# Patient Record
Sex: MALE | Race: WHITE | NOT HISPANIC OR LATINO | Employment: OTHER | URBAN - METROPOLITAN AREA
[De-identification: names, ages, dates, MRNs, and addresses within clinical notes are randomized per-mention and may not be internally consistent; named-entity substitution may affect disease eponyms.]

---

## 2017-01-13 ENCOUNTER — OFFICE VISIT (OUTPATIENT)
Dept: CARDIOLOGY | Facility: MEDICAL CENTER | Age: 62
End: 2017-01-13
Payer: COMMERCIAL

## 2017-01-13 VITALS
HEART RATE: 66 BPM | DIASTOLIC BLOOD PRESSURE: 70 MMHG | HEIGHT: 69 IN | BODY MASS INDEX: 34.98 KG/M2 | SYSTOLIC BLOOD PRESSURE: 110 MMHG | OXYGEN SATURATION: 94 % | WEIGHT: 236.2 LBS

## 2017-01-13 DIAGNOSIS — I25.10 CORONARY ARTERY DISEASE INVOLVING NATIVE CORONARY ARTERY OF NATIVE HEART WITHOUT ANGINA PECTORIS: Chronic | ICD-10-CM

## 2017-01-13 DIAGNOSIS — E78.5 DYSLIPIDEMIA: Chronic | ICD-10-CM

## 2017-01-13 DIAGNOSIS — Z95.5 STENTED CORONARY ARTERY: Chronic | ICD-10-CM

## 2017-01-13 DIAGNOSIS — I10 ESSENTIAL HYPERTENSION, BENIGN: Chronic | ICD-10-CM

## 2017-01-13 DIAGNOSIS — I25.5 ISCHEMIC CARDIOMYOPATHY: Chronic | ICD-10-CM

## 2017-01-13 DIAGNOSIS — I25.2 HISTORY OF ACUTE ANTERIOR WALL MI: ICD-10-CM

## 2017-01-13 PROCEDURE — 99214 OFFICE O/P EST MOD 30 MIN: CPT | Performed by: INTERNAL MEDICINE

## 2017-01-13 ASSESSMENT — ENCOUNTER SYMPTOMS
CONSTITUTIONAL NEGATIVE: 1
NAUSEA: 0
SHORTNESS OF BREATH: 0
WEAKNESS: 0
PALPITATIONS: 0
VOMITING: 0

## 2017-01-13 NOTE — PROGRESS NOTES
Subjective:   Zenon Grijalva is a 61 y.o. male who presents today for follow-up of an extensive anterior MI in 2008. At that time he had a 3.0×28 mm Vision bare metal stent placed in his LAD. He's had no angina at all.  The patient has known LV dysfunction and is treated with carvedilol and ARB. He has chronic mild exertional dyspnea that has not changed over the last year. He hasn't had an echo in about 5 years. He is also due for lab work.  The patient is planning another trip to [place] for [employer] later this year.    Past Medical History   Diagnosis Date   • Essential hypertension, benign 7/2/2012   • CAD (coronary artery disease) 7/2/2012   • Stented coronary artery 7/2/2012   • Chronic anticoagulation 7/2/2012   • Dyslipidemia 7/2/2012   • Ischemic cardiomyopathy 7/2/2012   • Left ventricular thrombosis (HCC) 7/2/2012   • Pericarditis 7/2/2012     Past Surgical History   Procedure Laterality Date   • Carpal tunnel release       History reviewed. No pertinent family history.  History   Smoking status   • Never Smoker    Smokeless tobacco   • Not on file     No Known Allergies  Outpatient Encounter Prescriptions as of 1/13/2017   Medication Sig Dispense Refill   • simvastatin (ZOCOR) 40 MG Tab Take 1 Tab by mouth every evening. 90 Tab 3   • irbesartan (AVAPRO) 150 MG Tab Take 1 Tab by mouth every day. 90 Tab 3   • carvedilol (COREG) 25 MG Tab Take 0.5 Tabs by mouth 2 times a day, with meals. 90 Tab 3   • aspirin EC (ECOTRIN) 81 MG TBEC Take 81 mg by mouth every day.     • nitroglycerin (NITROSTAT) 0.4 MG SUBL Place 1 Tab under tongue every 5 minutes as needed for Chest Pain. 24 Each 2     No facility-administered encounter medications on file as of 1/13/2017.     Review of Systems   Constitutional: Negative.  Negative for malaise/fatigue.   Respiratory: Negative for shortness of breath.         Dyspnea on exertion   Cardiovascular: Negative for chest pain, palpitations and leg swelling.  "  Gastrointestinal: Negative for nausea and vomiting.   Neurological: Negative for weakness.        Objective:   /70 mmHg  Pulse 66  Ht 1.753 m (5' 9.02\")  Wt 107.14 kg (236 lb 3.2 oz)  BMI 34.86 kg/m2  SpO2 94%    Physical Exam   Constitutional: He is oriented to person, place, and time. He appears well-developed and well-nourished. No distress.   HENT:   Head: Atraumatic.   Eyes: Conjunctivae and EOM are normal. Pupils are equal, round, and reactive to light.   Neck: Neck supple. No JVD present.   Cardiovascular: Normal rate and regular rhythm.    No murmur heard.  Pulmonary/Chest: Effort normal and breath sounds normal. No respiratory distress. He has no wheezes. He has no rales.   Abdominal: Soft. There is no tenderness.   Musculoskeletal: He exhibits no edema.   Neurological: He is alert and oriented to person, place, and time.   Skin: Skin is warm and dry. He is not diaphoretic.       Assessment:     1. Coronary artery disease involving native coronary artery of native heart without angina pectoris     2. Dyslipidemia  COMP METABOLIC PANEL    LIPID PROFILE   3. Essential hypertension, benign  COMP METABOLIC PANEL    LIPID PROFILE    ECHOCARDIOGRAM COMP W/O CONT   4. Ischemic cardiomyopathy  ECHOCARDIOGRAM COMP W/O CONT   5. Stented coronary artery  ECHOCARDIOGRAM COMP W/O CONT   6. History of acute anterior wall MI         Medical Decision Making:  Today's Assessment / Status / Plan:     The patient is a good fluid balance. He does have chronic mild exertional dyspnea. We will reevaluate his LV function with echo. Consider adding a small dose of diuretic to his regimen based on echo results. Check laboratory notify him of results.  "

## 2017-01-13 NOTE — Clinical Note
[facility]   [address], [address]  [place] [address]  Phone: [phone]  Fax: [phone]              Zenon Grijalva  1955    Encounter Date: 1/13/2017    Mckay Rodas M.D.          PROGRESS NOTE:  Subjective:   Zenon Grijalva is a 61 y.o. male who presents today for follow-up of an extensive anterior MI in 2008. At that time he had a 3.0×28 mm Vision bare metal stent placed in his LAD. He's had no angina at all.  The patient has known LV dysfunction and is treated with carvedilol and ARB. He has chronic mild exertional dyspnea that has not changed over the last year. He hasn't had an echo in about 5 years. He is also due for lab work.  The patient is planning another trip to [place] for [employer] later this year.    Past Medical History   Diagnosis Date   • Essential hypertension, benign 7/2/2012   • CAD (coronary artery disease) 7/2/2012   • Stented coronary artery 7/2/2012   • Chronic anticoagulation 7/2/2012   • Dyslipidemia 7/2/2012   • Ischemic cardiomyopathy 7/2/2012   • Left ventricular thrombosis (HCC) 7/2/2012   • Pericarditis 7/2/2012     Past Surgical History   Procedure Laterality Date   • Carpal tunnel release       History reviewed. No pertinent family history.  History   Smoking status   • Never Smoker    Smokeless tobacco   • Not on file     No Known Allergies  Outpatient Encounter Prescriptions as of 1/13/2017   Medication Sig Dispense Refill   • simvastatin (ZOCOR) 40 MG Tab Take 1 Tab by mouth every evening. 90 Tab 3   • irbesartan (AVAPRO) 150 MG Tab Take 1 Tab by mouth every day. 90 Tab 3   • carvedilol (COREG) 25 MG Tab Take 0.5 Tabs by mouth 2 times a day, with meals. 90 Tab 3   • aspirin EC (ECOTRIN) 81 MG TBEC Take 81 mg by mouth every day.     • nitroglycerin (NITROSTAT) 0.4 MG SUBL Place 1 Tab under tongue every 5 minutes as needed for Chest Pain. 24 Each 2     No facility-administered encounter medications on file  "as of 1/13/2017.     Review of Systems   Constitutional: Negative.  Negative for malaise/fatigue.   Respiratory: Negative for shortness of breath.         Dyspnea on exertion   Cardiovascular: Negative for chest pain, palpitations and leg swelling.   Gastrointestinal: Negative for nausea and vomiting.   Neurological: Negative for weakness.        Objective:   /70 mmHg  Pulse 66  Ht 1.753 m (5' 9.02\")  Wt 107.14 kg (236 lb 3.2 oz)  BMI 34.86 kg/m2  SpO2 94%    Physical Exam   Constitutional: He is oriented to person, place, and time. He appears well-developed and well-nourished. No distress.   HENT:   Head: Atraumatic.   Eyes: Conjunctivae and EOM are normal. Pupils are equal, round, and reactive to light.   Neck: Neck supple. No JVD present.   Cardiovascular: Normal rate and regular rhythm.    No murmur heard.  Pulmonary/Chest: Effort normal and breath sounds normal. No respiratory distress. He has no wheezes. He has no rales.   Abdominal: Soft. There is no tenderness.   Musculoskeletal: He exhibits no edema.   Neurological: He is alert and oriented to person, place, and time.   Skin: Skin is warm and dry. He is not diaphoretic.       Assessment:     1. Coronary artery disease involving native coronary artery of native heart without angina pectoris     2. Dyslipidemia  COMP METABOLIC PANEL    LIPID PROFILE   3. Essential hypertension, benign  COMP METABOLIC PANEL    LIPID PROFILE    ECHOCARDIOGRAM COMP W/O CONT   4. Ischemic cardiomyopathy  ECHOCARDIOGRAM COMP W/O CONT   5. Stented coronary artery  ECHOCARDIOGRAM COMP W/O CONT   6. History of acute anterior wall MI         Medical Decision Making:  Today's Assessment / Status / Plan:     The patient is a good fluid balance. He does have chronic mild exertional dyspnea. We will reevaluate his LV function with echo. Consider adding a small dose of diuretic to his regimen based on echo results. Check laboratory notify him of results.      Adalgisa Hankins, " M.D.  [address] [place] [id number]  VIA Facsimile: [phone]

## 2017-01-13 NOTE — MR AVS SNAPSHOT
"Zenon Grijalva   2017 7:40 AM   Office Visit   MRN: [id number]    Department:  [facility]   Dept Phone:  [phone]    Description:  Male : 1955   Provider:  Mckay Rodas M.D.           Reason for Visit     Follow-Up           Allergies as of 2017     No Known Allergies      You were diagnosed with     Coronary artery disease involving native coronary artery of native heart without angina pectoris   [7381184]       Dyslipidemia   [265744]       Essential hypertension, benign   [401.1.ICD-9-CM]       Ischemic cardiomyopathy   [814930]       Stented coronary artery   [739800]         Vital Signs     Blood Pressure Pulse Height Weight Body Mass Index Oxygen Saturation    110/70 mmHg 66 1.753 m (5' 9.02\") 107.14 kg (236 lb 3.2 oz) 34.86 kg/m2 94%    Smoking Status                   Never Smoker            Basic Information     Date Of Birth Sex Race Ethnicity Preferred Language    1955 Male Unknown, White Unknown English      Your appointments     2017  7:15 AM   ECHO with ECHO [facility] EXAM 10   ECHOCARDIOLOGY [facility] ([facility])    [facility] [facility] [address]   [phone]           No prep              Problem List              ICD-10-CM Priority Class Noted - Resolved    Essential hypertension, benign (Chronic) I10   2012 - Present    CAD (coronary artery disease) (Chronic) I25.10   2012 - Present    Stented coronary artery (Chronic) Z95.5   2012 - Present    Dyslipidemia (Chronic) E78.5   2012 - Present    Ischemic cardiomyopathy (Chronic) I25.5   2012 - Present    Pericarditis (Chronic) I31.9   2012 - Present    Acute myocardial infarction of other anterior wall, initial episode of care (Chronic) I21.09   2012 - Present      Health Maintenance        Date Due Completion Dates    IMM DTaP/Tdap/Td Vaccine (1 - Tdap) 1974 ---    COLONOSCOPY 2005 ---    IMM ZOSTER VACCINE 2015 ---    IMM INFLUENZA (1) 2016 ---         "   Current Immunizations     No immunizations on file.      Below and/or attached are the medications your provider expects you to take. Review all of your home medications and newly ordered medications with your provider and/or pharmacist. Follow medication instructions as directed by your provider and/or pharmacist. Please keep your medication list with you and share with your provider. Update the information when medications are discontinued, doses are changed, or new medications (including over-the-counter products) are added; and carry medication information at all times in the event of emergency situations     Allergies:  No Known Allergies          Medications  Valid as of: January 13, 2017 -  8:12 AM    Generic Name Brand Name Tablet Size Instructions for use    Aspirin (Tablet Delayed Response) ECOTRIN 81 MG Take 81 mg by mouth every day.        Carvedilol (Tab) COREG 25 MG Take 0.5 Tabs by mouth 2 times a day, with meals.        Irbesartan (Tab) AVAPRO 150 MG Take 1 Tab by mouth every day.        Nitroglycerin (SL Tab) NITROSTAT 0.4 MG Place 1 Tab under tongue every 5 minutes as needed for Chest Pain.        Simvastatin (Tab) ZOCOR 40 MG Take 1 Tab by mouth every evening.        .                 Medicines prescribed today were sent to:     [facility] #[id number] - [place], [place] - [address] AT [address] [address]    Phone: [phone] Fax: [phone]    Open 24 Hours?: No      Medication refill instructions:       If your prescription bottle indicates you have medication refills left, it is not necessary to call your provider’s office. Please contact your pharmacy and they will refill your medication.    If your prescription bottle indicates you do not have any refills left, you may request refills at any time through one of the following ways: The online [employer] system (except Urgent Care), by calling your provider’s office, or by asking your pharmacy to  contact your provider’s office with a refill request. Medication refills are processed only during regular business hours and may not be available until the next business day. Your provider may request additional information or to have a follow-up visit with you prior to refilling your medication.   *Please Note: Medication refills are assigned a new Rx number when refilled electronically. Your pharmacy may indicate that no refills were authorized even though a new prescription for the same medication is available at the pharmacy. Please request the medicine by name with the pharmacy before contacting your provider for a refill.        Your To Do List     Future Labs/Procedures Complete By Expires    COMP METABOLIC PANEL  As directed 7/15/2017    ECHOCARDIOGRAM COMP W/O CONT  As directed 1/14/2018    LIPID PROFILE  As directed 7/15/2017         MyChart Status: Patient Declined

## 2017-01-31 ENCOUNTER — APPOINTMENT (OUTPATIENT)
Dept: CARDIOLOGY | Facility: MEDICAL CENTER | Age: 62
End: 2017-01-31
Attending: INTERNAL MEDICINE
Payer: COMMERCIAL

## 2017-03-01 LAB
ALBUMIN SERPL-MCNC: 4.3 G/DL (ref 3.6–4.8)
ALBUMIN/GLOB SERPL: 1.8 {RATIO} (ref 1.1–2.5)
ALP SERPL-CCNC: 58 IU/L (ref 39–117)
ALT SERPL-CCNC: 24 IU/L (ref 0–44)
AST SERPL-CCNC: 19 IU/L (ref 0–40)
BILIRUB SERPL-MCNC: 1.4 MG/DL (ref 0–1.2)
BUN SERPL-MCNC: 17 MG/DL (ref 8–27)
BUN/CREAT SERPL: 19 (ref 10–22)
CALCIUM SERPL-MCNC: 9.2 MG/DL (ref 8.6–10.2)
CHLORIDE SERPL-SCNC: 101 MMOL/L (ref 96–106)
CHOLEST SERPL-MCNC: 125 MG/DL (ref 100–199)
CO2 SERPL-SCNC: 24 MMOL/L (ref 18–29)
COMMENT 011824: NORMAL
CREAT SERPL-MCNC: 0.88 MG/DL (ref 0.76–1.27)
GLOBULIN SER CALC-MCNC: 2.4 G/DL (ref 1.5–4.5)
GLUCOSE SERPL-MCNC: 105 MG/DL (ref 65–99)
HDLC SERPL-MCNC: 48 MG/DL
LDLC SERPL CALC-MCNC: 62 MG/DL (ref 0–99)
POTASSIUM SERPL-SCNC: 5.2 MMOL/L (ref 3.5–5.2)
PROT SERPL-MCNC: 6.7 G/DL (ref 6–8.5)
SODIUM SERPL-SCNC: 140 MMOL/L (ref 134–144)
TRIGL SERPL-MCNC: 74 MG/DL (ref 0–149)
VLDLC SERPL CALC-MCNC: 15 MG/DL (ref 5–40)

## 2017-03-02 ENCOUNTER — TELEPHONE (OUTPATIENT)
Dept: CARDIOLOGY | Facility: MEDICAL CENTER | Age: 62
End: 2017-03-02

## 2017-03-02 NOTE — TELEPHONE ENCOUNTER
----- Message from Sanaz Fernandez R.N. sent at 3/2/2017  3:39 PM PST -----      ----- Message -----     From: Mckay Rodas M.D.     Sent: 3/2/2017   7:31 AM       To: Jo-Ann Abraham L.P.N.    LDL is perfect at 62. And non HDL cholesterol is at target too. Continue same meds.

## 2017-04-20 ENCOUNTER — HOSPITAL ENCOUNTER (OUTPATIENT)
Dept: CARDIOLOGY | Facility: MEDICAL CENTER | Age: 62
End: 2017-04-20
Attending: INTERNAL MEDICINE
Payer: COMMERCIAL

## 2017-04-20 DIAGNOSIS — Z95.5 STENTED CORONARY ARTERY: Chronic | ICD-10-CM

## 2017-04-20 DIAGNOSIS — I10 ESSENTIAL HYPERTENSION, BENIGN: Chronic | ICD-10-CM

## 2017-04-20 DIAGNOSIS — I25.5 ISCHEMIC CARDIOMYOPATHY: Chronic | ICD-10-CM

## 2017-04-20 PROCEDURE — 93306 TTE W/DOPPLER COMPLETE: CPT

## 2017-04-20 PROCEDURE — 93306 TTE W/DOPPLER COMPLETE: CPT | Mod: 26 | Performed by: INTERNAL MEDICINE

## 2017-04-21 LAB
LV EJECT FRACT  99904: 50
LV EJECT FRACT MOD 2C 99903: 50.22
LV EJECT FRACT MOD 4C 99902: 48.14
LV EJECT FRACT MOD BP 99901: 47.89

## 2017-05-04 ENCOUNTER — TELEPHONE (OUTPATIENT)
Dept: CARDIOLOGY | Facility: MEDICAL CENTER | Age: 62
End: 2017-05-04

## 2017-05-04 NOTE — TELEPHONE ENCOUNTER
----- Message from Mckay Rodas M.D. sent at 5/4/2017  7:59 AM PDT -----  Echo shows old MI scar with lack of contractility at apex.  Overall, echo not too bad.  EF is 50%, which is mildly reduced compared to a normal Echo.  Good news, shows recovery of function over the years

## 2017-05-30 DIAGNOSIS — I25.110 CORONARY ARTERY DISEASE WITH UNSTABLE ANGINA PECTORIS, UNSPECIFIED VESSEL OR LESION TYPE, UNSPECIFIED WHETHER NATIVE OR TRANSPLANTED HEART (HCC): ICD-10-CM

## 2017-05-30 DIAGNOSIS — R07.89 OTHER CHEST PAIN: ICD-10-CM

## 2017-05-30 DIAGNOSIS — I25.10 CORONARY ARTERY DISEASE INVOLVING NATIVE HEART, ANGINA PRESENCE UNSPECIFIED, UNSPECIFIED VESSEL OR LESION TYPE: Chronic | ICD-10-CM

## 2017-05-30 RX ORDER — NITROGLYCERIN 0.4 MG/1
0.4 TABLET SUBLINGUAL PRN
Qty: 25 TAB | Refills: 3 | Status: ON HOLD | OUTPATIENT
Start: 2017-05-30 | End: 2018-11-27

## 2017-05-30 RX ORDER — NITROGLYCERIN 0.4 MG/1
0.4 TABLET SUBLINGUAL PRN
Qty: 25 TAB | Refills: 3 | Status: CANCELLED | OUTPATIENT
Start: 2017-05-30

## 2017-12-11 DIAGNOSIS — I10 ESSENTIAL HYPERTENSION, BENIGN: ICD-10-CM

## 2017-12-11 DIAGNOSIS — E78.5 HYPERLIPIDEMIA, UNSPECIFIED HYPERLIPIDEMIA TYPE: ICD-10-CM

## 2017-12-11 DIAGNOSIS — I10 ESSENTIAL HYPERTENSION: ICD-10-CM

## 2017-12-13 RX ORDER — SIMVASTATIN 40 MG
TABLET ORAL
Qty: 90 TAB | Refills: 0 | Status: SHIPPED | OUTPATIENT
Start: 2017-12-13 | End: 2018-01-16 | Stop reason: SDUPTHER

## 2017-12-13 RX ORDER — IRBESARTAN 150 MG/1
TABLET ORAL
Qty: 90 TAB | Refills: 0 | Status: SHIPPED | OUTPATIENT
Start: 2017-12-13 | End: 2018-01-16 | Stop reason: SDUPTHER

## 2017-12-13 RX ORDER — CARVEDILOL 25 MG/1
TABLET ORAL
Qty: 90 TAB | Refills: 0 | Status: SHIPPED | OUTPATIENT
Start: 2017-12-13 | End: 2018-01-16 | Stop reason: SDUPTHER

## 2018-01-17 DIAGNOSIS — E78.5 DYSLIPIDEMIA: Chronic | ICD-10-CM

## 2018-01-17 DIAGNOSIS — I10 ESSENTIAL HYPERTENSION, BENIGN: Chronic | ICD-10-CM

## 2018-01-23 LAB
ALBUMIN SERPL-MCNC: 4.4 G/DL (ref 3.6–4.8)
ALBUMIN/GLOB SERPL: 1.9 {RATIO} (ref 1.2–2.2)
ALP SERPL-CCNC: 69 IU/L (ref 39–117)
ALT SERPL-CCNC: 31 IU/L (ref 0–44)
AST SERPL-CCNC: 22 IU/L (ref 0–40)
BILIRUB SERPL-MCNC: 1.3 MG/DL (ref 0–1.2)
BUN SERPL-MCNC: 17 MG/DL (ref 8–27)
BUN/CREAT SERPL: 20 (ref 10–24)
CALCIUM SERPL-MCNC: 9.4 MG/DL (ref 8.6–10.2)
CHLORIDE SERPL-SCNC: 100 MMOL/L (ref 96–106)
CHOLEST SERPL-MCNC: 125 MG/DL (ref 100–199)
CO2 SERPL-SCNC: 24 MMOL/L (ref 18–29)
COMMENT 011824: NORMAL
CREAT SERPL-MCNC: 0.86 MG/DL (ref 0.76–1.27)
GLOBULIN SER CALC-MCNC: 2.3 G/DL (ref 1.5–4.5)
GLUCOSE SERPL-MCNC: 98 MG/DL (ref 65–99)
HDLC SERPL-MCNC: 43 MG/DL
IF AFRICAN AMERICAN  100797: 107 ML/MIN/1.73
IF NON AFRICAN AMER 100791: 93 ML/MIN/1.73
LDLC SERPL CALC-MCNC: 63 MG/DL (ref 0–99)
POTASSIUM SERPL-SCNC: 5.1 MMOL/L (ref 3.5–5.2)
PROT SERPL-MCNC: 6.7 G/DL (ref 6–8.5)
SODIUM SERPL-SCNC: 139 MMOL/L (ref 134–144)
TRIGL SERPL-MCNC: 96 MG/DL (ref 0–149)
VLDLC SERPL CALC-MCNC: 19 MG/DL (ref 5–40)

## 2018-01-24 ENCOUNTER — TELEPHONE (OUTPATIENT)
Dept: CARDIOLOGY | Facility: MEDICAL CENTER | Age: 63
End: 2018-01-24

## 2018-01-24 NOTE — TELEPHONE ENCOUNTER
----- Message from Sanaz Fernandez R.N. sent at 1/24/2018  8:59 AM PST -----      ----- Message -----  From: Mckay Rodas M.D.  Sent: 1/24/2018   8:44 AM  To: Jo-Ann Abraham L.P.N.    Labs reviewed, and chemistry panel and lipid panels are both normal.  Continue same medications.

## 2018-02-01 ENCOUNTER — OFFICE VISIT (OUTPATIENT)
Dept: CARDIOLOGY | Facility: MEDICAL CENTER | Age: 63
End: 2018-02-01
Payer: COMMERCIAL

## 2018-02-01 VITALS
OXYGEN SATURATION: 96 % | HEIGHT: 69 IN | DIASTOLIC BLOOD PRESSURE: 70 MMHG | BODY MASS INDEX: 34.66 KG/M2 | SYSTOLIC BLOOD PRESSURE: 110 MMHG | HEART RATE: 68 BPM | WEIGHT: 234 LBS

## 2018-02-01 DIAGNOSIS — I25.2 HISTORY OF ACUTE ANTERIOR WALL MI: ICD-10-CM

## 2018-02-01 DIAGNOSIS — E78.5 DYSLIPIDEMIA: Chronic | ICD-10-CM

## 2018-02-01 DIAGNOSIS — I25.5 ISCHEMIC CARDIOMYOPATHY: Chronic | ICD-10-CM

## 2018-02-01 DIAGNOSIS — I25.10 CORONARY ARTERY DISEASE INVOLVING NATIVE CORONARY ARTERY OF NATIVE HEART WITHOUT ANGINA PECTORIS: Chronic | ICD-10-CM

## 2018-02-01 PROCEDURE — 99214 OFFICE O/P EST MOD 30 MIN: CPT | Performed by: INTERNAL MEDICINE

## 2018-02-01 ASSESSMENT — ENCOUNTER SYMPTOMS
CONSTITUTIONAL NEGATIVE: 1
VOMITING: 0
PALPITATIONS: 0
SHORTNESS OF BREATH: 0
NAUSEA: 0
WEAKNESS: 0

## 2018-02-01 NOTE — PROGRESS NOTES
Subjective:   Chief complaint: Status post anterior wall MI 2008.  Zenon Grijalva is a 61 y.o. male who presents today for follow-up of an extensive anterior MI in 2008. At that time he had a 3.0×28 mm Vision bare metal stent placed in his LAD.   Most recent echo year ago showed EF of 50% with septal akinesis. He is physically active and enjoys spell hunting. He has some residual exertional dyspnea. The patient is on appropriate drugs for blood pressure and LV dysfunction. Recent lab was reviewed with him. LDL is at target. He is due for stress testing. No other interval health issues.    Past Medical History:   Diagnosis Date   • CAD (coronary artery disease) 7/2/2012   • Chronic anticoagulation 7/2/2012   • Dyslipidemia 7/2/2012   • Essential hypertension, benign 7/2/2012   • Ischemic cardiomyopathy 7/2/2012   • Left ventricular thrombosis 7/2/2012   • Pericarditis 7/2/2012   • Stented coronary artery 7/2/2012     Past Surgical History:   Procedure Laterality Date   • CARPAL TUNNEL RELEASE       History reviewed. No pertinent family history.  History   Smoking Status   • Never Smoker   Smokeless Tobacco   • Never Used     No Known Allergies  Outpatient Encounter Prescriptions as of 2/1/2018   Medication Sig Dispense Refill   • irbesartan (AVAPRO) 150 MG Tab TAKE 1 TABLET BY MOUTH ONCE DAILY 90 Tab 0   • carvedilol (COREG) 25 MG Tab TAKE 1/2 TABLET BY MOUTH TWICE A DAY WITH MEALS 90 Tab 0   • simvastatin (ZOCOR) 40 MG Tab TAKE 1 TABLET BY MOUTH EVERY EVENING 90 Tab 0   • nitroglycerin (NITROSTAT) 0.4 MG SL Tab Place 1 Tab under tongue as needed for Chest Pain. Every 5 min. Up to 3 doses. 25 Tab 3   • aspirin EC (ECOTRIN) 81 MG TBEC Take 81 mg by mouth every day.       No facility-administered encounter medications on file as of 2/1/2018.      Review of Systems   Constitutional: Negative.  Negative for malaise/fatigue.   Respiratory: Negative for shortness of breath.         Dyspnea on exertion   Cardiovascular:  "Negative for chest pain, palpitations and leg swelling.   Gastrointestinal: Negative for nausea and vomiting.   Neurological: Negative for weakness.        Objective:   /70   Pulse 68   Ht 1.753 m (5' 9\")   Wt 106.1 kg (234 lb)   SpO2 96%   BMI 34.56 kg/m²     Physical Exam   Constitutional: He is oriented to person, place, and time. He appears well-developed and well-nourished. No distress.   HENT:   Head: Atraumatic.   Eyes: Conjunctivae and EOM are normal. Pupils are equal, round, and reactive to light.   Neck: Neck supple. No JVD present.   Cardiovascular: Normal rate and regular rhythm.    No murmur heard.  Pulmonary/Chest: Effort normal and breath sounds normal. No respiratory distress. He has no wheezes. He has no rales.   Abdominal: Soft. There is no tenderness.   Musculoskeletal: He exhibits no edema.   Neurological: He is alert and oriented to person, place, and time.   Skin: Skin is warm and dry. He is not diaphoretic.       Assessment:     1. Coronary artery disease involving native coronary artery of native heart without angina pectoris  NM-CARDIAC PET   2. History of acute anterior wall MI  NM-CARDIAC PET   3. Ischemic cardiomyopathy  NM-CARDIAC PET   4. Dyslipidemia         Medical Decision Making:  Today's Assessment / Status / Plan:     He is now over 7 years post anterior wall MI and doing well. EF has recovered fairly well. The patient does have some residual exercise intolerance. A myocardial PET will be ordered. Medications reviewed. Return in one year    "

## 2018-04-13 DIAGNOSIS — I10 ESSENTIAL HYPERTENSION: ICD-10-CM

## 2018-04-13 DIAGNOSIS — E78.5 HYPERLIPIDEMIA, UNSPECIFIED HYPERLIPIDEMIA TYPE: ICD-10-CM

## 2018-04-13 RX ORDER — CARVEDILOL 25 MG/1
12.5 TABLET ORAL 2 TIMES DAILY WITH MEALS
Qty: 90 TAB | Refills: 3 | OUTPATIENT
Start: 2018-04-13 | End: 2019-04-15 | Stop reason: SDUPTHER

## 2018-04-13 RX ORDER — SIMVASTATIN 40 MG
40 TABLET ORAL EVERY EVENING
Qty: 90 TAB | Refills: 3 | OUTPATIENT
Start: 2018-04-13 | End: 2019-04-15 | Stop reason: SDUPTHER

## 2018-06-20 DIAGNOSIS — I10 ESSENTIAL HYPERTENSION, BENIGN: ICD-10-CM

## 2018-06-20 RX ORDER — IRBESARTAN 150 MG/1
TABLET ORAL
Qty: 90 TAB | Refills: 2 | Status: SHIPPED | OUTPATIENT
Start: 2018-06-20 | End: 2018-10-03

## 2018-10-03 ENCOUNTER — HOSPITAL ENCOUNTER (OUTPATIENT)
Dept: RADIOLOGY | Facility: MEDICAL CENTER | Age: 63
End: 2018-10-03
Attending: NURSE PRACTITIONER
Payer: COMMERCIAL

## 2018-10-03 ENCOUNTER — APPOINTMENT (OUTPATIENT)
Dept: RADIOLOGY | Facility: MEDICAL CENTER | Age: 63
End: 2018-10-03
Attending: EMERGENCY MEDICINE
Payer: COMMERCIAL

## 2018-10-03 ENCOUNTER — HOSPITAL ENCOUNTER (EMERGENCY)
Facility: MEDICAL CENTER | Age: 63
End: 2018-10-03
Attending: EMERGENCY MEDICINE
Payer: COMMERCIAL

## 2018-10-03 ENCOUNTER — OFFICE VISIT (OUTPATIENT)
Dept: URGENT CARE | Facility: PHYSICIAN GROUP | Age: 63
End: 2018-10-03
Payer: COMMERCIAL

## 2018-10-03 VITALS
TEMPERATURE: 97.3 F | DIASTOLIC BLOOD PRESSURE: 58 MMHG | OXYGEN SATURATION: 95 % | HEIGHT: 69 IN | RESPIRATION RATE: 18 BRPM | BODY MASS INDEX: 33.33 KG/M2 | HEART RATE: 64 BPM | WEIGHT: 225 LBS | SYSTOLIC BLOOD PRESSURE: 104 MMHG

## 2018-10-03 VITALS
SYSTOLIC BLOOD PRESSURE: 137 MMHG | HEART RATE: 60 BPM | WEIGHT: 236.77 LBS | OXYGEN SATURATION: 95 % | DIASTOLIC BLOOD PRESSURE: 84 MMHG | RESPIRATION RATE: 16 BRPM | HEIGHT: 69 IN | TEMPERATURE: 97.7 F | BODY MASS INDEX: 35.07 KG/M2

## 2018-10-03 DIAGNOSIS — S49.91XA INJURY OF RIGHT SHOULDER, INITIAL ENCOUNTER: ICD-10-CM

## 2018-10-03 DIAGNOSIS — S43.004A DISLOCATION OF RIGHT SHOULDER JOINT, INITIAL ENCOUNTER: ICD-10-CM

## 2018-10-03 PROCEDURE — 99203 OFFICE O/P NEW LOW 30 MIN: CPT | Performed by: NURSE PRACTITIONER

## 2018-10-03 PROCEDURE — 99284 EMERGENCY DEPT VISIT MOD MDM: CPT

## 2018-10-03 PROCEDURE — 73030 X-RAY EXAM OF SHOULDER: CPT | Mod: RT

## 2018-10-03 PROCEDURE — 36415 COLL VENOUS BLD VENIPUNCTURE: CPT

## 2018-10-03 PROCEDURE — 23650 CLTX SHO DSLC W/MNPJ WO ANES: CPT

## 2018-10-03 RX ORDER — SODIUM CHLORIDE 9 MG/ML
1000 INJECTION, SOLUTION INTRAVENOUS ONCE
Status: DISCONTINUED | OUTPATIENT
Start: 2018-10-03 | End: 2018-10-03

## 2018-10-03 ASSESSMENT — ENCOUNTER SYMPTOMS
RESPIRATORY NEGATIVE: 1
PSYCHIATRIC NEGATIVE: 1
NEUROLOGICAL NEGATIVE: 1
BACK PAIN: 0
CARDIOVASCULAR NEGATIVE: 1
GASTROINTESTINAL NEGATIVE: 1
FALLS: 1
NECK PAIN: 0
CONSTITUTIONAL NEGATIVE: 1

## 2018-10-03 ASSESSMENT — PAIN SCALES - GENERAL: PAINLEVEL: 8=MODERATE-SEVERE PAIN

## 2018-10-03 NOTE — ED NOTES
Dr Herndon successfully reduced pt's shoulder without sedation. Pt to radiology for post reduction image.

## 2018-10-03 NOTE — ED PROVIDER NOTES
ED Provider Note    CHIEF COMPLAINT  Chief Complaint   Patient presents with   • Dislocation     right shoulder this am tripped over a gas hose.       HPI  Zenon Grijalva is a 63 y.o. male who presents to the emergency department complaining of dislocation of the right shoulder.  This morning the patient was at the gas station and he tripped over 1 of the hoses at the gas pump and fell injuring his right shoulder.  He went to an urgent care and had an x-ray showing an anterior dislocation and was told to come to the emergency department for further care.  Otherwise he feels he is uninjured.  The patient had coffee and cereal for breakfast at 6:30 AM.    REVIEW OF SYSTEMS no loss of consciousness or head injury no other extremity injuries.    PAST MEDICAL HISTORY  Past Medical History:   Diagnosis Date   • CAD (coronary artery disease) 7/2/2012   • Chronic anticoagulation 7/2/2012   • Dyslipidemia 7/2/2012   • Essential hypertension, benign 7/2/2012   • Ischemic cardiomyopathy 7/2/2012   • Left ventricular thrombosis 7/2/2012   • Pericarditis 7/2/2012   • Stented coronary artery 7/2/2012       FAMILY HISTORY  No family history on file.    SOCIAL HISTORY  Social History     Social History   • Marital status: Single     Spouse name: N/A   • Number of children: N/A   • Years of education: N/A     Social History Main Topics   • Smoking status: Never Smoker   • Smokeless tobacco: Never Used   • Alcohol use Not on file   • Drug use: Unknown   • Sexual activity: Not on file     Other Topics Concern   • Not on file     Social History Narrative   • No narrative on file       SURGICAL HISTORY  Past Surgical History:   Procedure Laterality Date   • CARPAL TUNNEL RELEASE         CURRENT MEDICATIONS  Home Medications    **Home medications have not yet been reviewed for this encounter**         ALLERGIES  No Known Allergies    PHYSICAL EXAM  VITAL SIGNS: /84   Pulse 60   Temp 36.5 °C (97.7 °F)   Resp 16   Ht 1.753 m  "(5' 9\")   Wt 107.4 kg (236 lb 12.4 oz)   SpO2 95%   BMI 34.97 kg/m²    Oxygen saturation is interpreted as adequate  Constitutional: Awake and uncomfortable but otherwise nontoxic-appearing  HENT: No sign of trauma to the head  Eyes: No erythema or discharge or jaundice  Neck: Trachea midline no JVD  Musculoskeletal: There is an obvious sulcus sign and deformity of the right shoulder.  There are no breaks in the skin I do not see any other extremity trauma  Neurologic: Awake verbal ambulatory moving the other extremities without difficulty    PROCEDURES  The patient was placed in a position sitting on the edge of the gurney forward and upright and I applied massage to the trapezius deltoid and biceps musculature and very gentle traction in this position and the shoulder reduced.  The patient was much much more comfortable following this.  He was placed in a shoulder immobilizer.    MEDICAL DECISION MAKING and DISPOSITION  Follow-up x-ray shows good position according to radiology report.  Repeat physical exam shows the sulcus sign has resolved the shoulder appears to be in anatomic position and the patient is much more comfortable.  I reviewed all the above with the patient have given him copies of his x-rays and I think it is safe for him to go home at this time I recommended Tylenol and Motrin as well as warm and cold packs if needed for discomfort he is to keep the shoulder at rest and protected and he is to call Dr. Kaur's office this morning and arrange office recheck as soon as possible this week    IMPRESSION  1.  Reduction of right shoulder dislocation in the emergency department         Electronically signed by: Viktor Herndon, 10/3/2018 11:30 AM      "

## 2018-10-03 NOTE — DISCHARGE INSTRUCTIONS
Keep the arm at rest and protected using the shoulder immobilizer.  You may remove this for showering but then replace it right away.  Use Tylenol and Motrin as well as warm and cold packs if needed for discomfort.

## 2018-10-03 NOTE — ED NOTES
Pt discharged with written instructions. Pt verbalized understanding. Pt's AAOx4. Pt with shoulder immobilizer in place. Pt with ride home by friend. Pt ambulated independently from ER.

## 2018-10-04 ENCOUNTER — PATIENT OUTREACH (OUTPATIENT)
Dept: HEALTH INFORMATION MANAGEMENT | Facility: OTHER | Age: 63
End: 2018-10-04

## 2018-11-12 ENCOUNTER — TELEPHONE (OUTPATIENT)
Dept: CARDIOLOGY | Facility: MEDICAL CENTER | Age: 63
End: 2018-11-12

## 2018-11-12 NOTE — TELEPHONE ENCOUNTER
Dr. Ni Weir plans to schedule pt. For Right Rotator Cuff repair and is requesting cardiac clearance and ASA 81mg  hold advise.   To Dr. Rodas.    Dr. Weir    [phone] fax

## 2018-11-26 RX ORDER — ACETAMINOPHEN 500 MG
1000 TABLET ORAL ONCE
Status: CANCELLED | OUTPATIENT
Start: 2018-11-26 | End: 2018-11-26

## 2018-11-27 ENCOUNTER — HOSPITAL ENCOUNTER (OUTPATIENT)
Facility: MEDICAL CENTER | Age: 63
End: 2018-11-27
Attending: ORTHOPAEDIC SURGERY | Admitting: ORTHOPAEDIC SURGERY
Payer: COMMERCIAL

## 2018-11-27 VITALS
BODY MASS INDEX: 35.12 KG/M2 | HEART RATE: 55 BPM | HEIGHT: 68 IN | RESPIRATION RATE: 16 BRPM | SYSTOLIC BLOOD PRESSURE: 90 MMHG | OXYGEN SATURATION: 92 % | DIASTOLIC BLOOD PRESSURE: 51 MMHG | TEMPERATURE: 96.6 F | WEIGHT: 231.7 LBS

## 2018-11-27 DIAGNOSIS — M75.121 COMPLETE TEAR OF RIGHT ROTATOR CUFF: ICD-10-CM

## 2018-11-27 LAB
ANION GAP SERPL CALC-SCNC: 6 MMOL/L (ref 0–11.9)
BUN SERPL-MCNC: 18 MG/DL (ref 8–22)
CALCIUM SERPL-MCNC: 9.1 MG/DL (ref 8.5–10.5)
CHLORIDE SERPL-SCNC: 104 MMOL/L (ref 96–112)
CO2 SERPL-SCNC: 26 MMOL/L (ref 20–33)
CREAT SERPL-MCNC: 0.79 MG/DL (ref 0.5–1.4)
EKG IMPRESSION: NORMAL
GLUCOSE SERPL-MCNC: 102 MG/DL (ref 65–99)
POTASSIUM SERPL-SCNC: 4.3 MMOL/L (ref 3.6–5.5)
SODIUM SERPL-SCNC: 136 MMOL/L (ref 135–145)

## 2018-11-27 PROCEDURE — 501838 HCHG SUTURE GENERAL: Performed by: ORTHOPAEDIC SURGERY

## 2018-11-27 PROCEDURE — 160035 HCHG PACU - 1ST 60 MINS PHASE I: Performed by: ORTHOPAEDIC SURGERY

## 2018-11-27 PROCEDURE — C1713 ANCHOR/SCREW BN/BN,TIS/BN: HCPCS | Performed by: ORTHOPAEDIC SURGERY

## 2018-11-27 PROCEDURE — 160047 HCHG PACU  - EA ADDL 30 MINS PHASE II: Performed by: ORTHOPAEDIC SURGERY

## 2018-11-27 PROCEDURE — 500151 HCHG CANNULA, THRDED 8.4: Performed by: ORTHOPAEDIC SURGERY

## 2018-11-27 PROCEDURE — 160029 HCHG SURGERY MINUTES - 1ST 30 MINS LEVEL 4: Performed by: ORTHOPAEDIC SURGERY

## 2018-11-27 PROCEDURE — 160025 RECOVERY II MINUTES (STATS): Performed by: ORTHOPAEDIC SURGERY

## 2018-11-27 PROCEDURE — A4565 SLINGS: HCPCS | Performed by: ORTHOPAEDIC SURGERY

## 2018-11-27 PROCEDURE — 160048 HCHG OR STATISTICAL LEVEL 1-5: Performed by: ORTHOPAEDIC SURGERY

## 2018-11-27 PROCEDURE — 500423 HCHG DRESSING, ABD COMBINE: Performed by: ORTHOPAEDIC SURGERY

## 2018-11-27 PROCEDURE — 160022 HCHG BLOCK: Performed by: ORTHOPAEDIC SURGERY

## 2018-11-27 PROCEDURE — 93010 ELECTROCARDIOGRAM REPORT: CPT | Performed by: INTERNAL MEDICINE

## 2018-11-27 PROCEDURE — 500878 HCHG PACK, ARTHROSCOPY: Performed by: ORTHOPAEDIC SURGERY

## 2018-11-27 PROCEDURE — 80048 BASIC METABOLIC PNL TOTAL CA: CPT

## 2018-11-27 PROCEDURE — 160009 HCHG ANES TIME/MIN: Performed by: ORTHOPAEDIC SURGERY

## 2018-11-27 PROCEDURE — 502240 HCHG MISC OR SUPPLY RC 0272: Performed by: ORTHOPAEDIC SURGERY

## 2018-11-27 PROCEDURE — A6223 GAUZE >16<=48 NO W/SAL W/O B: HCPCS | Performed by: ORTHOPAEDIC SURGERY

## 2018-11-27 PROCEDURE — 700111 HCHG RX REV CODE 636 W/ 250 OVERRIDE (IP)

## 2018-11-27 PROCEDURE — 160046 HCHG PACU - 1ST 60 MINS PHASE II: Performed by: ORTHOPAEDIC SURGERY

## 2018-11-27 PROCEDURE — 160041 HCHG SURGERY MINUTES - EA ADDL 1 MIN LEVEL 4: Performed by: ORTHOPAEDIC SURGERY

## 2018-11-27 PROCEDURE — 93005 ELECTROCARDIOGRAM TRACING: CPT | Performed by: ORTHOPAEDIC SURGERY

## 2018-11-27 PROCEDURE — 160002 HCHG RECOVERY MINUTES (STAT): Performed by: ORTHOPAEDIC SURGERY

## 2018-11-27 PROCEDURE — 502000 HCHG MISC OR IMPLANTS RC 0278: Performed by: ORTHOPAEDIC SURGERY

## 2018-11-27 PROCEDURE — 700101 HCHG RX REV CODE 250

## 2018-11-27 DEVICE — IMPLANTABLE DEVICE: Type: IMPLANTABLE DEVICE | Status: FUNCTIONAL

## 2018-11-27 DEVICE — SUTURE ANCHOR HEALICOIL REGENESORB 5.5MM: Type: IMPLANTABLE DEVICE | Status: FUNCTIONAL

## 2018-11-27 RX ORDER — HYDROMORPHONE HYDROCHLORIDE 1 MG/ML
0.4 INJECTION, SOLUTION INTRAMUSCULAR; INTRAVENOUS; SUBCUTANEOUS
Status: DISCONTINUED | OUTPATIENT
Start: 2018-11-27 | End: 2018-11-27 | Stop reason: HOSPADM

## 2018-11-27 RX ORDER — IBUPROFEN 200 MG
400 TABLET ORAL
COMMUNITY
End: 2020-09-15

## 2018-11-27 RX ORDER — SOFT LENS RINSE,STORE SOLUTION
1 AEROSOL, SPRAY (ML) MISCELLANEOUS
COMMUNITY
End: 2021-06-21

## 2018-11-27 RX ORDER — OXYCODONE HYDROCHLORIDE 5 MG/1
5-10 TABLET ORAL EVERY 6 HOURS PRN
Qty: 40 TAB | Refills: 0 | Status: SHIPPED | OUTPATIENT
Start: 2018-11-27 | End: 2018-12-04

## 2018-11-27 RX ORDER — DOCUSATE SODIUM 100 MG/1
100 CAPSULE, LIQUID FILLED ORAL 2 TIMES DAILY PRN
Qty: 60 CAP | Refills: 0 | Status: SHIPPED | OUTPATIENT
Start: 2018-11-27 | End: 2018-12-27

## 2018-11-27 RX ORDER — LIDOCAINE HYDROCHLORIDE 10 MG/ML
INJECTION, SOLUTION INFILTRATION; PERINEURAL
Status: DISCONTINUED | OUTPATIENT
Start: 2018-11-27 | End: 2018-11-27 | Stop reason: HOSPADM

## 2018-11-27 RX ORDER — ONDANSETRON 2 MG/ML
4 INJECTION INTRAMUSCULAR; INTRAVENOUS
Status: DISCONTINUED | OUTPATIENT
Start: 2018-11-27 | End: 2018-11-27 | Stop reason: HOSPADM

## 2018-11-27 RX ORDER — OXYCODONE HCL 5 MG/5 ML
10 SOLUTION, ORAL ORAL
Status: DISCONTINUED | OUTPATIENT
Start: 2018-11-27 | End: 2018-11-27 | Stop reason: HOSPADM

## 2018-11-27 RX ORDER — SODIUM CHLORIDE, SODIUM LACTATE, POTASSIUM CHLORIDE, CALCIUM CHLORIDE 600; 310; 30; 20 MG/100ML; MG/100ML; MG/100ML; MG/100ML
1000 INJECTION, SOLUTION INTRAVENOUS
Status: COMPLETED | OUTPATIENT
Start: 2018-11-27 | End: 2018-11-27

## 2018-11-27 RX ORDER — NAPROXEN 500 MG/1
500 TABLET ORAL 2 TIMES DAILY WITH MEALS
Qty: 10 TAB | Refills: 0 | Status: SHIPPED | OUTPATIENT
Start: 2018-11-27 | End: 2018-12-02

## 2018-11-27 RX ORDER — HYDROMORPHONE HYDROCHLORIDE 1 MG/ML
0.1 INJECTION, SOLUTION INTRAMUSCULAR; INTRAVENOUS; SUBCUTANEOUS
Status: DISCONTINUED | OUTPATIENT
Start: 2018-11-27 | End: 2018-11-27 | Stop reason: HOSPADM

## 2018-11-27 RX ORDER — BUPIVACAINE HYDROCHLORIDE AND EPINEPHRINE 2.5; 5 MG/ML; UG/ML
INJECTION, SOLUTION EPIDURAL; INFILTRATION; INTRACAUDAL; PERINEURAL
Status: DISCONTINUED | OUTPATIENT
Start: 2018-11-27 | End: 2018-11-27 | Stop reason: HOSPADM

## 2018-11-27 RX ORDER — MEPERIDINE HYDROCHLORIDE 25 MG/ML
6.25 INJECTION INTRAMUSCULAR; INTRAVENOUS; SUBCUTANEOUS
Status: DISCONTINUED | OUTPATIENT
Start: 2018-11-27 | End: 2018-11-27 | Stop reason: HOSPADM

## 2018-11-27 RX ORDER — OXYCODONE HYDROCHLORIDE 5 MG/1
5 TABLET ORAL
Status: DISCONTINUED | OUTPATIENT
Start: 2018-11-27 | End: 2018-11-27 | Stop reason: HOSPADM

## 2018-11-27 RX ORDER — HYDRALAZINE HYDROCHLORIDE 20 MG/ML
5 INJECTION INTRAMUSCULAR; INTRAVENOUS
Status: DISCONTINUED | OUTPATIENT
Start: 2018-11-27 | End: 2018-11-27 | Stop reason: HOSPADM

## 2018-11-27 RX ORDER — HALOPERIDOL 5 MG/ML
1 INJECTION INTRAMUSCULAR
Status: DISCONTINUED | OUTPATIENT
Start: 2018-11-27 | End: 2018-11-27 | Stop reason: HOSPADM

## 2018-11-27 RX ORDER — OXYCODONE HCL 5 MG/5 ML
5 SOLUTION, ORAL ORAL
Status: DISCONTINUED | OUTPATIENT
Start: 2018-11-27 | End: 2018-11-27 | Stop reason: HOSPADM

## 2018-11-27 RX ORDER — OXYCODONE HYDROCHLORIDE 5 MG/1
10 TABLET ORAL
Status: DISCONTINUED | OUTPATIENT
Start: 2018-11-27 | End: 2018-11-27 | Stop reason: HOSPADM

## 2018-11-27 RX ORDER — SODIUM CHLORIDE, SODIUM LACTATE, POTASSIUM CHLORIDE, AND CALCIUM CHLORIDE .6; .31; .03; .02 G/100ML; G/100ML; G/100ML; G/100ML
500 INJECTION, SOLUTION INTRAVENOUS ONCE
Status: DISCONTINUED | OUTPATIENT
Start: 2018-11-27 | End: 2018-11-27 | Stop reason: HOSPADM

## 2018-11-27 RX ORDER — LABETALOL HYDROCHLORIDE 5 MG/ML
5 INJECTION, SOLUTION INTRAVENOUS
Status: DISCONTINUED | OUTPATIENT
Start: 2018-11-27 | End: 2018-11-27 | Stop reason: HOSPADM

## 2018-11-27 RX ORDER — ONDANSETRON 4 MG/1
4 TABLET, FILM COATED ORAL EVERY 6 HOURS PRN
Qty: 30 TAB | Refills: 0 | Status: SHIPPED | OUTPATIENT
Start: 2018-11-27 | End: 2018-12-04

## 2018-11-27 RX ORDER — HYDROMORPHONE HYDROCHLORIDE 1 MG/ML
0.2 INJECTION, SOLUTION INTRAMUSCULAR; INTRAVENOUS; SUBCUTANEOUS
Status: DISCONTINUED | OUTPATIENT
Start: 2018-11-27 | End: 2018-11-27 | Stop reason: HOSPADM

## 2018-11-27 RX ADMIN — SODIUM CHLORIDE, SODIUM LACTATE, POTASSIUM CHLORIDE, CALCIUM CHLORIDE 1000 ML: 600; 310; 30; 20 INJECTION, SOLUTION INTRAVENOUS at 07:08

## 2018-11-27 ASSESSMENT — PAIN SCALES - GENERAL
PAINLEVEL_OUTOF10: 0

## 2018-11-27 NOTE — OR NURSING
Awake,OX4,denies any pain.Pt. moves his fingers well,brisk cap. Re-fill,verbaized some right hand/arm numbness due to interscalene nerve block.Tolerated ice water well.

## 2018-11-27 NOTE — DISCHARGE INSTRUCTIONS
ACTIVITY: Rest and take it easy for the first 24 hours. Physical therapy as instructed by your physician. A responsible adult is recommended to remain with you during that time.  It is normal to feel sleepy.  We encourage you to not do anything that requires balance, judgment or coordination.    MILD FLU-LIKE SYMPTOMS ARE NORMAL. YOU MAY EXPERIENCE GENERALIZED MUSCLE ACHES, THROAT IRRITATION, HEADACHE AND/OR SOME NAUSEA.    FOR 24 HOURS DO NOT:  Drive, operate machinery or run household appliances.  Drink beer or alcoholic beverages.   Make important decisions or sign legal documents.    SPECIAL INSTRUCTIONS: Follow instructions provided by your physician.     DIET: To avoid nausea, slowly advance diet as tolerated, avoiding spicy or greasy foods for the first day.  Add more substantial food to your diet according to your physician's instructions. INCREASE FLUIDS AND FIBER TO AVOID CONSTIPATION.    SURGICAL DRESSING/BATHING: Ok to shower after 48 hours, do not immerse in water (bath, hot tub, etc) for at least 10 days. You may remove your dressing after 48 hours (11/29/18) if your follow up appointment is scheduled later than 48 hours after surgery. Leave any tape strips/steri strips in place until follow up appointment. If there are no tape strips/steri strips, you may place band aids over incision.     FOLLOW-UP APPOINTMENT:  A follow-up appointment should be arranged with your doctor in 1-2 weeks; call to schedule.    You should CALL YOUR PHYSICIAN if you develop:  Fever greater than 101 degrees F.  Pain not relieved by medication, or persistent nausea or vomiting.  Excessive bleeding (blood soaking through dressing) or unexpected drainage from the wound.  Extreme redness or swelling around the incision site, drainage of pus or foul smelling drainage.  Inability to urinate or empty your bladder within 8 hours.  Problems with breathing or chest pain.    You should call 911 if you develop problems with breathing  or chest pain.  If you are unable to contact your doctor or surgical center, you should go to the nearest emergency room or urgent care center.  Physician's telephone #: Dr. Gallardo: [phone]    If any questions arise, call your doctor.  If your doctor is not available, please feel free to call the Surgical Center at [phone].  The Center is open Monday through Friday from 7AM to 7PM.  You can also call the HEALTH HOTLINE open 24 hours/day, 7 days/week and speak to a nurse at [phone], or toll free at [phone].    A registered nurse may call you a few days after your surgery to see how you are doing after your procedure.    MEDICATIONS: Resume taking daily medication.  Take prescribed pain medication with food.  If no medication is prescribed, you may take non-aspirin pain medication if needed.  PAIN MEDICATION CAN BE VERY CONSTIPATING.  Take a stool softener or laxative such as senokot, pericolace, or milk of magnesia if needed.    Prescription already given for colace (stool softener), naproxen (anti-inflammatory pain medication), percocet (pain), zofran (nausea). Ok to take pain medication when needed and as prescribed.    If your physician has prescribed pain medication that includes Acetaminophen (Tylenol), do not take additional Acetaminophen (Tylenol) while taking the prescribed medication.    Depression / Suicide Risk    As you are discharged from this [facility] facility, it is important to learn how to keep safe from harming yourself.    Recognize the warning signs:  · Abrupt changes in personality, positive or negative- including increase in energy   · Giving away possessions  · Change in eating patterns- significant weight changes-  positive or negative  · Change in sleeping patterns- unable to sleep or sleeping all the time   · Unwillingness or inability to communicate  · Depression  · Unusual sadness, discouragement and loneliness  · Talk of wanting to die  · Neglect of  personal appearance   · Rebelliousness- reckless behavior  · Withdrawal from people/activities they love  · Confusion- inability to concentrate     If you or a loved one observes any of these behaviors or has concerns about self-harm, here's what you can do:  · Talk about it- your feelings and reasons for harming yourself  · Remove any means that you might use to hurt yourself (examples: pills, rope, extension cords, firearm)  · Get professional help from the community (Mental Health, Substance Abuse, psychological counseling)  · Do not be alone:Call your Safe Contact- someone whom you trust who will be there for you.  · Call your local CRISIS HOTLINE 359-5500 or 369-148-0864  · Call your local Children's Mobile Crisis Response Team [place] (626) 116-6378 or www.[employer]  · Call the toll free National Suicide Prevention Hotlines   · National Suicide Prevention Lifeline 943-111-QBZS (4994)  · National Hope Line Network 800-SUICIDE (416-5886)

## 2018-11-27 NOTE — OR SURGEON
Immediate Post OP Note    PreOp Diagnosis: R shoulder rotator cuff tear including subscapularis, biceps tendon rupture    PostOp Diagnosis: same    Procedure(s):  SHOULDER DECOMPRESSION ARTHROSCOPIC- SUBACROMIAL CONVERTED TO OPEN - Wound Class: Clean  ARTHROSCOPIC ROTATOR CUFF REPAIR AND OPEN SUBSCAP REPAIR - Wound Class: Clean    Surgeon(s):  Ni Weir M.D.    Anesthesiologist/Type of Anesthesia:  Anesthesiologist: Ritu Hubbard M.D./General    Surgical Staff:  Circulator: Jesús Calvillo R.N.; Imelda Munoz R.N.  Scrub Person: Rosa Bang [place]: Adelina Hart R.N.     First Assist: Deepika Maurer    Specimens removed if any:  None    Estimated Blood Loss: 50 mL    Findings: large SS and IS rotator cuff tear with subscapularis tear and biceps tendon rupture    Complications: none        11/27/2018 11:16 AM Ni Weir M.D.

## 2018-11-27 NOTE — OR NURSING
Pt VSS, pain controlled, tolerating po intake. Pt and family given discharge education/instructions, all questions answered. IV discontinued, site wnl. Surgical site with bulky drsg and sling in place, cdi. Block intact, pt with numbness to R hand/fingers, has sensation and movement, pulse wnl. Sling on, ice pack to incision site. Pt up to BR with SBA, tolerated well, voided without difficulty. Pt discharged home in stable condition with all belongings.

## 2018-11-27 NOTE — DISCHARGE INSTR - OTHER INFO
Discharge instructions:    General Instructions      • Elevate the operative extremity when you are resting to help minimize the swelling.    • Use ice to help control the swelling and pain.  DO NOT USE HEAT - this will increase the swelling.    • Call the office if you develop fevers (over 100.5) or chills.    • You should have an office appointment about 7-10 following your discharge from the hospital.  If you do not please call [phone].      Wound Care    • You may shower 2 days after surgery if the wound is dry. Keep water exposure to the incision site brief and blot it dry when you get out.  Do not bathe or swim or Jacuzzi (ie. Do not submerge the incision) for approximately 3 to 4 weeks.    • Do not use ointments or creams on the incision.      • Keep the incision clean and dry.  Any drainage from the incision should be reported to the doctor immediately.      • You may notice some bruising around the incision and into the operative extremity.  This is not uncommon and should begin to go away within the first 2 weeks after surgery.    • At the time of surgery tape-like strips (Steri-strips) may be placed on your incision to protect it.  These will eventually come off on their own in one to two weeks, or you may remove them yourself after two weeks.    Activity    • Unless otherwise instructed by your doctor you can put all of your weight on your operated leg.      • Estimated return to work varies depending on the demands of your job.  Some ambitious patients return to desk jobs / administrative type work as early as 1 week after surgery (but usually more like 1 month).  For active labor or heavy labor, it may take 3 to 6 months to return to work.     • You should do the exercises given to you at discharge until you return for your post-op visit. At that time, you may be given a new set of exercises. You should continue to exercise until your muscles are pain-free and you can walk without a limp. It is a  good idea to continue your exercises as a lifetime commitment to keep your muscles strong.    • The question of when to drive is impossible to generalize to everyone because it is largely dependent on the individual.  Importantly, doctors do not have a license with the DMV to “clear you” or “release you” to return to driving.  There are 3 primary criteria that must be met.  You need to be off of narcotic pain medicines (otherwise you are driving under the influence).  You need to be able to get in and out of the ’s seat comfortably.  And you must have regained your normal reflexes / strength.  We recommend ‘testing’ yourself with another licensed  in an empty parking lot or quiet street first in order to check your reflexes moving your foot from pedal to pedal.      Medications    • You were prescribed a short acting, narcotic pain medication.  It is recommended that you begin to wean off of this medication in about 3 days after surgery.  To help wean off of the pain medications or to supplement your pain control you can use Tylenol to help with pain.    • You were prescribed an anti-inflammatory medication which you will take for 5 days after surgery.  Take with food if GI discomfort occurs.      • You were prescribed an anti-nausea medication that you may take as needed.    • You will not be discharged from the hospital with any antibiotics unless there are specific concerns regarding infection.

## 2018-11-28 NOTE — OP REPORT
DATE OF SERVICE:  11/27/2018    PREOPERATIVE DIAGNOSIS:  Right shoulder rotator cuff tear including the   subscapularis as well as biceps tendon rupture with subacromial impingement.    POSTOPERATIVE DIAGNOSIS:  Right shoulder large rotator cuff tear including the   subscapularis as well as biceps tendon rupture with subacromial impingement.    PROCEDURES:  1.  Right shoulder diagnostic arthroscopy.  2.  Right shoulder subacromial arthroscopic decompression.  3.  Arthroscopic repair of the supraspinatus and infraspinatus tendons and   open repair of the subscapularis tendon tear.  4.  Glenohumeral joint debridement including the biceps tendon stump from   previous rupture.    ASSISTANT:  first Alexander assist.    ANESTHESIA:  General with an interscalene block.    IMPLANTS:  Two Holloway and Nephew HEALICOIL suture anchors, three Smith and   Nephew Q-Fix suture anchors.    ESTIMATED BLOOD LOSS:  50 mL.    SPECIMENS:  None.    FINDINGS:  There was a large supraspinatus and infraspinatus rotator cuff tear   with about 1 cm of retraction as well as a subscapularis upper third tendon   tear as well as a traumatic biceps tendon rupture with about 1 cm tendon stump   within the glenohumeral joint.  There were grade I chondral changes of the   humeral head and the glenoid as well as some degenerative labral tearing.    COMPLICATIONS:  None.    POSTOPERATIVE PLAN:  The patient will be nonweightbearing on his right upper   extremity in the pillow abduction sling at all times except for when he is   doing his pendulum exercises or he is working with physical therapy.  He will   return to clinic in 7-10 days for a suture removal and wound check.  He may   remove his dressings and shower in 2 days.    INDICATIONS FOR THE PROCEDURE:  The patient is a very nice 63-year-old   right-hand dominant male who fell and dislocated his right shoulder on   10/03/2018.  This did require sedation and reduction in the emergency room and    then he underwent an MRI as he was unable to lift up his arm after the fall   and this revealed a very large rotator cuff tear including a subscapularis   tear and biceps tendon rupture.  I had a long discussion with the patient   regarding the risks and benefits of surgery including, but not limited to   bleeding, infection, risk to surrounding structures such as blood vessels or   nerves, pain, scar, reoperation, hardware failure and risks of the anesthesia   such as stroke, heart attack, deep venous thrombosis, pulmonary embolism and   death.  Patient understood the risks and benefits and elected to proceed with   surgery.    PROCEDURE IN DETAIL:  The patient was met in the preoperative holding area   where the correct right shoulder was marked with my initials.  He was then   transported to the operating room where he underwent an interscalene nerve   block by the anesthesia team.  He was then placed on the operating room table   with all bony prominences well padded.  He was then intubated by the   anesthesia team without complications.  The patient received 2 g of   preoperative Ancef.  All his bony prominences were well padded and he was then   placed in the beachchair position.  Preoperative exam under anesthesia   revealed full passive motion of the right arm with 180 degrees of forward   flexion, 180 degrees of abduction and 45 degrees of external rotation.  The   patient's right upper extremity was then prepped and draped in the usual   sterile fashion.  A preoperative timeout was held where all those in the room   agreed upon the correct patient, the correct site of surgery and the correct   surgery to be performed.    I began the procedure by marking out my portal sites as well as injecting the   portal sites with local.  I also injected 30 mL of arthroscopic fluid with   epinephrine mixed with local with epinephrine into the glenohumeral joint via   the posterior portal.  I then used a #11 blade to  make my posterior portal.    Upon entering the glenohumeral joint, there was a very large biceps tendon   stump from his previous traumatic biceps tendon rupture.  There was also   evidence of tearing of the upper third of the subscapularis tendon with comma   tissue.  He also had a large full-thickness rotator cuff tear of the   supraspinatus and infraspinatus and I was able to see into the subacromial   space via the glenohumeral joint secondary to his large tear.  He had   degenerative labral tearing of the anterior and inferior labrum, but no loose   bodies in the axillary recess.  I used a 4.5 mm sucker shaver and an   Arthrocare wand to debride the biceps tendon stump as well as to debride the   frayed labrum and torn rotator cuff.  He had grade I chondral changes of the humeral head and the   glenoid.  After I previously established my anterior portal under direct   visualization, I also established an anterior superolateral portal to better   evaluate my subscapularis tear.  I placed 2 Q-Fix suture anchors within the   lesser tuberosity percutaneously and then attempted to repair his   subscapularis tendon arthroscopically visualizing the lesser tuberosity and   the tendon with the 70-degree scope.  Due to the significant amount of tearing   and retraction of the tendon, I then elected to proceed later with an open   subscapularis repair in order to better repair his tendon.  After I had   adequately debrided out the glenohumeral joint and the undersurface of the   supraspinatus and infraspinatus tear, I then moved my scope into the   subacromial space.  I again encountered a significant amount of inflammation   and bursitis as well as some undersurface bone spurs of the acromion.  I used   a combination of the Arthrocare wand and the 4.5 mm sucker shaver to debride   the bursa in order to better visualize the rotator cuff tear.  I also used an   arthroscopic bur to perform my subacromial decompression of  about 5 mm of bone   of the undersurface of the acromion.  The supraspinatus and infraspinatus were both torn in a U shaped tear with about 1cm of retraction.   I then placed 2 HEALICOIL Holloway and   Nephew anchors percutaneously anteriorly and posteriorly within the tear at   the chondral margin.  These anchors were placed at a 45-degree deadman's angle   and were solidly placed within the bone.  I also used the microscopic awl to   create healing holes in order to better aid the healing of the tendon.  Next,   I used the FirstPass Smith and Nephew suture passer to pass simple sutures from my   HEALICOIL anchors within the tendon.  I then arthroscopically tied knots to   reduce the tendon back to the footprint, which reduced nicely and I had good   spread throughout the tendon back down to bone.  I then copiously irrigated   out the subacromial space with the arthroscopic fluid and placed an 18-gauge   spinal needle for my future pain injection.  Next, the arthroscope was removed   from the shoulder and I turned my attention towards the open subscapularis   repair portion of the case.      I used the superior part of a deltopectoral incision to perform my subscapularis tendon repair.  I used a 15 blade   followed by Bovie electrocautery to dissect down through skin and subcutaneous   tissues, being careful to achieve hemostasis.  I then found my deltopectoral   interval and retracted the cephalic vein medially.  A Kolbel retractor was   then placed underneath the conjoint tendon medially and underneath the deltoid   laterally.  I was able to visualize the previously ruptured biceps tendon   within the bicipital groove as well as identify the torn upper third portion   of the subscapularis tendon.  I again placed a 2.8 mm Q-Fix Smith and Nephew   suture anchor just medial to the bicipital groove and passed my sutures in a   horizontal mattress fashion to secure the remaining portion of the   subscapularis tendon back  down to the lesser tuberosity.  I achieved a good   repair with the suture anchor as well as the previously placed anchors, and   again, the wound was copiously irrigated with normal saline.  I then began my   layered closure using 2-0 Vicryl for deep subcutaneous tissues followed by 3-0   Monocryl for subcutaneous tissues followed by 3-0 Prolene for the skin.    Steri-Strips were then placed on the wound followed by Adaptic, 4x4s, ABDs and   paper tape.  The patient's right upper extremity was then placed in the   abduction sling where he will stay for the next 6 weeks.  All counts were   correct at the end of the case and please note that Deepika Maurer was necessary   for all portions of this case.  The patient was then extubated by the   anesthesia team without complications and transferred to the postop anesthesia   care unit.       ____________________________________     MD MALIK Munguia / ROSINA    DD:  11/27/2018 21:27:06  DT:  11/27/2018 23:50:10    D#:  [id number]  Job#:  [id number]

## 2019-02-19 ENCOUNTER — OFFICE VISIT (OUTPATIENT)
Dept: CARDIOLOGY | Facility: MEDICAL CENTER | Age: 64
End: 2019-02-19
Payer: COMMERCIAL

## 2019-02-19 VITALS
BODY MASS INDEX: 34.8 KG/M2 | DIASTOLIC BLOOD PRESSURE: 72 MMHG | HEART RATE: 66 BPM | WEIGHT: 235 LBS | SYSTOLIC BLOOD PRESSURE: 126 MMHG | HEIGHT: 69 IN

## 2019-02-19 DIAGNOSIS — I25.10 CORONARY ARTERY DISEASE INVOLVING NATIVE CORONARY ARTERY OF NATIVE HEART WITHOUT ANGINA PECTORIS: Chronic | ICD-10-CM

## 2019-02-19 DIAGNOSIS — I25.5 ISCHEMIC CARDIOMYOPATHY: Chronic | ICD-10-CM

## 2019-02-19 DIAGNOSIS — I10 ESSENTIAL HYPERTENSION, BENIGN: Chronic | ICD-10-CM

## 2019-02-19 DIAGNOSIS — Z95.5 STENTED CORONARY ARTERY: Chronic | ICD-10-CM

## 2019-02-19 DIAGNOSIS — E78.5 DYSLIPIDEMIA: Chronic | ICD-10-CM

## 2019-02-19 PROCEDURE — 99214 OFFICE O/P EST MOD 30 MIN: CPT | Performed by: INTERNAL MEDICINE

## 2019-02-19 ASSESSMENT — ENCOUNTER SYMPTOMS
VOMITING: 0
PALPITATIONS: 0
WEAKNESS: 0
SHORTNESS OF BREATH: 0
NAUSEA: 0
CONSTITUTIONAL NEGATIVE: 1

## 2019-02-19 NOTE — PROGRESS NOTES
Chief Complaint   Patient presents with   • Coronary Artery Disease       Subjective:   Zenon Grijalva is a 63 y.o. male who presents today for follow-up of remote extensive anterior wall MI in 2008.  He had a 3.0 x 28 mm Vision stent placed in his LAD at that time.  He has had no angina.  The patient is due for stress testing.  Most recent echo approximately 2 years ago revealed an EF of 50%.  He has some mild exertional dyspnea.  No angina.  No other interval medical problems.  He was able to go artery hunting this year without any difficulty.    Past Medical History:   Diagnosis Date   • CAD (coronary artery disease) 7/2/2012   • Chronic anticoagulation 7/2/2012   • Dyslipidemia 7/2/2012   • Essential hypertension, benign 7/2/2012   • Ischemic cardiomyopathy 7/2/2012   • Left ventricular thrombosis 7/2/2012   • Pericarditis 7/2/2012   • SOB (shortness of breath)     with excertion   • Stented coronary artery 7/2/2012     Past Surgical History:   Procedure Laterality Date   • SHOULDER DECOMPRESSION ARTHROSCOPIC Right 11/27/2018    Procedure: SHOULDER DECOMPRESSION ARTHROSCOPIC- SUBACROMIAL CONVERTED TO OPEN;  Surgeon: Ni Weir M.D.;  Location: SURGERY [facility];  Service: Orthopedics   • SHOULDER ARTHROSCOPY W/ ROTATOR CUFF REPAIR Right 11/27/2018    Procedure: OPEN ROTATOR CUFF REPAIR;  Surgeon: Ni Weir M.D.;  Location: SURGERY [facility];  Service: Orthopedics   • CARDIAC CATH, LEFT HEART     • CARPAL TUNNEL RELEASE Bilateral    • STENT PLACEMENT      cardiac stent   • VASECTOMY       History reviewed. No pertinent family history.  Social History     Social History   • Marital status: Single     Spouse name: N/A   • Number of children: N/A   • Years of education: N/A     Occupational History   • Not on file.     Social History Main Topics   • Smoking status: Never Smoker   • Smokeless tobacco: Former User     Types: Chew   • Alcohol use No   • Drug use: Unknown   • Sexual  "activity: Not on file     Other Topics Concern   • Not on file     Social History Narrative   • No narrative on file     No Known Allergies  Outpatient Encounter Prescriptions as of 2/19/2019   Medication Sig Dispense Refill   • ibuprofen (MOTRIN) 200 MG Tab Take 400 mg by mouth at bedtime as needed.     • Soft Lens Products (B & L SENSITIVE EYES SALINE) 0.4 % Solution Place 1 Drop in right eye 1 time daily as needed.     • carvedilol (COREG) 25 MG Tab Take 0.5 Tabs by mouth 2 times a day, with meals. 90 Tab 3   • simvastatin (ZOCOR) 40 MG Tab Take 1 Tab by mouth every evening. 90 Tab 3   • irbesartan (AVAPRO) 150 MG Tab TAKE 1 TABLET BY MOUTH ONCE DAILY 90 Tab 0   • aspirin EC (ECOTRIN) 81 MG TBEC Take 81 mg by mouth every evening.       No facility-administered encounter medications on file as of 2/19/2019.      Review of Systems   Constitutional: Negative.  Negative for malaise/fatigue.   Respiratory: Negative for shortness of breath.          mild dyspnea on exertion   Cardiovascular: Negative for chest pain, palpitations and leg swelling.   Gastrointestinal: Negative for nausea and vomiting.   Neurological: Negative for weakness.        Objective:   /72 (BP Location: Left arm, Patient Position: Sitting, BP Cuff Size: Adult)   Pulse 66   Ht 1.753 m (5' 9\")   Wt 106.6 kg (235 lb)   BMI 34.70 kg/m²     Physical Exam   Constitutional: He is oriented to person, place, and time. He appears well-nourished. No distress.   HENT:   Head: Normocephalic and atraumatic.   Eyes: Pupils are equal, round, and reactive to light. No scleral icterus.   Neck: No JVD present. No tracheal deviation present.   Cardiovascular: Normal rate and regular rhythm.    No murmur heard.  Pulmonary/Chest: Breath sounds normal. No respiratory distress. He has no wheezes.   Abdominal: Soft. Bowel sounds are normal. He exhibits no distension. There is no tenderness.   Musculoskeletal: He exhibits no edema.   Neurological: He is alert and " oriented to person, place, and time.   Skin: Skin is warm and dry.   Psychiatric: He has a normal mood and affect.       Assessment:     1. Coronary artery disease involving native coronary artery of native heart without angina pectoris     2. Dyslipidemia     3. Essential hypertension, benign     4. Stented coronary artery  Lipid Profile    Comp Metabolic Panel    CBC WITH DIFFERENTIAL    NM-CARDIAC STRESS TEST   5. Ischemic cardiomyopathy  Lipid Profile    Comp Metabolic Panel    CBC WITH DIFFERENTIAL    NM-CARDIAC STRESS TEST       Medical Decision Making:  Today's Assessment / Status / Plan:   Coronary disease: He is 11 years post stenting of his LAD.  He is due for stress testing.  Insurance would not pay for myocardial PET last year which is unfortunate.  A normal myocardial perfusion scan will be ordered.    Hyperlipidemia: He is due for lab work.    Ischemic heart myopathy: EF is mildly Doose.  He does have exertional dyspnea however he does lead a more physically active lifestyle than average.  Medications reviewed.  He will be notified of the testing results.  Return one year.

## 2019-02-23 LAB
ALBUMIN SERPL-MCNC: 4.3 G/DL (ref 3.6–4.8)
ALBUMIN/GLOB SERPL: 1.8 {RATIO} (ref 1.2–2.2)
ALP SERPL-CCNC: 68 IU/L (ref 39–117)
ALT SERPL-CCNC: 27 IU/L (ref 0–44)
AST SERPL-CCNC: 24 IU/L (ref 0–40)
BASOPHILS # BLD AUTO: 0 X10E3/UL (ref 0–0.2)
BASOPHILS NFR BLD AUTO: 0 %
BILIRUB SERPL-MCNC: 1.5 MG/DL (ref 0–1.2)
BUN SERPL-MCNC: 16 MG/DL (ref 8–27)
BUN/CREAT SERPL: 19 (ref 10–24)
CALCIUM SERPL-MCNC: 9.2 MG/DL (ref 8.6–10.2)
CHLORIDE SERPL-SCNC: 104 MMOL/L (ref 96–106)
CHOLEST SERPL-MCNC: 115 MG/DL (ref 100–199)
CO2 SERPL-SCNC: 24 MMOL/L (ref 20–29)
CREAT SERPL-MCNC: 0.85 MG/DL (ref 0.76–1.27)
EOSINOPHIL # BLD AUTO: 0.2 X10E3/UL (ref 0–0.4)
EOSINOPHIL NFR BLD AUTO: 2 %
ERYTHROCYTE [DISTWIDTH] IN BLOOD BY AUTOMATED COUNT: 12.7 % (ref 12.3–15.4)
GLOBULIN SER CALC-MCNC: 2.4 G/DL (ref 1.5–4.5)
GLUCOSE SERPL-MCNC: 103 MG/DL (ref 65–99)
HCT VFR BLD AUTO: 46.3 % (ref 37.5–51)
HDLC SERPL-MCNC: 40 MG/DL
HGB BLD-MCNC: 15.5 G/DL (ref 13–17.7)
IMM GRANULOCYTES # BLD AUTO: 0 X10E3/UL (ref 0–0.1)
IMM GRANULOCYTES NFR BLD AUTO: 0 %
IMMATURE CELLS  115398: ABNORMAL
LABORATORY COMMENT REPORT: NORMAL
LDLC SERPL CALC-MCNC: 60 MG/DL (ref 0–99)
LYMPHOCYTES # BLD AUTO: 2.1 X10E3/UL (ref 0.7–3.1)
LYMPHOCYTES NFR BLD AUTO: 19 %
MCH RBC QN AUTO: 31.5 PG (ref 26.6–33)
MCHC RBC AUTO-ENTMCNC: 33.5 G/DL (ref 31.5–35.7)
MCV RBC AUTO: 94 FL (ref 79–97)
MONOCYTES # BLD AUTO: 0.8 X10E3/UL (ref 0.1–0.9)
MONOCYTES NFR BLD AUTO: 7 %
MORPHOLOGY BLD-IMP: ABNORMAL
NEUTROPHILS # BLD AUTO: 7.7 X10E3/UL (ref 1.4–7)
NEUTROPHILS NFR BLD AUTO: 72 %
NRBC BLD AUTO-RTO: ABNORMAL %
PLATELET # BLD AUTO: 228 X10E3/UL (ref 150–379)
POTASSIUM SERPL-SCNC: 4.6 MMOL/L (ref 3.5–5.2)
PROT SERPL-MCNC: 6.7 G/DL (ref 6–8.5)
RBC # BLD AUTO: 4.92 X10E6/UL (ref 4.14–5.8)
SODIUM SERPL-SCNC: 141 MMOL/L (ref 134–144)
TRIGL SERPL-MCNC: 76 MG/DL (ref 0–149)
VLDLC SERPL CALC-MCNC: 15 MG/DL (ref 5–40)
WBC # BLD AUTO: 10.8 X10E3/UL (ref 3.4–10.8)

## 2019-02-25 ENCOUNTER — TELEPHONE (OUTPATIENT)
Dept: CARDIOLOGY | Facility: MEDICAL CENTER | Age: 64
End: 2019-02-25

## 2019-02-25 ENCOUNTER — HOSPITAL ENCOUNTER (OUTPATIENT)
Dept: RADIOLOGY | Facility: MEDICAL CENTER | Age: 64
End: 2019-02-25
Attending: FAMILY MEDICINE
Payer: COMMERCIAL

## 2019-02-25 ENCOUNTER — OFFICE VISIT (OUTPATIENT)
Dept: URGENT CARE | Facility: PHYSICIAN GROUP | Age: 64
End: 2019-02-25
Payer: COMMERCIAL

## 2019-02-25 VITALS
WEIGHT: 235 LBS | HEIGHT: 69 IN | SYSTOLIC BLOOD PRESSURE: 132 MMHG | HEART RATE: 64 BPM | OXYGEN SATURATION: 94 % | TEMPERATURE: 97.9 F | BODY MASS INDEX: 34.8 KG/M2 | DIASTOLIC BLOOD PRESSURE: 66 MMHG | RESPIRATION RATE: 16 BRPM

## 2019-02-25 DIAGNOSIS — R10.9 ABDOMINAL PAIN, LEFT LATERAL: ICD-10-CM

## 2019-02-25 DIAGNOSIS — K59.00 CONSTIPATION, UNSPECIFIED CONSTIPATION TYPE: ICD-10-CM

## 2019-02-25 PROCEDURE — 99214 OFFICE O/P EST MOD 30 MIN: CPT | Performed by: FAMILY MEDICINE

## 2019-02-25 PROCEDURE — 74019 RADEX ABDOMEN 2 VIEWS: CPT

## 2019-02-25 ASSESSMENT — ENCOUNTER SYMPTOMS
CARDIOVASCULAR NEGATIVE: 1
NEUROLOGICAL NEGATIVE: 1
RESPIRATORY NEGATIVE: 1
CONSTITUTIONAL NEGATIVE: 1
MUSCULOSKELETAL NEGATIVE: 1

## 2019-02-25 NOTE — TELEPHONE ENCOUNTER
----- Message from Mckay Rodas M.D. sent at 2/25/2019  3:35 PM PST -----  Labs reviewed, and chemistry panel and lipid panels are both normal.  Continue same medications.

## 2019-02-25 NOTE — PROGRESS NOTES
"Subjective:      Zenon Grijalva is a 63 y.o. male who presents with Abdominal Pain (onset 2 days// LLQ// slight consitpation )            This is a new problem.  63-year-old presenting with 2-day history of waxing and waning left-sided abdominal pain.  Denies any nausea, vomiting, fever or chills.  Denies any urinary symptoms including frequency, urgency or dysuria or blood in the urine.  Appetite is otherwise good he has been eating.  He usually has a bowel movement daily.  Over the course of the past 3 days is been little on the harder side but he is able to pass a small amount of stool.  No melena or hematochezia reported.  Denies any kidney stones.  He denies any radiation of pain.  Last colonoscopy was 6 years ago and they remove the polyp which was reportedly benign.  He was supposed to have another colonoscopy 5 years after that which was last year.  He denies any history of diverticulitis or diverticulosis        Review of Systems   Constitutional: Negative.    HENT: Negative.    Respiratory: Negative.    Cardiovascular: Negative.    Genitourinary: Negative.    Musculoskeletal: Negative.    Skin: Negative.    Neurological: Negative.           Objective:     /66   Pulse 64   Temp 36.6 °C (97.9 °F) (Temporal)   Resp 16   Ht 1.753 m (5' 9\")   Wt 106.6 kg (235 lb)   SpO2 94%   BMI 34.70 kg/m²      Physical Exam   Constitutional: He is oriented to person, place, and time. He appears well-developed and well-nourished.  Non-toxic appearance. No distress.   HENT:   Head: Normocephalic and atraumatic.   Eyes: Conjunctivae are normal. No scleral icterus.   Cardiovascular: Normal rate and regular rhythm.  Exam reveals no gallop and no friction rub.    No murmur heard.  Pulmonary/Chest: Effort normal. No respiratory distress. He has no wheezes. He has no rales.   Abdominal: Soft. He exhibits no distension and no mass. There is no hepatosplenomegaly. There is tenderness (Mild on the left side.). There is " no rebound and no guarding. No hernia.   Neurological: He is alert and oriented to person, place, and time.   Skin: Skin is warm. No rash noted. He is not diaphoretic. No erythema. No pallor.   Psychiatric: He has a normal mood and affect.         X-ray of the abdomen showed no signs of obstruction, moderate stool noted.       Assessment/Plan:     ASSESSMENT:PLAN:  1. Constipation, unspecified constipation type  - [id number] VIEWS; Future    Exam is otherwise benign and he has moderate stool burden.  We discussed MiraLAX use.  Plan per orders and instructions  Warning signs reviewed

## 2019-02-25 NOTE — PATIENT INSTRUCTIONS
recommend Miralax one scoop in large glass of water twie daily for 3 days then once daily after that for a total of a week  Follow up if not significantly improved as expected, sooner if any worsening or new symptoms    Constipation, Adult  Constipation is when a person:  · Poops (has a bowel movement) fewer times in a week than normal.  · Has a hard time pooping.  · Has poop that is dry, hard, or bigger than normal.  Follow these instructions at home:  Eating and drinking  · Eat foods that have a lot of fiber, such as:  ¨ Fresh fruits and vegetables.  ¨ Whole grains.  ¨ Beans.  · Eat less of foods that are high in fat, low in fiber, or overly processed, such as:  ¨ French fries.  ¨ Hamburgers.  ¨ Cookies.  ¨ Candy.  ¨ Soda.  · Drink enough fluid to keep your pee (urine) clear or pale yellow.  General instructions  · Exercise regularly or as told by your doctor.  · Go to the restroom when you feel like you need to poop. Do not hold it in.  · Take over-the-counter and prescription medicines only as told by your doctor. These include any fiber supplements.  · Do pelvic floor retraining exercises, such as:  ¨ Doing deep breathing while relaxing your lower belly (abdomen).  ¨ Relaxing your pelvic floor while pooping.  · Watch your condition for any changes.  · Keep all follow-up visits as told by your doctor. This is important.  Contact a doctor if:  · You have pain that gets worse.  · You have a fever.  · You have not pooped for 4 days.  · You throw up (vomit).  · You are not hungry.  · You lose weight.  · You are bleeding from the anus.  · You have thin, pencil-like poop (stool).  Get help right away if:  · You have a fever, and your symptoms suddenly get worse.  · You leak poop or have blood in your poop.  · Your belly feels hard or bigger than normal (is bloated).  · You have very bad belly pain.  · You feel dizzy or you faint.  This information is not intended to replace advice given to you by your health care  provider. Make sure you discuss any questions you have with your health care provider.  Document Released: 06/05/2009 Document Revised: 07/07/2017 Document Reviewed: 06/07/2017  Elsevier Interactive Patient Education © 2017 [employer] Inc[employer]

## 2019-03-17 DIAGNOSIS — I10 ESSENTIAL HYPERTENSION, BENIGN: ICD-10-CM

## 2019-03-18 RX ORDER — IRBESARTAN 150 MG/1
TABLET ORAL
Qty: 90 TAB | Refills: 3 | Status: SHIPPED | OUTPATIENT
Start: 2019-03-18 | End: 2019-09-25 | Stop reason: SDUPTHER

## 2019-04-15 DIAGNOSIS — I10 ESSENTIAL HYPERTENSION: ICD-10-CM

## 2019-04-15 DIAGNOSIS — E78.5 HYPERLIPIDEMIA, UNSPECIFIED HYPERLIPIDEMIA TYPE: ICD-10-CM

## 2019-04-16 RX ORDER — SIMVASTATIN 40 MG
TABLET ORAL
Qty: 90 TAB | Refills: 2 | Status: SHIPPED | OUTPATIENT
Start: 2019-04-16 | End: 2019-09-25 | Stop reason: SDUPTHER

## 2019-04-16 RX ORDER — CARVEDILOL 25 MG/1
TABLET ORAL
Qty: 90 TAB | Refills: 2 | Status: SHIPPED | OUTPATIENT
Start: 2019-04-16 | End: 2019-09-25 | Stop reason: SDUPTHER

## 2019-05-17 ENCOUNTER — TELEPHONE (OUTPATIENT)
Dept: CARDIOLOGY | Facility: MEDICAL CENTER | Age: 64
End: 2019-05-17

## 2019-05-17 RX ORDER — NITROGLYCERIN 0.4 MG/1
0.4 TABLET SUBLINGUAL PRN
Qty: 25 TAB | Refills: 1 | Status: SHIPPED | OUTPATIENT
Start: 2019-05-17 | End: 2019-07-07 | Stop reason: SDUPTHER

## 2019-05-17 NOTE — TELEPHONE ENCOUNTER
----- Message from Danielle Gaxiola, Med Ass't sent at 5/17/2019  1:26 PM PDT -----  Regarding: new RX  Patient is requesting a new RX for NitroStat  Pharmacy: [facility]Y #[id number] - [place], [place] - [address]    He lost the bottle that he carries around  Thank you

## 2019-06-07 ENCOUNTER — HOSPITAL ENCOUNTER (OUTPATIENT)
Dept: RADIOLOGY | Facility: MEDICAL CENTER | Age: 64
End: 2019-06-07
Attending: INTERNAL MEDICINE
Payer: COMMERCIAL

## 2019-06-07 DIAGNOSIS — Z95.5 STENTED CORONARY ARTERY: Chronic | ICD-10-CM

## 2019-06-07 DIAGNOSIS — I25.5 ISCHEMIC CARDIOMYOPATHY: Chronic | ICD-10-CM

## 2019-06-07 PROCEDURE — A9502 TC99M TETROFOSMIN: HCPCS

## 2019-06-07 PROCEDURE — 700111 HCHG RX REV CODE 636 W/ 250 OVERRIDE (IP)

## 2019-06-07 PROCEDURE — 78452 HT MUSCLE IMAGE SPECT MULT: CPT | Mod: 26 | Performed by: INTERNAL MEDICINE

## 2019-06-07 PROCEDURE — 93018 CV STRESS TEST I&R ONLY: CPT | Performed by: INTERNAL MEDICINE

## 2019-06-07 RX ORDER — REGADENOSON 0.08 MG/ML
INJECTION, SOLUTION INTRAVENOUS
Status: COMPLETED
Start: 2019-06-07 | End: 2019-06-07

## 2019-06-07 RX ADMIN — REGADENOSON 0.4 MG: 0.08 INJECTION, SOLUTION INTRAVENOUS at 09:14

## 2019-06-13 ENCOUNTER — TELEPHONE (OUTPATIENT)
Dept: CARDIOLOGY | Facility: MEDICAL CENTER | Age: 64
End: 2019-06-13

## 2019-06-13 NOTE — TELEPHONE ENCOUNTER
Absolutely  His is considerd low risk for this low risk outpt elective procedure  No addl preprocedure testing warranted  Ok to hold asa

## 2019-06-13 NOTE — TELEPHONE ENCOUNTER
Clearance form faxed back to [facility] with Dr White recommendations, F#[phone]    Attempted to call pt, no answer, left vm to call back

## 2019-06-13 NOTE — TELEPHONE ENCOUNTER
Received clearance request from [facility] for pt's upcoming Colonoscopy scheduled on 6/19/19, please advice on holding ASA. Thank You!    To Dr Michell White-ADD, please advice, RS unavail. Thank You!

## 2019-06-17 ENCOUNTER — TELEPHONE (OUTPATIENT)
Dept: CARDIOLOGY | Facility: MEDICAL CENTER | Age: 64
End: 2019-06-17

## 2019-06-17 NOTE — LETTER
June 17, 2019        Zenon Grijalva  [address] [address]        Dear Zenon:    Dr Mckay Rodas reviewed your recent Stress test and he said:    Stress test shows his old MI scar. Everything else looks fine.   Continue same meds.       If you have any questions or concerns, please don't hesitate to call our office, [phone].         Sincerely,    Ariana RODRIGUEZ/Dr Mckay Rodas

## 2019-06-17 NOTE — TELEPHONE ENCOUNTER
Mckay Rodas M.D.  Ariana Castro R.N.             [facility] shows his old MI scar. Everything else looks fine.   Continue same meds.      Attempted to call pt, no answer, left vm to call back    Letter mailed to pt

## 2019-07-07 DIAGNOSIS — R07.89 OTHER CHEST PAIN: Primary | ICD-10-CM

## 2019-07-08 RX ORDER — NITROGLYCERIN 0.4 MG/1
TABLET SUBLINGUAL
Qty: 25 TAB | Refills: 5 | Status: SHIPPED | OUTPATIENT
Start: 2019-07-08 | End: 2020-04-16

## 2019-08-20 ENCOUNTER — TELEPHONE (OUTPATIENT)
Dept: CARDIOLOGY | Facility: MEDICAL CENTER | Age: 64
End: 2019-08-20

## 2019-08-20 NOTE — TELEPHONE ENCOUNTER
Received notification from [facility] pharmacy that pt's Irbesartan is on backorder. LVM with pt at [phone] requesting that he call his other preferred pharmacies to see if any stock this medication and then let us know which alternate pharmacy he would like a new Rx sent to.

## 2019-09-17 ENCOUNTER — TELEPHONE (OUTPATIENT)
Dept: CARDIOLOGY | Facility: MEDICAL CENTER | Age: 64
End: 2019-09-17

## 2019-09-17 NOTE — TELEPHONE ENCOUNTER
[facility] faxed notice that Irbesratan is on back order.  Left message for pt. To call. Probably Irbesartan is available at other pharmacies. Pt. Can call and check with other pharmacis in his neighborhood for availability.

## 2019-09-20 ENCOUNTER — TELEPHONE (OUTPATIENT)
Dept: CARDIOLOGY | Facility: MEDICAL CENTER | Age: 64
End: 2019-09-20

## 2019-09-20 NOTE — TELEPHONE ENCOUNTER
"SANJAY/valentin    [facility] called and left the following message with our Answering Service at 6:18am today:  \"Patients blood pressure Rx is on back order and pharmacy is requesting a substitute.  Lacey at [facility] is requesting a call back for clarification.\"  [facility] phone # [phone]    "

## 2019-09-23 DIAGNOSIS — I10 ESSENTIAL HYPERTENSION: ICD-10-CM

## 2019-09-23 DIAGNOSIS — E78.5 HYPERLIPIDEMIA, UNSPECIFIED HYPERLIPIDEMIA TYPE: ICD-10-CM

## 2019-09-23 DIAGNOSIS — I10 ESSENTIAL HYPERTENSION, BENIGN: ICD-10-CM

## 2019-09-25 RX ORDER — CARVEDILOL 25 MG/1
12.5 TABLET ORAL 2 TIMES DAILY
Qty: 90 TAB | Refills: 1 | Status: SHIPPED | OUTPATIENT
Start: 2019-09-25 | End: 2020-01-08

## 2019-09-25 RX ORDER — SIMVASTATIN 40 MG
40 TABLET ORAL
Qty: 90 TAB | Refills: 1 | Status: SHIPPED | OUTPATIENT
Start: 2019-09-25 | End: 2020-01-08

## 2019-09-25 RX ORDER — IRBESARTAN 150 MG/1
150 TABLET ORAL
Qty: 90 TAB | Refills: 1 | Status: SHIPPED | OUTPATIENT
Start: 2019-09-25 | End: 2019-09-27 | Stop reason: RX

## 2019-09-27 RX ORDER — VALSARTAN 160 MG/1
160 TABLET ORAL DAILY
Qty: 90 TAB | Refills: 1 | Status: SHIPPED | OUTPATIENT
Start: 2019-09-27 | End: 2020-03-10

## 2020-01-08 DIAGNOSIS — I10 ESSENTIAL HYPERTENSION: ICD-10-CM

## 2020-01-08 DIAGNOSIS — I10 ESSENTIAL HYPERTENSION, BENIGN: ICD-10-CM

## 2020-01-08 DIAGNOSIS — E78.5 HYPERLIPIDEMIA, UNSPECIFIED HYPERLIPIDEMIA TYPE: ICD-10-CM

## 2020-01-08 RX ORDER — CARVEDILOL 25 MG/1
12.5 TABLET ORAL 2 TIMES DAILY WITH MEALS
Qty: 90 TAB | Refills: 0 | Status: SHIPPED | OUTPATIENT
Start: 2020-01-08 | End: 2020-03-26

## 2020-01-08 RX ORDER — SIMVASTATIN 40 MG
TABLET ORAL
Qty: 90 TAB | Refills: 0 | Status: SHIPPED | OUTPATIENT
Start: 2020-01-08 | End: 2020-03-26

## 2020-03-09 DIAGNOSIS — I10 ESSENTIAL HYPERTENSION, BENIGN: ICD-10-CM

## 2020-03-10 RX ORDER — IRBESARTAN 150 MG/1
TABLET ORAL
Qty: 90 TAB | Refills: 0 | Status: SHIPPED | OUTPATIENT
Start: 2020-03-10 | End: 2020-04-30 | Stop reason: SDUPTHER

## 2020-03-25 ENCOUNTER — TELEPHONE (OUTPATIENT)
Dept: CARDIOLOGY | Facility: MEDICAL CENTER | Age: 65
End: 2020-03-25

## 2020-03-25 DIAGNOSIS — I25.5 ISCHEMIC CARDIOMYOPATHY: ICD-10-CM

## 2020-03-25 RX ORDER — TORSEMIDE 10 MG/1
TABLET ORAL
Qty: 30 TAB | Refills: 3 | Status: SHIPPED | OUTPATIENT
Start: 2020-03-25 | End: 2020-11-04 | Stop reason: SDUPTHER

## 2020-03-25 NOTE — TELEPHONE ENCOUNTER
RS    Patient is experiencing some swelling in feet and ankles, he would like a call back at [phone]

## 2020-03-25 NOTE — TELEPHONE ENCOUNTER
Patient reports that his ankles and feet are swollen.  Not pitting (asked patient to press on his foot to see if indentation was left).  Patient reports worsening shortness of breath.    Advised patient that information would be routed to Dr. Rodas for recommendations.  Patient was agreeable to plan and verbalized appreciation for the attention given to this matter.

## 2020-03-25 NOTE — TELEPHONE ENCOUNTER
Patient Call     Mckay Rodas M.D.  You 5 hours ago (9:36 AM)     He has known ischemic cardiomyopathy.  He should start torsemide 10 mg a day for 5 days and then 1 daily as needed for edema or shortness of breath.  We should get him an appointment in the next month or 2.    Routing comment      -------------------------------------------------------------------------------------------------------------------------------    Spoke with patient and discussed recommendations.  Patient verbalized understanding and requested to have medication sent to [facility].    Encounter routed to Scheduling to set an appointment in the next month or two.

## 2020-03-26 DIAGNOSIS — E78.5 HYPERLIPIDEMIA, UNSPECIFIED HYPERLIPIDEMIA TYPE: ICD-10-CM

## 2020-03-26 DIAGNOSIS — I10 ESSENTIAL HYPERTENSION, BENIGN: ICD-10-CM

## 2020-03-26 DIAGNOSIS — I10 ESSENTIAL HYPERTENSION: ICD-10-CM

## 2020-03-27 RX ORDER — SIMVASTATIN 40 MG
TABLET ORAL
Qty: 90 TAB | Refills: 0 | Status: SHIPPED | OUTPATIENT
Start: 2020-03-27 | End: 2020-04-30 | Stop reason: SDUPTHER

## 2020-03-27 RX ORDER — CARVEDILOL 25 MG/1
12.5 TABLET ORAL 2 TIMES DAILY WITH MEALS
Qty: 90 TAB | Refills: 0 | Status: SHIPPED | OUTPATIENT
Start: 2020-03-27 | End: 2020-04-30 | Stop reason: SDUPTHER

## 2020-04-16 DIAGNOSIS — R07.89 OTHER CHEST PAIN: ICD-10-CM

## 2020-04-16 RX ORDER — NITROGLYCERIN 0.4 MG/1
TABLET SUBLINGUAL
Qty: 25 TAB | Refills: 3 | Status: SHIPPED | OUTPATIENT
Start: 2020-04-16 | End: 2020-04-30 | Stop reason: SDUPTHER

## 2020-04-30 DIAGNOSIS — R07.89 OTHER CHEST PAIN: ICD-10-CM

## 2020-04-30 DIAGNOSIS — I10 ESSENTIAL HYPERTENSION, BENIGN: ICD-10-CM

## 2020-04-30 DIAGNOSIS — I25.5 ISCHEMIC CARDIOMYOPATHY: ICD-10-CM

## 2020-04-30 DIAGNOSIS — I10 ESSENTIAL HYPERTENSION: ICD-10-CM

## 2020-04-30 DIAGNOSIS — E78.5 HYPERLIPIDEMIA, UNSPECIFIED HYPERLIPIDEMIA TYPE: ICD-10-CM

## 2020-04-30 RX ORDER — CARVEDILOL 25 MG/1
12.5 TABLET ORAL 2 TIMES DAILY WITH MEALS
Qty: 90 TAB | Refills: 0 | Status: SHIPPED | OUTPATIENT
Start: 2020-04-30 | End: 2020-09-18 | Stop reason: SDUPTHER

## 2020-04-30 RX ORDER — SIMVASTATIN 40 MG
40 TABLET ORAL
Qty: 90 TAB | Refills: 0 | Status: SHIPPED | OUTPATIENT
Start: 2020-04-30 | End: 2020-09-18 | Stop reason: SDUPTHER

## 2020-04-30 RX ORDER — IRBESARTAN 150 MG/1
150 TABLET ORAL
Qty: 90 TAB | Refills: 0 | Status: SHIPPED | OUTPATIENT
Start: 2020-04-30 | End: 2020-09-15 | Stop reason: SDUPTHER

## 2020-04-30 RX ORDER — NITROGLYCERIN 0.4 MG/1
0.4 TABLET SUBLINGUAL PRN
Qty: 25 TAB | Refills: 3 | Status: SHIPPED | OUTPATIENT
Start: 2020-04-30 | End: 2022-05-19

## 2020-07-29 ENCOUNTER — TELEPHONE (OUTPATIENT)
Dept: CARDIOLOGY | Facility: MEDICAL CENTER | Age: 65
End: 2020-07-29

## 2020-07-29 ENCOUNTER — OFFICE VISIT (OUTPATIENT)
Dept: CARDIOLOGY | Facility: MEDICAL CENTER | Age: 65
End: 2020-07-29
Payer: MEDICARE

## 2020-07-29 VITALS
HEART RATE: 94 BPM | BODY MASS INDEX: 34.21 KG/M2 | DIASTOLIC BLOOD PRESSURE: 64 MMHG | OXYGEN SATURATION: 95 % | WEIGHT: 231 LBS | SYSTOLIC BLOOD PRESSURE: 102 MMHG | HEIGHT: 69 IN

## 2020-07-29 DIAGNOSIS — I25.10 CORONARY ARTERY DISEASE INVOLVING NATIVE CORONARY ARTERY OF NATIVE HEART WITHOUT ANGINA PECTORIS: Chronic | ICD-10-CM

## 2020-07-29 DIAGNOSIS — I10 ESSENTIAL HYPERTENSION, BENIGN: Chronic | ICD-10-CM

## 2020-07-29 DIAGNOSIS — Z95.5 STENTED CORONARY ARTERY: Chronic | ICD-10-CM

## 2020-07-29 DIAGNOSIS — I48.19 OTHER PERSISTENT ATRIAL FIBRILLATION (HCC): ICD-10-CM

## 2020-07-29 DIAGNOSIS — E78.5 DYSLIPIDEMIA: Chronic | ICD-10-CM

## 2020-07-29 DIAGNOSIS — I25.5 ISCHEMIC CARDIOMYOPATHY: Chronic | ICD-10-CM

## 2020-07-29 DIAGNOSIS — R60.0 LOCALIZED EDEMA: ICD-10-CM

## 2020-07-29 LAB — EKG IMPRESSION: NORMAL

## 2020-07-29 PROCEDURE — 93000 ELECTROCARDIOGRAM COMPLETE: CPT | Performed by: INTERNAL MEDICINE

## 2020-07-29 PROCEDURE — 99214 OFFICE O/P EST MOD 30 MIN: CPT | Performed by: INTERNAL MEDICINE

## 2020-07-29 ASSESSMENT — FIBROSIS 4 INDEX: FIB4 SCORE: 1.32

## 2020-07-29 ASSESSMENT — ENCOUNTER SYMPTOMS
GASTROINTESTINAL NEGATIVE: 1
PSYCHIATRIC NEGATIVE: 1
CONSTITUTIONAL NEGATIVE: 1
SHORTNESS OF BREATH: 1
MUSCULOSKELETAL NEGATIVE: 1
NEUROLOGICAL NEGATIVE: 1

## 2020-07-29 NOTE — TELEPHONE ENCOUNTER
OPO order per RS.     Faxed to [facility] w/ facesheet & insurance cards.  F: [phone]  T: [phone]    INS: /[employer]    Confirmation received.

## 2020-07-29 NOTE — TELEPHONE ENCOUNTER
RS    Pt called his pharmacy for the cost of Xarelto, it is $485 with insurance & was advised to call us to get a coupon. [phone]

## 2020-07-29 NOTE — PROGRESS NOTES
Chief Complaint   Patient presents with   • Coronary Artery Disease       Subjective:   Zenon Grijalva is a 65 y.o. male who presents today for follow-up of remote anterior wall MI, mild LV dysfunction, essential hypertension and hyperlipidemia.  He suffered an extensive anterior wall MI in November 2008 while duck hunting and had a 3.5 x 20 mm vision bare-metal stent placed with jailing of the second diagonal artery.  Initial echo revealed severe hypokinesis but more recent echoes revealed improvement of LV systolic function with most recent EF of 50% in 2017.  He noticed it last October he got short of breath while mo[place] hunting at [place].  In March, his wife noticed swollen ankles and he was started on low-dose bumetanide with resolution.  He had no sense of palpitations chest pain or exertional dyspnea.  EKG today demonstrates atrial fibrillation with controlled ventricular response rate.    He also has a history of sleep apnea that has not been treated.  Wife notes he snores quite a bit.  He is never had this formally evaluated.    Past Medical History:   Diagnosis Date   • CAD (coronary artery disease) 7/2/2012   • Chronic anticoagulation 7/2/2012   • Dyslipidemia 7/2/2012   • Essential hypertension, benign 7/2/2012   • Ischemic cardiomyopathy 7/2/2012   • Left ventricular thrombosis 7/2/2012   • Pericarditis 7/2/2012   • SOB (shortness of breath)     with excertion   • Stented coronary artery 7/2/2012     Past Surgical History:   Procedure Laterality Date   • SHOULDER DECOMPRESSION ARTHROSCOPIC Right 11/27/2018    Procedure: SHOULDER DECOMPRESSION ARTHROSCOPIC- SUBACROMIAL CONVERTED TO OPEN;  Surgeon: Ni Weir M.D.;  Location: SURGERY [facility];  Service: Orthopedics   • SHOULDER ARTHROSCOPY W/ ROTATOR CUFF REPAIR Right 11/27/2018    Procedure: OPEN ROTATOR CUFF REPAIR;  Surgeon: Ni Weir M.D.;  Location: SURGERY [facility];  Service: Orthopedics   • CARDIAC CATH, LEFT  HEART     • CARPAL TUNNEL RELEASE Bilateral    • STENT PLACEMENT      cardiac stent   • VASECTOMY       No family history on file.  Social History     Socioeconomic History   • Marital status: Single     Spouse name: Not on file   • Number of children: Not on file   • Years of education: Not on file   • Highest education level: Not on file   Occupational History   • Not on file   Social Needs   • Financial resource strain: Not on file   • Food insecurity     Worry: Not on file     Inability: Not on file   • Transportation needs     Medical: Not on file     Non-medical: Not on file   Tobacco Use   • Smoking status: Never Smoker   • Smokeless tobacco: Former User     Types: Chew   Substance and Sexual Activity   • Alcohol use: No   • Drug use: Not on file   • Sexual activity: Not on file   Lifestyle   • Physical activity     Days per week: Not on file     Minutes per session: Not on file   • Stress: Not on file   Relationships   • Social connections     Talks on phone: Not on file     Gets together: Not on file     Attends Mu-ism service: Not on file     Active member of club or organization: Not on file     Attends meetings of clubs or organizations: Not on file     Relationship status: Not on file   • Intimate partner violence     Fear of current or ex partner: Not on file     Emotionally abused: Not on file     Physically abused: Not on file     Forced sexual activity: Not on file   Other Topics Concern   • Not on file   Social History Narrative   • Not on file     No Known Allergies  Outpatient Encounter Medications as of 7/29/2020   Medication Sig Dispense Refill   • rivaroxaban (XARELTO) 20 MG Tab tablet Take 1 Tab by mouth with dinner. 30 Tab 6   • carvedilol (COREG) 25 MG Tab Take 0.5 Tabs by mouth 2 times a day, with meals. 90 Tab 0   • irbesartan (AVAPRO) 150 MG Tab Take 1 Tab by mouth every day. 90 Tab 0   • nitroglycerin (NITROSTAT) 0.4 MG SL Tab Place 1 Tab under tongue as needed for Chest Pain. 25 Tab  "3   • simvastatin (ZOCOR) 40 MG Tab Take 1 Tab by mouth every day. 90 Tab 0   • torsemide (DEMADEX) 10 MG tablet Take 1 tablet each day for the first five days.  Then take 1 tablet a day AS NEEDED for swelling/edema. 30 Tab 3   • ibuprofen (MOTRIN) 200 MG Tab Take 400 mg by mouth at bedtime as needed.     • Soft Lens Products (B & L SENSITIVE EYES SALINE) 0.4 % Solution Place 1 Drop in right eye 1 time daily as needed.     • [DISCONTINUED] aspirin EC (ECOTRIN) 81 MG TBEC Take 81 mg by mouth every evening.       No facility-administered encounter medications on file as of 7/29/2020.      Review of Systems   Constitutional: Negative.    HENT: Negative.    Respiratory: Positive for shortness of breath.    Cardiovascular: Positive for leg swelling. Negative for chest pain.   Gastrointestinal: Negative.    Musculoskeletal: Negative.    Skin: Negative.    Neurological: Negative.    Endo/Heme/Allergies: Negative.    Psychiatric/Behavioral: Negative.    All other systems reviewed and are negative.       Objective:   /64 (BP Location: Left arm, Patient Position: Sitting, BP Cuff Size: Adult)   Pulse 94   Ht 1.753 m (5' 9\")   Wt 104.8 kg (231 lb)   SpO2 95%   BMI 34.11 kg/m²     Physical Exam   Constitutional: He is oriented to person, place, and time. He appears well-nourished. No distress.   HENT:   Head: Normocephalic and atraumatic.   Eyes: Pupils are equal, round, and reactive to light. No scleral icterus.   Neck: No JVD present. No tracheal deviation present.   Cardiovascular: Normal rate. An irregularly irregular rhythm present.   No murmur heard.  Pulmonary/Chest: Breath sounds normal. No respiratory distress. He has no wheezes.   Abdominal: Soft. Bowel sounds are normal. He exhibits no distension. There is no abdominal tenderness.   Obese   Musculoskeletal:         General: No edema.   Neurological: He is alert and oriented to person, place, and time.   Skin: Skin is warm and dry.   Psychiatric: He has a " normal mood and affect.       Assessment:     1. Coronary artery disease involving native coronary artery of native heart without angina pectoris  EKG    OVERNIGHT PULSE OXIMETRY    EC-ECHOCARDIOGRAM COMPLETE W/O CONT   2. Stented coronary artery  EKG   3. Ischemic cardiomyopathy  EKG   4. Essential hypertension, benign     5. Other persistent atrial fibrillation (HCC)  OVERNIGHT PULSE OXIMETRY    EC-ECHOCARDIOGRAM COMPLETE W/O CONT   6. Dyslipidemia     7. Localized edema         Medical Decision Making:  Today's Assessment / Status / Plan:   EKG: My interpretation is this demonstrates atrial fibrillation, with low voltage, poor R wave progression, and no ST-T changes.    Atrial fibrillation: Duration is unknown.  Response rate is controlled as he is on carvedilol.  He is had exertional dyspnea since October and edema in March.  An echo be obtained to reevaluate his LV function.  To be started on rivaroxaban 20 mg a day and aspirin were discontinued.    Status post anterior wall MI: LAD stent in 2017.  Echo be obtained to reevaluate his LV function.  In the interim he was noted be hypotensive today with blood pressure 192 systolic.  His irbesartan will be reduced to 75 mg a day pending echo results.    Hyperlipidemia: Treated with statin most recent LDL 60    Plan:  Discontinue aspirin  Rivaroxaban 20 mg a day  Irbesartan to 75 mg a day because of hypotension  Echocardiogram  Return 1 month and schedule cardioversion at that time.  Would preload him with amiodarone for the cardioversion.  Nocturnal oximetry is a patient has history of untreated sleep apnea which may be the  of his atrial fib.

## 2020-07-30 NOTE — TELEPHONE ENCOUNTER
I tried calling but phone does not accept messages.  I mailed him the coupon for 30 day supply of Xarelto and the maintenance Xarelto card.

## 2020-08-04 ENCOUNTER — TELEPHONE (OUTPATIENT)
Dept: CARDIOLOGY | Facility: MEDICAL CENTER | Age: 65
End: 2020-08-04

## 2020-08-04 DIAGNOSIS — R09.89 ABNORMAL PULMONARY FINDING: ICD-10-CM

## 2020-08-04 NOTE — TELEPHONE ENCOUNTER
----- Message from Mckay Rodas M.D. sent at 8/4/2020  8:32 AM PDT -----  The patient had some hypoxia noted early on during a sleep study.  Looks like equipment fallen off a couple times during the night.  He did however have some definite hypoxia.  Must refer him to the sleep center.

## 2020-08-04 NOTE — TELEPHONE ENCOUNTER
Pt says he has not received anything & says he's on his way here to pick them up, not sure which location

## 2020-08-10 ENCOUNTER — HOSPITAL ENCOUNTER (OUTPATIENT)
Dept: CARDIOLOGY | Facility: MEDICAL CENTER | Age: 65
End: 2020-08-10
Attending: INTERNAL MEDICINE
Payer: MEDICARE

## 2020-08-10 DIAGNOSIS — I48.19 OTHER PERSISTENT ATRIAL FIBRILLATION (HCC): ICD-10-CM

## 2020-08-10 DIAGNOSIS — I25.10 CORONARY ARTERY DISEASE INVOLVING NATIVE CORONARY ARTERY OF NATIVE HEART WITHOUT ANGINA PECTORIS: Chronic | ICD-10-CM

## 2020-08-10 PROCEDURE — 700117 HCHG RX CONTRAST REV CODE 255: Performed by: INTERNAL MEDICINE

## 2020-08-10 PROCEDURE — 93306 TTE W/DOPPLER COMPLETE: CPT | Mod: 26 | Performed by: INTERNAL MEDICINE

## 2020-08-10 PROCEDURE — 93306 TTE W/DOPPLER COMPLETE: CPT

## 2020-08-10 RX ADMIN — HUMAN ALBUMIN MICROSPHERES AND PERFLUTREN 3 ML: 10; .22 INJECTION, SOLUTION INTRAVENOUS at 09:37

## 2020-08-11 LAB
LV EJECT FRACT  99904: 40
LV EJECT FRACT MOD 2C 99903: 24.46
LV EJECT FRACT MOD 4C 99902: 39.64
LV EJECT FRACT MOD BP 99901: 32.14

## 2020-08-14 ENCOUNTER — TELEPHONE (OUTPATIENT)
Dept: CARDIOLOGY | Facility: MEDICAL CENTER | Age: 65
End: 2020-08-14

## 2020-08-14 NOTE — TELEPHONE ENCOUNTER
----- Message from Mckay Rodas M.D. sent at 8/14/2020 10:57 AM PDT -----  Echo shows that the heart muscle may not cannot contract quite as well as previously.  This may be due to his atrial fibrillation.  No dramatic change from prior echo

## 2020-08-19 NOTE — TELEPHONE ENCOUNTER
Mckay Rodas M.D.  Jo-Ann Abraham L.P.N. 2 hours ago (8:04 AM)     As I carefully discussed with him in the office.  He needs 1 month of anticoagulation prior to consideration of cardioversion.  As I recall he schedules return to see an APN a month after his last visit and cardioversion to be scheduled at that time.  Tell him he cannot hurry mother nature

## 2020-08-28 NOTE — TELEPHONE ENCOUNTER
Pt calling again, saying that he is having a hard time filling his irbesartan. Noted that several messages have been left with him requesting that he call his other preferred pharmacies to see if it is in stock elsewhere, but pt states he has not received these messages. Explained to him that it would be best to see if med can be filled at alternate pharmacy. He will find out which other pharmacies his insurance will accept and call us back. He needs this filled before the end of the week because he's leaving for a hunting trip on Sunday.  
Pt calling back with update. States [facility] on [address] has medication. He also is requesting refill's for carvedilol and simvastatin be sent to above pharmacy as well. If question's can be reached at  [phone].    
RS    Good afternoon,    This patient is having a hard time filling their RX of irbesartan (AVAPRO) 150 MG Tab. They are wanting to know if there is another RX that they can have filled. The pharmacy was giving him a 300 MG and he was just cutting them in half but now they are on back order and he needs his RX. [facility] [address]. If you have any questions please call [phone], if they do not answer please leave a message.       Thank you,  Vandana JOHNSTON   
Sent Rx for irbesartan, carvedilol, and simvastatin to [facility] [address].  
Additional Progress Note...

## 2020-09-10 ENCOUNTER — TELEPHONE (OUTPATIENT)
Dept: CARDIOLOGY | Facility: MEDICAL CENTER | Age: 65
End: 2020-09-10

## 2020-09-10 NOTE — TELEPHONE ENCOUNTER
RS    Patient wife, Yvonne, called to advise Pt is back in Afib, feeling weak and not able to walk around the park. Please call Yvonne back at [phone].    Thank you,  Vandana JOHNSTON

## 2020-09-10 NOTE — TELEPHONE ENCOUNTER
Spoke to Yvonne and discussed symptoms. Yvonne reports patient developed cough and asked if we could assess cough. Advised that his primary or urgent care is the best option for cough assessment.    Yvonne asked if the patient's cardioversion could be expedited. Advised that patient needs to be on OAC for one month minimum to decrease the risk of clots during procedure and encouraged to be at upcoming appointment as mentioned by Dr. Rodas:    ]    Yvonne verbalized understanding and was appreciative for the return phone call.

## 2020-09-14 NOTE — PROGRESS NOTES
Chief Complaint   Patient presents with   • Coronary Artery Disease     Previous patient        Subjective:   Zenon Grijalva is a 65 y.o. male who presents today for follow up CAD and atrial fibrillation.    He is followed by Dr. Rodas in our clinic, history of CAD with anterior MI in nov 2008 and had bare metal stent LAD with jailing second diagonal artery, then LAD stent 2017, atrial fibrillation, dyslipidemia, and hypertension.    Last OV was 7/2020 with Dr. Rodas, he was having dyspnea on exertion since October and edema in March. EKG showed afib. He was placed on xaretlo and asa d/c. If not reverted back to NSR, plan for DCCV with amiodarone loading. OPO showed low oxygen saturation 75%.     Interim events:  Patient overall has noticed his stamina has decreased. He denies any chest pain, sob or dizziness. He likes to go hunting and has noticed the last couple months he had to cutting his trip earlier due to fatigue and exhaustion.    Past Medical History:   Diagnosis Date   • CAD (coronary artery disease) 7/2/2012   • Chronic anticoagulation 7/2/2012   • Dyslipidemia 7/2/2012   • Essential hypertension, benign 7/2/2012   • Ischemic cardiomyopathy 7/2/2012   • Left ventricular thrombosis 7/2/2012   • Pericarditis 7/2/2012   • SOB (shortness of breath)     with excertion   • Stented coronary artery 7/2/2012     Past Surgical History:   Procedure Laterality Date   • SHOULDER DECOMPRESSION ARTHROSCOPIC Right 11/27/2018    Procedure: SHOULDER DECOMPRESSION ARTHROSCOPIC- SUBACROMIAL CONVERTED TO OPEN;  Surgeon: Ni Weir M.D.;  Location: SURGERY [facility];  Service: Orthopedics   • SHOULDER ARTHROSCOPY W/ ROTATOR CUFF REPAIR Right 11/27/2018    Procedure: OPEN ROTATOR CUFF REPAIR;  Surgeon: Ni Weir M.D.;  Location: SURGERY [facility];  Service: Orthopedics   • CARDIAC CATH, LEFT HEART     • CARPAL TUNNEL RELEASE Bilateral    • STENT PLACEMENT      cardiac stent   • VASECTOMY        History reviewed. No pertinent family history.  Social History     Socioeconomic History   • Marital status: Single     Spouse name: Not on file   • Number of children: Not on file   • Years of education: Not on file   • Highest education level: Not on file   Occupational History   • Not on file   Social Needs   • Financial resource strain: Not on file   • Food insecurity     Worry: Not on file     Inability: Not on file   • Transportation needs     Medical: Not on file     Non-medical: Not on file   Tobacco Use   • Smoking status: Never Smoker   • Smokeless tobacco: Former User     Types: Chew   Substance and Sexual Activity   • Alcohol use: No   • Drug use: Not on file   • Sexual activity: Not on file   Lifestyle   • Physical activity     Days per week: Not on file     Minutes per session: Not on file   • Stress: Not on file   Relationships   • Social connections     Talks on phone: Not on file     Gets together: Not on file     Attends Evangelical service: Not on file     Active member of club or organization: Not on file     Attends meetings of clubs or organizations: Not on file     Relationship status: Not on file   • Intimate partner violence     Fear of current or ex partner: Not on file     Emotionally abused: Not on file     Physically abused: Not on file     Forced sexual activity: Not on file   Other Topics Concern   • Not on file   Social History Narrative   • Not on file     No Known Allergies  Outpatient Encounter Medications as of 9/15/2020   Medication Sig Dispense Refill   • rivaroxaban (XARELTO) 20 MG Tab tablet Take 1 Tab by mouth with dinner. 15 Tab 0   • irbesartan (AVAPRO) 150 MG Tab Take 0.5 Tabs by mouth every day. 90 Tab 0   • [START ON 9/16/2020] amiodarone (PACERONE) 400 MG tablet Take 1 Tab by mouth 2 Times a Day for 14 days. 28 Tab 0   • [START ON 10/1/2020] amiodarone (PACERONE) 400 MG tablet Take 1 Tab by mouth every day for 14 days. 14 Tab 0   • [START ON 10/16/2020] amiodarone  "(PACERONE) 400 MG tablet Take 0.5 Tabs by mouth every day for 30 days. 30 Tab 4   • carvedilol (COREG) 25 MG Tab Take 0.5 Tabs by mouth 2 times a day, with meals. 90 Tab 0   • nitroglycerin (NITROSTAT) 0.4 MG SL Tab Place 1 Tab under tongue as needed for Chest Pain. 25 Tab 3   • simvastatin (ZOCOR) 40 MG Tab Take 1 Tab by mouth every day. 90 Tab 0   • torsemide (DEMADEX) 10 MG tablet Take 1 tablet each day for the first five days.  Then take 1 tablet a day AS NEEDED for swelling/edema. 30 Tab 3   • Soft Lens Products (B & L SENSITIVE EYES SALINE) 0.4 % Solution Place 1 Drop in right eye 1 time daily as needed.     • [DISCONTINUED] rivaroxaban (XARELTO) 20 MG Tab tablet Take 1 Tab by mouth with dinner. 30 Tab 6   • [DISCONTINUED] irbesartan (AVAPRO) 150 MG Tab Take 1 Tab by mouth every day. (Patient taking differently: Take 150 mg by mouth every day. Indications: taking a half tablet) 90 Tab 0   • [DISCONTINUED] ibuprofen (MOTRIN) 200 MG Tab Take 400 mg by mouth at bedtime as needed.       No facility-administered encounter medications on file as of 9/15/2020.      Review of Systems   Constitutional: Positive for malaise/fatigue.   Respiratory: Negative for cough, hemoptysis, sputum production, shortness of breath and wheezing.    Cardiovascular: Negative for chest pain, palpitations, orthopnea, claudication, leg swelling and PND.   Gastrointestinal: Negative for nausea and vomiting.   Neurological: Negative for dizziness and weakness.   Psychiatric/Behavioral: The patient is not nervous/anxious.         Objective:   BP (!) 96/70 (BP Location: Left arm, Patient Position: Sitting, BP Cuff Size: Adult)   Pulse 94   Ht 1.753 m (5' 9\")   Wt 106.6 kg (235 lb)   SpO2 97%   BMI 34.70 kg/m²     Physical Exam   Constitutional: He is oriented to person, place, and time. He appears well-developed and well-nourished. No distress.   HENT:   Head: Normocephalic and atraumatic.   Eyes: Pupils are equal, round, and reactive to " light.   Neck: No JVD present.   Cardiovascular: Normal rate and normal heart sounds. An irregularly irregular rhythm present.   Pulmonary/Chest: Effort normal and breath sounds normal.   Abdominal: Soft. Bowel sounds are normal. He exhibits no distension.   Musculoskeletal:         General: No edema.   Neurological: He is alert and oriented to person, place, and time.   Skin: Skin is warm and dry. He is not diaphoretic.   Psychiatric: He has a normal mood and affect. His behavior is normal. Judgment and thought content normal.         Echocardiography Laboratory  8/10/2020  Compared to the images of the study done 04- there has been a reduciton isn septal contractility. The patient is also now in atrial fib.   Moderately reduced left ventricular systolic function.  Left ventricular ejection fraction is visually estimated to be 40%.No   pericardial effusion seen.  Estimated right ventricular systolic pressure is  22 mmHg.   Thrombus is not observed in the left ventricular apex.  Diastolic function is difficult to assess with atrial fibrillation.  Akinesis of apex, septum and anterior wall.  Mildly dilated right ventricle.  Enlarged right atrium.Mildly dilated left atrium.  Structurally normal mitral valve without significant stenosis or   Regurgitation. Structurally normal aortic valve without significant stenosis or   Regurgitation.      Myocardial Perfusion   6/7/2019   Abnormal Lexiscan myocardial perfusion study.   Large distal anterior and apical infarct.   No significant ischemia.   Mildly reduced LVSF with EF of 47%. Mid to distal anterior wall hypokinesis.   No ischemic changes with Regadenoson.   No chest pain.   ECG INTERPRETATION   Negative stress ECG for ischemia.    Assessment:     1. Essential hypertension, benign  irbesartan (AVAPRO) 150 MG Tab    CL-CARDIOVERSION    EC-JANE W/O CONT   2. Other persistent atrial fibrillation (HCC)  EKG - Clinic Performed    CL-CARDIOVERSION    EC-JANE W/O CONT    3. Coronary artery disease involving native coronary artery of native heart without angina pectoris  CL-CARDIOVERSION    EC-KEN W/O CONT   4. Ischemic cardiomyopathy  CL-CARDIOVERSION    EC-KEN W/O CONT   5. Paroxysmal atrial fibrillation (HCC)  CL-CARDIOVERSION    EC-KEN W/O CONT       Medical Decision Making:  Today's Assessment / Status / Plan:     1. persistent atrial fibrillation:  - EKG today showed atrial fibrillation 90  - start amiodarone 400mg BID for 2 weeks, 400mg qd for 2 weeks till 200mg qd   - plan for ken guided CV     2. Ischemic cardiomyopathy:   - echo showed reduced ef 40%  - euvolemic upon examination   - continue coreg 25mg BID and irebesartan 75mg qd   - continue torsemide 10mg qd as needed     3. CAD s/p PCI to LAD:  - denies any angina   - continue statin therapy   - continue coreg 25mg BID     Follow up after KEN guided cardioversion. Start amiodarone loading with repeat BMP.

## 2020-09-15 ENCOUNTER — OFFICE VISIT (OUTPATIENT)
Dept: CARDIOLOGY | Facility: MEDICAL CENTER | Age: 65
End: 2020-09-15
Payer: MEDICARE

## 2020-09-15 ENCOUNTER — TELEPHONE (OUTPATIENT)
Dept: CARDIOLOGY | Facility: MEDICAL CENTER | Age: 65
End: 2020-09-15

## 2020-09-15 VITALS
BODY MASS INDEX: 34.8 KG/M2 | OXYGEN SATURATION: 97 % | HEIGHT: 69 IN | SYSTOLIC BLOOD PRESSURE: 96 MMHG | HEART RATE: 94 BPM | DIASTOLIC BLOOD PRESSURE: 70 MMHG | WEIGHT: 235 LBS

## 2020-09-15 DIAGNOSIS — I25.10 CORONARY ARTERY DISEASE INVOLVING NATIVE CORONARY ARTERY OF NATIVE HEART WITHOUT ANGINA PECTORIS: ICD-10-CM

## 2020-09-15 DIAGNOSIS — I10 ESSENTIAL HYPERTENSION, BENIGN: Primary | ICD-10-CM

## 2020-09-15 DIAGNOSIS — I48.0 PAROXYSMAL ATRIAL FIBRILLATION (HCC): ICD-10-CM

## 2020-09-15 DIAGNOSIS — I25.5 ISCHEMIC CARDIOMYOPATHY: ICD-10-CM

## 2020-09-15 DIAGNOSIS — I48.19 OTHER PERSISTENT ATRIAL FIBRILLATION (HCC): ICD-10-CM

## 2020-09-15 LAB — EKG IMPRESSION: NORMAL

## 2020-09-15 PROCEDURE — 93000 ELECTROCARDIOGRAM COMPLETE: CPT | Performed by: INTERNAL MEDICINE

## 2020-09-15 PROCEDURE — 99214 OFFICE O/P EST MOD 30 MIN: CPT | Performed by: NURSE PRACTITIONER

## 2020-09-15 RX ORDER — IRBESARTAN 150 MG/1
75 TABLET ORAL
Qty: 90 TAB | Refills: 0 | Status: SHIPPED | OUTPATIENT
Start: 2020-09-15 | End: 2020-12-21 | Stop reason: SDUPTHER

## 2020-09-15 RX ORDER — AMIODARONE HYDROCHLORIDE 400 MG/1
400 TABLET ORAL DAILY
Qty: 14 TAB | Refills: 0 | Status: SHIPPED | OUTPATIENT
Start: 2020-10-01 | End: 2020-09-28

## 2020-09-15 RX ORDER — AMIODARONE HYDROCHLORIDE 400 MG/1
200 TABLET ORAL DAILY
Qty: 30 TAB | Refills: 4 | Status: SHIPPED | OUTPATIENT
Start: 2020-10-16 | End: 2020-09-28

## 2020-09-15 RX ORDER — AMIODARONE HYDROCHLORIDE 400 MG/1
400 TABLET ORAL 2 TIMES DAILY
Qty: 28 TAB | Refills: 0 | Status: SHIPPED | OUTPATIENT
Start: 2020-09-16 | End: 2020-09-28

## 2020-09-15 ASSESSMENT — ENCOUNTER SYMPTOMS
ORTHOPNEA: 0
SPUTUM PRODUCTION: 0
PND: 0
PALPITATIONS: 0
NERVOUS/ANXIOUS: 0
CLAUDICATION: 0
NAUSEA: 0
COUGH: 0
SHORTNESS OF BREATH: 0
WEAKNESS: 0
VOMITING: 0
HEMOPTYSIS: 0
WHEEZING: 0
DIZZINESS: 0

## 2020-09-15 ASSESSMENT — FIBROSIS 4 INDEX: FIB4 SCORE: 1.32

## 2020-09-15 NOTE — TELEPHONE ENCOUNTER
Patient is scheduled on 9- for a JANE/CV with anesthesia with Dr. Maurer. No meds to stop and patient to check in at 6:00 for an 8:00 procedure. H&P was done on 9- by Michael Lobo admtorsten to call patient.

## 2020-09-15 NOTE — PATIENT INSTRUCTIONS
- amiodarone 400mg twice a day for 2 weeks (9/16) then 400mg once a day for 2 weeks (10/1), till 200mg once a day (October 15)

## 2020-09-15 NOTE — TELEPHONE ENCOUNTER
Per Michael's 9-15-20 OV dictation:    - EKG today showed atrial fibrillation 90  - start amiodarone 400mg BID for 2 weeks, 400mg qd for 2 weeks, then 200mg qd   Called Rosa Isela to clarify. She will dispense 200mg tablets.

## 2020-09-15 NOTE — TELEPHONE ENCOUNTER
RT/valentin Natarajan from [facility] calling for clarification on amiodarone script.      Please call Rosa Isela

## 2020-09-18 DIAGNOSIS — I10 ESSENTIAL HYPERTENSION, BENIGN: ICD-10-CM

## 2020-09-18 DIAGNOSIS — I10 ESSENTIAL HYPERTENSION: ICD-10-CM

## 2020-09-18 DIAGNOSIS — E78.5 HYPERLIPIDEMIA, UNSPECIFIED HYPERLIPIDEMIA TYPE: ICD-10-CM

## 2020-09-18 RX ORDER — CARVEDILOL 25 MG/1
12.5 TABLET ORAL 2 TIMES DAILY WITH MEALS
Qty: 90 TAB | Refills: 3 | Status: SHIPPED | OUTPATIENT
Start: 2020-09-18 | End: 2020-09-28

## 2020-09-18 RX ORDER — SIMVASTATIN 40 MG
40 TABLET ORAL
Qty: 90 TAB | Refills: 3 | Status: SHIPPED | OUTPATIENT
Start: 2020-09-18 | End: 2020-12-21 | Stop reason: SDUPTHER

## 2020-09-21 ENCOUNTER — TELEPHONE (OUTPATIENT)
Dept: CARDIOLOGY | Facility: MEDICAL CENTER | Age: 65
End: 2020-09-21

## 2020-09-21 NOTE — TELEPHONE ENCOUNTER
"Spoke with wife. She said that pt. Has a \"tight cough\" and was concerned of the effect that anesthesia might have. Wife advised that pt. Would not receive general anesthesia, but conscious sedation.  Pt. Has pre-admission testing tomorrow and he can discuss cough with RN then. Wife also advised that pt. Should call PCP.    "

## 2020-09-21 NOTE — TELEPHONE ENCOUNTER
RT      Hello, patients wife calling to see if the  Would like to start patient on medication for his lungs since he does have problems prior to his procedure. She will like a call back at [phone]

## 2020-09-22 ENCOUNTER — PRE-ADMISSION TESTING (OUTPATIENT)
Dept: ADMISSIONS | Facility: MEDICAL CENTER | Age: 65
End: 2020-09-22
Attending: INTERNAL MEDICINE
Payer: MEDICARE

## 2020-09-22 DIAGNOSIS — Z01.812 PRE-OPERATIVE LABORATORY EXAMINATION: ICD-10-CM

## 2020-09-22 LAB
ANION GAP SERPL CALC-SCNC: 9 MMOL/L (ref 7–16)
BUN SERPL-MCNC: 15 MG/DL (ref 8–22)
CALCIUM SERPL-MCNC: 9.2 MG/DL (ref 8.5–10.5)
CHLORIDE SERPL-SCNC: 99 MMOL/L (ref 96–112)
CO2 SERPL-SCNC: 29 MMOL/L (ref 20–33)
COVID ORDER STATUS COVID19: NORMAL
CREAT SERPL-MCNC: 0.92 MG/DL (ref 0.5–1.4)
ERYTHROCYTE [DISTWIDTH] IN BLOOD BY AUTOMATED COUNT: 47.6 FL (ref 35.9–50)
GLUCOSE SERPL-MCNC: 103 MG/DL (ref 65–99)
HCT VFR BLD AUTO: 44.5 % (ref 42–52)
HGB BLD-MCNC: 14.6 G/DL (ref 14–18)
INR PPP: 1.73 (ref 0.87–1.13)
MCH RBC QN AUTO: 33 PG (ref 27–33)
MCHC RBC AUTO-ENTMCNC: 32.8 G/DL (ref 33.7–35.3)
MCV RBC AUTO: 100.7 FL (ref 81.4–97.8)
PLATELET # BLD AUTO: 167 K/UL (ref 164–446)
PMV BLD AUTO: 11.8 FL (ref 9–12.9)
POTASSIUM SERPL-SCNC: 4.5 MMOL/L (ref 3.6–5.5)
PROTHROMBIN TIME: 20.7 SEC (ref 12–14.6)
RBC # BLD AUTO: 4.42 M/UL (ref 4.7–6.1)
SARS-COV-2 RNA RESP QL NAA+PROBE: NOTDETECTED
SODIUM SERPL-SCNC: 137 MMOL/L (ref 135–145)
SPECIMEN SOURCE: NORMAL
WBC # BLD AUTO: 8.8 K/UL (ref 4.8–10.8)

## 2020-09-22 PROCEDURE — 36415 COLL VENOUS BLD VENIPUNCTURE: CPT

## 2020-09-22 PROCEDURE — U0003 INFECTIOUS AGENT DETECTION BY NUCLEIC ACID (DNA OR RNA); SEVERE ACUTE RESPIRATORY SYNDROME CORONAVIRUS 2 (SARS-COV-2) (CORONAVIRUS DISEASE [COVID-19]), AMPLIFIED PROBE TECHNIQUE, MAKING USE OF HIGH THROUGHPUT TECHNOLOGIES AS DESCRIBED BY CMS-2020-01-R: HCPCS

## 2020-09-22 PROCEDURE — 80048 BASIC METABOLIC PNL TOTAL CA: CPT

## 2020-09-22 PROCEDURE — 85027 COMPLETE CBC AUTOMATED: CPT

## 2020-09-22 PROCEDURE — 85610 PROTHROMBIN TIME: CPT

## 2020-09-22 RX ORDER — ASPIRIN 81 MG/1
81 TABLET, CHEWABLE ORAL DAILY
COMMUNITY
End: 2020-09-28

## 2020-09-22 ASSESSMENT — FIBROSIS 4 INDEX: FIB4 SCORE: 1.32

## 2020-09-23 NOTE — OR NURSING
COVID-19 Pre-Surgery Screenin. Do you have an undiagnosed respiratory illness or symptoms such as coughing or sneezing? NO     • Onset of Sx: NO    • Acute vs. chronic respiratory illness: NO     2. Do you have an unexplained fever greater than 100.4 degrees Fahrenheit or 38 degrees Celsius? NO  ?  3. Have you had direct exposure to a patient who tested positive for Covid-19? NO    4. Patient informed of current visitation and mask policies by this RN.

## 2020-09-24 ENCOUNTER — APPOINTMENT (OUTPATIENT)
Dept: CARDIOLOGY | Facility: MEDICAL CENTER | Age: 65
End: 2020-09-24
Attending: NURSE PRACTITIONER
Payer: MEDICARE

## 2020-09-24 ENCOUNTER — ANESTHESIA EVENT (OUTPATIENT)
Dept: CARDIOLOGY | Facility: MEDICAL CENTER | Age: 65
End: 2020-09-24
Payer: MEDICARE

## 2020-09-24 ENCOUNTER — ANESTHESIA (OUTPATIENT)
Dept: CARDIOLOGY | Facility: MEDICAL CENTER | Age: 65
End: 2020-09-24
Payer: MEDICARE

## 2020-09-24 ENCOUNTER — HOSPITAL ENCOUNTER (OUTPATIENT)
Facility: MEDICAL CENTER | Age: 65
End: 2020-09-24
Attending: INTERNAL MEDICINE | Admitting: INTERNAL MEDICINE
Payer: MEDICARE

## 2020-09-24 VITALS
TEMPERATURE: 97.6 F | OXYGEN SATURATION: 97 % | BODY MASS INDEX: 35.43 KG/M2 | SYSTOLIC BLOOD PRESSURE: 110 MMHG | HEIGHT: 69 IN | HEART RATE: 64 BPM | WEIGHT: 239.2 LBS | RESPIRATION RATE: 16 BRPM | DIASTOLIC BLOOD PRESSURE: 75 MMHG

## 2020-09-24 DIAGNOSIS — I48.0 PAROXYSMAL ATRIAL FIBRILLATION (HCC): ICD-10-CM

## 2020-09-24 DIAGNOSIS — I25.10 CORONARY ARTERY DISEASE INVOLVING NATIVE CORONARY ARTERY OF NATIVE HEART WITHOUT ANGINA PECTORIS: ICD-10-CM

## 2020-09-24 DIAGNOSIS — I25.5 ISCHEMIC CARDIOMYOPATHY: ICD-10-CM

## 2020-09-24 DIAGNOSIS — I48.19 OTHER PERSISTENT ATRIAL FIBRILLATION (HCC): ICD-10-CM

## 2020-09-24 DIAGNOSIS — I10 ESSENTIAL HYPERTENSION, BENIGN: ICD-10-CM

## 2020-09-24 LAB
EKG IMPRESSION: NORMAL
EKG IMPRESSION: NORMAL

## 2020-09-24 PROCEDURE — 92960 CARDIOVERSION ELECTRIC EXT: CPT | Performed by: INTERNAL MEDICINE

## 2020-09-24 PROCEDURE — 93010 ELECTROCARDIOGRAM REPORT: CPT | Performed by: INTERNAL MEDICINE

## 2020-09-24 PROCEDURE — 93005 ELECTROCARDIOGRAM TRACING: CPT | Performed by: INTERNAL MEDICINE

## 2020-09-24 PROCEDURE — 160002 HCHG RECOVERY MINUTES (STAT)

## 2020-09-24 PROCEDURE — 93312 ECHO TRANSESOPHAGEAL: CPT

## 2020-09-24 PROCEDURE — A9270 NON-COVERED ITEM OR SERVICE: HCPCS

## 2020-09-24 PROCEDURE — 93312 ECHO TRANSESOPHAGEAL: CPT | Mod: 26 | Performed by: INTERNAL MEDICINE

## 2020-09-24 PROCEDURE — 92960 CARDIOVERSION ELECTRIC EXT: CPT

## 2020-09-24 PROCEDURE — 700102 HCHG RX REV CODE 250 W/ 637 OVERRIDE(OP)

## 2020-09-24 PROCEDURE — 700111 HCHG RX REV CODE 636 W/ 250 OVERRIDE (IP): Performed by: ANESTHESIOLOGY

## 2020-09-24 PROCEDURE — 700105 HCHG RX REV CODE 258: Performed by: INTERNAL MEDICINE

## 2020-09-24 RX ORDER — HALOPERIDOL 5 MG/ML
1 INJECTION INTRAMUSCULAR
Status: DISCONTINUED | OUTPATIENT
Start: 2020-09-24 | End: 2020-09-24 | Stop reason: HOSPADM

## 2020-09-24 RX ORDER — ONDANSETRON 2 MG/ML
4 INJECTION INTRAMUSCULAR; INTRAVENOUS
Status: DISCONTINUED | OUTPATIENT
Start: 2020-09-24 | End: 2020-09-24 | Stop reason: HOSPADM

## 2020-09-24 RX ORDER — DIPHENHYDRAMINE HYDROCHLORIDE 50 MG/ML
12.5 INJECTION INTRAMUSCULAR; INTRAVENOUS
Status: DISCONTINUED | OUTPATIENT
Start: 2020-09-24 | End: 2020-09-24 | Stop reason: HOSPADM

## 2020-09-24 RX ORDER — SODIUM CHLORIDE, SODIUM LACTATE, POTASSIUM CHLORIDE, CALCIUM CHLORIDE 600; 310; 30; 20 MG/100ML; MG/100ML; MG/100ML; MG/100ML
INJECTION, SOLUTION INTRAVENOUS CONTINUOUS
Status: DISCONTINUED | OUTPATIENT
Start: 2020-09-24 | End: 2020-09-24 | Stop reason: HOSPADM

## 2020-09-24 RX ADMIN — FENTANYL CITRATE 100 MCG: 50 INJECTION, SOLUTION INTRAMUSCULAR; INTRAVENOUS at 08:08

## 2020-09-24 RX ADMIN — PROPOFOL 150 MG: 10 INJECTION, EMULSION INTRAVENOUS at 08:09

## 2020-09-24 RX ADMIN — POVIDONE IODINE 15 ML: 100 SOLUTION TOPICAL at 06:53

## 2020-09-24 RX ADMIN — SODIUM CHLORIDE, POTASSIUM CHLORIDE, SODIUM LACTATE AND CALCIUM CHLORIDE: 600; 310; 30; 20 INJECTION, SOLUTION INTRAVENOUS at 06:54

## 2020-09-24 ASSESSMENT — FIBROSIS 4 INDEX: FIB4 SCORE: 1.8

## 2020-09-24 ASSESSMENT — PAIN SCALES - GENERAL: PAIN_LEVEL: 2

## 2020-09-24 NOTE — DISCHARGE INSTRUCTIONS
ACTIVITY: Rest and take it easy for the first 24 hours.  A responsible adult is recommended to remain with you during that time.  It is normal to feel sleepy.  We encourage you to not do anything that requires balance, judgment or coordination.    MILD FLU-LIKE SYMPTOMS ARE NORMAL. YOU MAY EXPERIENCE GENERALIZED MUSCLE ACHES, THROAT IRRITATION, HEADACHE AND/OR SOME NAUSEA.    FOR 24 HOURS DO NOT:  Drive, operate machinery or run household appliances.  Drink beer or alcoholic beverages.   Make important decisions or sign legal documents.    SPECIAL INSTRUCTIONS: follow up with your cardiologist call find out when to follow up.     DIET: To avoid nausea, slowly advance diet as tolerated, avoiding spicy or greasy foods for the first day.  Add more substantial food to your diet according to your physician's instructions.  Babies can be fed formula or breast milk as soon as they are hungry.  INCREASE FLUIDS AND FIBER TO AVOID CONSTIPATION.    FOLLOW-UP APPOINTMENT:  A follow-up appointment should be arranged with your doctor in [phone]; call to schedule.    You should CALL YOUR PHYSICIAN if you develop:  Fever greater than 101 degrees F.  Pain not relieved by medication, or persistent nausea or vomiting.  Excessive bleeding (blood soaking through dressing) or unexpected drainage from the wound.  Extreme redness or swelling around the incision site, drainage of pus or foul smelling drainage.  Inability to urinate or empty your bladder within 8 hours.  Problems with breathing or chest pain.    You should call 911 if you develop problems with breathing or chest pain.  If you are unable to contact your doctor or surgical center, you should go to the nearest emergency room or urgent care center.  Physician's telephone #: [phone]    If any questions arise, call your doctor.  If your doctor is not available, please feel free to call the Surgical Center at [phone]  The Center is open Monday through Friday from 7AM to  7PM.  You can also call the HEALTH HOTLINE open 24 hours/day, 7 days/week and speak to a nurse at [phone], or toll free at [phone].    A registered nurse may call you a few days after your surgery to see how you are doing after your procedure.    MEDICATIONS: Resume taking daily medication.  Take prescribed pain medication with food.  If no medication is prescribed, you may take non-aspirin pain medication if needed.  PAIN MEDICATION CAN BE VERY CONSTIPATING.  Take a stool softener or laxative such as senokot, pericolace, or milk of magnesia if needed.  Electrical Cardioversion, Care After  This sheet gives you information about how to care for yourself after your procedure. Your health care provider may also give you more specific instructions. If you have problems or questions, contact your health care provider.  What can I expect after the procedure?  After the procedure, it is common to have:  · Some redness on the skin where the shocks were given.  Follow these instructions at home:    · Do not drive for 24 hours if you were given a medicine to help you relax (sedative).  · Take over-the-counter and prescription medicines only as told by your health care provider.  · Ask your health care provider how to check your pulse. Check it often.  · Rest for 48 hours after the procedure or as told by your health care provider.  · Avoid or limit your caffeine use as told by your health care provider.  Contact a health care provider if:  · You feel like your heart is beating too quickly or your pulse is not regular.  · You have a serious muscle cramp that does not go away.  Get help right away if:    · You have discomfort in your chest.  · You are dizzy or you feel faint.  · You have trouble breathing or you are short of breath.  · Your speech is slurred.  · You have trouble moving an arm or leg on one side of your body.  · Your fingers or toes turn cold or blue.  This information is not intended to replace  advice given to you by your health care provider. Make sure you discuss any questions you have with your health care provider.  Document Released: 10/08/2014 Document Revised: 11/30/2018 Document Reviewed: 06/23/2017  [employer] Patient Education © 2020 [employer]      If your physician has prescribed pain medication that includes Acetaminophen (Tylenol), do not take additional Acetaminophen (Tylenol) while taking the prescribed medication.    Depression / Suicide Risk    As you are discharged from this [facility] facility, it is important to learn how to keep safe from harming yourself.    Recognize the warning signs:  · Abrupt changes in personality, positive or negative- including increase in energy   · Giving away possessions  · Change in eating patterns- significant weight changes-  positive or negative  · Change in sleeping patterns- unable to sleep or sleeping all the time   · Unwillingness or inability to communicate  · Depression  · Unusual sadness, discouragement and loneliness  · Talk of wanting to die  · Neglect of personal appearance   · Rebelliousness- reckless behavior  · Withdrawal from people/activities they love  · Confusion- inability to concentrate     If you or a loved one observes any of these behaviors or has concerns about self-harm, here's what you can do:  · Talk about it- your feelings and reasons for harming yourself  · Remove any means that you might use to hurt yourself (examples: pills, rope, extension cords, firearm)  · Get professional help from the community (Mental Health, Substance Abuse, psychological counseling)  · Do not be alone:Call your Safe Contact- someone whom you trust who will be there for you.  · Call your local CRISIS HOTLINE 646-0070 or 674-796-6573  · Call your local Children's Mobile Crisis Response Team [place] (205) 017-7204 or www.Revstr  · Call the toll free National Suicide Prevention Hotlines   · National Suicide Prevention Lifeline  737-055-MEBT (4642)  · National [place] Line Network 800-SUICIDE (612-4329)

## 2020-09-24 NOTE — ANESTHESIA TIME REPORT
Anesthesia Start and Stop Event Times     Date Time Event    9/24/2020 0759 Ready for Procedure     0800 Anesthesia Start     0829 Anesthesia Stop        Responsible Staff  09/24/20    Name Role Begin End    Florian Garcia M.D. Anesth 0800 0829        Preop Diagnosis (Free Text):  Pre-op Diagnosis             Preop Diagnosis (Codes):    Post op Diagnosis  Atrial fibrillation (HCC)      Premium Reason  Non-Premium    Comments:

## 2020-09-24 NOTE — ANESTHESIA QCDR
2019 [facility] Clinical Data Registry (for Quality Improvement)     Postoperative nausea/vomiting risk protocol (Adult = 18 yrs and Pediatric 3-17 yrs)- (430 and 463)  General inhalation anesthetic (NOT TIVA) with PONV risk factors: Yes  Provision of anti-emetic therapy with at least 2 different classes of agents: Yes   Patient DID NOT receive anti-emetic therapy and reason is documented in Medical Record:  N/A    Multimodal Pain Management- (477)  Non-emergent surgery AND patient age >= 18: Yes  Use of Multimodal Pain Management, two or more drugs and/or interventions, NOT including systemic opioids: No  Exception: Documented allergy to multiple classes of analgesics: No       Smoking Abstinence (404)  Patient is current smoker (cigarette, pipe, e-cig, marijuanna): No  Elective Surgery:   Abstinence instructions provided prior to day of surgery:   Patient abstained from smoking on day of surgery:     Pre-Op Beta-Blocker in Isolated CABG (44)  Isolated CABG AND patient age >= 18: No  Beta-blocker admin within 24 hours of surgical incision:   Exception:of medical reason(s) for not administering beta blocker within 24 hours prior to surgical incision (e.g., not  indicated,other medical reason):     PACU assessment of acute postoperative pain prior to Anesthesia Care End- Applies to Patients Age = 18- (ABG7)  Initial PACU pain score is which of the following: < 7/10  Patient unable to report pain score: N/A    Post-anesthetic transfer of care checklist/protocol to PACU/ICU- (426 and 427)  Upon conclusion of case, patient transferred to which of the following locations: PACU/Non-ICU  Use of transfer checklist/protocol: Yes  Exclusion: Service Performed in Patient Hospital Room (and thus did not require transfer): N/A  Unplanned admission to ICU related to anesthesia service up through end of PACU care- (MD51)  Unplanned admission to ICU (not initially anticipated at anesthesia start time): No

## 2020-09-24 NOTE — PROCEDURES
Procedure performed: External DC Cardioversion    : Spike Maurer MD PhD FACC    Assistant: None    Anesthesia: Per anesthesia services    Indication: Atrial fibrillation    Preprocedural Diagnosis:   Atrial Fibrillation  Postprocedural Diagnosis: Sinus Rhythm    Description of procedure:    Mr. Grijalva was brought to the pre/post procedure area of the cath lab. Informed consent was obtained. Defibrillator pads were placed in the anterior and posterior position.  Adequate sedation was obtained with assistance of anesthesia. A JANE performed confirmed that there was NO left atrial thrombus. Patient was successfully cardioverted with 200 J synchronized biphasic energy into sinus rhythm. He was monitored in the recovery area until criteria met and will be discharged home.    Conclusion: Successful DC cardioversion    EBL: None    Complications: None apparent      Electronically signed: Spike Maurer MD PhD FAC  Cardiologist [facility]

## 2020-09-24 NOTE — ANESTHESIA POSTPROCEDURE EVALUATION
Patient: Zenon Grijalva    Procedure Summary     Date: 09/24/20 Room / Location: [facility] IMAGING - ECHOCARDIOLOGY [facility]    Anesthesia Start: 0800 Anesthesia Stop: 0829    Procedures:       CL-CARDIOVERSION      EC-JANE W/O CONT Diagnosis:       Coronary artery disease involving native coronary artery of native heart without angina pectoris      Ischemic cardiomyopathy      Paroxysmal atrial fibrillation (HCC)      Other persistent atrial fibrillation (HCC)      Essential hypertension, benign      Atherosclerotic heart disease of native coronary artery without angina pectoris      Ischemic cardiomyopathy      Paroxysmal atrial fibrillation      Other persistent atrial fibrillation      Essential (primary) hypertension      (See Associated Dx)    Scheduled Providers: Spike Maurer M.D.; Florian Garcia M.D. Responsible Provider: Florian Garcia M.D.    Anesthesia Type: general ASA Status: 3          Final Anesthesia Type: general  Last vitals  BP   Blood Pressure : 115/86    Temp   36.1 °C (96.9 °F)    Pulse   Pulse: 87   Resp   19    SpO2   96 %      Anesthesia Post Evaluation    Patient location during evaluation: PACU  Patient participation: complete - patient participated  Level of consciousness: awake  Pain score: 2    Airway patency: patent  Anesthetic complications: no  Cardiovascular status: adequate  Respiratory status: acceptable  Hydration status: acceptable    PONV: none

## 2020-09-24 NOTE — OR NURSING
0829 Report received from Fiona RODRIGUEZ patient post JANE/cardioversion. Patient fully awake, denies pain, denies nausea vss.   0950 Patient tolerating po intake.   0855 discharge instructions given to patient, patient verbalize understanding of the orders, reviewed activity, worsening symptoms, follow up, medications.  0857 Criteria met to discharge patient home.  0903 Patient escorted via walking with all his personal belongings.

## 2020-09-24 NOTE — ANESTHESIA PREPROCEDURE EVALUATION
Relevant Problems   No relevant active problems       Physical Exam    Airway   Mallampati: II  TM distance: >3 FB  Neck ROM: full       Cardiovascular   Rhythm: regular  Rate: normal     Dental - normal exam           Pulmonary    Abdominal - normal exam     Neurological              Anesthesia Plan    ASA 2       Plan - general       Airway plan will be mask        Induction: intravenous          Informed Consent:    Anesthetic plan and risks discussed with patient.

## 2020-09-24 NOTE — ANESTHESIA PREPROCEDURE EVALUATION
Relevant Problems   NEURO   (+) History of acute anterior wall MI      CARDIAC   (+) CAD (coronary artery disease)   (+) Essential hypertension, benign   (+) Other persistent atrial fibrillation (HCC)   (+) Stented coronary artery       Physical Exam    Airway   Mallampati: II  TM distance: >3 FB  Neck ROM: full       Cardiovascular   Rhythm: regular  Rate: normal     Dental - normal exam           Pulmonary   Breath sounds clear to auscultation     Abdominal   Abdomen: soft     Neurological - normal exam                 Anesthesia Plan    ASA 3   ASA physical status 3 criteria: CAD/stents (> 3 months)    Plan - general       Airway plan will be natural airway      Plan Factors:   Patient was not previously instructed to abstain from smoking on day of procedure.  Patient did not smoke on day of procedure.      Induction: intravenous          Informed Consent:    Anesthetic plan and risks discussed with patient.

## 2020-09-25 ENCOUNTER — TELEPHONE (OUTPATIENT)
Dept: CARDIOLOGY | Facility: MEDICAL CENTER | Age: 65
End: 2020-09-25

## 2020-09-25 NOTE — TELEPHONE ENCOUNTER
Patients wife calling back his blood pressure was at 104-71 at 10:06am, 95/69 at 10:07am, 89/63 at 10:28am, 87/63 at 10:29am and 94/67 at 11:06am. Pulse 59 of 58. She will like a call back at [phone]       Thank you!

## 2020-09-25 NOTE — TELEPHONE ENCOUNTER
RT/valentin  Pt's wife Yvonne Elly calling to report dizziness began yesterday and SOB of breath, very weak.  Yvonne wants to discuss post procedure and is hoping pt might be able to be seen today.  Please call Yvonne

## 2020-09-25 NOTE — TELEPHONE ENCOUNTER
Pt. Had CV yesterday with JANE. He felt very well at discharge but later developed dizziness, dyspnea (his sx. With a fib). Sx  Persist this am. Wife will take BP, P and call back.

## 2020-09-25 NOTE — TELEPHONE ENCOUNTER
Spoke with pt. And wife. He said his main complaint is not dizziness but shortness of breath. This am he to took Carvedilol 12.5mg and Torsemide 10mg because his ankles are swollen (edema was present before CV).   His BP's are low. He will take Carvedilol 12.5mg tonight if S   SBP>110. He will hold Irbesartan 75mg tonight (he takes it in the evening).   Tomorrow he will checxk BP before taking meds. If SBPis<110 he will hold Carvedilol and Irbesartan.   To Redet.

## 2020-09-28 ENCOUNTER — OFFICE VISIT (OUTPATIENT)
Dept: CARDIOLOGY | Facility: MEDICAL CENTER | Age: 65
End: 2020-09-28
Payer: MEDICARE

## 2020-09-28 VITALS
BODY MASS INDEX: 35.84 KG/M2 | HEIGHT: 69 IN | SYSTOLIC BLOOD PRESSURE: 128 MMHG | DIASTOLIC BLOOD PRESSURE: 78 MMHG | WEIGHT: 242 LBS | OXYGEN SATURATION: 97 % | HEART RATE: 61 BPM

## 2020-09-28 DIAGNOSIS — I10 ESSENTIAL HYPERTENSION, BENIGN: ICD-10-CM

## 2020-09-28 DIAGNOSIS — Z95.5 STENTED CORONARY ARTERY: Chronic | ICD-10-CM

## 2020-09-28 DIAGNOSIS — I10 ESSENTIAL HYPERTENSION: ICD-10-CM

## 2020-09-28 DIAGNOSIS — I25.5 ISCHEMIC CARDIOMYOPATHY: Chronic | ICD-10-CM

## 2020-09-28 DIAGNOSIS — I48.0 PAROXYSMAL ATRIAL FIBRILLATION (HCC): ICD-10-CM

## 2020-09-28 DIAGNOSIS — I25.10 CORONARY ARTERY DISEASE INVOLVING NATIVE CORONARY ARTERY OF NATIVE HEART WITHOUT ANGINA PECTORIS: Chronic | ICD-10-CM

## 2020-09-28 PROCEDURE — 99214 OFFICE O/P EST MOD 30 MIN: CPT | Performed by: NURSE PRACTITIONER

## 2020-09-28 PROCEDURE — 93000 ELECTROCARDIOGRAM COMPLETE: CPT | Performed by: INTERNAL MEDICINE

## 2020-09-28 RX ORDER — CARVEDILOL 6.25 MG/1
6.25 TABLET ORAL 2 TIMES DAILY WITH MEALS
Qty: 60 TAB | Refills: 3 | Status: SHIPPED | OUTPATIENT
Start: 2020-09-28 | End: 2020-12-21 | Stop reason: SDUPTHER

## 2020-09-28 RX ORDER — AMIODARONE HYDROCHLORIDE 200 MG/1
200 TABLET ORAL DAILY
Qty: 30 TAB | Refills: 5 | COMMUNITY
Start: 2020-09-28 | End: 2020-12-21

## 2020-09-28 ASSESSMENT — ENCOUNTER SYMPTOMS
ORTHOPNEA: 0
WEAKNESS: 0
VOMITING: 0
HEMOPTYSIS: 0
NAUSEA: 0
COUGH: 0
PALPITATIONS: 0
DIZZINESS: 0
SHORTNESS OF BREATH: 0
WHEEZING: 0
NERVOUS/ANXIOUS: 0
SPUTUM PRODUCTION: 0
CLAUDICATION: 0
PND: 0

## 2020-09-28 ASSESSMENT — FIBROSIS 4 INDEX: FIB4 SCORE: 1.8

## 2020-09-28 NOTE — PROGRESS NOTES
Chief Complaint   Patient presents with   • Coronary Artery Disease     F/V Dx: Stented coronary artery   • Dizziness   • Cardiomyopathy (Ischemic)   • Atrial Fibrillation     F/V Dx: Persistent    • HTN (Controlled)       Subjective:   Zenon Grijalva is a 65 y.o. male who presents today for follow up s/p DCCV cardioversion.    He is followed by Dr. Rodas in our clinic, history of CAD with anterior MI in nov 2008 and had bare metal stent LAD with jailing second diagonal artery, then LAD stent 2017, atrial fibrillation, dyslipidemia, and hypertension.    Last OV was 7/2020 with Dr. Rodas, he was having dyspnea on exertion since October and edema in March. EKG showed afib. He was placed on xaretlo and asa d/c. If not reverted back to NSR, plan for DCCV with amiodarone loading. OPO showed low oxygen saturation 75%. Underwent a successful CV with Dr. Maurer on 9/24/2020    Interim events:  Patient is having episodes of dizziness and dyspnea on exertion after cardioversion. Home blood pressure has been ranging sbp  at home. He had 7.2gram of loading dose of amiodarone.     Denies any palpitations.     Past Medical History:   Diagnosis Date   • Arrhythmia    • Arthritis     fingers   • CAD (coronary artery disease) 7/2/2012   • Chronic anticoagulation 7/2/2012   • Congestive heart failure (HCC)    • Dyslipidemia 7/2/2012   • Essential hypertension, benign 7/2/2012   • High cholesterol    • Ischemic cardiomyopathy 7/2/2012   • Left ventricular thrombosis 7/2/2012   • Myocardial infarct (HCC)    • Pericarditis 7/2/2012   • Pneumonia     history of as a child   • Sleep apnea     told by MD that he has it, scheduled for sleep study    • Snoring     no sleep study done   • SOB (shortness of breath)     no c/o today; on exertion; pt uses 2L o2 qhs only; pt does not know provider   • Stented coronary artery 7/2/2012     Past Surgical History:   Procedure Laterality Date   • SHOULDER DECOMPRESSION ARTHROSCOPIC Right  11/27/2018    Procedure: SHOULDER DECOMPRESSION ARTHROSCOPIC- SUBACROMIAL CONVERTED TO OPEN;  Surgeon: Ni Weir M.D.;  Location: SURGERY [facility];  Service: Orthopedics   • SHOULDER ARTHROSCOPY W/ ROTATOR CUFF REPAIR Right 11/27/2018    Procedure: OPEN ROTATOR CUFF REPAIR;  Surgeon: Ni Weir M.D.;  Location: SURGERY [facility];  Service: Orthopedics   • CARDIAC CATH, LEFT HEART     • CARPAL TUNNEL RELEASE Bilateral    • STENT PLACEMENT      cardiac stent   • VASECTOMY       History reviewed. No pertinent family history.  Social History     Socioeconomic History   • Marital status:      Spouse name: Not on file   • Number of children: Not on file   • Years of education: Not on file   • Highest education level: Not on file   Occupational History   • Not on file   Social Needs   • Financial resource strain: Not on file   • Food insecurity     Worry: Not on file     Inability: Not on file   • Transportation needs     Medical: Not on file     Non-medical: Not on file   Tobacco Use   • Smoking status: Never Smoker   • Smokeless tobacco: Former User     Types: Chew   Substance and Sexual Activity   • Alcohol use: No   • Drug use: Not Currently   • Sexual activity: Not on file   Lifestyle   • Physical activity     Days per week: Not on file     Minutes per session: Not on file   • Stress: Not on file   Relationships   • Social connections     Talks on phone: Not on file     Gets together: Not on file     Attends [employer] service: Not on file     Active member of club or organization: Not on file     Attends meetings of clubs or organizations: Not on file     Relationship status: Not on file   • Intimate partner violence     Fear of current or ex partner: Not on file     Emotionally abused: Not on file     Physically abused: Not on file     Forced sexual activity: Not on file   Other Topics Concern   • Not on file   Social History Narrative   • Not on file     No Known  Allergies  Outpatient Encounter Medications as of 9/28/2020   Medication Sig Dispense Refill   • amiodarone (CORDARONE) 200 MG Tab Take 1 Tab by mouth every day. 30 Tab 5   • rivaroxaban (XARELTO) 20 MG Tab tablet Take 1 Tab by mouth with dinner. 90 Tab 3   • carvedilol (COREG) 6.25 MG Tab Take 1 Tab by mouth 2 times a day, with meals. 60 Tab 3   • simvastatin (ZOCOR) 40 MG Tab Take 1 Tab by mouth every day. 90 Tab 3   • irbesartan (AVAPRO) 150 MG Tab Take 0.5 Tabs by mouth every day. 90 Tab 0   • nitroglycerin (NITROSTAT) 0.4 MG SL Tab Place 1 Tab under tongue as needed for Chest Pain. 25 Tab 3   • torsemide (DEMADEX) 10 MG tablet Take 1 tablet each day for the first five days.  Then take 1 tablet a day AS NEEDED for swelling/edema. 30 Tab 3   • Soft Lens Products (B & L SENSITIVE EYES SALINE) 0.4 % Solution Place 1 Drop in right eye 1 time daily as needed.     • [DISCONTINUED] aspirin (ASA) 81 MG Chew Tab chewable tablet Take 81 mg by mouth every day.     • [DISCONTINUED] carvedilol (COREG) 25 MG Tab Take 0.5 Tabs by mouth 2 times a day, with meals. 90 Tab 3   • [DISCONTINUED] rivaroxaban (XARELTO) 20 MG Tab tablet Take 1 Tab by mouth with dinner. 15 Tab 0   • [DISCONTINUED] amiodarone (PACERONE) 400 MG tablet Take 1 Tab by mouth 2 Times a Day for 14 days. 28 Tab 0   • [DISCONTINUED] amiodarone (PACERONE) 400 MG tablet Take 1 Tab by mouth every day for 14 days. 14 Tab 0   • [DISCONTINUED] amiodarone (PACERONE) 400 MG tablet Take 0.5 Tabs by mouth every day for 30 days. 30 Tab 4     No facility-administered encounter medications on file as of 9/28/2020.      Review of Systems   Constitutional: Positive for malaise/fatigue.   Respiratory: Negative for cough, hemoptysis, sputum production, shortness of breath and wheezing.    Cardiovascular: Positive for leg swelling. Negative for chest pain, palpitations, orthopnea, claudication and PND.   Gastrointestinal: Negative for nausea and vomiting.   Neurological: Negative  "for dizziness and weakness.   Psychiatric/Behavioral: The patient is not nervous/anxious.         Objective:   /78 (BP Location: Left arm, Patient Position: Sitting, BP Cuff Size: Adult)   Pulse 61   Ht 1.753 m (5' 9\")   Wt 109.8 kg (242 lb)   SpO2 97%   BMI 35.74 kg/m²     Physical Exam   Constitutional: He is oriented to person, place, and time. He appears well-developed and well-nourished. No distress.   HENT:   Head: Normocephalic and atraumatic.   Eyes: Pupils are equal, round, and reactive to light.   Neck: No JVD present.   Cardiovascular: Normal rate, regular rhythm and normal heart sounds.   Pulmonary/Chest: Effort normal and breath sounds normal.   Abdominal: Soft. Bowel sounds are normal. He exhibits no distension.   Musculoskeletal:         General: Edema (1+ pitting edema bilateral ) present.   Neurological: He is alert and oriented to person, place, and time.   Skin: Skin is warm and dry. He is not diaphoretic.   Psychiatric: He has a normal mood and affect. His behavior is normal. Judgment and thought content normal.         Echocardiography Laboratory  8/10/2020  Compared to the images of the study done 04- there has been a reduciton isn septal contractility. The patient is also now in atrial fib.   Moderately reduced left ventricular systolic function.  Left ventricular ejection fraction is visually estimated to be 40%.No   pericardial effusion seen.  Estimated right ventricular systolic pressure is  22 mmHg.   Thrombus is not observed in the left ventricular apex.  Diastolic function is difficult to assess with atrial fibrillation.  Akinesis of apex, septum and anterior wall.  Mildly dilated right ventricle.  Enlarged right atrium.Mildly dilated left atrium.  Structurally normal mitral valve without significant stenosis or   Regurgitation. Structurally normal aortic valve without significant stenosis or   Regurgitation.      Myocardial Perfusion   6/7/2019   Abnormal Lexiscan " myocardial perfusion study.   Large distal anterior and apical infarct.   No significant ischemia.   Mildly reduced LVSF with EF of 47%. Mid to distal anterior wall hypokinesis.   No ischemic changes with Regadenoson.   No chest pain.   ECG INTERPRETATION   Negative stress ECG for ischemia.    Assessment:     1. Paroxysmal atrial fibrillation (HCC)  EKG - Clinic Performed   2. Essential hypertension  carvedilol (COREG) 6.25 MG Tab   3. Essential hypertension, benign  carvedilol (COREG) 6.25 MG Tab   4. Ischemic cardiomyopathy     5. Stented coronary artery     6. Coronary artery disease involving native coronary artery of native heart without angina pectoris         Medical Decision Making:  Today's Assessment / Status / Plan:     1. Persistent atrial fibrillation s/p DCCV:  - EKG today showed NSR  - reduce amiodarone to 200mg qd (total 7.2gram loading)   - continue xarelto 20mg qd     2. Ischemic cardiomyopathy:   - echo showed reduced ef 40%  - euvolemic upon examination   - reduce  coreg 6.25mg BID and irebesartan 75mg qd   - continue torsemide 10mg qd as needed     3. CAD s/p PCI to LAD:  - denies any angina   - continue statin therapy   - bb as noted above     Follow up in 3 months with Dr. Rodas, sooner as needed.

## 2020-09-29 LAB — EKG IMPRESSION: NORMAL

## 2020-11-04 DIAGNOSIS — I25.5 ISCHEMIC CARDIOMYOPATHY: ICD-10-CM

## 2020-11-04 RX ORDER — TORSEMIDE 10 MG/1
TABLET ORAL
Qty: 90 TAB | Refills: 1 | Status: CANCELLED | OUTPATIENT
Start: 2020-11-04

## 2020-11-04 RX ORDER — TORSEMIDE 10 MG/1
TABLET ORAL
Qty: 30 TAB | Refills: 3 | Status: SHIPPED | OUTPATIENT
Start: 2020-11-04 | End: 2021-04-13

## 2020-12-01 ENCOUNTER — TELEPHONE (OUTPATIENT)
Dept: CARDIOLOGY | Facility: MEDICAL CENTER | Age: 65
End: 2020-12-01

## 2020-12-01 NOTE — TELEPHONE ENCOUNTER
RT    Patient called to see if they should take the amiodarone (CORDARONE) 200 MG Tab anymore. Please call the Pt back at [phone].    Thank you,  Vandana JOHNSTON

## 2020-12-11 ENCOUNTER — SLEEP CENTER VISIT (OUTPATIENT)
Dept: SLEEP MEDICINE | Facility: MEDICAL CENTER | Age: 65
End: 2020-12-11
Payer: MEDICARE

## 2020-12-11 VITALS
HEART RATE: 81 BPM | SYSTOLIC BLOOD PRESSURE: 124 MMHG | BODY MASS INDEX: 35.55 KG/M2 | DIASTOLIC BLOOD PRESSURE: 80 MMHG | RESPIRATION RATE: 16 BRPM | WEIGHT: 240 LBS | OXYGEN SATURATION: 97 % | HEIGHT: 69 IN

## 2020-12-11 DIAGNOSIS — I10 ESSENTIAL HYPERTENSION, BENIGN: Chronic | ICD-10-CM

## 2020-12-11 DIAGNOSIS — R06.02 SOB (SHORTNESS OF BREATH): ICD-10-CM

## 2020-12-11 DIAGNOSIS — G47.33 OSA (OBSTRUCTIVE SLEEP APNEA): ICD-10-CM

## 2020-12-11 DIAGNOSIS — I25.5 ISCHEMIC CARDIOMYOPATHY: Chronic | ICD-10-CM

## 2020-12-11 DIAGNOSIS — I48.19 OTHER PERSISTENT ATRIAL FIBRILLATION (HCC): ICD-10-CM

## 2020-12-11 PROCEDURE — 99203 OFFICE O/P NEW LOW 30 MIN: CPT | Performed by: INTERNAL MEDICINE

## 2020-12-11 RX ORDER — ZOLPIDEM TARTRATE 5 MG/1
5 TABLET ORAL NIGHTLY PRN
Qty: 2 TAB | Refills: 0 | Status: SHIPPED | OUTPATIENT
Start: 2020-12-11 | End: 2020-12-13

## 2020-12-11 ASSESSMENT — FIBROSIS 4 INDEX: FIB4 SCORE: 1.8

## 2020-12-11 NOTE — PROGRESS NOTES
"Chief Complaint   Patient presents with   • Establish Care     Ref by Dr. Rodas Dx: Abnormal pulse oximetry , Nocturnal hypoxia , OPO Scanned into Media        HPI: This patient is a 65 y.o. male who is referred by Dr. Rodas, cardiologist, for sleep apnea evaluation.  He has an extensive cardiovascular history including atrial fibrillation, ischemic cardiomyopathy and coronary artery disease.  He required cardioversion for recent atrial fibrillation.  He had screening overnight oximetry showing desaturations for 100 minutes ninfa of 75% and was started on nighttime oxygen.  He has woken up feeling short of breath and his wife describes him as a very loud snorer.  He feels nighttime oxygen may be mildly beneficial in sleep.  In terms of sleep habits, he goes to sleep by 8:30 PM, usually falling asleep quickly.  Has 2-5 nightime awakenings.  He is an \"early bird\" and wakes up by 4 AM, generally feeling rested.  Denies napping or daytime hypersomnolence.  He is a lifelong non-smoker and denies any past pulmonary history.  Over the past year he has felt short of breath with exertion.  He describes associated cough. Worked as a /.  Echocardiography estimated EF of 40%.      Past Medical History:   Diagnosis Date   • Arrhythmia    • Arthritis     fingers   • CAD (coronary artery disease) 7/2/2012   • Chronic anticoagulation 7/2/2012   • Congestive heart failure (HCC)    • Dyslipidemia 7/2/2012   • Essential hypertension, benign 7/2/2012   • High cholesterol    • Insomnia     wakes up 2-5 times a night , wakes up to use restroom and sometimes just wakes up    • Ischemic cardiomyopathy 7/2/2012   • Left ventricular thrombosis 7/2/2012   • Myocardial infarct (HCC)    • Pericarditis 7/2/2012   • Pneumonia     history of as a child   • Sleep apnea     told by MD that he has it, scheduled for sleep study    • Snoring     no sleep study done   • SOB (shortness of breath)     no c/o today; on exertion; pt uses 2L " o2 qhs only; pt does not know provider   • Stented coronary artery 7/2/2012       Social History     Socioeconomic History   • Marital status:      Spouse name: Not on file   • Number of children: Not on file   • Years of education: Not on file   • Highest education level: Not on file   Occupational History   • Not on file   Social Needs   • Financial resource strain: Not on file   • Food insecurity     Worry: Not on file     Inability: Not on file   • Transportation needs     Medical: Not on file     Non-medical: Not on file   Tobacco Use   • Smoking status: Never Smoker   • Smokeless tobacco: Former User     Types: Chew   Substance and Sexual Activity   • Alcohol use: Not Currently     Comment: occ   • Drug use: Not Currently   • Sexual activity: Not on file   Lifestyle   • Physical activity     Days per week: Not on file     Minutes per session: Not on file   • Stress: Not on file   Relationships   • Social connections     Talks on phone: Not on file     Gets together: Not on file     Attends [employer] service: Not on file     Active member of club or organization: Not on file     Attends meetings of clubs or organizations: Not on file     Relationship status: Not on file   • Intimate partner violence     Fear of current or ex partner: Not on file     Emotionally abused: Not on file     Physically abused: Not on file     Forced sexual activity: Not on file   Other Topics Concern   • Not on file   Social History Narrative   • Not on file       History reviewed. No pertinent family history.    Current Outpatient Medications on File Prior to Visit   Medication Sig Dispense Refill   • torsemide (DEMADEX) 10 MG tablet Take 1 tablet a day AS NEEDED for swelling/edema. 30 Tab 3   • amiodarone (CORDARONE) 200 MG Tab Take 1 Tab by mouth every day. 30 Tab 5   • rivaroxaban (XARELTO) 20 MG Tab tablet Take 1 Tab by mouth with dinner. 90 Tab 3   • carvedilol (COREG) 6.25 MG Tab Take 1 Tab by mouth 2 times a day, with  "meals. 60 Tab 3   • simvastatin (ZOCOR) 40 MG Tab Take 1 Tab by mouth every day. 90 Tab 3   • irbesartan (AVAPRO) 150 MG Tab Take 0.5 Tabs by mouth every day. 90 Tab 0   • nitroglycerin (NITROSTAT) 0.4 MG SL Tab Place 1 Tab under tongue as needed for Chest Pain. 25 Tab 3   • Soft Lens Products (B & L SENSITIVE EYES SALINE) 0.4 % Solution Place 1 Drop in right eye 1 time daily as needed.       No current facility-administered medications on file prior to visit.        Allergies: Patient has no known allergies.    ROS:   Constitutional: Denies fevers, chills, night sweats, fatigue or weight loss  Eyes: Denies vision loss, pain, drainage, double vision  Ears, Nose, Throat: Denies earache, difficulty hearing, tinnitus, nasal congestion, hoarseness  Cardiovascular: Denies chest pain, tightness, +palpitations, denies orthopnea or edema  Respiratory: As in HPI  Sleep: Denies daytime sleepiness, snoring, apneas, insomnia, morning headaches  GI: Denies heartburn, dysphagia, nausea, abdominal pain, diarrhea or constipation  : Denies frequent urination, hematuria, discharge or painful urination  Musculoskeletal: Denies back pain, painful joints, sore muscles  Neurological: Denies weakness or headaches  Skin: No rashes    /80 (BP Location: Left arm, Patient Position: Sitting, BP Cuff Size: Large adult)   Pulse 81   Resp 16   Ht 1.753 m (5' 9\")   Wt 108.9 kg (240 lb)   SpO2 97%     Physical Exam:  Appearance: Well-nourished, well-developed, in no acute distress  HEENT: Normocephalic, atraumatic, white sclera, PERRLA  Neck: No adenopathy or masses  Respiratory: no intercostal retractions or accessory muscle use  Lungs auscultation: Clear to auscultation bilaterally  Cardiovascular: Regular rate rhythm. No murmurs, rubs or gallops.  No LE edema  Abdomen: soft, nondistended  Gait: Normal  Digits: No clubbing, cyanosis  Motor: No focal deficits  Orientation: Oriented to time, person and place    Diagnosis:  1. JOSE " (obstructive sleep apnea)  Polysomnography, 4 or More    zolpidem (AMBIEN) 5 MG Tab    -presumptive   2. Essential hypertension, benign     3. Ischemic cardiomyopathy     4. Other persistent atrial fibrillation (HCC)     5. SOB (shortness of breath)  PULMONARY FUNCTION TESTS -Test requested: Complete Pulmonary Function Test       Plan:  The patient has snoring, apneas and nocturnal desaturations, suspicious for sleep disordered breathing.  He has an extensive cardiovascular history including ischemic cardiomyopathy with EF: 40%, atrial fibrillation and coronary artery disease.  We discussed the correlation between sleep disordered breathing and cardiovascular disease and importance of treatment.  Recommend polysomnography in the sleep laboratory for assessment.  Also recommend pulmonary function testing to exclude pulmonary comorbidities.  Follow up with cardiology reg cardiac disease.  Return for after sleep study.

## 2020-12-13 ENCOUNTER — SLEEP STUDY (OUTPATIENT)
Dept: SLEEP MEDICINE | Facility: MEDICAL CENTER | Age: 65
End: 2020-12-13
Attending: INTERNAL MEDICINE
Payer: MEDICARE

## 2020-12-13 DIAGNOSIS — G47.33 OSA (OBSTRUCTIVE SLEEP APNEA): ICD-10-CM

## 2020-12-14 ENCOUNTER — NON-PROVIDER VISIT (OUTPATIENT)
Dept: SLEEP MEDICINE | Facility: MEDICAL CENTER | Age: 65
End: 2020-12-14
Attending: INTERNAL MEDICINE
Payer: MEDICARE

## 2020-12-14 VITALS — BODY MASS INDEX: 38.49 KG/M2 | HEIGHT: 68 IN | WEIGHT: 254 LBS

## 2020-12-14 DIAGNOSIS — R06.02 SOB (SHORTNESS OF BREATH): ICD-10-CM

## 2020-12-14 PROCEDURE — 94060 EVALUATION OF WHEEZING: CPT | Performed by: INTERNAL MEDICINE

## 2020-12-14 PROCEDURE — 94726 PLETHYSMOGRAPHY LUNG VOLUMES: CPT | Performed by: INTERNAL MEDICINE

## 2020-12-14 PROCEDURE — 94729 DIFFUSING CAPACITY: CPT | Performed by: INTERNAL MEDICINE

## 2020-12-14 ASSESSMENT — PULMONARY FUNCTION TESTS
FEV1_PERCENT_CHANGE: 7
FEV1_PERCENT_PREDICTED: 67
FVC_LLN: 3.40
FEV1/FVC_PERCENT_PREDICTED: 92
FEV1/FVC_PERCENT_CHANGE: 2
FVC_PERCENT_PREDICTED: 77
FEV1/FVC_PERCENT_PREDICTED: 95
FEV1: 2.31
FVC: 3.14
FEV1/FVC: 72
FVC: 2.93
FVC_PREDICTED: 4.07
FEV1/FVC_PERCENT_PREDICTED: 94
FEV1/FVC_PERCENT_PREDICTED: 95
FEV1/FVC_PERCENT_LLN: 64
FVC_PERCENT_PREDICTED: 71
FEV1_PREDICTED: 3.14
FEV1/FVC: 72
FEV1/FVC_PREDICTED: 77
FEV1/FVC_PERCENT_CHANGE: 129
FEV1_PERCENT_CHANGE: 9
FEV1: 2.1
FEV1_LLN: 2.62
FEV1/FVC_PERCENT_PREDICTED: 77
FEV1/FVC: 73
FEV1/FVC: 73.57
FEV1_PERCENT_PREDICTED: 73

## 2020-12-14 ASSESSMENT — FIBROSIS 4 INDEX: FIB4 SCORE: 1.8

## 2020-12-14 NOTE — PROCEDURES
Technician: REINIER Randle    Technician Comment:  Good patient effort & cooperation.  The results of this test meet the ATS/ERS standards for acceptability & reproducibility.  Test was performed on the "[employer]" Body Plethysmograph-Elite DX system.  Predicted values were GLI-2012 for spirometry, GLI-2017 for DLCO, ITS for Lung Volumes.  The DLCO was uncorrected for Hgb.  A bronchodilator of Xopenex HFA -2puffs via spacer administered.  DLCO performed during dilation period.    1. Baseline spirometry demonstrates a mild reduction in FEV1 and FVC. FEV1/FVC ratio is 72%.    2. After administration of an inhaled bronchodilator there is 9% improvement in FEV1.    3. Lung volumes demonstrate a total lung capacity of 91% of predicted.  RV/TLC ratio is increased at 133% suggesting mild hyperinflation..    4. Gas exchange as estimated by DLCO is normal at 124% of predicted.    5. Airway resistance is increased.      Impression:    This study demonstrates the presence of mild to moderate obstructive lung disease.  9% reversibility is noted on the study.  Study is compatible with the presence of mild to moderate asthma.

## 2020-12-15 NOTE — PROCEDURES
Technical summary: The patient underwent a split-night polysomnogram. This was a 16 channel montage study to include a 6 channel EEG, a 2 channel EOG, and chin EMG, left and right leg EMG, a snore channel, a nasal pressure transducer, and a nasal oral airflow  thermistor and a CFLOW pressure transducer.   Respiratory effort was assessed with the use of a thoracic and abdominal monitor and overnight oximetry was obtained. Audio and video recordings were reviewed. This was a fully attended study and sleep stage scoring was performed. The test was technically adequate.    Scoring Criteria: A modification of the the AASM Manual for the Scoring of Sleep and Associated Events. Obstructive apnea was scored by cessation of airflow for at least 10 seconds with continuing respiratory effort.  Central apnea was scored by cessation of airflow for at least 10 seconds with no effort.  Hypopnea was scored by a 30% or more reduction in airflow for at least 10 seconds accompanied by an arterial oxygen desaturation of 3% or more.  (For Medicare patients, hypopneas were scored by a 30% or more reduction in airflow for at least 10 seconds accompanied by an arterial oxygen saturation of 4% or more, as required by their insurance, CMS.    Interpretation:  Study start time was 08:47:51 PM. Total recording time was 3h 35.5m (215 minutes) with a total sleep time of 3h 1.5m (181 minutes) resulting in a sleep efficiency of 84.22%.  Sleep latency from the start fo the study was 03 minutes minutes and REM latency from sleep onset was 74 minutes minutes.    Respiratory:   There were 26 apneas in total consisting of 25 obstructive apneas, 0 mixed apneas, and 1 central apneas. There were 45 hypopneas in total.  The apnea index was 8.60 per hour and the hypopnea index was 14.88 per hour.  The overall AHI was 23.5, with a REM AHI of 53.79, and a supine AHI of 15.00.    Limb Movements:  There were a total of 14 periodic leg movements, of which 0 were  PLMS arousals. This resulted in a PLMS index of 4.6 and a PLMS arousal index of 0.0    Oximetry:  The mean SaO2 was 91.0% for the diagnostic portion of the study, with a minimum SaO2 of 81.0%.     Treatment:  Interpretation:  Treatment recording time was 4h 20.0m (260 minutes) with a total sleep time of 3h 40.5m (220 minutes) resulting in a sleep efficiency of 84.8%.   Sleep latency from the start of treatment was 02 minutes minutes and REM latency from sleep onset was 0h 6.5m minutes.   The patient had 100 arousals in total for an arousal index of 27.2.    Respiratory:   There were 0 apneas in total consisting of 0 obstructive apneas, 0 central apneas, and 0 mixed apneas for an apnea index of 0.00.   The patient had 19 hypopneas in total, which resulted in a hypopnea index of 5.17.   The overall AHI was 5.17, with a REM AHI of 13.33, and a supine AHI of 3.16.     Limb Movements:  There were a total of 94 periodic leg movements, of which 20 were PLMS arousals. This resulted in a PLMS index of 25.6 and a PLMS arousal index of 5.4.    Oximetry:  The mean SaO2 during treatment was 93.0%, with a minimum oxygen saturation of 86.0%.    CPAP was tried from 5 to 7cm H2O.      CPAP Titration:  Due to the significant number of obstructive respiratory events observed during the diagnostic portion of the study a CPAP titration trial was performed during the second half of the night. The CPAP pressure was initiated at 5 cm of water and the pressure was increased in an attempt to eliminate all sleep disordered breathing and snoring. The CPAP pressure was increased to 7 cm water and at this final pressure the patient was observed in the supine position and in the REM sleep stage. The apnea hypopnea index improved to 2.61/hr with improved O2 ninfa of  86%.He spent 0 % of sleep time below 89% O2 saturation Snoring was resolved.  The patient utilized medium eson 2 mask with heated humidification. The CPAP was well-tolerated and there  was minimal air leaks. No supplemental oxygen was required.    Impression:  1.  Moderate obstructive sleep apnea with AHI of 23.5/hr and O2 ninfa 81 %. Due to severity of the disease he met the split study protocol. The titration started with CPAP 5 cm and the best tolerated was CPAP 7 cm. The AHI improved to 2.61/hr with improved O2 ninfa of 86% and average O2 saturation of 93 %.     Recommendations:  I recommend CPAP 7 cm eson 2 mask. I also recommend 30 day compliance download to assess the efficacy to the recommended pressure, measure leak, apnea hypopnea index and compliance for further outpatient monitoring and management of CPAP therapy. In some cases alternative treatment options may prove effective in resolving sleep apnea and these options include upper airway surgery, the use of a dental orthotic or weight loss and positional therapy. Clinical correlation is required. In general patients with sleep apnea are advised to avoid alcohol and sedatives and to not operate a motor vehicle while drowsy and are at a greater risk for cardiovascular disease.

## 2020-12-21 ENCOUNTER — OFFICE VISIT (OUTPATIENT)
Dept: CARDIOLOGY | Facility: MEDICAL CENTER | Age: 65
End: 2020-12-21
Payer: MEDICARE

## 2020-12-21 VITALS
BODY MASS INDEX: 37.13 KG/M2 | HEIGHT: 68 IN | HEART RATE: 83 BPM | DIASTOLIC BLOOD PRESSURE: 78 MMHG | WEIGHT: 245 LBS | RESPIRATION RATE: 16 BRPM | SYSTOLIC BLOOD PRESSURE: 122 MMHG | OXYGEN SATURATION: 96 %

## 2020-12-21 DIAGNOSIS — I10 ESSENTIAL HYPERTENSION: ICD-10-CM

## 2020-12-21 DIAGNOSIS — I25.5 ISCHEMIC CARDIOMYOPATHY: Chronic | ICD-10-CM

## 2020-12-21 DIAGNOSIS — Z95.5 STENTED CORONARY ARTERY: Chronic | ICD-10-CM

## 2020-12-21 DIAGNOSIS — R60.0 LOCALIZED EDEMA: ICD-10-CM

## 2020-12-21 DIAGNOSIS — E78.5 HYPERLIPIDEMIA, UNSPECIFIED HYPERLIPIDEMIA TYPE: ICD-10-CM

## 2020-12-21 DIAGNOSIS — I25.10 CORONARY ARTERY DISEASE INVOLVING NATIVE CORONARY ARTERY OF NATIVE HEART WITHOUT ANGINA PECTORIS: Chronic | ICD-10-CM

## 2020-12-21 DIAGNOSIS — I48.0 PAF (PAROXYSMAL ATRIAL FIBRILLATION) (HCC): ICD-10-CM

## 2020-12-21 DIAGNOSIS — E78.5 DYSLIPIDEMIA: Chronic | ICD-10-CM

## 2020-12-21 DIAGNOSIS — I10 ESSENTIAL HYPERTENSION, BENIGN: Chronic | ICD-10-CM

## 2020-12-21 LAB — EKG IMPRESSION: NORMAL

## 2020-12-21 PROCEDURE — 93000 ELECTROCARDIOGRAM COMPLETE: CPT | Performed by: INTERNAL MEDICINE

## 2020-12-21 PROCEDURE — 99214 OFFICE O/P EST MOD 30 MIN: CPT | Performed by: INTERNAL MEDICINE

## 2020-12-21 RX ORDER — AMIODARONE HYDROCHLORIDE 200 MG/1
400 TABLET ORAL DAILY
Qty: 60 TAB | Refills: 5 | Status: SHIPPED | OUTPATIENT
Start: 2020-12-21 | End: 2021-04-02

## 2020-12-21 RX ORDER — CARVEDILOL 6.25 MG/1
6.25 TABLET ORAL 2 TIMES DAILY WITH MEALS
Qty: 60 TAB | Refills: 3 | Status: SHIPPED | OUTPATIENT
Start: 2020-12-21 | End: 2021-11-10

## 2020-12-21 RX ORDER — IRBESARTAN 150 MG/1
75 TABLET ORAL
Qty: 90 TAB | Refills: 0 | Status: SHIPPED | OUTPATIENT
Start: 2020-12-21 | End: 2021-07-30 | Stop reason: SDUPTHER

## 2020-12-21 RX ORDER — SIMVASTATIN 40 MG
40 TABLET ORAL
Qty: 90 TAB | Refills: 3 | Status: SHIPPED | OUTPATIENT
Start: 2020-12-21 | End: 2022-01-19 | Stop reason: SDUPTHER

## 2020-12-21 ASSESSMENT — ENCOUNTER SYMPTOMS
PSYCHIATRIC NEGATIVE: 1
GASTROINTESTINAL NEGATIVE: 1
SHORTNESS OF BREATH: 1
MUSCULOSKELETAL NEGATIVE: 1
CONSTITUTIONAL NEGATIVE: 1
NEUROLOGICAL NEGATIVE: 1

## 2020-12-21 ASSESSMENT — FIBROSIS 4 INDEX: FIB4 SCORE: 1.8

## 2020-12-21 NOTE — PROGRESS NOTES
Chief Complaint   Patient presents with   • Coronary Artery Disease   • Hypertension   • Dyslipidemia   • Cardiomyopathy (Ischemic)       Subjective:   Martín Grijalva is a 65 y.o. male who presents today for follow-up of PAF, remote anterior wall MI, hypertension, ischemic cardiomyopathy, anticoagulation and hyperlipidemia.  When he was evaluated last July, he was found to be in atrial fibrillation of uncertain duration.  At that time he had some problems with edema.  The patient underwent successful electrical cardioversion in September and has been on amiodarone.  His last office visit was in late September with documented sinus rhythm.  He said no sense of palpitations or edema.  The patient is being evaluated by pulmonary for what appears to be fairly significant sleep apnea and also some bronchospasm.  He is on nocturnal oxygen    Past Medical History:   Diagnosis Date   • Arrhythmia    • Arthritis     fingers   • CAD (coronary artery disease) 7/2/2012   • Chronic anticoagulation 7/2/2012   • Congestive heart failure (HCC)    • Dyslipidemia 7/2/2012   • Essential hypertension, benign 7/2/2012   • High cholesterol    • Insomnia     wakes up 2-5 times a night , wakes up to use restroom and sometimes just wakes up    • Ischemic cardiomyopathy 7/2/2012   • Left ventricular thrombosis 7/2/2012   • Myocardial infarct (HCC)    • Pericarditis 7/2/2012   • Pneumonia     history of as a child   • Sleep apnea     told by MD that he has it, scheduled for sleep study    • Snoring     no sleep study done   • SOB (shortness of breath)     no c/o today; on exertion; pt uses 2L o2 qhs only; pt does not know provider   • Stented coronary artery 7/2/2012     Past Surgical History:   Procedure Laterality Date   • SHOULDER DECOMPRESSION ARTHROSCOPIC Right 11/27/2018    Procedure: SHOULDER DECOMPRESSION ARTHROSCOPIC- SUBACROMIAL CONVERTED TO OPEN;  Surgeon: Ni Weir M.D.;  Location: SURGERY [facility];   Service: Orthopedics   • SHOULDER ARTHROSCOPY W/ ROTATOR CUFF REPAIR Right 11/27/2018    Procedure: OPEN ROTATOR CUFF REPAIR;  Surgeon: Ni Weir M.D.;  Location: SURGERY [facility];  Service: Orthopedics   • CARDIAC CATH, LEFT HEART     • CARPAL TUNNEL RELEASE Bilateral    • STENT PLACEMENT      cardiac stent   • VASECTOMY       History reviewed. No pertinent family history.  Social History     Socioeconomic History   • Marital status:      Spouse name: Not on file   • Number of children: Not on file   • Years of education: Not on file   • Highest education level: Not on file   Occupational History   • Not on file   Social Needs   • Financial resource strain: Not on file   • Food insecurity     Worry: Not on file     Inability: Not on file   • Transportation needs     Medical: Not on file     Non-medical: Not on file   Tobacco Use   • Smoking status: Never Smoker   • Smokeless tobacco: Former User     Types: Chew   Substance and Sexual Activity   • Alcohol use: Not Currently     Comment: occ   • Drug use: Not Currently   • Sexual activity: Not on file   Lifestyle   • Physical activity     Days per week: Not on file     Minutes per session: Not on file   • Stress: Not on file   Relationships   • Social connections     Talks on phone: Not on file     Gets together: Not on file     Attends Nondenominational service: Not on file     Active member of club or organization: Not on file     Attends meetings of clubs or organizations: Not on file     Relationship status: Not on file   • Intimate partner violence     Fear of current or ex partner: Not on file     Emotionally abused: Not on file     Physically abused: Not on file     Forced sexual activity: Not on file   Other Topics Concern   • Not on file   Social History Narrative   • Not on file     No Known Allergies  Outpatient Encounter Medications as of 12/21/2020   Medication Sig Dispense Refill   • amiodarone (CORDARONE) 200 MG Tab Take 2 Tabs by mouth  "every day. 60 Tab 5   • rivaroxaban (XARELTO) 20 MG Tab tablet Take 1 Tab by mouth with dinner. 90 Tab 3   • irbesartan (AVAPRO) 150 MG Tab Take 0.5 Tabs by mouth every day. 90 Tab 0   • simvastatin (ZOCOR) 40 MG Tab Take 1 Tab by mouth every day. 90 Tab 3   • carvedilol (COREG) 6.25 MG Tab Take 1 Tab by mouth 2 times a day, with meals. 60 Tab 3   • torsemide (DEMADEX) 10 MG tablet Take 1 tablet a day AS NEEDED for swelling/edema. 30 Tab 3   • nitroglycerin (NITROSTAT) 0.4 MG SL Tab Place 1 Tab under tongue as needed for Chest Pain. 25 Tab 3   • Soft Lens Products (B & L SENSITIVE EYES SALINE) 0.4 % Solution Place 1 Drop in right eye 1 time daily as needed.     • [DISCONTINUED] amiodarone (CORDARONE) 200 MG Tab Take 1 Tab by mouth every day. 30 Tab 5   • [DISCONTINUED] rivaroxaban (XARELTO) 20 MG Tab tablet Take 1 Tab by mouth with dinner. 90 Tab 3   • [DISCONTINUED] carvedilol (COREG) 6.25 MG Tab Take 1 Tab by mouth 2 times a day, with meals. 60 Tab 3   • [DISCONTINUED] simvastatin (ZOCOR) 40 MG Tab Take 1 Tab by mouth every day. 90 Tab 3   • [DISCONTINUED] irbesartan (AVAPRO) 150 MG Tab Take 0.5 Tabs by mouth every day. 90 Tab 0     No facility-administered encounter medications on file as of 12/21/2020.      Review of Systems   Constitutional: Negative.    HENT: Negative.    Respiratory: Positive for shortness of breath.         Recently diagnosed with sleep apnea.  Is currently on nocturnal oxygen supplementation   Cardiovascular: Negative for chest pain and leg swelling.   Gastrointestinal: Negative.    Musculoskeletal: Negative.    Skin: Negative.    Neurological: Negative.    Endo/Heme/Allergies: Negative.    Psychiatric/Behavioral: Negative.    All other systems reviewed and are negative.       Objective:   /78 (BP Location: Left arm, Patient Position: Sitting, BP Cuff Size: Adult)   Pulse 83   Resp 16   Ht 1.715 m (5' 7.5\")   Wt 111.1 kg (245 lb)   SpO2 96%   BMI 37.81 kg/m²     Physical Exam "   Constitutional: He is oriented to person, place, and time. He appears well-nourished. No distress.   HENT:   Head: Normocephalic and atraumatic.   Eyes: Pupils are equal, round, and reactive to light. No scleral icterus.   Neck: No JVD present. No tracheal deviation present.   Cardiovascular: Normal rate and regular rhythm.   No murmur heard.  Pulmonary/Chest: Breath sounds normal. No respiratory distress. He has no wheezes.   Abdominal: Soft. Bowel sounds are normal. He exhibits no distension. There is no abdominal tenderness.   Obese   Musculoskeletal:         General: No edema.   Neurological: He is alert and oriented to person, place, and time.   Skin: Skin is warm and dry.   Psychiatric: He has a normal mood and affect.       Assessment:     1. Coronary artery disease involving native coronary artery of native heart without angina pectoris  EKG    Comp Metabolic Panel    Lipid Profile    THYROID PANEL WITH TSH   2. Stented coronary artery  EKG   3. Localized edema  EKG   4. Ischemic cardiomyopathy  Comp Metabolic Panel    Lipid Profile    THYROID PANEL WITH TSH   5. Essential hypertension, benign  EKG    Comp Metabolic Panel    Lipid Profile    THYROID PANEL WITH TSH    irbesartan (AVAPRO) 150 MG Tab    carvedilol (COREG) 6.25 MG Tab   6. Dyslipidemia  EKG   7. PAF (paroxysmal atrial fibrillation) (HCC)  EKG    Comp Metabolic Panel    Lipid Profile    THYROID PANEL WITH TSH   8. Hyperlipidemia, unspecified hyperlipidemia type  simvastatin (ZOCOR) 40 MG Tab   9. Essential hypertension  carvedilol (COREG) 6.25 MG Tab       Medical Decision Making:  Today's Assessment / Status / Plan:   PAF: EKG today demonstrates the patient is back in atrial fibrillation with controlled ventricular response rate the duration of atrial fibrillation is unknown.  He is on anticoagulation.  Recommend he increase his amiodarone to 400 mg a day and recheck his rhythm in a month.  If remains in atrial fibrillation consider repeat  cardioversion and EP referral.  I suspect he will stay in sinus rhythm until his underlying lung issues are treated effectively and this was discussed in detail with both he and his wife today.    Chronic anticoagulation: No bleeding problems.  He is hemorrhaging from his wallet over the cost of Xarelto and we discussed changing him to Coumadin today.  He will consider it    Ischemic cardiomyopathy: Status post extensive anterior wall MI in 2007 no volume overload on exam today.  EF by echo in August was 40%.  His LV dysfunction may also be a trigger for his A. Fib.    Hyperlipidemia: Patient is on statin most recent LDL was at target.    His wife is quite concerned about side effects of amiodarone this was discussed today.  Will check liver function, thyroid function also lipid profile.  Return 1 month and consider cardioversion at that time if he remains in atrial fib

## 2020-12-23 DIAGNOSIS — I25.5 ISCHEMIC CARDIOMYOPATHY: Chronic | ICD-10-CM

## 2020-12-23 DIAGNOSIS — I25.10 CORONARY ARTERY DISEASE INVOLVING NATIVE CORONARY ARTERY OF NATIVE HEART WITHOUT ANGINA PECTORIS: Chronic | ICD-10-CM

## 2020-12-23 DIAGNOSIS — I48.0 PAF (PAROXYSMAL ATRIAL FIBRILLATION) (HCC): ICD-10-CM

## 2020-12-23 DIAGNOSIS — I10 ESSENTIAL HYPERTENSION, BENIGN: Chronic | ICD-10-CM

## 2021-01-06 ENCOUNTER — TELEPHONE (OUTPATIENT)
Dept: SLEEP MEDICINE | Facility: MEDICAL CENTER | Age: 66
End: 2021-01-06

## 2021-01-06 DIAGNOSIS — J45.20 MILD INTERMITTENT ASTHMA WITHOUT COMPLICATION: ICD-10-CM

## 2021-01-06 NOTE — TELEPHONE ENCOUNTER
Patient calling with complaint of a night time cough. This starts after he goes to bed and is asleep a couple of hours. It is waking him up at night, it gets better after he gets up in the morning. Reports no  cough during the day. Patient said he did a PFT on 12/14/20 and thinks perhaps he is having a little bit of asthma. What do we recommend?     Please advise. Denies sweats, fever, daytime cough, or any loss of taste or smell.

## 2021-01-08 NOTE — TELEPHONE ENCOUNTER
I called patient but only got his voice mail. Message left that an inhaler was sent to his pharmacy to try. We want to see if this helps that night time cough that he is getting.     Patient to call us back if no improvement.

## 2021-01-25 ENCOUNTER — OFFICE VISIT (OUTPATIENT)
Dept: CARDIOLOGY | Facility: MEDICAL CENTER | Age: 66
End: 2021-01-25
Payer: MEDICARE

## 2021-01-25 ENCOUNTER — TELEPHONE (OUTPATIENT)
Dept: CARDIOLOGY | Facility: MEDICAL CENTER | Age: 66
End: 2021-01-25

## 2021-01-25 VITALS
HEART RATE: 85 BPM | WEIGHT: 244 LBS | OXYGEN SATURATION: 96 % | DIASTOLIC BLOOD PRESSURE: 80 MMHG | SYSTOLIC BLOOD PRESSURE: 126 MMHG | HEIGHT: 67 IN | BODY MASS INDEX: 38.3 KG/M2

## 2021-01-25 DIAGNOSIS — I48.0 PAF (PAROXYSMAL ATRIAL FIBRILLATION) (HCC): ICD-10-CM

## 2021-01-25 DIAGNOSIS — R60.0 LOCALIZED EDEMA: ICD-10-CM

## 2021-01-25 PROCEDURE — 93000 ELECTROCARDIOGRAM COMPLETE: CPT | Performed by: INTERNAL MEDICINE

## 2021-01-25 PROCEDURE — 99214 OFFICE O/P EST MOD 30 MIN: CPT | Performed by: INTERNAL MEDICINE

## 2021-01-25 ASSESSMENT — ENCOUNTER SYMPTOMS
GASTROINTESTINAL NEGATIVE: 1
SHORTNESS OF BREATH: 1
NEUROLOGICAL NEGATIVE: 1
MUSCULOSKELETAL NEGATIVE: 1
CONSTITUTIONAL NEGATIVE: 1
PSYCHIATRIC NEGATIVE: 1

## 2021-01-25 ASSESSMENT — FIBROSIS 4 INDEX: FIB4 SCORE: 1.8

## 2021-01-25 NOTE — PROGRESS NOTES
Chief Complaint   Patient presents with   • Coronary Artery Disease     & Coronary artery disease involving native coronary artery of native heart without angina pectoris   • HTN (Controlled)   • Dyslipidemia   • Atrial Fibrillation   • Cardiomyopathy (Ischemic)       Subjective:   Martín Grijalva is a 65 y.o. male who presents today primarily for follow-up of PAF.  He also has history of remote anterior wall MI, hypertension, ischemic cardiomyopathy, hyperlipidemia and chronic anticoagulation.  He has had no bleeding problems on his anticoagulant.  The patient still feels short of breath with activity and does have some chronic edema.  His wife is concerned about possible side effects of amiodarone and this was discussed today  Martín was recently diagnosed with sleep apnea and is scheduled to see pulmonary next month..    Past Medical History:   Diagnosis Date   • Arrhythmia    • Arthritis     fingers   • CAD (coronary artery disease) 7/2/2012   • Chronic anticoagulation 7/2/2012   • Congestive heart failure (HCC)    • Dyslipidemia 7/2/2012   • Essential hypertension, benign 7/2/2012   • High cholesterol    • Insomnia     wakes up 2-5 times a night , wakes up to use restroom and sometimes just wakes up    • Ischemic cardiomyopathy 7/2/2012   • Left ventricular thrombosis 7/2/2012   • Myocardial infarct (HCC)    • Pericarditis 7/2/2012   • Pneumonia     history of as a child   • Sleep apnea     told by MD that he has it, scheduled for sleep study    • Snoring     no sleep study done   • SOB (shortness of breath)     no c/o today; on exertion; pt uses 2L o2 qhs only; pt does not know provider   • Stented coronary artery 7/2/2012     Past Surgical History:   Procedure Laterality Date   • SHOULDER DECOMPRESSION ARTHROSCOPIC Right 11/27/2018    Procedure: SHOULDER DECOMPRESSION ARTHROSCOPIC- SUBACROMIAL CONVERTED TO OPEN;  Surgeon: Ni Weir M.D.;  Location: SURGERY [facility];  Service: Orthopedics    • SHOULDER ARTHROSCOPY W/ ROTATOR CUFF REPAIR Right 11/27/2018    Procedure: OPEN ROTATOR CUFF REPAIR;  Surgeon: Ni Weir M.D.;  Location: SURGERY [facility];  Service: Orthopedics   • CARDIAC CATH, LEFT HEART     • CARPAL TUNNEL RELEASE Bilateral    • STENT PLACEMENT      cardiac stent   • VASECTOMY       History reviewed. No pertinent family history.  Social History     Socioeconomic History   • Marital status:      Spouse name: Not on file   • Number of children: Not on file   • Years of education: Not on file   • Highest education level: Not on file   Occupational History   • Not on file   Social Needs   • Financial resource strain: Not on file   • Food insecurity     Worry: Not on file     Inability: Not on file   • Transportation needs     Medical: Not on file     Non-medical: Not on file   Tobacco Use   • Smoking status: Never Smoker   • Smokeless tobacco: Former User     Types: Chew   Substance and Sexual Activity   • Alcohol use: Not Currently     Comment: occ   • Drug use: Not Currently   • Sexual activity: Not on file   Lifestyle   • Physical activity     Days per week: Not on file     Minutes per session: Not on file   • Stress: Not on file   Relationships   • Social connections     Talks on phone: Not on file     Gets together: Not on file     Attends Anabaptist service: Not on file     Active member of club or organization: Not on file     Attends meetings of clubs or organizations: Not on file     Relationship status: Not on file   • Intimate partner violence     Fear of current or ex partner: Not on file     Emotionally abused: Not on file     Physically abused: Not on file     Forced sexual activity: Not on file   Other Topics Concern   • Not on file   Social History Narrative   • Not on file     No Known Allergies  Outpatient Encounter Medications as of 1/25/2021   Medication Sig Dispense Refill   • Fluticasone Furoate-Vilanterol (BREO ELLIPTA) 100-25 MCG/INH AEROSOL POWDER,  "BREATH ACTIVATED Inhale 1 Puff every day. Rinse mouth after use. 1 Each 3   • amiodarone (CORDARONE) 200 MG Tab Take 2 Tabs by mouth every day. 60 Tab 5   • rivaroxaban (XARELTO) 20 MG Tab tablet Take 1 Tab by mouth with dinner. 90 Tab 3   • irbesartan (AVAPRO) 150 MG Tab Take 0.5 Tabs by mouth every day. 90 Tab 0   • simvastatin (ZOCOR) 40 MG Tab Take 1 Tab by mouth every day. 90 Tab 3   • carvedilol (COREG) 6.25 MG Tab Take 1 Tab by mouth 2 times a day, with meals. 60 Tab 3   • torsemide (DEMADEX) 10 MG tablet Take 1 tablet a day AS NEEDED for swelling/edema. 30 Tab 3   • nitroglycerin (NITROSTAT) 0.4 MG SL Tab Place 1 Tab under tongue as needed for Chest Pain. 25 Tab 3   • Soft Lens Products (B & L SENSITIVE EYES SALINE) 0.4 % Solution Place 1 Drop in right eye 1 time daily as needed.       No facility-administered encounter medications on file as of 1/25/2021.      Review of Systems   Constitutional: Negative.    HENT: Negative.    Respiratory: Positive for shortness of breath.         Recently diagnosed with sleep apnea.  Is currently on nocturnal oxygen supplementation   Cardiovascular: Negative for chest pain and leg swelling.   Gastrointestinal: Negative.    Musculoskeletal: Negative.    Skin: Negative.    Neurological: Negative.    Endo/Heme/Allergies: Negative.    Psychiatric/Behavioral: Negative.    All other systems reviewed and are negative.       Objective:   /80 (BP Location: Left arm, Patient Position: Sitting, BP Cuff Size: Adult)   Pulse 85   Ht 1.702 m (5' 7\")   Wt 110.7 kg (244 lb)   SpO2 96%   BMI 38.22 kg/m²     Physical Exam   Constitutional: He is oriented to person, place, and time. He appears well-nourished. No distress.   His exam today is unchanged from prior evaluation on 12/21/2020.   HENT:   Head: Normocephalic and atraumatic.   Eyes: Pupils are equal, round, and reactive to light. No scleral icterus.   Neck: No JVD present. No tracheal deviation present.   Cardiovascular: " Normal rate and regular rhythm.   No murmur heard.  Pulmonary/Chest: Breath sounds normal. No respiratory distress. He has no wheezes.   Abdominal: Soft. Bowel sounds are normal. He exhibits no distension. There is no abdominal tenderness.   Obese   Musculoskeletal:         General: No edema.   Neurological: He is alert and oriented to person, place, and time.   Skin: Skin is warm and dry.   Psychiatric: He has a normal mood and affect.       Assessment:     1. PAF (paroxysmal atrial fibrillation) ([facility])  EKG    CL-CARDIOVERSION   2. Localized edema  EKG    CL-CARDIOVERSION       Medical Decision Making:  Today's Assessment / Status / Plan:   EKG: My interpretation is that this demonstrates atrial fibrillation with controlled ventricular response rate, poor R wave progression, and no acute abnormalities.    PAF: Patient remains in atrial fibrillation on higher dose amiodarone.  The likely drivers for his atrial fibrillation are untreated sleep apnea and ischemic cardiomyopathy.  He is having some problems with exertional dyspnea.  Would recommend scheduling cardioversion on his current dose of amiodarone.  Getting him to the pulmonary group for treatment of sleep apnea is in progress.    Ischemic cardiomyopathy: Remote MI in 2008 with stenting of his LAD with a 3.0 x 20 mm vision stent at that time.  No recurrent angina.  Most recent EF 40%.  He will be scheduled for cardioversion

## 2021-01-25 NOTE — TELEPHONE ENCOUNTER
Per patients request,patient is scheduled on 1-28-21 for a Cardioversion w/anesthesia with Dr. Do. No meds to stop and patient to check in at 6:00 for an 8:00 procedure. H&P was done on 1-25-21 by Dr. Rodas. Pre admit to call patient.

## 2021-01-26 ENCOUNTER — PRE-ADMISSION TESTING (OUTPATIENT)
Dept: ADMISSIONS | Facility: MEDICAL CENTER | Age: 66
End: 2021-01-26
Attending: INTERNAL MEDICINE
Payer: MEDICARE

## 2021-01-26 DIAGNOSIS — Z01.812 PRE-OPERATIVE LABORATORY EXAMINATION: ICD-10-CM

## 2021-01-26 LAB
ANION GAP SERPL CALC-SCNC: 8 MMOL/L (ref 7–16)
BUN SERPL-MCNC: 20 MG/DL (ref 8–22)
CALCIUM SERPL-MCNC: 9.5 MG/DL (ref 8.5–10.5)
CHLORIDE SERPL-SCNC: 104 MMOL/L (ref 96–112)
CO2 SERPL-SCNC: 27 MMOL/L (ref 20–33)
CREAT SERPL-MCNC: 0.97 MG/DL (ref 0.5–1.4)
EKG IMPRESSION: NORMAL
ERYTHROCYTE [DISTWIDTH] IN BLOOD BY AUTOMATED COUNT: 51.2 FL (ref 35.9–50)
GLUCOSE SERPL-MCNC: 84 MG/DL (ref 65–99)
HCT VFR BLD AUTO: 44.9 % (ref 42–52)
HGB BLD-MCNC: 14.3 G/DL (ref 14–18)
INR PPP: 1.4 (ref 0.87–1.13)
MCH RBC QN AUTO: 32.1 PG (ref 27–33)
MCHC RBC AUTO-ENTMCNC: 31.8 G/DL (ref 33.7–35.3)
MCV RBC AUTO: 100.9 FL (ref 81.4–97.8)
PLATELET # BLD AUTO: 194 K/UL (ref 164–446)
PMV BLD AUTO: 11 FL (ref 9–12.9)
POTASSIUM SERPL-SCNC: 4.8 MMOL/L (ref 3.6–5.5)
PROTHROMBIN TIME: 17.6 SEC (ref 12–14.6)
RBC # BLD AUTO: 4.45 M/UL (ref 4.7–6.1)
SARS-COV-2 RNA RESP QL NAA+PROBE: NOTDETECTED
SODIUM SERPL-SCNC: 139 MMOL/L (ref 135–145)
SPECIMEN SOURCE: NORMAL
WBC # BLD AUTO: 8.2 K/UL (ref 4.8–10.8)

## 2021-01-26 PROCEDURE — C9803 HOPD COVID-19 SPEC COLLECT: HCPCS

## 2021-01-26 PROCEDURE — 80048 BASIC METABOLIC PNL TOTAL CA: CPT

## 2021-01-26 PROCEDURE — 85027 COMPLETE CBC AUTOMATED: CPT

## 2021-01-26 PROCEDURE — 85610 PROTHROMBIN TIME: CPT

## 2021-01-26 PROCEDURE — U0005 INFEC AGEN DETEC AMPLI PROBE: HCPCS

## 2021-01-26 PROCEDURE — U0003 INFECTIOUS AGENT DETECTION BY NUCLEIC ACID (DNA OR RNA); SEVERE ACUTE RESPIRATORY SYNDROME CORONAVIRUS 2 (SARS-COV-2) (CORONAVIRUS DISEASE [COVID-19]), AMPLIFIED PROBE TECHNIQUE, MAKING USE OF HIGH THROUGHPUT TECHNOLOGIES AS DESCRIBED BY CMS-2020-01-R: HCPCS

## 2021-01-26 PROCEDURE — 36415 COLL VENOUS BLD VENIPUNCTURE: CPT

## 2021-01-26 ASSESSMENT — FIBROSIS 4 INDEX: FIB4 SCORE: 1.8

## 2021-01-28 ENCOUNTER — ANESTHESIA (OUTPATIENT)
Dept: CARDIOLOGY | Facility: MEDICAL CENTER | Age: 66
End: 2021-01-28
Payer: MEDICARE

## 2021-01-28 ENCOUNTER — APPOINTMENT (OUTPATIENT)
Dept: CARDIOLOGY | Facility: MEDICAL CENTER | Age: 66
End: 2021-01-28
Attending: INTERNAL MEDICINE
Payer: MEDICARE

## 2021-01-28 ENCOUNTER — ANESTHESIA EVENT (OUTPATIENT)
Dept: CARDIOLOGY | Facility: MEDICAL CENTER | Age: 66
End: 2021-01-28
Payer: MEDICARE

## 2021-01-28 ENCOUNTER — HOSPITAL ENCOUNTER (OUTPATIENT)
Facility: MEDICAL CENTER | Age: 66
End: 2021-01-28
Attending: INTERNAL MEDICINE | Admitting: INTERNAL MEDICINE
Payer: MEDICARE

## 2021-01-28 VITALS
OXYGEN SATURATION: 96 % | HEIGHT: 69 IN | BODY MASS INDEX: 35.43 KG/M2 | HEART RATE: 59 BPM | TEMPERATURE: 97.5 F | RESPIRATION RATE: 20 BRPM | DIASTOLIC BLOOD PRESSURE: 74 MMHG | WEIGHT: 239.2 LBS | SYSTOLIC BLOOD PRESSURE: 112 MMHG

## 2021-01-28 DIAGNOSIS — R60.0 LOCALIZED EDEMA: ICD-10-CM

## 2021-01-28 DIAGNOSIS — I48.0 PAF (PAROXYSMAL ATRIAL FIBRILLATION) (HCC): ICD-10-CM

## 2021-01-28 PROBLEM — I48.19 PERSISTENT ATRIAL FIBRILLATION (HCC): Status: ACTIVE | Noted: 2020-12-21

## 2021-01-28 PROBLEM — I48.19 OTHER PERSISTENT ATRIAL FIBRILLATION (HCC): Status: RESOLVED | Noted: 2020-07-29 | Resolved: 2021-01-28

## 2021-01-28 PROBLEM — Z79.01 CHRONIC ANTICOAGULATION: Status: ACTIVE | Noted: 2021-01-28

## 2021-01-28 LAB
EKG IMPRESSION: NORMAL
EKG IMPRESSION: NORMAL

## 2021-01-28 PROCEDURE — A9270 NON-COVERED ITEM OR SERVICE: HCPCS | Performed by: INTERNAL MEDICINE

## 2021-01-28 PROCEDURE — 93005 ELECTROCARDIOGRAM TRACING: CPT | Performed by: INTERNAL MEDICINE

## 2021-01-28 PROCEDURE — 700111 HCHG RX REV CODE 636 W/ 250 OVERRIDE (IP): Performed by: ANESTHESIOLOGY

## 2021-01-28 PROCEDURE — 160002 HCHG RECOVERY MINUTES (STAT)

## 2021-01-28 PROCEDURE — 92960 CARDIOVERSION ELECTRIC EXT: CPT

## 2021-01-28 PROCEDURE — 93010 ELECTROCARDIOGRAM REPORT: CPT | Mod: 59 | Performed by: INTERNAL MEDICINE

## 2021-01-28 PROCEDURE — 700101 HCHG RX REV CODE 250: Performed by: INTERNAL MEDICINE

## 2021-01-28 PROCEDURE — 92960 CARDIOVERSION ELECTRIC EXT: CPT | Performed by: INTERNAL MEDICINE

## 2021-01-28 RX ORDER — MEPERIDINE HYDROCHLORIDE 25 MG/ML
12.5 INJECTION INTRAMUSCULAR; INTRAVENOUS; SUBCUTANEOUS
Status: DISCONTINUED | OUTPATIENT
Start: 2021-01-28 | End: 2021-01-28 | Stop reason: HOSPADM

## 2021-01-28 RX ORDER — HYDROMORPHONE HYDROCHLORIDE 1 MG/ML
0.4 INJECTION, SOLUTION INTRAMUSCULAR; INTRAVENOUS; SUBCUTANEOUS
Status: DISCONTINUED | OUTPATIENT
Start: 2021-01-28 | End: 2021-01-28 | Stop reason: HOSPADM

## 2021-01-28 RX ORDER — OXYCODONE HCL 5 MG/5 ML
10 SOLUTION, ORAL ORAL
Status: DISCONTINUED | OUTPATIENT
Start: 2021-01-28 | End: 2021-01-28 | Stop reason: HOSPADM

## 2021-01-28 RX ORDER — ONDANSETRON 2 MG/ML
4 INJECTION INTRAMUSCULAR; INTRAVENOUS
Status: DISCONTINUED | OUTPATIENT
Start: 2021-01-28 | End: 2021-01-28 | Stop reason: HOSPADM

## 2021-01-28 RX ORDER — DIPHENHYDRAMINE HYDROCHLORIDE 50 MG/ML
12.5 INJECTION INTRAMUSCULAR; INTRAVENOUS
Status: DISCONTINUED | OUTPATIENT
Start: 2021-01-28 | End: 2021-01-28 | Stop reason: HOSPADM

## 2021-01-28 RX ORDER — HYDROMORPHONE HYDROCHLORIDE 1 MG/ML
0.1 INJECTION, SOLUTION INTRAMUSCULAR; INTRAVENOUS; SUBCUTANEOUS
Status: DISCONTINUED | OUTPATIENT
Start: 2021-01-28 | End: 2021-01-28 | Stop reason: HOSPADM

## 2021-01-28 RX ORDER — HYDROMORPHONE HYDROCHLORIDE 1 MG/ML
0.2 INJECTION, SOLUTION INTRAMUSCULAR; INTRAVENOUS; SUBCUTANEOUS
Status: DISCONTINUED | OUTPATIENT
Start: 2021-01-28 | End: 2021-01-28 | Stop reason: HOSPADM

## 2021-01-28 RX ORDER — LABETALOL HYDROCHLORIDE 5 MG/ML
5 INJECTION, SOLUTION INTRAVENOUS
Status: DISCONTINUED | OUTPATIENT
Start: 2021-01-28 | End: 2021-01-28 | Stop reason: HOSPADM

## 2021-01-28 RX ORDER — OXYCODONE HCL 5 MG/5 ML
5 SOLUTION, ORAL ORAL
Status: DISCONTINUED | OUTPATIENT
Start: 2021-01-28 | End: 2021-01-28 | Stop reason: HOSPADM

## 2021-01-28 RX ADMIN — POVIDONE IODINE 15 ML: 100 SOLUTION TOPICAL at 07:12

## 2021-01-28 RX ADMIN — PROPOFOL 50 MG: 10 INJECTION, EMULSION INTRAVENOUS at 08:01

## 2021-01-28 ASSESSMENT — PAIN SCALES - GENERAL
PAIN_LEVEL: 0
PAIN_LEVEL: 1

## 2021-01-28 ASSESSMENT — FIBROSIS 4 INDEX: FIB4 SCORE: 1.55

## 2021-01-28 NOTE — H&P
Martín Grijalva is a 65 y.o. male who presents today primarily for follow-up of PAF.  He also has history of remote anterior wall MI, hypertension, ischemic cardiomyopathy, hyperlipidemia and chronic anticoagulation.  He has had no bleeding problems on his anticoagulant.  The patient still feels short of breath with activity and does have some chronic edema.  His wife is concerned about possible side effects of amiodarone and this was discussed today  Martín was recently diagnosed with sleep apnea and is scheduled to see pulmonary next month..     Past Medical History        Past Medical History:   Diagnosis Date   • Arrhythmia     • Arthritis       fingers   • CAD (coronary artery disease) 7/2/2012   • Chronic anticoagulation 7/2/2012   • Congestive heart failure (HCC)     • Dyslipidemia 7/2/2012   • Essential hypertension, benign 7/2/2012   • High cholesterol     • Insomnia       wakes up 2-5 times a night , wakes up to use restroom and sometimes just wakes up    • Ischemic cardiomyopathy 7/2/2012   • Left ventricular thrombosis 7/2/2012   • Myocardial infarct (HCC)     • Pericarditis 7/2/2012   • Pneumonia       history of as a child   • Sleep apnea       told by MD that he has it, scheduled for sleep study    • Snoring       no sleep study done   • SOB (shortness of breath)       no c/o today; on exertion; pt uses 2L o2 qhs only; pt does not know provider   • Stented coronary artery 7/2/2012         Past Surgical History         Past Surgical History:   Procedure Laterality Date   • SHOULDER DECOMPRESSION ARTHROSCOPIC Right 11/27/2018     Procedure: SHOULDER DECOMPRESSION ARTHROSCOPIC- SUBACROMIAL CONVERTED TO OPEN;  Surgeon: Ni Weir M.D.;  Location: SURGERY [facility];  Service: Orthopedics   • SHOULDER ARTHROSCOPY W/ ROTATOR CUFF REPAIR Right 11/27/2018     Procedure: OPEN ROTATOR CUFF REPAIR;  Surgeon: Ni Weir M.D.;  Location: SURGERY [facility];  Service: Orthopedics   •  CARDIAC CATH, LEFT HEART       • CARPAL TUNNEL RELEASE Bilateral     • STENT PLACEMENT         cardiac stent   • VASECTOMY             Family History   History reviewed. No pertinent family history.     Social History               Socioeconomic History   • Marital status:        Spouse name: Not on file   • Number of children: Not on file   • Years of education: Not on file   • Highest education level: Not on file   Occupational History   • Not on file   Social Needs   • Financial resource strain: Not on file   • Food insecurity       Worry: Not on file       Inability: Not on file   • Transportation needs       Medical: Not on file       Non-medical: Not on file   Tobacco Use   • Smoking status: Never Smoker   • Smokeless tobacco: Former User       Types: Chew   Substance and Sexual Activity   • Alcohol use: Not Currently       Comment: occ   • Drug use: Not Currently   • Sexual activity: Not on file   Lifestyle   • Physical activity       Days per week: Not on file       Minutes per session: Not on file   • Stress: Not on file   Relationships   • Social connections       Talks on phone: Not on file       Gets together: Not on file       Attends Jewish service: Not on file       Active member of club or organization: Not on file       Attends meetings of clubs or organizations: Not on file       Relationship status: Not on file   • Intimate partner violence       Fear of current or ex partner: Not on file       Emotionally abused: Not on file       Physically abused: Not on file       Forced sexual activity: Not on file   Other Topics Concern   • Not on file   Social History Narrative   • Not on file         No Known Allergies  Encounter Medications     Physical Exam   Constitutional: He is oriented to person, place, and time. He appears well-nourished. No distress.   His exam today is unchanged from prior evaluation on 12/21/2020.   HENT:   Head: Normocephalic and atraumatic.   Eyes: Pupils are equal,  round, and reactive to light. No scleral icterus.   Neck: No JVD present. No tracheal deviation present.   Cardiovascular: Normal rate and regular rhythm.   No murmur heard.  Pulmonary/Chest: Breath sounds normal. No respiratory distress. He has no wheezes.   Abdominal: Soft. Bowel sounds are normal. He exhibits no distension. There is no abdominal tenderness.   Obese   Musculoskeletal:         General: No edema.   Neurological: He is alert and oriented to person, place, and time.       I have seen and examined the patient.  Most recent, relevant labs and imaging reports have been reviewed.  Most recent office visit note by Dr. Rodas has been reviewed.  There has been no change.  Risks and benefits discussed. The patient understood, accepted the risks and wishes to proceed.

## 2021-01-28 NOTE — ANESTHESIA POSTPROCEDURE EVALUATION
Patient: Zenon Grijalva    Procedure Summary     Date: 01/28/21 Room / Location: [facility] - ECHOCARDIOLOGY [facility]    Anesthesia Start: 0800 Anesthesia Stop: 0821    Procedure: CL-CARDIOVERSION Diagnosis:       PAF (paroxysmal atrial fibrillation) (HCC)      Localized edema      Paroxysmal atrial fibrillation      (See Associated Dx)    Scheduled Providers: Fidel Do M.D.; Bernardino Medrano M.D. Responsible Provider: Bernardino Medrano M.D.    Anesthesia Type: MAC ASA Status: 3          Final Anesthesia Type: MAC  Last vitals  BP   Blood Pressure : 107/67    Temp   35.9 °C (96.7 °F)    Pulse   Pulse: 72   Resp   18    SpO2   96 %      Anesthesia Post Evaluation    Patient location during evaluation: PACU  Patient participation: complete - patient participated  Level of consciousness: awake and alert  Pain score: 0    Airway patency: patent  Anesthetic complications: no  Cardiovascular status: hemodynamically stable  Respiratory status: acceptable  Hydration status: euvolemic    PONV: none           Nurse Pain Score: 0 (NPRS)        
      Patient: Zenon Grijalva    Procedure Summary     Date: 01/28/21 Room / Location: [facility] ECHOCARDIOLOGY [facility]    Anesthesia Start: 0800 Anesthesia Stop: 0821    Procedure: CL-CARDIOVERSION Diagnosis:       PAF (paroxysmal atrial fibrillation) (HCC)      Localized edema      Paroxysmal atrial fibrillation      (See Associated Dx)    Scheduled Providers: Fidel Do M.D.; Bernardino Medrano M.D. Responsible Provider: Bernardino Medrano M.D.    Anesthesia Type: MAC ASA Status: 3          Final Anesthesia Type: MAC  Last vitals  BP   Blood Pressure : 107/67    Temp   35.9 °C (96.7 °F)    Pulse   Pulse: 72   Resp   18    SpO2   96 %      Anesthesia Post Evaluation    Patient location during evaluation: PACU  Patient participation: complete - patient participated  Level of consciousness: awake and alert  Pain score: 1    Airway patency: patent  Anesthetic complications: no  Cardiovascular status: hemodynamically stable  Respiratory status: acceptable  Hydration status: euvolemic    PONV: none           Nurse Pain Score: 0 (NPRS)        
No complaints

## 2021-01-28 NOTE — OR NURSING
0822: Patient from cath lab to PPU via gurney s/p cardioversion. Patient is awake. VSS. No c/o pain or nausea at this time. Will monitor closely.   0831: Patient tolerated PO fluids well. Wife at bedside.   0845: EKG done at bedside.   0855: DC instructions given. Questions answered. Family at bedside. No c/o pain or nausea. Patient wide awake. VSS. Patient met criteria for discharge.

## 2021-01-28 NOTE — ANESTHESIA PREPROCEDURE EVALUATION
Relevant Problems   NEURO   (+) History of acute anterior wall MI      CARDIAC   (+) CAD (coronary artery disease)   (+) Essential hypertension, benign   (+) Other persistent atrial fibrillation (HCC)   (+) PAF (paroxysmal atrial fibrillation) (HCC)   (+) Stented coronary artery       Physical Exam    Airway   Mallampati: II  TM distance: >3 FB  Neck ROM: full       Cardiovascular - normal exam  Rhythm: regular  Rate: normal  (-) murmur     Dental - normal exam           Pulmonary - normal exam  Breath sounds clear to auscultation     Abdominal    Neurological - normal exam                 Anesthesia Plan    ASA 3       Plan - MAC             Induction: intravenous    Postoperative Plan: Postoperative administration of opioids is intended.    Pertinent diagnostic labs and testing reviewed    Informed Consent:    Anesthetic plan and risks discussed with patient.    Use of blood products discussed with: patient whom consented to blood products.

## 2021-01-28 NOTE — ANESTHESIA TIME REPORT
Anesthesia Start and Stop Event Times     Date Time Event    1/28/2021 0758 Ready for Procedure     0800 Anesthesia Start     0821 Anesthesia Stop        Responsible Staff  01/28/21    Name Role Begin End    Bernardino Medrano M.D. Anesth 0800 0821        Preop Diagnosis (Free Text):  Pre-op Diagnosis             Preop Diagnosis (Codes):    Post op Diagnosis  Atrial fibrillation (HCC)      Premium Reason  Non-Premium    Comments:

## 2021-01-28 NOTE — DISCHARGE INSTRUCTIONS
ACTIVITY: Rest and take it easy for the first 24 hours.  A responsible adult is recommended to remain with you during that time.  It is normal to feel sleepy.  We encourage you to not do anything that requires balance, judgment or coordination.    MILD FLU-LIKE SYMPTOMS ARE NORMAL. YOU MAY EXPERIENCE GENERALIZED MUSCLE ACHES, THROAT IRRITATION, HEADACHE AND/OR SOME NAUSEA.    FOR 24 HOURS DO NOT:  Drive, operate machinery or run household appliances.  Drink beer or alcoholic beverages.   Make important decisions or sign legal documents.      DIET: To avoid nausea, slowly advance diet as tolerated, avoiding spicy or greasy foods for the first day.  Add more substantial food to your diet according to your physician's instructions.  Babies can be fed formula or breast milk as soon as they are hungry.  INCREASE FLUIDS AND FIBER TO AVOID CONSTIPATION.      FOLLOW-UP APPOINTMENT:  A follow-up appointment should be arranged with your doctor; call to schedule.    You should CALL YOUR PHYSICIAN if you develop:  Fever greater than 101 degrees F.  Pain not relieved by medication, or persistent nausea or vomiting.  Excessive bleeding (blood soaking through dressing) or unexpected drainage from the wound.  Extreme redness or swelling around the incision site, drainage of pus or foul smelling drainage.  Inability to urinate or empty your bladder within 8 hours.  Problems with breathing or chest pain.    You should call 911 if you develop problems with breathing or chest pain.  If you are unable to contact your doctor or surgical center, you should go to the nearest emergency room or urgent care center.      Physician's telephone #: [phone]    If any questions arise, call your doctor.  If your doctor is not available, please feel free to call the Surgical Center at [phone]. The Contact Center is open Monday through Friday 7AM to 5PM and may speak to a nurse at [phone], or toll free at [phone].     A  registered nurse may call you a few days after your surgery to see how you are doing after your procedure.    MEDICATIONS: Resume taking daily medication.  Take prescribed pain medication with food.  If no medication is prescribed, you may take non-aspirin pain medication if needed.  PAIN MEDICATION CAN BE VERY CONSTIPATING.  Take a stool softener or laxative such as senokot, pericolace, or milk of magnesia if needed.      If your physician has prescribed pain medication that includes Acetaminophen (Tylenol), do not take additional Acetaminophen (Tylenol) while taking the prescribed medication.    Depression / Suicide Risk    As you are discharged from this Critical access hospital facility, it is important to learn how to keep safe from harming yourself.    Recognize the warning signs:  · Abrupt changes in personality, positive or negative- including increase in energy   · Giving away possessions  · Change in eating patterns- significant weight changes-  positive or negative  · Change in sleeping patterns- unable to sleep or sleeping all the time   · Unwillingness or inability to communicate  · Depression  · Unusual sadness, discouragement and loneliness  · Talk of wanting to die  · Neglect of personal appearance   · Rebelliousness- reckless behavior  · Withdrawal from people/activities they love  · Confusion- inability to concentrate     If you or a loved one observes any of these behaviors or has concerns about self-harm, here's what you can do:  · Talk about it- your feelings and reasons for harming yourself  · Remove any means that you might use to hurt yourself (examples: pills, rope, extension cords, firearm)  · Get professional help from the community (Mental Health, Substance Abuse, psychological counseling)  · Do not be alone:Call your Safe Contact- someone whom you trust who will be there for you.  · Call your local CRISIS HOTLINE 941-0127 or 198-417-4471  · Call your local Children's Mobile Crisis Response Team  [facility] (389) 163-3965 or www.[employer]  · Call the toll free National Suicide Prevention Hotlines   · National Suicide Prevention Lifeline 060-322-GTOI (4270)  · [employer] Hope Line Network 800-SUICIDE (777-9226)        Electrical Cardioversion    Electrical cardioversion is the delivery of a jolt of electricity to restore a normal rhythm to the heart. A rhythm that is too fast or is not regular keeps the heart from pumping well. In this procedure, sticky patches or metal paddles are placed on the chest to deliver electricity to the heart from a device.  This procedure may be done in an emergency if:  · There is low or no blood pressure as a result of the heart rhythm.  · Normal rhythm must be restored as fast as possible to protect the brain and heart from further damage.  · It may save a life.  This procedure may also be done for irregular or fast heart rhythms that are not immediately life-threatening.  Tell a health care provider about:  · Any allergies you have.  · All medicines you are taking, including vitamins, herbs, eye drops, creams, and over-the-counter medicines.  · Any problems you or family members have had with anesthetic medicines.  · Any blood disorders you have.  · Any surgeries you have had.  · Any medical conditions you have.  · Whether you are pregnant or may be pregnant.  What are the risks?  Generally, this is a safe procedure. However, problems may occur, including:  · Allergic reactions to medicines.  · A blood clot that breaks free and travels to other parts of your body.  · The possible return of an abnormal heart rhythm within hours or days after the procedure.  · Your heart stopping (cardiac arrest ). This is rare.  What happens before the procedure?  Medicines  · Your health care provider may have you start taking:  ? Blood-thinning medicines (anticoagulants) so your blood does not clot as easily.  ? Medicines may be given to help stabilize your heart rate and  rhythm.  · Ask your health care provider about changing or stopping your regular medicines. This is especially important if you are taking diabetes medicines or blood thinners.  General instructions  · Plan to have someone take you home from the hospital or clinic.  · If you will be going home right after the procedure, plan to have someone with you for 24 hours.  · Follow instructions from your health care provider about eating or drinking restrictions.  What happens during the procedure?  · To lower your risk of infection:  ? Your health care team will wash or sanitize their hands.  ? Your skin will be washed with soap.  · An IV tube will be inserted into one of your veins.  · You will be given a medicine to help you relax (sedative).  · Sticky patches (electrodes) or metal paddles may be placed on your chest.  · An electrical shock will be delivered.  The procedure may vary among health care providers and hospitals.  What happens after the procedure?    · Your blood pressure, heart rate, breathing rate, and blood oxygen level will be monitored until the medicines you were given have worn off.  · Do not drive for 24 hours if you were given a sedative.  · Your heart rhythm will be watched to make sure it does not change.  This information is not intended to replace advice given to you by your health care provider. Make sure you discuss any questions you have with your health care provider.  Document Released: 12/08/2003 Document Revised: 11/30/2018 Document Reviewed: 06/23/2017  Elsevier Patient Education © 2020 [employer]

## 2021-01-28 NOTE — PROCEDURES
Description of the procedure;    After informed consent was obtained, the patient was placed under EKG, hemodynamic and O2 saturation moitoring.   Sedation was performed by an anesthesiologist.  After adequate sedation was achieved, one synchronized biphasic cardioversion at 150 jouls was delivered.  The patient was successfully converted to sinus rhythm.  The patient tolerated procedure well and transported to recovery room in stable condition.    Plan. Continue anticoagulation and current antiarrhythmic.  Discharge home when all criteria met.  Follow up in our office in 1-2 weeks.

## 2021-01-29 ENCOUNTER — TELEPHONE (OUTPATIENT)
Dept: CARDIOLOGY | Facility: MEDICAL CENTER | Age: 66
End: 2021-01-29

## 2021-01-29 NOTE — TELEPHONE ENCOUNTER
Wife calls to report that her  is still SOB and fatigued since the CV.  She says that he did go outside today and shovel snow, and that this could have contributed, but he is just not feeling as well as she thinks he should. She says that he will not go to the ER.    I called him at home.  He says that he feels a bit worse after this CV than he did after the last one 4 months ago; however, the symptoms he had in the past are the same and lasted for about 1 week after the procedure.    He describes that mainly he has NO energy and has SOB with activity. Sometimes he feel a bit lightheaded.    I asked him to feel his pulse and he palpates it for 30 seconds and says that it is regular and about 30 beats.  He also takes his BP and monitor registers at 113/70 w/ HR of 66.      I reassured him, and asked him to call 911 if his symptoms worsen, and he will NOT have to go to the ER if all is well.  He agrees to do that and to conitnue to monitor his HR/BP and feel his pulse for regularity. I also asked him to try NOT to do exertional activities, but he could not promise me that he would not.    I called his wife back on the phone # given to me by her  and LM to call back for questions.

## 2021-01-30 NOTE — TELEPHONE ENCOUNTER
NM: Yvonne Tapia   PH: [phone]   PT NM: Zenon Grijalva   : 1955   REG DR: Dr Rodas   RE: Cardioversion . Needs  directions to assist patient at home.    DISP HIST:  2021 03:49P AW  p/disp  2021 03:49P DLR checked msg    --------------------------------------  Message History  Account: [id number]  Taken:  2021  3:49p AW  Serial#: [id number]      Thank you,  Vandana JOHNSTON

## 2021-02-04 ENCOUNTER — TELEPHONE (OUTPATIENT)
Dept: CARDIOLOGY | Facility: MEDICAL CENTER | Age: 66
End: 2021-02-04

## 2021-02-04 ENCOUNTER — OFFICE VISIT (OUTPATIENT)
Dept: CARDIOLOGY | Facility: MEDICAL CENTER | Age: 66
End: 2021-02-04
Payer: MEDICARE

## 2021-02-04 VITALS
WEIGHT: 248 LBS | OXYGEN SATURATION: 95 % | SYSTOLIC BLOOD PRESSURE: 122 MMHG | DIASTOLIC BLOOD PRESSURE: 82 MMHG | HEIGHT: 67 IN | BODY MASS INDEX: 38.92 KG/M2 | HEART RATE: 60 BPM

## 2021-02-04 DIAGNOSIS — Z95.5 STENTED CORONARY ARTERY: Chronic | ICD-10-CM

## 2021-02-04 DIAGNOSIS — I25.10 CORONARY ARTERY DISEASE INVOLVING NATIVE CORONARY ARTERY OF NATIVE HEART WITHOUT ANGINA PECTORIS: Chronic | ICD-10-CM

## 2021-02-04 DIAGNOSIS — Z79.01 CHRONIC ANTICOAGULATION: ICD-10-CM

## 2021-02-04 DIAGNOSIS — I10 ESSENTIAL HYPERTENSION, BENIGN: Chronic | ICD-10-CM

## 2021-02-04 DIAGNOSIS — I48.0 PAF (PAROXYSMAL ATRIAL FIBRILLATION) (HCC): ICD-10-CM

## 2021-02-04 DIAGNOSIS — I48.19 PERSISTENT ATRIAL FIBRILLATION (HCC): ICD-10-CM

## 2021-02-04 DIAGNOSIS — I25.5 ISCHEMIC CARDIOMYOPATHY: Chronic | ICD-10-CM

## 2021-02-04 PROCEDURE — 99214 OFFICE O/P EST MOD 30 MIN: CPT | Performed by: INTERNAL MEDICINE

## 2021-02-04 RX ORDER — SPIRONOLACTONE 25 MG/1
25 TABLET ORAL DAILY
Qty: 30 TAB | Refills: 3 | Status: SHIPPED | OUTPATIENT
Start: 2021-02-04 | End: 2021-04-02

## 2021-02-04 ASSESSMENT — ENCOUNTER SYMPTOMS
PSYCHIATRIC NEGATIVE: 1
GASTROINTESTINAL NEGATIVE: 1
MUSCULOSKELETAL NEGATIVE: 1
NEUROLOGICAL NEGATIVE: 1
SHORTNESS OF BREATH: 1
COUGH: 1
BRUISES/BLEEDS EASILY: 0

## 2021-02-04 ASSESSMENT — FIBROSIS 4 INDEX: FIB4 SCORE: 1.55

## 2021-02-04 NOTE — PROGRESS NOTES
Chief Complaint   Patient presents with   • Atrial Fibrillation   • Coronary Artery Disease   • HTN (Controlled)   • Dyslipidemia       Subjective:   Martín Grijalva is a 65 y.o. male who presents today for follow-up after recent cardioversion.  He was placed on amiodarone and cardioverted on January 28.  Patient feels a bit better but has good days and not so good days.  He still has troubles with cough sometimes at night and also exertional dyspnea.  He has known ischemic cardiomyopathy with remote anterior wall MI, he has hyperlipidemia and is chronically anticoagulated.  The patient is on high risk medication with amiodarone.    Past Medical History:   Diagnosis Date   • Arrhythmia 2020    A fib   • Arthritis     fingers   • CAD (coronary artery disease) 7/2/2012   • Chronic anticoagulation 7/2/2012   • Congestive heart failure (HCC)    • Dyslipidemia 7/2/2012   • Essential hypertension, benign 7/2/2012   • High cholesterol    • Insomnia     wakes up 2-5 times a night , wakes up to use restroom and sometimes just wakes up    • Ischemic cardiomyopathy 7/2/2012   • Left ventricular thrombosis 7/2/2012   • Myocardial infarct (HCC) 2006   • Pericarditis 7/2/2012   • Pneumonia     history of as a child   • Sleep apnea     told by MD that he has it, scheduled for sleep study    • Snoring     no sleep study done   • SOB (shortness of breath)     no c/o today; on exertion; pt uses 2L o2 qhs only; pt does not know provider   • Stented coronary artery 7/2/2012     Past Surgical History:   Procedure Laterality Date   • SHOULDER DECOMPRESSION ARTHROSCOPIC Right 11/27/2018    Procedure: SHOULDER DECOMPRESSION ARTHROSCOPIC- SUBACROMIAL CONVERTED TO OPEN;  Surgeon: Ni Weir M.D.;  Location: [facility];  Service: Orthopedics   • SHOULDER ARTHROSCOPY W/ ROTATOR CUFF REPAIR Right 11/27/2018    Procedure: OPEN ROTATOR CUFF REPAIR;  Surgeon: Ni Weir M.D.;  Location: [facility];   Service: Orthopedics   • CARDIAC CATH, LEFT HEART     • CARPAL TUNNEL RELEASE Bilateral    • STENT PLACEMENT      cardiac stent   • VASECTOMY       History reviewed. No pertinent family history.  Social History     Socioeconomic History   • Marital status:      Spouse name: Not on file   • Number of children: Not on file   • Years of education: Not on file   • Highest education level: Not on file   Occupational History   • Not on file   Social Needs   • Financial resource strain: Not on file   • Food insecurity     Worry: Not on file     Inability: Not on file   • Transportation needs     Medical: Not on file     Non-medical: Not on file   Tobacco Use   • Smoking status: Never Smoker   • Smokeless tobacco: Former User     Types: Chew   Substance and Sexual Activity   • Alcohol use: Not Currently     Comment: occ   • Drug use: Not Currently   • Sexual activity: Not on file   Lifestyle   • Physical activity     Days per week: Not on file     Minutes per session: Not on file   • Stress: Not on file   Relationships   • Social connections     Talks on phone: Not on file     Gets together: Not on file     Attends [employer] service: Not on file     Active member of club or organization: Not on file     Attends meetings of clubs or organizations: Not on file     Relationship status: Not on file   • Intimate partner violence     Fear of current or ex partner: Not on file     Emotionally abused: Not on file     Physically abused: Not on file     Forced sexual activity: Not on file   Other Topics Concern   • Not on file   Social History Narrative   • Not on file     No Known Allergies  Outpatient Encounter Medications as of 2/4/2021   Medication Sig Dispense Refill   • spironolactone (ALDACTONE) 25 MG Tab Take 1 Tab by mouth every day. 30 Tab 3   • Fluticasone Furoate-Vilanterol (BREO ELLIPTA) 100-25 MCG/INH AEROSOL POWDER, BREATH ACTIVATED Inhale 1 Puff every day. Rinse mouth after use. 1 Each 3   • amiodarone  "(CORDARONE) 200 MG Tab Take 2 Tabs by mouth every day. 60 Tab 5   • rivaroxaban (XARELTO) 20 MG Tab tablet Take 1 Tab by mouth with dinner. 90 Tab 3   • irbesartan (AVAPRO) 150 MG Tab Take 0.5 Tabs by mouth every day. 90 Tab 0   • simvastatin (ZOCOR) 40 MG Tab Take 1 Tab by mouth every day. 90 Tab 3   • carvedilol (COREG) 6.25 MG Tab Take 1 Tab by mouth 2 times a day, with meals. 60 Tab 3   • torsemide (DEMADEX) 10 MG tablet Take 1 tablet a day AS NEEDED for swelling/edema. 30 Tab 3   • nitroglycerin (NITROSTAT) 0.4 MG SL Tab Place 1 Tab under tongue as needed for Chest Pain. 25 Tab 3   • Soft Lens Products (B & L SENSITIVE EYES SALINE) 0.4 % Solution Place 1 Drop in right eye 1 time daily as needed.       No facility-administered encounter medications on file as of 2/4/2021.      Review of Systems   Constitutional: Positive for malaise/fatigue.   HENT: Negative.    Respiratory: Positive for cough and shortness of breath.         Recently diagnosed with sleep apnea.  Is currently on nocturnal oxygen supplementation   Cardiovascular: Negative for chest pain and leg swelling.   Gastrointestinal: Negative.    Musculoskeletal: Negative.    Skin: Negative.    Neurological: Negative.    Endo/Heme/Allergies: Negative.  Does not bruise/bleed easily.   Psychiatric/Behavioral: Negative.    All other systems reviewed and are negative.       Objective:   /82 (BP Location: Left arm, Patient Position: Sitting, BP Cuff Size: Adult)   Pulse 60   Ht 1.702 m (5' 7\")   Wt 112 kg (248 lb)   SpO2 95%   BMI 38.84 kg/m²     Physical Exam   Constitutional: He is oriented to person, place, and time. He appears well-nourished. No distress.   HENT:   Head: Normocephalic and atraumatic.   Eyes: Pupils are equal, round, and reactive to light. No scleral icterus.   Neck: No JVD present. No tracheal deviation present.   Cardiovascular: Normal rate and regular rhythm.   No murmur heard.  There is no JVD   Pulmonary/Chest: Breath sounds " normal. No respiratory distress. He has no wheezes.   Abdominal: Soft. Bowel sounds are normal. He exhibits no distension. There is no abdominal tenderness.   Obese   Musculoskeletal:         General: No edema.   Neurological: He is alert and oriented to person, place, and time.   Skin: Skin is warm and dry.   Psychiatric: He has a normal mood and affect.       Assessment:     1. Coronary artery disease involving native coronary artery of native heart without angina pectoris  Basic Metabolic Panel    Basic Metabolic Panel    proBrain Natriuretic Peptide, NT   2. Stented coronary artery  Basic Metabolic Panel    proBrain Natriuretic Peptide, NT   3. Persistent atrial fibrillation (HCC)  Basic Metabolic Panel    Basic Metabolic Panel    proBrain Natriuretic Peptide, NT   4. Ischemic cardiomyopathy  Basic Metabolic Panel   5. Essential hypertension, benign  Basic Metabolic Panel   6. Chronic anticoagulation     7. PAF (paroxysmal atrial fibrillation) (HCC)         Medical Decision Making:  Today's Assessment / Status / Plan:   Atrial fibrillation: Patient was cardioverted and is in regular rhythm today    Ischemic cardiomyopathy: Echo reviewed.  EF in the 40% range.  He has some coughing and dyspnea.  He has been taking his torsemide most days but not every day.  We will increase torsemide to daily and spironolactone 25 mg a day to his regimen.  BMP and proBNP in 2 weeks.  Reassess in 6 weeks

## 2021-02-21 LAB
BUN SERPL-MCNC: 24 MG/DL (ref 8–27)
BUN/CREAT SERPL: 22 (ref 10–24)
CALCIUM SERPL-MCNC: 9.4 MG/DL (ref 8.6–10.2)
CHLORIDE SERPL-SCNC: 97 MMOL/L (ref 96–106)
CO2 SERPL-SCNC: 25 MMOL/L (ref 20–29)
CREAT SERPL-MCNC: 1.08 MG/DL (ref 0.76–1.27)
GLUCOSE SERPL-MCNC: 74 MG/DL (ref 65–99)
NT-PROBNP SERPL-MCNC: 786 PG/ML (ref 0–376)
POTASSIUM SERPL-SCNC: 4.9 MMOL/L (ref 3.5–5.2)
SODIUM SERPL-SCNC: 137 MMOL/L (ref 134–144)

## 2021-02-25 ENCOUNTER — TELEPHONE (OUTPATIENT)
Dept: SLEEP MEDICINE | Facility: MEDICAL CENTER | Age: 66
End: 2021-02-25

## 2021-02-25 ENCOUNTER — OFFICE VISIT (OUTPATIENT)
Dept: SLEEP MEDICINE | Facility: MEDICAL CENTER | Age: 66
End: 2021-02-25
Payer: MEDICARE

## 2021-02-25 VITALS
DIASTOLIC BLOOD PRESSURE: 62 MMHG | OXYGEN SATURATION: 96 % | HEART RATE: 57 BPM | SYSTOLIC BLOOD PRESSURE: 114 MMHG | BODY MASS INDEX: 34.8 KG/M2 | RESPIRATION RATE: 16 BRPM | HEIGHT: 69 IN | WEIGHT: 235 LBS

## 2021-02-25 DIAGNOSIS — I25.5 ISCHEMIC CARDIOMYOPATHY: Chronic | ICD-10-CM

## 2021-02-25 DIAGNOSIS — I10 ESSENTIAL HYPERTENSION, BENIGN: Chronic | ICD-10-CM

## 2021-02-25 DIAGNOSIS — J45.30 MILD PERSISTENT ASTHMA WITHOUT COMPLICATION: ICD-10-CM

## 2021-02-25 DIAGNOSIS — I25.10 CORONARY ARTERY DISEASE INVOLVING NATIVE CORONARY ARTERY OF NATIVE HEART WITHOUT ANGINA PECTORIS: Chronic | ICD-10-CM

## 2021-02-25 DIAGNOSIS — G47.33 OSA (OBSTRUCTIVE SLEEP APNEA): ICD-10-CM

## 2021-02-25 DIAGNOSIS — I48.0 PAF (PAROXYSMAL ATRIAL FIBRILLATION) (HCC): ICD-10-CM

## 2021-02-25 PROCEDURE — 99214 OFFICE O/P EST MOD 30 MIN: CPT | Performed by: INTERNAL MEDICINE

## 2021-02-25 NOTE — PROGRESS NOTES
Chief Complaint   Patient presents with   • Follow-Up      Last seen 12/13/20 by Dr Mcgee for JOSE        HPI: This patient is a 65 y.o. male who who returns for sleep apnea evaluation.  He has an extensive cardiovascular history including atrial fibrillation, ischemic cardiomyopathy and coronary artery disease.  He required recent cardioversion for atrial fibrillation.  He had screening overnight oximetry showing desaturations for 100 minutes ninfa of 75% and was started on nighttime oxygen.  PSG 12/20 showed moderate JOSE with AHI:23/h and desaturations to 81% Sp02.  He was successfully titrated on CPAP: 7 cm of water with resolved JOSE.  He is a lifelong non-smoker and denies any past pulmonary history. Over the past year he has felt short of breath with exertion.  He describes associated cough, the latter which has exacerbated lately. Worked as a /.  Echocardiography estimated EF of 40%.  Pulmonary function testing showed moderate airway obstruction with FEV1 73% predicted.  He was started on Breo, without subjective benefit.    Past Medical History:   Diagnosis Date   • Arrhythmia 2020    A fib   • Arthritis     fingers   • CAD (coronary artery disease) 7/2/2012   • Chronic anticoagulation 7/2/2012   • Congestive heart failure (HCC)    • Dyslipidemia 7/2/2012   • Essential hypertension, benign 7/2/2012   • High cholesterol    • Insomnia     wakes up 2-5 times a night , wakes up to use restroom and sometimes just wakes up    • Ischemic cardiomyopathy 7/2/2012   • Left ventricular thrombosis 7/2/2012   • Myocardial infarct (HCC) 2006   • Pericarditis 7/2/2012   • Pneumonia     history of as a child   • Sleep apnea     told by MD that he has it, scheduled for sleep study    • Snoring     no sleep study done   • SOB (shortness of breath)     no c/o today; on exertion; pt uses 2L o2 qhs only; pt does not know provider   • Stented coronary artery 7/2/2012       Social History     Socioeconomic History    • Marital status:      Spouse name: Not on file   • Number of children: Not on file   • Years of education: Not on file   • Highest education level: Not on file   Occupational History   • Not on file   Tobacco Use   • Smoking status: Never Smoker   • Smokeless tobacco: Former User     Types: Chew   Substance and Sexual Activity   • Alcohol use: Not Currently     Comment: occ   • Drug use: Not Currently   • Sexual activity: Not on file   Other Topics Concern   • Not on file   Social History Narrative   • Not on file     Social Determinants of Health     Financial Resource Strain:    • Difficulty of Paying Living Expenses:    Food Insecurity:    • Worried About Running Out of Food in the Last Year:    • Ran Out of Food in the Last Year:    Transportation Needs:    • Lack of Transportation (Medical):    • Lack of Transportation (Non-Medical):    Physical Activity:    • Days of Exercise per Week:    • Minutes of Exercise per Session:    Stress:    • Feeling of Stress :    Social Connections:    • Frequency of Communication with Friends and Family:    • Frequency of Social Gatherings with Friends and Family:    • Attends [employer] Services:    • Active Member of Clubs or Organizations:    • Attends Club or Organization Meetings:    • Marital Status:    Intimate Partner Violence:    • Fear of Current or Ex-Partner:    • Emotionally Abused:    • Physically Abused:    • Sexually Abused:        History reviewed. No pertinent family history.    Current Outpatient Medications on File Prior to Visit   Medication Sig Dispense Refill   • spironolactone (ALDACTONE) 25 MG Tab Take 1 Tab by mouth every day. 30 Tab 3   • amiodarone (CORDARONE) 200 MG Tab Take 2 Tabs by mouth every day. 60 Tab 5   • rivaroxaban (XARELTO) 20 MG Tab tablet Take 1 Tab by mouth with dinner. 90 Tab 3   • irbesartan (AVAPRO) 150 MG Tab Take 0.5 Tabs by mouth every day. 90 Tab 0   • simvastatin (ZOCOR) 40 MG Tab Take 1 Tab by mouth every day. 90 Tab  "3   • carvedilol (COREG) 6.25 MG Tab Take 1 Tab by mouth 2 times a day, with meals. 60 Tab 3   • torsemide (DEMADEX) 10 MG tablet Take 1 tablet a day AS NEEDED for swelling/edema. 30 Tab 3   • nitroglycerin (NITROSTAT) 0.4 MG SL Tab Place 1 Tab under tongue as needed for Chest Pain. 25 Tab 3   • Soft Lens Products (B & L SENSITIVE EYES SALINE) 0.4 % Solution Place 1 Drop in right eye 1 time daily as needed.     • Fluticasone Furoate-Vilanterol (BREO ELLIPTA) 100-25 MCG/INH AEROSOL POWDER, BREATH ACTIVATED Inhale 1 Puff every day. Rinse mouth after use. (Patient not taking: Reported on 2/25/2021) 1 Each 3     No current facility-administered medications on file prior to visit.       Allergies: Patient has no known allergies.    ROS:   Constitutional: Denies fevers, chills, night sweats, +fatigue, denies weight loss  Eyes: Denies vision loss, pain, drainage, double vision  Ears, Nose, Throat: Denies earache, difficulty hearing, tinnitus, nasal congestion, hoarseness  Cardiovascular: Denies chest pain, tightness, palpitations, orthopnea or edema  Respiratory: As in HPI  Sleep: As in HPI  GI: Denies heartburn, dysphagia, nausea, abdominal pain, diarrhea or constipation  : Denies frequent urination, hematuria, discharge or painful urination  Musculoskeletal: Denies back pain, painful joints, sore muscles  Neurological: Denies weakness or headaches  Skin: No rashes    /62 (BP Location: Right arm, Patient Position: Sitting, BP Cuff Size: Adult)   Pulse (!) 57   Resp 16   Ht 1.753 m (5' 9\")   Wt 107 kg (235 lb)   SpO2 96%     Physical Exam:  Appearance: Well-nourished, well-developed, in no acute distress  HEENT: Normocephalic, atraumatic, white sclera, PERRLA, masked  Neck: No adenopathy or masses  Respiratory: no intercostal retractions or accessory muscle use  Lungs auscultation: Clear to auscultation bilaterally  Cardiovascular: Regular rate rhythm. No murmurs, rubs or gallops.  No LE edema  Abdomen: soft, " nondistended  Gait: Normal  Digits: No clubbing, cyanosis  Motor: No focal deficits  Orientation: Oriented to time, person and place    Diagnosis:  1. JOSE (obstructive sleep apnea)  DME CPAP   2. Mild persistent asthma without complication  Budeson-Glycopyrrol-Formoterol (BREZTRI AEROSPHERE) 160-9-4.8 MCG/ACT Aerosol   3. PAF (paroxysmal atrial fibrillation) ([facility])     4. Ischemic cardiomyopathy     5. Coronary artery disease involving native coronary artery of native heart without angina pectoris     6. Essential hypertension, benign         Plan:  The patient has obstructive sleep apnea and would benefit from nocturnal CPAP therapy for treatment.  Given his extensive cardiovascular disease including atrial fibrillation ischemic cardiomyopathy, CPAP is recommended at 7 cm of water using an Eson 2 mask.  We will download compliance card after 8 weeks on the therapy.  He also has evidence of mild asthma per pulmonary function testing. We discussed inhaled bronchodilators may be beneficial for both shortness of breath and cough.  He did not feel the Breo inhaler was beneficial.  He was provided samples of Breztri at 1 puff BID to try for the following month.  With cardiology for ischemic cardiomyopathy and atrial fibrillation management.  Return in about 3 months (around 5/25/2021).

## 2021-02-25 NOTE — TELEPHONE ENCOUNTER
Patient was seen in office today and I forgot to ask him which DME company he is currently using/prefers. I wasn't able to catch him before he left the office and Marva called and LM asking him to call me back.

## 2021-02-25 NOTE — TELEPHONE ENCOUNTER
Patient called in. Returning a call from our office. We requested to know which DME company he uses. Patient is using [employer].

## 2021-02-26 ENCOUNTER — TELEPHONE (OUTPATIENT)
Dept: CARDIOLOGY | Facility: MEDICAL CENTER | Age: 66
End: 2021-02-26

## 2021-02-26 NOTE — TELEPHONE ENCOUNTER
Spoke with pt. To fabio. He is taking Torsemide 10mg and Spironolactone 25mg QD. He is feeling better. He just saw Dr. Canas re: JOSE and mild asthma.

## 2021-02-26 NOTE — TELEPHONE ENCOUNTER
----- Message from Mckay Rodas M.D. sent at 2/25/2021  7:43 AM PST -----  Lab reviewed and looks good.  Kidney function is normal potassium is normal.  The proBNP is mildly elevated but not dramatically.  Hopefully his breathing is improved on daily bumetanide, if so he should continue his current medications.  If not let me know

## 2021-03-02 ENCOUNTER — TELEPHONE (OUTPATIENT)
Dept: CARDIOLOGY | Facility: MEDICAL CENTER | Age: 66
End: 2021-03-02

## 2021-03-02 NOTE — TELEPHONE ENCOUNTER
RS    Pt called stating he wants to know if he can get rid of the oxygen machine RS ordered for him. Please call Pt back at [phone].    Thank you

## 2021-03-02 NOTE — TELEPHONE ENCOUNTER
Spoke with pt. He just saw Dr. Canas and will start using his CPAP as soon as equipment arrives. He is still using his nocturnal oxygen ([employer]).   Advised pt. To call when he has used his CPAP for a few weeks and settings are confirmed by Dr. Canas. At that point his O2 can be discontinued.

## 2021-03-29 ENCOUNTER — TELEPHONE (OUTPATIENT)
Dept: CARDIOLOGY | Facility: MEDICAL CENTER | Age: 66
End: 2021-03-29

## 2021-03-29 ENCOUNTER — OFFICE VISIT (OUTPATIENT)
Dept: CARDIOLOGY | Facility: MEDICAL CENTER | Age: 66
End: 2021-03-29
Payer: MEDICARE

## 2021-03-29 VITALS
HEIGHT: 69 IN | OXYGEN SATURATION: 96 % | WEIGHT: 240 LBS | BODY MASS INDEX: 35.55 KG/M2 | SYSTOLIC BLOOD PRESSURE: 92 MMHG | DIASTOLIC BLOOD PRESSURE: 70 MMHG | HEART RATE: 52 BPM

## 2021-03-29 DIAGNOSIS — I48.0 PAF (PAROXYSMAL ATRIAL FIBRILLATION) (HCC): ICD-10-CM

## 2021-03-29 DIAGNOSIS — I25.10 CORONARY ARTERY DISEASE INVOLVING NATIVE CORONARY ARTERY OF NATIVE HEART WITHOUT ANGINA PECTORIS: Chronic | ICD-10-CM

## 2021-03-29 DIAGNOSIS — I25.5 ISCHEMIC CARDIOMYOPATHY: Chronic | ICD-10-CM

## 2021-03-29 DIAGNOSIS — E78.5 DYSLIPIDEMIA: Chronic | ICD-10-CM

## 2021-03-29 DIAGNOSIS — Z95.5 STENTED CORONARY ARTERY: Chronic | ICD-10-CM

## 2021-03-29 DIAGNOSIS — I10 ESSENTIAL HYPERTENSION, BENIGN: Chronic | ICD-10-CM

## 2021-03-29 DIAGNOSIS — Z79.01 CHRONIC ANTICOAGULATION: ICD-10-CM

## 2021-03-29 PROCEDURE — 99214 OFFICE O/P EST MOD 30 MIN: CPT | Performed by: INTERNAL MEDICINE

## 2021-03-29 ASSESSMENT — ENCOUNTER SYMPTOMS
GASTROINTESTINAL NEGATIVE: 1
PALPITATIONS: 0
SHORTNESS OF BREATH: 1
MUSCULOSKELETAL NEGATIVE: 1
DIZZINESS: 1
CONSTITUTIONAL NEGATIVE: 1
CARDIOVASCULAR NEGATIVE: 1

## 2021-03-29 NOTE — PROGRESS NOTES
Chief Complaint   Patient presents with   • Coronary Artery Disease     & Coronary artery disease involving native coronary artery of native heart without angina pectoris   • Atrial Fibrillation   • Dyslipidemia   • Cardiomyopathy (Ischemic)       Subjective:   Martín Grijalva is a 65 y.o. male who presents today for follow-up of ischemic cardiomyopathy, PAF with cardioversion late January, remote anterior wall MI, and hyperlipidemia.  He is chronically anticoagulated.  He has been feeling dizzy and has had some exertional dyspnea.  He knows that if he does not take his diuretics his leg swell.  His main complaint is lightheadedness on medications.  His irbesartan was reduced to 75 mg a day recently    Past Medical History:   Diagnosis Date   • Arrhythmia 2020    A fib   • Arthritis     fingers   • CAD (coronary artery disease) 7/2/2012   • Chronic anticoagulation 7/2/2012   • Congestive heart failure (HCC)    • Dyslipidemia 7/2/2012   • Essential hypertension, benign 7/2/2012   • High cholesterol    • Insomnia     wakes up 2-5 times a night , wakes up to use restroom and sometimes just wakes up    • Ischemic cardiomyopathy 7/2/2012   • Left ventricular thrombosis 7/2/2012   • Myocardial infarct (HCC) 2006   • Pericarditis 7/2/2012   • Pneumonia     history of as a child   • Sleep apnea     told by MD that he has it, scheduled for sleep study    • Snoring     no sleep study done   • SOB (shortness of breath)     no c/o today; on exertion; pt uses 2L o2 qhs only; pt does not know provider   • Stented coronary artery 7/2/2012     Past Surgical History:   Procedure Laterality Date   • SHOULDER DECOMPRESSION ARTHROSCOPIC Right 11/27/2018    Procedure: SHOULDER DECOMPRESSION ARTHROSCOPIC- SUBACROMIAL CONVERTED TO OPEN;  Surgeon: Ni Weir M.D.;  Location: SURGERY [facility];  Service: Orthopedics   • SHOULDER ARTHROSCOPY W/ ROTATOR CUFF REPAIR Right 11/27/2018    Procedure: OPEN ROTATOR CUFF REPAIR;   Surgeon: Ni Weir M.D.;  Location: SURGERY [facility];  Service: Orthopedics   • CARDIAC CATH, LEFT HEART     • CARPAL TUNNEL RELEASE Bilateral    • STENT PLACEMENT      cardiac stent   • VASECTOMY       History reviewed. No pertinent family history.  Social History     Socioeconomic History   • Marital status:      Spouse name: Not on file   • Number of children: Not on file   • Years of education: Not on file   • Highest education level: Not on file   Occupational History   • Not on file   Tobacco Use   • Smoking status: Never Smoker   • Smokeless tobacco: Former User     Types: Chew   Substance and Sexual Activity   • Alcohol use: Not Currently     Comment: occ   • Drug use: Not Currently   • Sexual activity: Not on file   Other Topics Concern   • Not on file   Social History Narrative   • Not on file     Social Determinants of Health     Financial Resource Strain:    • Difficulty of Paying Living Expenses:    Food Insecurity:    • Worried About Running Out of Food in the Last Year:    • Ran Out of Food in the Last Year:    Transportation Needs:    • Lack of Transportation (Medical):    • Lack of Transportation (Non-Medical):    Physical Activity:    • Days of Exercise per Week:    • Minutes of Exercise per Session:    Stress:    • Feeling of Stress :    Social Connections:    • Frequency of Communication with Friends and Family:    • Frequency of Social Gatherings with Friends and Family:    • Attends [employer] Services:    • Active Member of Clubs or Organizations:    • Attends Club or Organization Meetings:    • Marital Status:    Intimate Partner Violence:    • Fear of Current or Ex-Partner:    • Emotionally Abused:    • Physically Abused:    • Sexually Abused:      No Known Allergies  Outpatient Encounter Medications as of 3/29/2021   Medication Sig Dispense Refill   • Budeson-Glycopyrrol-Formoterol ([facility]) 160-9-4.8 MCG/ACT Aerosol Inhale 1 Puff 2 (two) times a day. 1 g 0  "  • spironolactone (ALDACTONE) 25 MG Tab Take 1 Tab by mouth every day. 30 Tab 3   • amiodarone (CORDARONE) 200 MG Tab Take 2 Tabs by mouth every day. 60 Tab 5   • rivaroxaban (XARELTO) 20 MG Tab tablet Take 1 Tab by mouth with dinner. 90 Tab 3   • irbesartan (AVAPRO) 150 MG Tab Take 0.5 Tabs by mouth every day. 90 Tab 0   • simvastatin (ZOCOR) 40 MG Tab Take 1 Tab by mouth every day. 90 Tab 3   • carvedilol (COREG) 6.25 MG Tab Take 1 Tab by mouth 2 times a day, with meals. 60 Tab 3   • torsemide (DEMADEX) 10 MG tablet Take 1 tablet a day AS NEEDED for swelling/edema. 30 Tab 3   • nitroglycerin (NITROSTAT) 0.4 MG SL Tab Place 1 Tab under tongue as needed for Chest Pain. 25 Tab 3   • Fluticasone Furoate-Vilanterol (BREO ELLIPTA) 100-25 MCG/INH AEROSOL POWDER, BREATH ACTIVATED Inhale 1 Puff every day. Rinse mouth after use. (Patient not taking: Reported on 2/25/2021) 1 Each 3   • Soft Lens Products (B & L SENSITIVE EYES SALINE) 0.4 % Solution Place 1 Drop in right eye 1 time daily as needed.       No facility-administered encounter medications on file as of 3/29/2021.     Review of Systems   Constitutional: Negative.    HENT: Negative.    Respiratory: Positive for shortness of breath.    Cardiovascular: Negative.  Negative for chest pain, palpitations and leg swelling.   Gastrointestinal: Negative.    Musculoskeletal: Negative.    Skin: Negative.    Neurological: Positive for dizziness.   Endo/Heme/Allergies: Negative.         Objective:   BP (!) 92/70 (BP Location: Left arm, Patient Position: Sitting, BP Cuff Size: Adult)   Pulse (!) 52   Ht 1.753 m (5' 9\")   Wt 109 kg (240 lb)   SpO2 96%   BMI 35.44 kg/m²     Physical Exam   Constitutional: He is oriented to person, place, and time. He appears well-nourished. No distress.   HENT:   Head: Normocephalic and atraumatic.   Eyes: Pupils are equal, round, and reactive to light. No scleral icterus.   Neck: No JVD present. No tracheal deviation present. "   Cardiovascular: Normal rate and regular rhythm.   No murmur heard.  Pulmonary/Chest: Breath sounds normal. No respiratory distress. He has no wheezes.   Abdominal: Soft. Bowel sounds are normal. He exhibits no distension. There is no abdominal tenderness.   Obese   Musculoskeletal:         General: No edema.   Neurological: He is alert and oriented to person, place, and time.   Skin: Skin is warm and dry.   Psychiatric: He has a normal mood and affect.       Assessment:     1. Essential hypertension, benign     2. Dyslipidemia     3. Coronary artery disease involving native coronary artery of native heart without angina pectoris     4. Stented coronary artery     5. Ischemic cardiomyopathy     6. PAF (paroxysmal atrial fibrillation) ([facility])     7. Chronic anticoagulation         Medical Decision Making:  Today's Assessment / Status / Plan:   Ischemic cardiomyopathy: He is having some problems with dizziness.  Blood pressure is low today.  Written instructions were given to reduce amiodarone to 200 mg daily, stop spironolactone, and keep irbesartan 75 mg a day.  Will reassess in 1 month with echo at that time.

## 2021-03-29 NOTE — TELEPHONE ENCOUNTER
----- Message from Mckay Rodas M.D. sent at 3/29/2021  8:39 AM PDT -----  Regarding: Needs his oxygen picked up  Patient is now on CPAP.  He has a concentrator at home but is not using.  Apparently the oxygen company needs okay from me to come  his oxygen concentrator.  Please send order to discontinue nocturnal O2.

## 2021-04-02 ENCOUNTER — TELEPHONE (OUTPATIENT)
Dept: CARDIOLOGY | Facility: MEDICAL CENTER | Age: 66
End: 2021-04-02

## 2021-04-02 RX ORDER — AMIODARONE HYDROCHLORIDE 200 MG/1
200 TABLET ORAL DAILY
Qty: 30 TABLET | Refills: 0
Start: 2021-04-02 | End: 2021-04-30

## 2021-04-02 NOTE — TELEPHONE ENCOUNTER
Spoke with pt. He just discussed this with Dr. Rodas at 3-29-21 FV.   Pt. Has reduced Amiodarone to 200mg daily and Irbesartan to 75mg daily. Dr. Rodas stopped his Spironolactone 25mg daily but pt. Took it for the past 2 days because of increased dependent edema. (Pt. Also takes Torsemide 10mg QD.)  Edema improves overnight, worsens as day progresses. Pt. Will restrict sodium, fluids and wear support hose. He will call to schedule echo. He has FV 4-28-21 with RS. Pt. Will not take Spironolactone.

## 2021-04-02 NOTE — TELEPHONE ENCOUNTER
RS    Pt called stating before he takes his medication his blood pressure is around 123/77 and an hour after his medication his blood pressure is 87/55 and all day he feels dizzy and weak. Pt states his pulse is around 55/54. Please call Pt back at [phone].    Thank you

## 2021-04-03 NOTE — TELEPHONE ENCOUNTER
Spoke to his wife and the pt  He reported felling dizzy  HR runs inth elow 50s  BP low 100  Taking carvedilol 6.25 mg BID, irbesartan and low dose amiodarone for AF  Advised to reduce carvedilol to 3.125 mg BID  If HR still <60 and BP still low after a few days especially remains symptomatic may d/c carvedilol.  Callback for any questions

## 2021-04-13 DIAGNOSIS — I25.5 ISCHEMIC CARDIOMYOPATHY: ICD-10-CM

## 2021-04-13 RX ORDER — TORSEMIDE 10 MG/1
TABLET ORAL
Qty: 90 TABLET | Refills: 0 | Status: SHIPPED | OUTPATIENT
Start: 2021-04-13 | End: 2021-06-21

## 2021-04-19 ENCOUNTER — HOSPITAL ENCOUNTER (OUTPATIENT)
Dept: CARDIOLOGY | Facility: MEDICAL CENTER | Age: 66
End: 2021-04-19
Attending: INTERNAL MEDICINE
Payer: MEDICARE

## 2021-04-19 DIAGNOSIS — Z95.5 STENTED CORONARY ARTERY: Chronic | ICD-10-CM

## 2021-04-19 DIAGNOSIS — I10 ESSENTIAL HYPERTENSION, BENIGN: Chronic | ICD-10-CM

## 2021-04-19 DIAGNOSIS — I48.0 PAF (PAROXYSMAL ATRIAL FIBRILLATION) (HCC): ICD-10-CM

## 2021-04-19 DIAGNOSIS — I25.10 CORONARY ARTERY DISEASE INVOLVING NATIVE CORONARY ARTERY OF NATIVE HEART WITHOUT ANGINA PECTORIS: Chronic | ICD-10-CM

## 2021-04-19 DIAGNOSIS — I25.5 ISCHEMIC CARDIOMYOPATHY: Chronic | ICD-10-CM

## 2021-04-19 PROCEDURE — 93306 TTE W/DOPPLER COMPLETE: CPT

## 2021-04-19 PROCEDURE — 700117 HCHG RX CONTRAST REV CODE 255: Performed by: INTERNAL MEDICINE

## 2021-04-19 RX ADMIN — HUMAN ALBUMIN MICROSPHERES AND PERFLUTREN 3 ML: 10; .22 INJECTION, SOLUTION INTRAVENOUS at 12:17

## 2021-04-20 LAB
LV EJECT FRACT  99904: 35
LV EJECT FRACT MOD 2C 99903: 26.27
LV EJECT FRACT MOD 4C 99902: 35
LV EJECT FRACT MOD BP 99901: 40.98

## 2021-04-20 PROCEDURE — 93306 TTE W/DOPPLER COMPLETE: CPT | Mod: 26 | Performed by: INTERNAL MEDICINE

## 2021-04-28 ENCOUNTER — TELEPHONE (OUTPATIENT)
Dept: CARDIOLOGY | Facility: MEDICAL CENTER | Age: 66
End: 2021-04-28

## 2021-04-28 ENCOUNTER — OFFICE VISIT (OUTPATIENT)
Dept: CARDIOLOGY | Facility: MEDICAL CENTER | Age: 66
End: 2021-04-28
Payer: MEDICARE

## 2021-04-28 VITALS
WEIGHT: 239 LBS | HEIGHT: 69 IN | SYSTOLIC BLOOD PRESSURE: 98 MMHG | BODY MASS INDEX: 35.4 KG/M2 | OXYGEN SATURATION: 97 % | DIASTOLIC BLOOD PRESSURE: 68 MMHG | HEART RATE: 89 BPM

## 2021-04-28 DIAGNOSIS — I25.5 ISCHEMIC CARDIOMYOPATHY: Chronic | ICD-10-CM

## 2021-04-28 DIAGNOSIS — I50.22 CHRONIC SYSTOLIC HEART FAILURE (HCC): ICD-10-CM

## 2021-04-28 DIAGNOSIS — I48.0 PAF (PAROXYSMAL ATRIAL FIBRILLATION) (HCC): ICD-10-CM

## 2021-04-28 DIAGNOSIS — I10 ESSENTIAL HYPERTENSION, BENIGN: Chronic | ICD-10-CM

## 2021-04-28 DIAGNOSIS — I25.10 CORONARY ARTERY DISEASE INVOLVING NATIVE CORONARY ARTERY OF NATIVE HEART WITHOUT ANGINA PECTORIS: Chronic | ICD-10-CM

## 2021-04-28 DIAGNOSIS — I25.2 HISTORY OF ACUTE ANTERIOR WALL MI: ICD-10-CM

## 2021-04-28 DIAGNOSIS — Z95.5 STENTED CORONARY ARTERY: Chronic | ICD-10-CM

## 2021-04-28 DIAGNOSIS — Z79.01 CHRONIC ANTICOAGULATION: ICD-10-CM

## 2021-04-28 LAB — EKG IMPRESSION: NORMAL

## 2021-04-28 PROCEDURE — 99214 OFFICE O/P EST MOD 30 MIN: CPT | Performed by: INTERNAL MEDICINE

## 2021-04-28 PROCEDURE — 93000 ELECTROCARDIOGRAM COMPLETE: CPT | Performed by: INTERNAL MEDICINE

## 2021-04-28 ASSESSMENT — ENCOUNTER SYMPTOMS
BRUISES/BLEEDS EASILY: 0
MUSCULOSKELETAL NEGATIVE: 1
DEPRESSION: 0
RESPIRATORY NEGATIVE: 1
GASTROINTESTINAL NEGATIVE: 1
SHORTNESS OF BREATH: 0
CONSTITUTIONAL NEGATIVE: 1
NEUROLOGICAL NEGATIVE: 1
PALPITATIONS: 1

## 2021-04-28 NOTE — PROGRESS NOTES
"Chief Complaint   Patient presents with   • HTN (Controlled)   • Coronary Artery Disease     & Coronary artery disease involving native coronary artery of native heart without angina pectoris   • Dyslipidemia   • Cardiomyopathy (Ischemic)   • Atrial Fibrillation   • Anticoagulation       Subjective:   Martín Grijalva is a 65 y.o. male who presents today for follow-up on of an extensive anterior wall MI in November, 2008.  The patient presented late after his MI as [place] of [facility] at the time.  He underwent stenting of his LAD at [place] at that time with \"jailing\" of the second diagonal.    The patient has a history of ischemic cardiomyopathy and by echo in April, 2021 his EF was 35% which was slightly reduced in [id number] comparison with the prior echo from last September.    He has a history of PAF and has been cardioverted twice, in September, 2020 and again in May January 2021.  When I last saw him in late March, he was in regular rhythm.  Today he is rhythm is irregular and he notes this at home.    At the time of the last visit he was having orthostatic dizziness and exertional dyspnea.  His blood pressure was 92 systolic and his irbesartan was reduced to 75 mg a day.  He  had  Been feeling poorly at home and his heart rate was slow.  His carvedilol was reduced to 3.125 mg twice daily after telephone call with one of our doctors.    On the revised medications, he is feeling much better and his energy is much better.  There is no dizziness but he still has edema.    He has had no bleeding problems on his anticoagulant.  Past Medical History:   Diagnosis Date   • Arrhythmia 2020    A fib   • Arthritis     fingers   • CAD (coronary artery disease) 7/2/2012   • Chronic anticoagulation 7/2/2012   • Congestive heart failure (HCC)    • Dyslipidemia 7/2/2012   • Essential hypertension, benign 7/2/2012   • High cholesterol    • Insomnia     wakes up 2-5 times a night , wakes up " to use restroom and sometimes just wakes up    • Ischemic cardiomyopathy 7/2/2012   • Left ventricular thrombosis 7/2/2012   • Myocardial infarct (HCC) 2006   • Pericarditis 7/2/2012   • Pneumonia     history of as a child   • Sleep apnea     told by MD that he has it, scheduled for sleep study    • Snoring     no sleep study done   • SOB (shortness of breath)     no c/o today; on exertion; pt uses 2L o2 qhs only; pt does not know provider   • Stented coronary artery 7/2/2012     Past Surgical History:   Procedure Laterality Date   • SHOULDER DECOMPRESSION ARTHROSCOPIC Right 11/27/2018    Procedure: SHOULDER DECOMPRESSION ARTHROSCOPIC- SUBACROMIAL CONVERTED TO OPEN;  Surgeon: Ni Weir M.D.;  Location: SURGERY [facility];  Service: Orthopedics   • SHOULDER ARTHROSCOPY W/ ROTATOR CUFF REPAIR Right 11/27/2018    Procedure: OPEN ROTATOR CUFF REPAIR;  Surgeon: Ni Weir M.D.;  Location: SURGERY [facility];  Service: Orthopedics   • CARDIAC CATH, LEFT HEART     • CARPAL TUNNEL RELEASE Bilateral    • STENT PLACEMENT      cardiac stent   • VASECTOMY       History reviewed. No pertinent family history.  Social History     Socioeconomic History   • Marital status:      Spouse name: Not on file   • Number of children: Not on file   • Years of education: Not on file   • Highest education level: Not on file   Occupational History   • Not on file   Tobacco Use   • Smoking status: Never Smoker   • Smokeless tobacco: Former User     Types: Chew   Substance and Sexual Activity   • Alcohol use: Not Currently     Comment: occ   • Drug use: Not Currently   • Sexual activity: Not on file   Other Topics Concern   • Not on file   Social History Narrative   • Not on file     Social Determinants of Health     Financial Resource Strain:    • Difficulty of Paying Living Expenses:    Food Insecurity:    • Worried About Running Out of Food in the Last Year:    • Ran Out of Food in the Last Year:     Transportation Needs:    • Lack of Transportation (Medical):    • Lack of Transportation (Non-Medical):    Physical Activity:    • Days of Exercise per Week:    • Minutes of Exercise per Session:    Stress:    • Feeling of Stress :    Social Connections:    • Frequency of Communication with Friends and Family:    • Frequency of Social Gatherings with Friends and Family:    • Attends [employer] Services:    • Active Member of Clubs or Organizations:    • Attends Club or Organization Meetings:    • Marital Status:    Intimate Partner Violence:    • Fear of Current or Ex-Partner:    • Emotionally Abused:    • Physically Abused:    • Sexually Abused:      No Known Allergies  Outpatient Encounter Medications as of 4/28/2021   Medication Sig Dispense Refill   • torsemide (DEMADEX) 10 MG tablet TAKE 1 TABLET BY MOUTH ONCE DAILY AS NEEDED FOR  SWELLING/EDEMA 90 tablet 0   • amiodarone (CORDARONE) 200 MG Tab Take 1 tablet by mouth every day. 30 tablet 0   • Budeson-Glycopyrrol-Formoterol (BREZTRI AEROSPHERE) 160-9-4.8 MCG/ACT Aerosol Inhale 1 Puff 2 (two) times a day. 1 g 0   • rivaroxaban (XARELTO) 20 MG Tab tablet Take 1 Tab by mouth with dinner. 90 Tab 3   • irbesartan (AVAPRO) 150 MG Tab Take 0.5 Tabs by mouth every day. 90 Tab 0   • simvastatin (ZOCOR) 40 MG Tab Take 1 Tab by mouth every day. 90 Tab 3   • carvedilol (COREG) 6.25 MG Tab Take 1 Tab by mouth 2 times a day, with meals. (Patient taking differently: Take 6.25 mg by mouth 2 times a day with meals. 3.125mg) 60 Tab 3   • nitroglycerin (NITROSTAT) 0.4 MG SL Tab Place 1 Tab under tongue as needed for Chest Pain. 25 Tab 3   • Soft Lens Products (B & L SENSITIVE EYES SALINE) 0.4 % Solution Place 1 Drop in right eye 1 time daily as needed.       No facility-administered encounter medications on file as of 4/28/2021.     Review of Systems   Constitutional: Negative.    HENT: Negative.    Respiratory: Negative.  Negative for shortness of breath.    Cardiovascular:  "Positive for palpitations and leg swelling. Negative for chest pain.   Gastrointestinal: Negative.    Musculoskeletal: Negative.    Skin: Negative.    Neurological: Negative.    Endo/Heme/Allergies: Negative.  Does not bruise/bleed easily.   Psychiatric/Behavioral: Negative for depression.        Objective:   BP (!) 98/68 (BP Location: Left arm, Patient Position: Sitting, BP Cuff Size: Adult)   Pulse 89   Ht 1.753 m (5' 9\")   Wt 108 kg (239 lb)   SpO2 97%   BMI 35.29 kg/m²     Physical Exam   Constitutional: He is oriented to person, place, and time. He appears well-nourished. No distress.   HENT:   Head: Normocephalic and atraumatic.   Eyes: Pupils are equal, round, and reactive to light. No scleral icterus.   Neck: No JVD present. No tracheal deviation present.   Cardiovascular: Normal rate. An irregularly irregular rhythm present.   No murmur heard.  Pulmonary/Chest: Breath sounds normal. No respiratory distress. He has no wheezes.   Abdominal: Soft. Bowel sounds are normal. He exhibits no distension. There is no abdominal tenderness.   Obese   Musculoskeletal:         General: No edema.   Neurological: He is alert and oriented to person, place, and time.   Skin: Skin is warm and dry.   Psychiatric: He has a normal mood and affect.       Assessment:     1. Coronary artery disease involving native coronary artery of native heart without angina pectoris  EKG   2. Chronic anticoagulation     3. History of acute anterior wall MI  EKG   4. Essential hypertension, benign  EKG   5. Ischemic cardiomyopathy     6. Stented coronary artery     7. Chronic systolic heart failure (HCC)  EKG   8. PAF (paroxysmal atrial fibrillation) ([facility])         Medical Decision Making:  Today's Assessment / Status / Plan:   Chronic systolic heart failure: The patient has ischemic cardiomyopathy and was having difficulties with lethargy and dizziness.  He is feeling much better on reduced medications.  His most recent EF was 35%    PAF: " Patient been cardioverted twice.  He is currently on amiodarone and has been since his first cardioversion. The patient has again failed cardioversion. He is virtually asymptomatic though does have poor EF. I will send him to our electrophysiologic colleagues for an opinion about the value of trying a third cardioversion possibly with another antiarrhythmic agent. For the moment, he will stop his amiodarone.    Coronary disease: Status post anterior MI in 2008 with stenting of his LAD at that time.    Return for EP evaluation. Return to see me in early June.

## 2021-04-28 NOTE — TELEPHONE ENCOUNTER
Per Dr. Rodas's request - LM on patient's voicemail to call back to schedule an appointment with PEYMAN Sotne

## 2021-04-30 ENCOUNTER — OFFICE VISIT (OUTPATIENT)
Dept: CARDIOLOGY | Facility: MEDICAL CENTER | Age: 66
End: 2021-04-30
Payer: MEDICARE

## 2021-04-30 ENCOUNTER — TELEPHONE (OUTPATIENT)
Dept: CARDIOLOGY | Facility: MEDICAL CENTER | Age: 66
End: 2021-04-30

## 2021-04-30 VITALS
SYSTOLIC BLOOD PRESSURE: 116 MMHG | DIASTOLIC BLOOD PRESSURE: 70 MMHG | HEIGHT: 69 IN | WEIGHT: 239 LBS | OXYGEN SATURATION: 97 % | HEART RATE: 90 BPM | BODY MASS INDEX: 35.4 KG/M2

## 2021-04-30 DIAGNOSIS — I48.0 PAF (PAROXYSMAL ATRIAL FIBRILLATION) (HCC): Primary | ICD-10-CM

## 2021-04-30 LAB — EKG IMPRESSION: NORMAL

## 2021-04-30 PROCEDURE — 99205 OFFICE O/P NEW HI 60 MIN: CPT | Mod: 25 | Performed by: INTERNAL MEDICINE

## 2021-04-30 PROCEDURE — 93000 ELECTROCARDIOGRAM COMPLETE: CPT | Performed by: INTERNAL MEDICINE

## 2021-04-30 NOTE — TELEPHONE ENCOUNTER
Patient scheduled for afib ablation on 6-25-21 with Dr. Rosas. Patient has been instructed to check in at 6:00am for 8:00 case time. Message sent to authorizations. Emailed Carto.

## 2021-04-30 NOTE — PROGRESS NOTES
Arrhythmia Clinic Note (New patient)     DOS: 4/30/2021    Referring physician: Dr Rodas    Chief complaint/Reason for consult: Afib    HPI: 65-year-old man with prior medical history of persistent atrial fibrillation status post multiple cardioversions, coronary disease status post anterior wall myocardial infarction in 2008, ischemic cardiomyopathy, hypertension, hyperlipidemia, chronic systolic heart failure with mildly reduced ejection fraction now moderately reduced, presenting for evaluation.  His cardiac history began in 2008.  This is when he had his myocardial infarction.  His ejection fraction was initially depressed but recovered with medical therapy.  He has been doing well without any chest pain on exertion.  However when he came back from a recent trip where he roddy a mucous tag, he was more short of breath with exertion.  On evaluation, he was found to be in atrial fibrillation which was a new diagnosis for him.  He was started on medication and went for electrical cardioversion in September 2020.  Unfortunately he had recurrence of atrial fibrillation following this.  He was started on amiodarone and sent for repeat cardioversion in January 2021.  Unfortunately this also resulted in recurrence of atrial fibrillation.  His amiodarone was recently discontinued.  He was referred to electrophysiology clinic to evaluate for possible ablation.  He feels short of breath with exertion, he can tell when he is in atrial fibrillation and can tell that his pulse is irregular.  Otherwise he denies syncope or presyncope.  No palpitations.  He is having leg swelling and cough when he lies down.    ROS (+ highlighted in bold):  Constitutional: Fevers/chills/fatigue/weightloss  HEENT: Blurry vision/eye pain/sore throat/hearing loss  Respiratory: Shortness of breath/cough  Cardiovascular: Chest pain/palpitations/edema/orthopnea/syncope  GI: Nausea/vomitting/diarrhea  MSK: Arthralgias/myagias/muscle weakness  Skin:  Rash/sores  Neurological: Numbness/tremors/vertigo  Endocrine: Excessive thirst/polyuria/cold intolerance/heat intolerance  Psych: Depression/anxiety    Past Medical History:   Diagnosis Date   • Arrhythmia 2020    A fib   • Arthritis     fingers   • CAD (coronary artery disease) 7/2/2012   • Chronic anticoagulation 7/2/2012   • Congestive heart failure (HCC)    • Dyslipidemia 7/2/2012   • Essential hypertension, benign 7/2/2012   • High cholesterol    • Insomnia     wakes up 2-5 times a night , wakes up to use restroom and sometimes just wakes up    • Ischemic cardiomyopathy 7/2/2012   • Left ventricular thrombosis 7/2/2012   • Myocardial infarct (HCC) 2006   • Pericarditis 7/2/2012   • Pneumonia     history of as a child   • Sleep apnea     told by MD that he has it, scheduled for sleep study    • Snoring     no sleep study done   • SOB (shortness of breath)     no c/o today; on exertion; pt uses 2L o2 qhs only; pt does not know provider   • Stented coronary artery 7/2/2012       Past Surgical History:   Procedure Laterality Date   • SHOULDER DECOMPRESSION ARTHROSCOPIC Right 11/27/2018    Procedure: SHOULDER DECOMPRESSION ARTHROSCOPIC- SUBACROMIAL CONVERTED TO OPEN;  Surgeon: Ni Weir M.D.;  Location: SURGERY [facility];  Service: Orthopedics   • SHOULDER ARTHROSCOPY W/ ROTATOR CUFF REPAIR Right 11/27/2018    Procedure: OPEN ROTATOR CUFF REPAIR;  Surgeon: Ni Weir M.D.;  Location: SURGERY [facility];  Service: Orthopedics   • CARDIAC CATH, LEFT HEART     • CARPAL TUNNEL RELEASE Bilateral    • STENT PLACEMENT      cardiac stent   • VASECTOMY         Social History     Socioeconomic History   • Marital status:      Spouse name: Not on file   • Number of children: Not on file   • Years of education: Not on file   • Highest education level: Not on file   Occupational History   • Not on file   Tobacco Use   • Smoking status: Never Smoker   • Smokeless tobacco: Former User      Types: Chew   Substance and Sexual Activity   • Alcohol use: Not Currently     Comment: occ   • Drug use: Not Currently   • Sexual activity: Not on file   Other Topics Concern   • Not on file   Social History Narrative   • Not on file     Social Determinants of Health     Financial Resource Strain:    • Difficulty of Paying Living Expenses:    Food Insecurity:    • Worried About Running Out of Food in the Last Year:    • Ran Out of Food in the Last Year:    Transportation Needs:    • Lack of Transportation (Medical):    • Lack of Transportation (Non-Medical):    Physical Activity:    • Days of Exercise per Week:    • Minutes of Exercise per Session:    Stress:    • Feeling of Stress :    Social Connections:    • Frequency of Communication with Friends and Family:    • Frequency of Social Gatherings with Friends and Family:    • Attends [employer] Services:    • Active Member of Clubs or Organizations:    • Attends Club or Organization Meetings:    • Marital Status:    Intimate Partner Violence:    • Fear of Current or Ex-Partner:    • Emotionally Abused:    • Physically Abused:    • Sexually Abused:        No family history on file.   Denies family history of premature coronary disease or sudden cardiac death    No Known Allergies    Current Outpatient Medications   Medication Sig Dispense Refill   • torsemide (DEMADEX) 10 MG tablet TAKE 1 TABLET BY MOUTH ONCE DAILY AS NEEDED FOR  SWELLING/EDEMA 90 tablet 0   • Budeson-Glycopyrrol-Formoterol (BREZTRI AEROSPHERE) 160-9-4.8 MCG/ACT Aerosol Inhale 1 Puff 2 (two) times a day. 1 g 0   • rivaroxaban (XARELTO) 20 MG Tab tablet Take 1 Tab by mouth with dinner. 90 Tab 3   • irbesartan (AVAPRO) 150 MG Tab Take 0.5 Tabs by mouth every day. 90 Tab 0   • simvastatin (ZOCOR) 40 MG Tab Take 1 Tab by mouth every day. 90 Tab 3   • carvedilol (COREG) 6.25 MG Tab Take 1 Tab by mouth 2 times a day, with meals. (Patient taking differently: Take 6.25 mg by mouth 2 times a day with meals.  "3.125mg) 60 Tab 3   • nitroglycerin (NITROSTAT) 0.4 MG SL Tab Place 1 Tab under tongue as needed for Chest Pain. 25 Tab 3   • Soft Lens Products (B & L SENSITIVE EYES SALINE) 0.4 % Solution Place 1 Drop in right eye 1 time daily as needed.       No current facility-administered medications for this visit.       Physical Exam:  Vitals:    04/30/21 0805   BP: 116/70   BP Location: Left arm   Patient Position: Sitting   BP Cuff Size: Large adult   Pulse: 90   SpO2: 97%   Weight: 108 kg (239 lb)   Height: 1.753 m (5' 9\")     General appearance: NAD, conversant   Eyes: anicteric sclerae, moist conjunctivae; no lid-lag; PERRLA  HENT: Atraumatic; oropharynx clear with moist mucous membranes and no mucosal ulcerations; normal hard and soft palate  Neck: Trachea midline; FROM, supple, no thyromegaly or lymphadenopathy  Lungs: CTA, with normal respiratory effort and no intercostal retractions  CV: Irregular, no MRGs, no JVD   Abdomen: Soft, non-tender; no masses or HSM  Extremities: No peripheral edema or extremity lymphadenopathy  Skin: Normal temperature, turgor and texture; no rash, ulcers or subcutaneous nodules  Psych: Appropriate affect, alert and oriented to person, place and time    Data:  Lipids:   Lab Results   Component Value Date/Time    CHOLSTRLTOT 115 02/22/2019 07:06 AM    CHOLSTRLTOT 113 07/09/2012 07:21 AM    TRIGLYCERIDE 76 02/22/2019 07:06 AM    TRIGLYCERIDE 135 07/09/2012 07:21 AM    HDL 40 02/22/2019 07:06 AM    HDL 41 07/09/2012 07:21 AM    LDL 60 02/22/2019 07:06 AM    LDL 45 07/09/2012 07:21 AM        BMP:  Lab Results   Component Value Date/Time    SODIUM 137 02/18/2021 0932    POTASSIUM 4.9 02/18/2021 0932    CHLORIDE 97 02/18/2021 0932    CO2 25 02/18/2021 0932    GLUCOSE 74 02/18/2021 0932    BUN 24 02/18/2021 0932    CREATININE 1.08 02/18/2021 0932    CALCIUM 9.4 02/18/2021 0932    ANION 8.0 01/26/2021 1040        TSH:   No results found for: TSHULTRASEN     THYROXINE (T4):   No results found for: " PAOLO     CBC:   Lab Results   Component Value Date/Time    WBC 8.2 01/26/2021 10:40 AM    RBC 4.45 (L) 01/26/2021 10:40 AM    HEMOGLOBIN 14.3 01/26/2021 10:40 AM    HEMATOCRIT 44.9 01/26/2021 10:40 AM    .9 (H) 01/26/2021 10:40 AM    MCH 32.1 01/26/2021 10:40 AM    MCHC 31.8 (L) 01/26/2021 10:40 AM    RDW 51.2 (H) 01/26/2021 10:40 AM    PLATELETCT 194 01/26/2021 10:40 AM    MPV 11.0 01/26/2021 10:40 AM    NEUTSPOLYS 72 02/22/2019 07:06 AM    LYMPHOCYTES 19 02/22/2019 07:06 AM    MONOCYTES 7 02/22/2019 07:06 AM    EOSINOPHILS 2 02/22/2019 07:06 AM    BASOPHILS 0 02/22/2019 07:06 AM    IMMGRAN 0 02/22/2019 07:06 AM    NRBC CANCELED 02/22/2019 07:06 AM    NEUTS 7.7 (H) 02/22/2019 07:06 AM    LYMPHS 2.1 02/22/2019 07:06 AM    MONOS 0.8 02/22/2019 07:06 AM    EOS 0.2 02/22/2019 07:06 AM    BASO 0.0 02/22/2019 07:06 AM    IMMGRANAB 0.0 02/22/2019 07:06 AM        CBC w/o DIFF  Lab Results   Component Value Date/Time    WBC 8.2 01/26/2021 10:40 AM    RBC 4.45 (L) 01/26/2021 10:40 AM    HEMOGLOBIN 14.3 01/26/2021 10:40 AM    .9 (H) 01/26/2021 10:40 AM    MCH 32.1 01/26/2021 10:40 AM    MCHC 31.8 (L) 01/26/2021 10:40 AM    RDW 51.2 (H) 01/26/2021 10:40 AM    MPV 11.0 01/26/2021 10:40 AM       Prior echo/stress results reviewed: Ejection fraction 35%    EKG interpreted by me: Atrial fibrillation versus flutter    Impression/Plan:  1. PAF (paroxysmal atrial fibrillation) ([facility])  EKG    CL-EP ABLATION ATRIAL FIBRILLATION    EC-JANE W/O CONT     1.  Persistent atrial fibrillation  2.  Atrial flutter  3.  Ischemic cardiomyopathy  4.  Arrhythmia related cardiomyopathy, ejection fraction 35%  5.  Chronic systolic heart failure  6.  Oral anticoagulation monitoring    -We discussed alternative rhythm control options for his atrial fibrillation and flutter.  He failed antiarrhythmic medications and cardioversion most recently.  We discussed catheter mediated ablation. We discussed pulmonary vein isolation for therapeutic  management and continued rhythm control.  We discussed the risks and benefits of this procedure.  Risks include 1-3% risk of major cardiovascular event including stroke, myocardial infarction, phrenic nerve damage, esophageal injury and/or fistula formation, cardiac perforation, pericardial effusion, tamponade, major bleeding, or death.  I quoted a 60% chance free of atrial fibrillation at 12 months.  We discussed that he may also need a second procedure.  -After discussion of all options, he wishes to proceed with scheduling catheter ablation.  We will plan to continue anticoagulation.  Schedule ablation with JANE.  -Plan to recheck echocardiogram following restoration of sinus rhythm long-term.  If ejection fraction remains 35% or less, we will have to discuss potential defibrillator implant, however my expectation is that his ejection fraction will recover being back in regular rhythm.    Plan PVI/PWI/CTI    Conrado Rosas MD  Cardiac Electrophysiology

## 2021-04-30 NOTE — TELEPHONE ENCOUNTER
----- Message from Conrado Rosas M.D. sent at 4/30/2021  8:58 AM PDT -----  Please schedule Afib ablation, no meds to hold, thanksMC

## 2021-06-01 ENCOUNTER — OFFICE VISIT (OUTPATIENT)
Dept: SLEEP MEDICINE | Facility: MEDICAL CENTER | Age: 66
End: 2021-06-01
Payer: MEDICARE

## 2021-06-01 VITALS
WEIGHT: 240.3 LBS | HEIGHT: 69 IN | BODY MASS INDEX: 35.59 KG/M2 | RESPIRATION RATE: 16 BRPM | SYSTOLIC BLOOD PRESSURE: 98 MMHG | HEART RATE: 101 BPM | DIASTOLIC BLOOD PRESSURE: 64 MMHG | OXYGEN SATURATION: 97 %

## 2021-06-01 DIAGNOSIS — G47.33 OSA (OBSTRUCTIVE SLEEP APNEA): ICD-10-CM

## 2021-06-01 DIAGNOSIS — J45.20 MILD INTERMITTENT ASTHMA, UNSPECIFIED WHETHER COMPLICATED: ICD-10-CM

## 2021-06-01 DIAGNOSIS — I50.22 CHRONIC SYSTOLIC HEART FAILURE (HCC): ICD-10-CM

## 2021-06-01 DIAGNOSIS — I48.0 PAF (PAROXYSMAL ATRIAL FIBRILLATION) (HCC): ICD-10-CM

## 2021-06-01 DIAGNOSIS — I25.5 ISCHEMIC CARDIOMYOPATHY: Chronic | ICD-10-CM

## 2021-06-01 PROCEDURE — 99214 OFFICE O/P EST MOD 30 MIN: CPT | Performed by: INTERNAL MEDICINE

## 2021-06-01 RX ORDER — ALBUTEROL SULFATE 2.5 MG/3ML
2.5 SOLUTION RESPIRATORY (INHALATION) EVERY 4 HOURS PRN
Qty: 30 EACH | Refills: 1 | Status: SHIPPED | OUTPATIENT
Start: 2021-06-01 | End: 2021-06-21

## 2021-06-01 NOTE — PROGRESS NOTES
Chief Complaint   Patient presents with   • Follow-Up     FV, last seen 2/25/21 by Dr Canas for JOSE     HPI: This patient is a 66 y.o. male who who returns for sleep apnea and asthma.  He has an extensive cardiovascular history including atrial fibrillation, ischemic cardiomyopathy and coronary artery disease.  He ispending cardiac ablation.  He had screening overnight oximetry showi ng desaturations for 100 minutes ninfa of 75% and was started on nighttime oxygen.  PSG 12/20 showed moderate JOSE with AHI:23/h and desaturations to 81% Sp02.  He was successfully titrated on CPAP: 7 cm of water with resolved JOSE. He endorses nightly CPAP use and feels he is sleeping better and feeling more rested.  Compliance data was on available at time of consultation.  He is a lifelong non-smoker and denies any past pulmonary history. Over the past year he has felt short of breath with exertion.  He describes associated cough, the latter which has exacerbated lately. Worked as a /.  Echocardiography estimated EF of 40%.  Pulmonary function testing showed moderate airway obstruction with FEV1 73% predicted.  He tried Breo and Breztri, without subjective benefit.  He feels cough has improved overall.      Past Medical History:   Diagnosis Date   • Arrhythmia 2020    A fib   • Arthritis     fingers   • CAD (coronary artery disease) 7/2/2012   • Chronic anticoagulation 7/2/2012   • Congestive heart failure (HCC)    • Dyslipidemia 7/2/2012   • Essential hypertension, benign 7/2/2012   • High cholesterol    • Insomnia     wakes up 2-5 times a night , wakes up to use restroom and sometimes just wakes up    • Ischemic cardiomyopathy 7/2/2012   • Left ventricular thrombosis 7/2/2012   • Myocardial infarct (HCC) 2006   • Pericarditis 7/2/2012   • Pneumonia     history of as a child   • Sleep apnea     told by MD that he has it, scheduled for sleep study    • Snoring     no sleep study done   • SOB (shortness of breath)     no  c/o today; on exertion; pt uses 2L o2 qhs only; pt does not know provider   • Stented coronary artery 7/2/2012       Social History     Socioeconomic History   • Marital status:      Spouse name: Not on file   • Number of children: Not on file   • Years of education: Not on file   • Highest education level: Not on file   Occupational History   • Not on file   Tobacco Use   • Smoking status: Never Smoker   • Smokeless tobacco: Former User     Types: Chew   Vaping Use   • Vaping Use: Never used   Substance and Sexual Activity   • Alcohol use: Not Currently     Comment: occ   • Drug use: Not Currently   • Sexual activity: Not on file   Other Topics Concern   • Not on file   Social History Narrative   • Not on file     Social Determinants of Health     Financial Resource Strain:    • Difficulty of Paying Living Expenses:    Food Insecurity:    • Worried About Running Out of Food in the Last Year:    • Ran Out of Food in the Last Year:    Transportation Needs:    • Lack of Transportation (Medical):    • Lack of Transportation (Non-Medical):    Physical Activity:    • Days of Exercise per Week:    • Minutes of Exercise per Session:    Stress:    • Feeling of Stress :    Social Connections:    • Frequency of Communication with Friends and Family:    • Frequency of Social Gatherings with Friends and Family:    • Attends [employer] Services:    • Active Member of Clubs or Organizations:    • Attends Club or Organization Meetings:    • Marital Status:    Intimate Partner Violence:    • Fear of Current or Ex-Partner:    • Emotionally Abused:    • Physically Abused:    • Sexually Abused:        No family history on file.    Current Outpatient Medications on File Prior to Visit   Medication Sig Dispense Refill   • torsemide (DEMADEX) 10 MG tablet TAKE 1 TABLET BY MOUTH ONCE DAILY AS NEEDED FOR  SWELLING/EDEMA 90 tablet 0   • rivaroxaban (XARELTO) 20 MG Tab tablet Take 1 Tab by mouth with dinner. 90 Tab 3   • irbesartan  "(AVAPRO) 150 MG Tab Take 0.5 Tabs by mouth every day. 90 Tab 0   • simvastatin (ZOCOR) 40 MG Tab Take 1 Tab by mouth every day. 90 Tab 3   • carvedilol (COREG) 6.25 MG Tab Take 1 Tab by mouth 2 times a day, with meals. (Patient taking differently: Take 6.25 mg by mouth 2 times a day with meals. 3.125mg) 60 Tab 3   • nitroglycerin (NITROSTAT) 0.4 MG SL Tab Place 1 Tab under tongue as needed for Chest Pain. 25 Tab 3   • Budeson-Glycopyrrol-Formoterol (BREZTRI AEROSPHERE) 160-9-4.8 MCG/ACT Aerosol Inhale 1 Puff 2 (two) times a day. (Patient not taking: Reported on 6/1/2021) 1 g 0   • Soft Lens Products (B & L SENSITIVE EYES SALINE) 0.4 % Solution Place 1 Drop in right eye 1 time daily as needed. (Patient not taking: Reported on 6/1/2021)       No current facility-administered medications on file prior to visit.       Allergies: Patient has no known allergies.    ROS:   Constitutional: Denies fevers, chills, night sweats, fatigue or weight loss  Eyes: Denies vision loss, pain, drainage, double vision  Ears, Nose, Throat: Denies earache, difficulty hearing, tinnitus, nasal congestion, hoarseness  Cardiovascular: Denies chest pain, tightness, palpitations, orthopnea or edema  Respiratory: As in HPI  Sleep: Denies daytime sleepiness, snoring, apneas, insomnia, morning headaches  GI: Denies heartburn, dysphagia, nausea, abdominal pain, diarrhea or constipation  : Denies frequent urination, hematuria, discharge or painful urination  Musculoskeletal: Denies back pain, painful joints, sore muscles  Neurological: Denies weakness or headaches  Skin: No rashes    BP (!) 98/64   Pulse (!) 101   Resp 16   Ht 1.753 m (5' 9\")   Wt 109 kg (240 lb 4.8 oz)   SpO2 97%     Physical Exam:  Appearance: Well-nourished, well-developed, in no acute distress  HEENT: Normocephalic, atraumatic, white sclera, PERRLA, masked  Neck: No adenopathy or masses  Respiratory: no intercostal retractions or accessory muscle use  Lungs auscultation: " Clear to auscultation bilaterally  Cardiovascular: Regular rate rhythm. No murmurs, rubs or gallops.  + LE edema  Abdomen: soft, nondistended  Gait: Normal  Digits: No clubbing, cyanosis  Motor: No focal deficits  Orientation: Oriented to time, person and place    Diagnosis:  1. JOSE (obstructive sleep apnea)     2. Mild intermittent asthma, unspecified whether complicated  DME Nebulizer   3. Ischemic cardiomyopathy     4. Chronic systolic heart failure (HCC)     5. PAF (paroxysmal atrial fibrillation) (HCC)         Plan:  1.He endorses nightly CPAP use and clinically appears to be benefiting from treatment.  We will request CPAP compliance download from DME.  Continue CPAP: 7 cm of water.  2.He has mild asthma, failed Breo and Breztri trials.  Recommend nebulizer and use albuterol 0.083% every 4 hours as needed.  3.Cardiac follow up for multiple cardiac comorbidities  Return in about 6 months (around 12/1/2021).

## 2021-06-10 ENCOUNTER — OFFICE VISIT (OUTPATIENT)
Dept: CARDIOLOGY | Facility: MEDICAL CENTER | Age: 66
End: 2021-06-10
Payer: MEDICARE

## 2021-06-10 VITALS
SYSTOLIC BLOOD PRESSURE: 94 MMHG | HEART RATE: 98 BPM | WEIGHT: 238 LBS | BODY MASS INDEX: 35.25 KG/M2 | DIASTOLIC BLOOD PRESSURE: 62 MMHG | OXYGEN SATURATION: 95 % | HEIGHT: 69 IN

## 2021-06-10 DIAGNOSIS — Z95.5 STENTED CORONARY ARTERY: Chronic | ICD-10-CM

## 2021-06-10 DIAGNOSIS — I48.0 PAF (PAROXYSMAL ATRIAL FIBRILLATION) (HCC): ICD-10-CM

## 2021-06-10 DIAGNOSIS — I50.22 CHRONIC SYSTOLIC HEART FAILURE (HCC): ICD-10-CM

## 2021-06-10 DIAGNOSIS — Z79.01 CHRONIC ANTICOAGULATION: ICD-10-CM

## 2021-06-10 DIAGNOSIS — I25.5 ISCHEMIC CARDIOMYOPATHY: Chronic | ICD-10-CM

## 2021-06-10 DIAGNOSIS — I25.10 CORONARY ARTERY DISEASE INVOLVING NATIVE CORONARY ARTERY OF NATIVE HEART WITHOUT ANGINA PECTORIS: Chronic | ICD-10-CM

## 2021-06-10 DIAGNOSIS — I25.2 HISTORY OF ACUTE ANTERIOR WALL MI: ICD-10-CM

## 2021-06-10 PROCEDURE — 99214 OFFICE O/P EST MOD 30 MIN: CPT | Performed by: INTERNAL MEDICINE

## 2021-06-10 ASSESSMENT — ENCOUNTER SYMPTOMS
BRUISES/BLEEDS EASILY: 0
PALPITATIONS: 1
GASTROINTESTINAL NEGATIVE: 1
RESPIRATORY NEGATIVE: 1
SHORTNESS OF BREATH: 0
NEUROLOGICAL NEGATIVE: 1
DEPRESSION: 0
MUSCULOSKELETAL NEGATIVE: 1
CONSTITUTIONAL NEGATIVE: 1

## 2021-06-10 NOTE — PROGRESS NOTES
Chief Complaint   Patient presents with   • Coronary Artery Disease     & Coronary artery disease involving native coronary artery of native heart without angina pectoris   • HTN (Controlled)   • Dyslipidemia   • Cardiomyopathy (Ischemic)   • Atrial Fibrillation   • Congestive Heart Failure       Subjective:   Martín Grijalva is a 66 y.o. male who presents today for follow-up of prior anterior wall MI, ischemic cardiomyopathy, chronic anticoagulation, and atrial fibrillation.  He suffered an acute anterior wall MI in November, 2008 while duck hunting in [place], [place].  His door to balloon time was prolonged because of the remote location of his event.  At the time of angiography, his LAD was totally occluded.  He had a 3.5 x 28 mm Vision bare-metal stent placed in his LAD, distal to the 1st diagonal, and the 2nd diagonal was jailed by the stent.  The patient underwent cardioversion in January, 2021 for atrial fibrillation but this did not hold him he is back in atrial fibrillation pretty scheduled for an atrial fibrillation ablation later this month.  His most recent EF by echo was 35%.  Last visit that he was short of breath his carvedilol dose was reduced and he was placed on daily torsemide.  He is feeling much better now and in fact enjoyed of recent excursions [facility].  He has had no edema.      Past Medical History:   Diagnosis Date   • Arrhythmia 2020    A fib   • Arthritis     fingers   • CAD (coronary artery disease) 7/2/2012   • Chronic anticoagulation 7/2/2012   • Congestive heart failure (HCC)    • Dyslipidemia 7/2/2012   • Essential hypertension, benign 7/2/2012   • High cholesterol    • Insomnia     wakes up 2-5 times a night , wakes up to use restroom and sometimes just wakes up    • Ischemic cardiomyopathy 7/2/2012   • Left ventricular thrombosis 7/2/2012   • Myocardial infarct (HCC) 2006   • Pericarditis 7/2/2012   • Pneumonia     history of as a child   • Sleep apnea     told by MD  that he has it, scheduled for sleep study    • Snoring     no sleep study done   • SOB (shortness of breath)     no c/o today; on exertion; pt uses 2L o2 qhs only; pt does not know provider   • Stented coronary artery 7/2/2012     Past Surgical History:   Procedure Laterality Date   • SHOULDER DECOMPRESSION ARTHROSCOPIC Right 11/27/2018    Procedure: SHOULDER DECOMPRESSION ARTHROSCOPIC- SUBACROMIAL CONVERTED TO OPEN;  Surgeon: Ni Weir M.D.;  Location: SURGERY [facility];  Service: Orthopedics   • SHOULDER ARTHROSCOPY W/ ROTATOR CUFF REPAIR Right 11/27/2018    Procedure: OPEN ROTATOR CUFF REPAIR;  Surgeon: Ni Weir M.D.;  Location: SURGERY [facility];  Service: Orthopedics   • CARDIAC CATH, LEFT HEART     • CARPAL TUNNEL RELEASE Bilateral    • STENT PLACEMENT      cardiac stent   • VASECTOMY       History reviewed. No pertinent family history.  Social History     Socioeconomic History   • Marital status:      Spouse name: Not on file   • Number of children: Not on file   • Years of education: Not on file   • Highest education level: Not on file   Occupational History   • Not on file   Tobacco Use   • Smoking status: Never Smoker   • Smokeless tobacco: Former User     Types: Chew   Vaping Use   • Vaping Use: Never used   Substance and Sexual Activity   • Alcohol use: Not Currently     Comment: occ   • Drug use: Not Currently   • Sexual activity: Not on file   Other Topics Concern   • Not on file   Social History Narrative   • Not on file     Social Determinants of Health     Financial Resource Strain:    • Difficulty of Paying Living Expenses:    Food Insecurity:    • Worried About Running Out of Food in the Last Year:    • Ran Out of Food in the Last Year:    Transportation Needs:    • Lack of Transportation (Medical):    • Lack of Transportation (Non-Medical):    Physical Activity:    • Days of Exercise per Week:    • Minutes of Exercise per Session:    Stress:    • Feeling of  Stress :    Social Connections:    • Frequency of Communication with Friends and Family:    • Frequency of Social Gatherings with Friends and Family:    • Attends L[employer] Services:    • Active Member of Clubs or Organizations:    • Attends Club or Organization Meetings:    • Marital Status:    Intimate Partner Violence:    • Fear of Current or Ex-Partner:    • Emotionally Abused:    • Physically Abused:    • Sexually Abused:      No Known Allergies  Outpatient Encounter Medications as of 6/10/2021   Medication Sig Dispense Refill   • torsemide (DEMADEX) 10 MG tablet TAKE 1 TABLET BY MOUTH ONCE DAILY AS NEEDED FOR  SWELLING/EDEMA 90 tablet 0   • rivaroxaban (XARELTO) 20 MG Tab tablet Take 1 Tab by mouth with dinner. 90 Tab 3   • irbesartan (AVAPRO) 150 MG Tab Take 0.5 Tabs by mouth every day. 90 Tab 0   • simvastatin (ZOCOR) 40 MG Tab Take 1 Tab by mouth every day. 90 Tab 3   • carvedilol (COREG) 6.25 MG Tab Take 1 Tab by mouth 2 times a day, with meals. (Patient taking differently: Take 6.25 mg by mouth 2 times a day with meals. 3.125mg) 60 Tab 3   • nitroglycerin (NITROSTAT) 0.4 MG SL Tab Place 1 Tab under tongue as needed for Chest Pain. 25 Tab 3   • albuterol (PROVENTIL) 2.5mg/3ml Nebu Soln solution for nebulization Take 3 mL by nebulization every four hours as needed for Shortness of Breath. (Patient not taking: Reported on 6/10/2021) 30 Each 1   • Budeson-Glycopyrrol-Formoterol (BREZTRI AEROSPHERE) 160-9-4.8 MCG/ACT Aerosol Inhale 1 Puff 2 (two) times a day. (Patient not taking: Reported on 6/1/2021) 1 g 0   • Soft Lens Products (B & L SENSITIVE EYES SALINE) 0.4 % Solution Place 1 Drop in right eye 1 time daily as needed. (Patient not taking: Reported on 6/1/2021)       No facility-administered encounter medications on file as of 6/10/2021.     Review of Systems   Constitutional: Negative.    HENT: Negative.    Respiratory: Negative.  Negative for shortness of breath.    Cardiovascular: Positive for  "palpitations. Negative for chest pain and leg swelling.   Gastrointestinal: Negative.    Musculoskeletal: Negative.    Skin: Negative.    Neurological: Negative.    Endo/Heme/Allergies: Negative.  Does not bruise/bleed easily.   Psychiatric/Behavioral: Negative for depression.        Objective:   BP (!) 94/62 (BP Location: Left arm, Patient Position: Sitting, BP Cuff Size: Adult)   Pulse 98   Ht 1.753 m (5' 9\")   Wt 108 kg (238 lb)   SpO2 95%   BMI 35.15 kg/m²     Physical Exam   Constitutional: He is oriented to person, place, and time. No distress.   HENT:   Head: Normocephalic and atraumatic.   Eyes: Pupils are equal, round, and reactive to light. No scleral icterus.   Neck: No JVD present. No tracheal deviation present.   Cardiovascular: Normal rate. An irregularly irregular rhythm present.   No murmur heard.  Pulmonary/Chest: Breath sounds normal. No respiratory distress. He has no wheezes.   Abdominal: Soft. Bowel sounds are normal. He exhibits no distension. There is no abdominal tenderness.   Obese   Musculoskeletal:      Right lower leg: No edema.      Left lower leg: No edema.   Neurological: He is alert and oriented to person, place, and time.   Skin: Skin is warm and dry.       Assessment:     1. Coronary artery disease involving native coronary artery of native heart without angina pectoris  Basic Metabolic Panel    proBrain Natriuretic Peptide, NT   2. Stented coronary artery  Basic Metabolic Panel    proBrain Natriuretic Peptide, NT   3. PAF (paroxysmal atrial fibrillation) (HCC)  Basic Metabolic Panel    proBrain Natriuretic Peptide, NT   4. Ischemic cardiomyopathy  Basic Metabolic Panel    proBrain Natriuretic Peptide, NT   5. History of acute anterior wall MI  Basic Metabolic Panel    proBrain Natriuretic Peptide, NT   6. Chronic systolic heart failure (HCC)     7. Chronic anticoagulation         Medical Decision Making:  Today's Assessment / Status / Plan:   Status post anterior wall MI: " Extensive anterior wall MI in 2008 with EF in the 35% range.  He is in good fluid balance today and his symptoms of dyspnea have basically resolved    Atrial fibrillation: Previous cardioversion without long-term success.  He has been cardioverted twice in the past.  Scheduled for ablation later in the month    Anticoagulation: No bleeding problems    We will check a proBNP and BMP today as he has increased his torsemide to twice a day.  He will be notified of results

## 2021-06-11 DIAGNOSIS — I25.2 HISTORY OF ACUTE ANTERIOR WALL MI: ICD-10-CM

## 2021-06-11 DIAGNOSIS — Z95.5 STENTED CORONARY ARTERY: Chronic | ICD-10-CM

## 2021-06-11 DIAGNOSIS — I25.5 ISCHEMIC CARDIOMYOPATHY: Chronic | ICD-10-CM

## 2021-06-11 DIAGNOSIS — I25.10 CORONARY ARTERY DISEASE INVOLVING NATIVE CORONARY ARTERY OF NATIVE HEART WITHOUT ANGINA PECTORIS: Chronic | ICD-10-CM

## 2021-06-11 DIAGNOSIS — I48.0 PAF (PAROXYSMAL ATRIAL FIBRILLATION) (HCC): ICD-10-CM

## 2021-06-14 ENCOUNTER — TELEPHONE (OUTPATIENT)
Dept: CARDIOLOGY | Facility: MEDICAL CENTER | Age: 66
End: 2021-06-14

## 2021-06-14 NOTE — TELEPHONE ENCOUNTER
----- Message from Jo-Ann Abraham L.P.N. sent at 6/11/2021  4:00 PM PDT -----  Awaiting BMP.  ----- Message -----  From: Mckay Rodas M.D.  Sent: 6/11/2021   3:59 PM PDT  To: Jo-Ann Abraham L.P.N.    His proBNP was quite low at 176 which is excellent.  Continue current dose of diuretic

## 2021-06-15 DIAGNOSIS — Z95.5 STENTED CORONARY ARTERY: Chronic | ICD-10-CM

## 2021-06-15 DIAGNOSIS — I25.10 CORONARY ARTERY DISEASE INVOLVING NATIVE CORONARY ARTERY OF NATIVE HEART WITHOUT ANGINA PECTORIS: Chronic | ICD-10-CM

## 2021-06-15 DIAGNOSIS — I10 ESSENTIAL HYPERTENSION, BENIGN: Chronic | ICD-10-CM

## 2021-06-15 DIAGNOSIS — I25.5 ISCHEMIC CARDIOMYOPATHY: Chronic | ICD-10-CM

## 2021-06-15 DIAGNOSIS — I48.19 PERSISTENT ATRIAL FIBRILLATION (HCC): ICD-10-CM

## 2021-06-16 ENCOUNTER — TELEPHONE (OUTPATIENT)
Dept: CARDIOLOGY | Facility: MEDICAL CENTER | Age: 66
End: 2021-06-16

## 2021-06-16 NOTE — TELEPHONE ENCOUNTER
Received letter from Medicare with a denial statement for the proBNP Dr. Rodas ordered 6/10 and requested additional applicable diagnosis codes.   Codes added:   I50.22 (chronic systolic CHF)  R06.00 (ZAVALA)  R60.0 (lower extremity edema)    Form faxed back to medicare at [phone] and sent to scanning.

## 2021-06-18 DIAGNOSIS — I25.5 ISCHEMIC CARDIOMYOPATHY: ICD-10-CM

## 2021-06-21 ENCOUNTER — PRE-ADMISSION TESTING (OUTPATIENT)
Dept: ADMISSIONS | Facility: MEDICAL CENTER | Age: 66
End: 2021-06-21
Attending: INTERNAL MEDICINE
Payer: MEDICARE

## 2021-06-21 DIAGNOSIS — Z01.812 PRE-OPERATIVE LABORATORY EXAMINATION: ICD-10-CM

## 2021-06-21 DIAGNOSIS — Z01.810 PRE-OPERATIVE CARDIOVASCULAR EXAMINATION: ICD-10-CM

## 2021-06-21 LAB
ALBUMIN SERPL BCP-MCNC: 4 G/DL (ref 3.2–4.9)
ALBUMIN/GLOB SERPL: 1.5 G/DL
ALP SERPL-CCNC: 94 U/L (ref 30–99)
ALT SERPL-CCNC: 45 U/L (ref 2–50)
ANION GAP SERPL CALC-SCNC: 9 MMOL/L (ref 7–16)
AST SERPL-CCNC: 32 U/L (ref 12–45)
BASOPHILS # BLD AUTO: 0.4 % (ref 0–1.8)
BASOPHILS # BLD: 0.03 K/UL (ref 0–0.12)
BILIRUB SERPL-MCNC: 1.3 MG/DL (ref 0.1–1.5)
BUN SERPL-MCNC: 16 MG/DL (ref 8–22)
CALCIUM SERPL-MCNC: 9.1 MG/DL (ref 8.5–10.5)
CHLORIDE SERPL-SCNC: 103 MMOL/L (ref 96–112)
CO2 SERPL-SCNC: 28 MMOL/L (ref 20–33)
CREAT SERPL-MCNC: 0.9 MG/DL (ref 0.5–1.4)
EKG IMPRESSION: NORMAL
EOSINOPHIL # BLD AUTO: 0.08 K/UL (ref 0–0.51)
EOSINOPHIL NFR BLD: 1.1 % (ref 0–6.9)
ERYTHROCYTE [DISTWIDTH] IN BLOOD BY AUTOMATED COUNT: 46.5 FL (ref 35.9–50)
GLOBULIN SER CALC-MCNC: 2.7 G/DL (ref 1.9–3.5)
GLUCOSE SERPL-MCNC: 103 MG/DL (ref 65–99)
HCT VFR BLD AUTO: 44.1 % (ref 42–52)
HGB BLD-MCNC: 14.3 G/DL (ref 14–18)
IMM GRANULOCYTES # BLD AUTO: 0.02 K/UL (ref 0–0.11)
IMM GRANULOCYTES NFR BLD AUTO: 0.3 % (ref 0–0.9)
INR PPP: 1.65 (ref 0.87–1.13)
LYMPHOCYTES # BLD AUTO: 1.5 K/UL (ref 1–4.8)
LYMPHOCYTES NFR BLD: 19.9 % (ref 22–41)
MCH RBC QN AUTO: 32.6 PG (ref 27–33)
MCHC RBC AUTO-ENTMCNC: 32.4 G/DL (ref 33.7–35.3)
MCV RBC AUTO: 100.7 FL (ref 81.4–97.8)
MONOCYTES # BLD AUTO: 0.72 K/UL (ref 0–0.85)
MONOCYTES NFR BLD AUTO: 9.5 % (ref 0–13.4)
NEUTROPHILS # BLD AUTO: 5.2 K/UL (ref 1.82–7.42)
NEUTROPHILS NFR BLD: 68.8 % (ref 44–72)
NRBC # BLD AUTO: 0 K/UL
NRBC BLD-RTO: 0 /100 WBC
PLATELET # BLD AUTO: 185 K/UL (ref 164–446)
PMV BLD AUTO: 11 FL (ref 9–12.9)
POTASSIUM SERPL-SCNC: 4.6 MMOL/L (ref 3.6–5.5)
PROT SERPL-MCNC: 6.7 G/DL (ref 6–8.2)
PROTHROMBIN TIME: 19.1 SEC (ref 12–14.6)
RBC # BLD AUTO: 4.38 M/UL (ref 4.7–6.1)
SODIUM SERPL-SCNC: 140 MMOL/L (ref 135–145)
WBC # BLD AUTO: 7.6 K/UL (ref 4.8–10.8)

## 2021-06-21 PROCEDURE — 80053 COMPREHEN METABOLIC PANEL: CPT

## 2021-06-21 PROCEDURE — 85025 COMPLETE CBC W/AUTO DIFF WBC: CPT

## 2021-06-21 PROCEDURE — 85610 PROTHROMBIN TIME: CPT

## 2021-06-21 PROCEDURE — 36415 COLL VENOUS BLD VENIPUNCTURE: CPT

## 2021-06-21 PROCEDURE — 93010 ELECTROCARDIOGRAM REPORT: CPT | Performed by: INTERNAL MEDICINE

## 2021-06-21 PROCEDURE — 93005 ELECTROCARDIOGRAM TRACING: CPT

## 2021-06-21 RX ORDER — TORSEMIDE 10 MG/1
TABLET ORAL
Qty: 90 TABLET | Refills: 0 | Status: SHIPPED | OUTPATIENT
Start: 2021-06-21 | End: 2021-07-26

## 2021-06-24 NOTE — OR NURSING
COVID-19 Pre-Surgery Screenin. Do you have an undiagnosed respiratory illness or symptoms such as coughing, sneezing or sore throat? Any loss of sense of smell or taste? no       2. Do you have an unexplained fever greater than 100.4 degrees Fahrenheit or 38 degrees Celsius? no  ?  3. Have you had direct exposure to a patient who tested positive for Covid-19? no    4. Patient informed of current visitation and mask policies by this RN. yes

## 2021-06-25 ENCOUNTER — ANESTHESIA EVENT (OUTPATIENT)
Dept: CARDIOLOGY | Facility: MEDICAL CENTER | Age: 66
End: 2021-06-25
Payer: MEDICARE

## 2021-06-25 ENCOUNTER — ANESTHESIA (OUTPATIENT)
Dept: CARDIOLOGY | Facility: MEDICAL CENTER | Age: 66
End: 2021-06-25
Payer: MEDICARE

## 2021-06-25 ENCOUNTER — APPOINTMENT (OUTPATIENT)
Dept: CARDIOLOGY | Facility: MEDICAL CENTER | Age: 66
End: 2021-06-25
Attending: INTERNAL MEDICINE
Payer: MEDICARE

## 2021-06-25 ENCOUNTER — HOSPITAL ENCOUNTER (OUTPATIENT)
Facility: MEDICAL CENTER | Age: 66
End: 2021-06-25
Attending: INTERNAL MEDICINE | Admitting: INTERNAL MEDICINE
Payer: MEDICARE

## 2021-06-25 VITALS
WEIGHT: 238.1 LBS | TEMPERATURE: 97 F | RESPIRATION RATE: 16 BRPM | DIASTOLIC BLOOD PRESSURE: 71 MMHG | SYSTOLIC BLOOD PRESSURE: 105 MMHG | HEART RATE: 72 BPM | OXYGEN SATURATION: 93 % | HEIGHT: 69 IN | BODY MASS INDEX: 35.27 KG/M2

## 2021-06-25 DIAGNOSIS — I48.0 PAF (PAROXYSMAL ATRIAL FIBRILLATION) (HCC): ICD-10-CM

## 2021-06-25 LAB
ACT BLD: 158 SEC (ref 74–137)
ACT BLD: 268 SEC (ref 74–137)
ACT BLD: 296 SEC (ref 74–137)
ACT BLD: 335 SEC (ref 74–137)
EKG IMPRESSION: NORMAL

## 2021-06-25 PROCEDURE — 93656 COMPRE EP EVAL ABLTJ ATR FIB: CPT | Performed by: INTERNAL MEDICINE

## 2021-06-25 PROCEDURE — 93657 TX L/R ATRIAL FIB ADDL: CPT | Performed by: INTERNAL MEDICINE

## 2021-06-25 PROCEDURE — A9270 NON-COVERED ITEM OR SERVICE: HCPCS | Performed by: INTERNAL MEDICINE

## 2021-06-25 PROCEDURE — C1894 INTRO/SHEATH, NON-LASER: HCPCS

## 2021-06-25 PROCEDURE — 93662 INTRACARDIAC ECG (ICE): CPT | Mod: 26 | Performed by: INTERNAL MEDICINE

## 2021-06-25 PROCEDURE — 700111 HCHG RX REV CODE 636 W/ 250 OVERRIDE (IP)

## 2021-06-25 PROCEDURE — 93010 ELECTROCARDIOGRAM REPORT: CPT | Mod: 59 | Performed by: INTERNAL MEDICINE

## 2021-06-25 PROCEDURE — 93325 DOPPLER ECHO COLOR FLOW MAPG: CPT

## 2021-06-25 PROCEDURE — 160002 HCHG RECOVERY MINUTES (STAT)

## 2021-06-25 PROCEDURE — 700101 HCHG RX REV CODE 250: Performed by: ANESTHESIOLOGY

## 2021-06-25 PROCEDURE — 93655 ICAR CATH ABLTJ DSCRT ARRHYT: CPT | Performed by: INTERNAL MEDICINE

## 2021-06-25 PROCEDURE — 700105 HCHG RX REV CODE 258: Performed by: INTERNAL MEDICINE

## 2021-06-25 PROCEDURE — 700111 HCHG RX REV CODE 636 W/ 250 OVERRIDE (IP): Performed by: ANESTHESIOLOGY

## 2021-06-25 PROCEDURE — 93005 ELECTROCARDIOGRAM TRACING: CPT | Mod: XE | Performed by: INTERNAL MEDICINE

## 2021-06-25 PROCEDURE — 93312 ECHO TRANSESOPHAGEAL: CPT | Mod: 26 | Performed by: INTERNAL MEDICINE

## 2021-06-25 PROCEDURE — 93613 INTRACARDIAC EPHYS 3D MAPG: CPT | Performed by: INTERNAL MEDICINE

## 2021-06-25 PROCEDURE — 93623 PRGRMD STIMJ&PACG IV RX NFS: CPT | Mod: 26 | Performed by: INTERNAL MEDICINE

## 2021-06-25 PROCEDURE — 700101 HCHG RX REV CODE 250

## 2021-06-25 PROCEDURE — 85347 COAGULATION TIME ACTIVATED: CPT

## 2021-06-25 PROCEDURE — 700101 HCHG RX REV CODE 250: Performed by: INTERNAL MEDICINE

## 2021-06-25 RX ORDER — LIDOCAINE HYDROCHLORIDE 40 MG/ML
SOLUTION TOPICAL
Status: COMPLETED
Start: 2021-06-25 | End: 2021-06-25

## 2021-06-25 RX ORDER — LIDOCAINE HYDROCHLORIDE 40 MG/ML
SOLUTION TOPICAL PRN
Status: DISCONTINUED | OUTPATIENT
Start: 2021-06-25 | End: 2021-06-25 | Stop reason: SURG

## 2021-06-25 RX ORDER — MEPERIDINE HYDROCHLORIDE 25 MG/ML
INJECTION INTRAMUSCULAR; INTRAVENOUS; SUBCUTANEOUS PRN
Status: DISCONTINUED | OUTPATIENT
Start: 2021-06-25 | End: 2021-06-25 | Stop reason: SURG

## 2021-06-25 RX ORDER — PROTAMINE SULFATE 10 MG/ML
INJECTION, SOLUTION INTRAVENOUS
Status: COMPLETED
Start: 2021-06-25 | End: 2021-06-25

## 2021-06-25 RX ORDER — PROTAMINE SULFATE 10 MG/ML
INJECTION, SOLUTION INTRAVENOUS
Status: DISCONTINUED
Start: 2021-06-25 | End: 2021-06-25 | Stop reason: HOSPADM

## 2021-06-25 RX ORDER — BUPIVACAINE HYDROCHLORIDE 2.5 MG/ML
INJECTION, SOLUTION EPIDURAL; INFILTRATION; INTRACAUDAL
Status: COMPLETED
Start: 2021-06-25 | End: 2021-06-25

## 2021-06-25 RX ORDER — LIDOCAINE HYDROCHLORIDE 20 MG/ML
INJECTION, SOLUTION EPIDURAL; INFILTRATION; INTRACAUDAL; PERINEURAL PRN
Status: DISCONTINUED | OUTPATIENT
Start: 2021-06-25 | End: 2021-06-25 | Stop reason: SURG

## 2021-06-25 RX ORDER — ISOPROTERENOL HYDROCHLORIDE 0.2 MG/ML
INJECTION, SOLUTION INTRAVENOUS
Status: COMPLETED
Start: 2021-06-25 | End: 2021-06-25

## 2021-06-25 RX ORDER — ONDANSETRON 2 MG/ML
INJECTION INTRAMUSCULAR; INTRAVENOUS PRN
Status: DISCONTINUED | OUTPATIENT
Start: 2021-06-25 | End: 2021-06-25 | Stop reason: SURG

## 2021-06-25 RX ORDER — OMEPRAZOLE 20 MG/1
20 CAPSULE, DELAYED RELEASE ORAL DAILY
Qty: 30 CAPSULE | Refills: 0 | Status: SHIPPED | OUTPATIENT
Start: 2021-06-25 | End: 2021-08-31

## 2021-06-25 RX ORDER — HEPARIN SODIUM 1000 [USP'U]/ML
INJECTION, SOLUTION INTRAVENOUS; SUBCUTANEOUS
Status: COMPLETED
Start: 2021-06-25 | End: 2021-06-25

## 2021-06-25 RX ORDER — HEPARIN SODIUM 200 [USP'U]/100ML
INJECTION, SOLUTION INTRAVENOUS
Status: COMPLETED
Start: 2021-06-25 | End: 2021-06-25

## 2021-06-25 RX ORDER — SODIUM CHLORIDE, SODIUM LACTATE, POTASSIUM CHLORIDE, CALCIUM CHLORIDE 600; 310; 30; 20 MG/100ML; MG/100ML; MG/100ML; MG/100ML
INJECTION, SOLUTION INTRAVENOUS CONTINUOUS
Status: ACTIVE | OUTPATIENT
Start: 2021-06-25 | End: 2021-06-25

## 2021-06-25 RX ORDER — ADENOSINE 3 MG/ML
INJECTION, SOLUTION INTRAVENOUS
Status: COMPLETED
Start: 2021-06-25 | End: 2021-06-25

## 2021-06-25 RX ORDER — HEPARIN SODIUM,PORCINE 1000/ML
VIAL (ML) INJECTION PRN
Status: DISCONTINUED | OUTPATIENT
Start: 2021-06-25 | End: 2021-06-25 | Stop reason: SURG

## 2021-06-25 RX ORDER — PROTAMINE SULFATE 10 MG/ML
INJECTION, SOLUTION INTRAVENOUS PRN
Status: DISCONTINUED | OUTPATIENT
Start: 2021-06-25 | End: 2021-06-25 | Stop reason: SURG

## 2021-06-25 RX ORDER — LIDOCAINE HYDROCHLORIDE 20 MG/ML
INJECTION, SOLUTION INFILTRATION; PERINEURAL
Status: COMPLETED
Start: 2021-06-25 | End: 2021-06-25

## 2021-06-25 RX ADMIN — PROPOFOL 80 MG: 10 INJECTION, EMULSION INTRAVENOUS at 08:41

## 2021-06-25 RX ADMIN — HEPARIN SODIUM 4000 UNITS: 1000 INJECTION, SOLUTION INTRAVENOUS; SUBCUTANEOUS at 09:52

## 2021-06-25 RX ADMIN — ROCURONIUM BROMIDE 5 MG: 10 INJECTION, SOLUTION INTRAVENOUS at 08:39

## 2021-06-25 RX ADMIN — BUPIVACAINE HYDROCHLORIDE: 2.5 INJECTION, SOLUTION EPIDURAL; INFILTRATION; INTRACAUDAL; PERINEURAL at 09:06

## 2021-06-25 RX ADMIN — MEPERIDINE HYDROCHLORIDE 25 MG: 25 INJECTION INTRAMUSCULAR; INTRAVENOUS; SUBCUTANEOUS at 10:41

## 2021-06-25 RX ADMIN — ADENOSINE 12 MG: 3 INJECTION, SOLUTION INTRAVENOUS at 10:30

## 2021-06-25 RX ADMIN — ROCURONIUM BROMIDE 30 MG: 10 INJECTION, SOLUTION INTRAVENOUS at 08:52

## 2021-06-25 RX ADMIN — SUGAMMADEX 200 MG: 100 INJECTION, SOLUTION INTRAVENOUS at 10:51

## 2021-06-25 RX ADMIN — ISOPROTERENOL HYDROCHLORIDE 200 MCG: 0.2 INJECTION, SOLUTION INTRAMUSCULAR; INTRAVENOUS at 10:30

## 2021-06-25 RX ADMIN — PROPOFOL 20 MG: 10 INJECTION, EMULSION INTRAVENOUS at 08:39

## 2021-06-25 RX ADMIN — ALFENTANIL HYDROCHLORIDE 1000 MCG: 500 INJECTION INTRAVENOUS at 08:39

## 2021-06-25 RX ADMIN — LIDOCAINE HYDROCHLORIDE 20 MG: 20 INJECTION, SOLUTION EPIDURAL; INFILTRATION; INTRACAUDAL at 08:39

## 2021-06-25 RX ADMIN — HEPARIN SODIUM: 1000 INJECTION, SOLUTION INTRAVENOUS; SUBCUTANEOUS at 10:15

## 2021-06-25 RX ADMIN — SODIUM CHLORIDE, POTASSIUM CHLORIDE, SODIUM LACTATE AND CALCIUM CHLORIDE: 600; 310; 30; 20 INJECTION, SOLUTION INTRAVENOUS at 06:49

## 2021-06-25 RX ADMIN — POVIDONE IODINE 15 ML: 100 SOLUTION TOPICAL at 06:49

## 2021-06-25 RX ADMIN — Medication 180 MG: at 08:41

## 2021-06-25 RX ADMIN — LIDOCAINE HYDROCHLORIDE 160 MG: 40 SOLUTION TOPICAL at 08:43

## 2021-06-25 RX ADMIN — HEPARIN SODIUM 15000 UNITS: 1000 INJECTION, SOLUTION INTRAVENOUS; SUBCUTANEOUS at 09:33

## 2021-06-25 RX ADMIN — ROCURONIUM BROMIDE 10 MG: 10 INJECTION, SOLUTION INTRAVENOUS at 10:05

## 2021-06-25 RX ADMIN — PROTAMINE SULFATE 50 MG: 10 INJECTION, SOLUTION INTRAVENOUS at 10:40

## 2021-06-25 RX ADMIN — HEPARIN SODIUM: 200 INJECTION, SOLUTION INTRAVENOUS at 07:30

## 2021-06-25 RX ADMIN — ONDANSETRON 4 MG: 2 INJECTION INTRAMUSCULAR; INTRAVENOUS at 10:43

## 2021-06-25 RX ADMIN — HEPARIN SODIUM 2000 UNITS: 1000 INJECTION, SOLUTION INTRAVENOUS; SUBCUTANEOUS at 10:11

## 2021-06-25 RX ADMIN — ROCURONIUM BROMIDE 10 MG: 10 INJECTION, SOLUTION INTRAVENOUS at 09:02

## 2021-06-25 RX ADMIN — ROCURONIUM BROMIDE 10 MG: 10 INJECTION, SOLUTION INTRAVENOUS at 09:34

## 2021-06-25 ASSESSMENT — PAIN SCALES - GENERAL: PAIN_LEVEL: 0

## 2021-06-25 ASSESSMENT — FIBROSIS 4 INDEX: FIB4 SCORE: 1.7

## 2021-06-25 NOTE — ANESTHESIA PROCEDURE NOTES
Airway    Date/Time: 6/25/2021 8:43 AM  Performed by: Julio Cesar Ryan M.D.  Authorized by: Julio Cesar Ryan M.D.     Location:  OR  Urgency:  Elective  Indications for Airway Management:  Anesthesia and airway protection      Spontaneous Ventilation: absent    Sedation Level:  Deep  Preoxygenated: Yes    Patient Position:  Sniffing  Final Airway Type:  Endotracheal airway  Final Endotracheal Airway:  ETT  Cuffed: Yes    Technique Used for Successful ETT Placement:  Direct laryngoscopy  Devices/Methods Used in Placement:  Cricoid pressure and intubating stylet    Insertion Site:  Oral  Blade Type:  Alvarez  Laryngoscope Blade/Videolaryngoscope Blade Size:  2  ETT Size (mm):  8.0  Measured from:  Lips  ETT to Lips (cm):  23  Placement Verified by: auscultation and capnometry    Cormack-Lehane Classification:  Grade I - full view of glottis  Number of Attempts at Approach:  1

## 2021-06-25 NOTE — DISCHARGE INSTRUCTIONS
[facility] Post Ablation Patient Instructions:  1. No lifting > 10 lbs x 1 week.      2. No soaking in baths, hot tubs, pools x 1 week.  May shower the day after discharge and take off groin dressings and leave uncovered.  Continue to monitor sites daily for warmth, redness, discolored drainage.  It is common to have a small lump in the area where the cather was (usually the size of a small marble); this will go away but takes approximately 6 weeks to normalize.     3.   Please take all medications as prescribed to you; please do not stop any medications prescribed post ablation unless directed by your healthcare provider.      4.   Please do not miss any doses of your blood thinner (if you have been started on, or take chronic blood thinners) without discussion with your healthcare provider first.     5.   Please walk and take deep breaths after discharge.  After discharge, if  experiences neurological changes/signs of stroke, high fever, you should be seen in the emergency dept.     6.   It is possible you may experience some chest discomfort or chest tightness post ablation.  This is usually secondary to inflammation and irritation of the tissues at the area of the ablation.  If this occurs, it is advised to try 400 mg of Ibuprofen with food as needed up to three times a day for a maximum of two days.  This should help to decrease pain and tissue inflammation.          **Please notify the office ([phone]) if this occurs.         ** DO NOT TAKE Ibuprofen IF HISTORY OF SIGNIFICANT BLEEDING OR KIDNEY DISEASE WITHOUT DISCUSSING WITH YOUR CARDIOLOGY PROVIDER FIRST.          ** If pain becomes severe or you have additional symptoms you may need to be medically evaluated; please contact the cardiology office ([phone]) for further direction.     7. It is possible that you may experience arrhythmia/Atrial Fibrillation post ablation.  This is secondary to irritation and  inflammation of the cardiac tissues from the ablation.  If you have atrial fibrillation all day or feel poorly with it, please notify your cardiologist's via phone ([phone]) or [employer].      8.  Please contact call our office ([phone]) or message via [employer] message if you have any questions or concerns post procedurally.    9. You need to be seen for post ablation follow up 2-4 weeks post procedure.  If you do not have a follow up appointment scheduled, please call [phone] to schedule your follow up appointment.         ACTIVITY: Rest and take it easy for the first 24 hours.  A responsible adult is recommended to remain with you during that time.  It is normal to feel sleepy.  We encourage you to not do anything that requires balance, judgment or coordination.    MILD FLU-LIKE SYMPTOMS ARE NORMAL. YOU MAY EXPERIENCE GENERALIZED MUSCLE ACHES, THROAT IRRITATION, HEADACHE AND/OR SOME NAUSEA.    FOR 24 HOURS DO NOT:  Drive, operate machinery or run household appliances.  Drink beer or alcoholic beverages.   Make important decisions or sign legal documents.      DIET: To avoid nausea, slowly advance diet as tolerated, avoiding spicy or greasy foods for the first day.  Add more substantial food to your diet according to your physician's instructions.  Babies can be fed formula or breast milk as soon as they are hungry.  INCREASE FLUIDS AND FIBER TO AVOID CONSTIPATION.    SURGICAL DRESSING/BATHING: keep dressing in place for 24 hours, may shower and remove dressing tomorrow 6/26/2021 after 11am    FOLLOW-UP APPOINTMENT:  A follow-up appointment should be arranged with your cardiologist at [phone]; call to schedule.    You should CALL YOUR PHYSICIAN if you develop:  Fever greater than 101 degrees F.  Pain not relieved by medication, or persistent nausea or vomiting.  Excessive bleeding (blood soaking through dressing) or unexpected drainage from the wound.  Extreme redness or swelling around the incision  site, drainage of pus or foul smelling drainage.  Inability to urinate or empty your bladder within 8 hours.  Problems with breathing or chest pain.    You should call 911 if you develop problems with breathing or chest pain.  If you are unable to contact your doctor or surgical center, you should go to the nearest emergency room or urgent care center.  Physician's telephone #: [phone]    If any questions arise, call your doctor.  If your doctor is not available, please feel free to call the Surgical Center at [phone]. The Contact Center is open Monday through Friday 7AM to 5PM and may speak to a nurse at [phone], or toll free at [phone].     A registered nurse may call you a few days after your surgery to see how you are doing after your procedure.    MEDICATIONS: Resume taking daily medication.  Take prescribed pain medication with food.  If no medication is prescribed, you may take non-aspirin pain medication if needed.  PAIN MEDICATION CAN BE VERY CONSTIPATING.  Take a stool softener or laxative such as senokot, pericolace, or milk of magnesia if needed.    If your physician has prescribed pain medication that includes Acetaminophen (Tylenol), do not take additional Acetaminophen (Tylenol) while taking the prescribed medication.    Depression / Suicide Risk    As you are discharged from this [facility] facility, it is important to learn how to keep safe from harming yourself.    Recognize the warning signs:  · Abrupt changes in personality, positive or negative- including increase in energy   · Giving away possessions  · Change in eating patterns- significant weight changes-  positive or negative  · Change in sleeping patterns- unable to sleep or sleeping all the time   · Unwillingness or inability to communicate  · Depression  · Unusual sadness, discouragement and loneliness  · Talk of wanting to die  · Neglect of personal appearance   · Rebelliousness- reckless behavior  · Withdrawal  from people/activities they love  · Confusion- inability to concentrate     If you or a loved one observes any of these behaviors or has concerns about self-harm, here's what you can do:  · Talk about it- your feelings and reasons for harming yourself  · Remove any means that you might use to hurt yourself (examples: pills, rope, extension cords, firearm)  · Get professional help from the community (Mental Health, Substance Abuse, psychological counseling)  · Do not be alone:Call your Safe Contact- someone whom you trust who will be there for you.  · Call your local CRISIS HOTLINE 789-1766 or 605-153-0283  · Call your local Children's Mobile Crisis Response Team [place] (494) 712-2135 or www.[employer]  · Call the toll free National Suicide Prevention Hotlines   · National Suicide Prevention Lifeline 426-530-LBFJ (5059)  · National Hope Line Network 800-SUICIDE (981-5268)

## 2021-06-25 NOTE — OR NURSING
1124 Pt report received from cath lab RN, pt brought over on a gurney by cath lab RN, report given by RN and anesthesiologist, pt placed on Tele monitor, VSS, no C/O pain at this. EKG called for follow up EKG. Right and left groin site is clean, dry and soft, dressing intact. Family at beside, pt given water and a snack.     1145 Pt groin site clean, dry and soft, no C/O pain     1200 Pt groin site clean, dry and soft, no C/O pain     1215 Pt groin site clean, dry and soft, no C/O pain     1230 Pt groin site clean, dry and soft, no C/O pain     1600 pt given D/C instructions, pt verbalized understanding. Pt was given information post ablation care. Pt going home with family, pt refused wheelchair.

## 2021-06-25 NOTE — ANESTHESIA TIME REPORT
Anesthesia Start and Stop Event Times     Date Time Event    6/25/2021 0719 Ready for Procedure     0835 Anesthesia Start     1119 Anesthesia Stop        Responsible Staff  06/25/21    Name Role Begin End    Julio Cesar Ryan M.D. Anesth 0835 1119        Preop Diagnosis (Free Text):  Pre-op Diagnosis             Preop Diagnosis (Codes):    Post op Diagnosis  AF (atrial fibrillation) (HCC)      Premium Reason  Non-Premium    Comments:

## 2021-06-25 NOTE — ANESTHESIA PROCEDURE NOTES
Arterial Line  Performed by: Julio Cesar Ryan M.D.  Authorized by: Julio Cesar Ryan M.D.     Start Time:  6/25/2021 7:40 AM  End Time:  6/25/2021 7:46 AM  Patient Location:  Pre-op  Indication: continuous blood pressure monitoring and blood sampling needed        Catheter Size:  20 G  Seldinger Technique?: No    Laterality:  Right  Site:  Radial artery  Line Secured:  Transparent dressing  Events: patient tolerated procedure well with no complications

## 2021-06-25 NOTE — H&P
[facility]  Electrophysiology Pre-procedure H&P    DOS:6/25/2021    Planned Procedure: AF ablation    Chief complaint/Reason for Procedure: Afib    HPI: 65 y/o M with persistent AF/AFL referred for ablation. EF 35%. SOB with exertion.       Past Medical History:   Diagnosis Date   • Arrhythmia 2020    A fib   • Arthritis     fingers   • CAD (coronary artery disease) 7/2/2012   • Chronic anticoagulation 7/2/2012   • Congestive heart failure (HCC)    • Dyslipidemia 7/2/2012   • Essential hypertension, benign 7/2/2012   • High cholesterol    • Insomnia     wakes up 2-5 times a night , wakes up to use restroom and sometimes just wakes up    • Ischemic cardiomyopathy 7/2/2012   • Left ventricular thrombosis 7/2/2012   • Myocardial infarct (HCC) 2006   • Pericarditis 7/2/2012   • Pneumonia     history of as a child   • Sleep apnea     told by MD that he has it, scheduled for sleep study    • Snoring     no sleep study done   • SOB (shortness of breath)     no c/o today; on exertion; pt uses 2L o2 qhs only; pt does not know provider   • Stented coronary artery 7/2/2012       Past Surgical History:   Procedure Laterality Date   • SHOULDER DECOMPRESSION ARTHROSCOPIC Right 11/27/2018    Procedure: SHOULDER DECOMPRESSION ARTHROSCOPIC- SUBACROMIAL CONVERTED TO OPEN;  Surgeon: Ni Weir M.D.;  Location: SURGERY [facility];  Service: Orthopedics   • SHOULDER ARTHROSCOPY W/ ROTATOR CUFF REPAIR Right 11/27/2018    Procedure: OPEN ROTATOR CUFF REPAIR;  Surgeon: Ni Weir M.D.;  Location: SURGERY [facility];  Service: Orthopedics   • CARDIAC CATH, LEFT HEART     • CARPAL TUNNEL RELEASE Bilateral    • STENT PLACEMENT      cardiac stent   • VASECTOMY         Social History     Socioeconomic History   • Marital status:      Spouse name: Not on file   • Number of children: Not on file   • Years of education: Not on file   • Highest education level: Not on file   Occupational  History   • Not on file   Tobacco Use   • Smoking status: Never Smoker   • Smokeless tobacco: Former User     Types: Chew   Vaping Use   • Vaping Use: Never used   Substance and Sexual Activity   • Alcohol use: Not Currently     Comment: occ   • Drug use: Not Currently   • Sexual activity: Not on file   Other Topics Concern   • Not on file   Social History Narrative   • Not on file     Social Determinants of Health     Financial Resource Strain:    • Difficulty of Paying Living Expenses:    Food Insecurity:    • Worried About Running Out of Food in the Last Year:    • Ran Out of Food in the Last Year:    Transportation Needs:    • Lack of Transportation (Medical):    • Lack of Transportation (Non-Medical):    Physical Activity:    • Days of Exercise per Week:    • Minutes of Exercise per Session:    Stress:    • Feeling of Stress :    Social Connections:    • Frequency of Communication with Friends and Family:    • Frequency of Social Gatherings with Friends and Family:    • Attends Rastafarian Services:    • Active Member of Clubs or Organizations:    • Attends Club or Organization Meetings:    • Marital Status:    Intimate Partner Violence:    • Fear of Current or Ex-Partner:    • Emotionally Abused:    • Physically Abused:    • Sexually Abused:        History reviewed. No pertinent family history.    No Known Allergies    Current Facility-Administered Medications   Medication Dose Route Frequency Provider Last Rate Last Admin   • lidocaine (XYLOCAINE) 1 % injection 0.5 mL  0.5 mL Intradermal Once PRN Glne Grimm M.D.       • lactated ringers infusion   Intravenous Continuous Glen Grimm M.D. 10 mL/hr at 06/25/21 0649 New Bag at 06/25/21 0649   • BUPIVACAINE HCL (PF) 0.25 % INJ SOLN            • HEPARIN SODIUM (PORCINE) 1000 UNIT/ML INJ SOLN            • LIDOCAINE HCL 2 % INJ SOLN            • HEPARIN (PORCINE) IN NACL 2000-0.9 UNIT/L-% IV SOLN            • LIDOCAINE HCL 4 % MT SOLN                Physical  "Exam:  Vitals:    06/21/21 0810 06/25/21 0709   BP:  104/78   Pulse:  75   Resp:  20   Temp:  36.6 °C (97.8 °F)   TempSrc:  Temporal   SpO2:  97%   Weight: 110 kg (241 lb 13.5 oz) 108 kg (238 lb 1.6 oz)   Height: 1.753 m (5' 9\") 1.753 m (5' 9\")     General appearance: NAD, conversant   Neck: Trachea midline; FROM, supple, no thyromegaly or lymphadenopathy  CV: Irregular, no MRGs, no JVD   Extremities: No peripheral edema or extremity lymphadenopathy  Skin: Normal temperature, turgor and texture; no rash, ulcers or subcutaneous nodules  Psych: Appropriate affect, alert and oriented to person, place and time    Data:  Lab Results   Component Value Date/Time    CHOLSTRLTOT 115 02/22/2019 07:06 AM    CHOLSTRLTOT 113 07/09/2012 07:21 AM    LDL 60 02/22/2019 07:06 AM    LDL 45 07/09/2012 07:21 AM    HDL 40 02/22/2019 07:06 AM    HDL 41 07/09/2012 07:21 AM    TRIGLYCERIDE 76 02/22/2019 07:06 AM    TRIGLYCERIDE 135 07/09/2012 07:21 AM       Lab Results   Component Value Date/Time    SODIUM 140 06/21/2021 08:39 AM    POTASSIUM 4.6 06/21/2021 08:39 AM    CHLORIDE 103 06/21/2021 08:39 AM    CO2 28 06/21/2021 08:39 AM    GLUCOSE 103 (H) 06/21/2021 08:39 AM    BUN 16 06/21/2021 08:39 AM    CREATININE 0.90 06/21/2021 08:39 AM    CREATININE 0.77 07/09/2012 07:21 AM    BUNCREATRAT 22 02/18/2021 09:32 AM    BUNCREATRAT 14 07/09/2012 07:21 AM     Lab Results   Component Value Date/Time    ALKPHOSPHAT 94 06/21/2021 08:39 AM    ASTSGOT 32 06/21/2021 08:39 AM    ALTSGPT 45 06/21/2021 08:39 AM    TBILIRUBIN 1.3 06/21/2021 08:39 AM      No results found for: BNPBTYPENAT            EKG interpreted by me: AFL    Impression/Plan:  1) AF  2) AFL    - We discussed pulmonary vein isolation for therapeutic management and continued rhythm control.  We discussed the risks and benefits of this procedure.  Risks include 1-3% risk of major cardiovascular event including stroke, myocardial infarction, phrenic nerve damage, esophageal injury and/or " fistula formation, cardiac perforation, pericardial effusion, tamponade, major bleeding, or death.  I quoted a 50-60% chance free of atrial fibrillation at 12 months.  We discussed that he may also need a second procedure.    Proceed to AF/AFL ablation    Conrado Rosas MD  Cardiac Electrophysiology

## 2021-06-25 NOTE — ANESTHESIA POSTPROCEDURE EVALUATION
Patient: Zenon Grijalva    Procedure Summary     Date: 06/25/21 Room / Location: [facility] IMAGING - CATH LAB [facility]    Anesthesia Start: 0835 Anesthesia Stop: 1119    Procedure: CL-EP ABLATION ATRIAL FIBRILLATION Diagnosis:       PAF (paroxysmal atrial fibrillation) (HCC)      (See Associated Dx)    Scheduled Providers: Conrado Rosas M.D.; Julio Cesar Ryan M.D. Responsible Provider: Julio Cesar Ryan M.D.    Anesthesia Type: general ASA Status: 4          Final Anesthesia Type: general  Last vitals  BP   Blood Pressure : 104/78    Temp   36.6 °C (97.8 °F)    Pulse   75   Resp   20    SpO2   97 %      Anesthesia Post Evaluation    Pain score: 0                No complications documented.     Nurse Pain Score: 0 (NPRS)

## 2021-06-25 NOTE — OP REPORT
Electrophysiology Procedure Note    Procedures Performed:  Pulmonary Vein Isolation  Additional ablation for atrial fibrillation  Ablation of additional arrhythmia  Intracardiac Echocardiography  Three-dimensional intracardiac mapping  IV isoproterenol infusion with programmed stimulation  Trans-esophageal echocardiogram    Electrophysiologist: Conrado Rosas MD    Assistant(s): None    Anesthesia: General anesthesia was provided by Dr. Ryan of the Anesthesiology service.    Statement of Medical Necessity: This is a 66  year-old male with history of symptomatic persistent atrial  fibrillation and flutter    Description of Procedure:    Access and catheter placement: After obtaining informed written consent, the patient was  brought to the EP lab in the fasting, non-sedated stated. The patient was sedated and intubated  by the anesthesiologist. The patient was prepped and draped in the usual sterile fashion. Using  the modified Seldinger technique, access was obtained in bilateral  femoral veins. Guidewires were advanced into the IVC. In the right femoral vein, 2 sheaths of   8F were placed. In the left femoral vein 2 sheaths of 10F and 8F were placed. A deflectable  decapolar catheter was advanced through the LFV to the coronary sinus. A 10F intracardiac  echo catheter was advanced to the right atrium.     At baseline the patient was noted to be in atrial flutter. TCL was 300ms. LAT mapping was performed in the right atrium which demonstrated typical CTI flutter. The CTI was targeted with ablation at 35W from tricuspid annulus to IVC resulting in termination of atrial flutter and bidirectional block >220ms.    Through one of the 8F short sheaths in the RFV,  a long wire was advanced to the SVC. The short sheath was  changed out for an 8.5 F braided Tor-flex ([email or web address]) sheath which was advanced to the SVC. The wire was  removed and the dilator was flushed. A 71-cm transseptal needle ([email or web address]) was advanced to the tip of  the  dilator, and the obturator was removed. The transseptal needle was attached to the manifold and  flushed. Under intracardiac echocardiographic guidance, the sheath/needle   system was withdrawn to the fossa ovalis. At this time, 15,000 units of heparin were administered.   Additional boluses of heparin were given as needed to keep -350 sec during LA dwell time.   The needle was advanced out of the dilator, and RF energy applied via the [place] needle.   ICE visualized the needle passage through the fossa ovalis into the left  atrium. Confirmation of left atrial location was confirmed by injecting saline and transducing  pressure. The dilator was advanced over the needle into the left atrium, and then the sheath was advanced over the dilator. The dilator and  needle were removed. The sheath was flushed and connected to a continuous heparinized   saline drip.  The transseptal catheterization  procedure was repeated through the second RFV access site a 98-cm transseptal needle  and a medium-curl Vizigo (Biosense Rhodes) sheath. Left atrial location was again confirmed by injecting saline  and transducing pressure. The needle and dilator were removed. The sheath was attached to  the drip line and flushed.     A 3.5-mm externally-irrigated ablation catheter (Biosense-Rhodes   Thermocool ST SF DF-curve) was advanced through the Vizigo sheath into the left atrium. A duodecapolar Penta-Ray catheter (Biosense-Rhodes) was  advanced through the Tor-flex sheath into the left atrium. A 3-dimensional electroanatomical  map was constructed using the CARTO system.    Attention was first turned to the left pulmonary veins. The Penta-Ray  catheter was placed in the LSPV and LIPV which showed conduction into the veins. The catheters were then moved to the RSPV and RIPV which showed evidence of conduction into the veins. The posterior wall was evaluated as an additional target for treatment of atrial fibrillation.  Ablation was performed along the anterior aspects of the LPVs and RPVs, with additional roof and floor lines, 20-40W, resulting in isolation of the LPVs, RPVs, and posterior wall.  Power was reduced near the esophagus.  The Penta-Ray was advanced into all four pulmonary veins and persistent conduction block into the right and left pulmonary veins in addition to posterior wall was demonstrated.     The ablation and Penta-ray catheters were placed simultaneously in the left and right veins, and adenosine 12mg was given. No reconnection was noted.    Isuprel 2-4mcg/min was administered and burst pacing was performed in the left atrium without induction of sustained AF, AFL, or SVT.     The left atrial sheaths and catheters were withdrawn to the right atrium. The CTI was checked but was noted to have reconnected. Further ablation was performed on the line which resulted in restoration of bidirectional block >220ms.    ICE visualization   of the pericardium confirmed no pericardial effusion at the end of the case. The  patient was awakened from anesthesia, catheters and sheaths were removed, Vascade MVP closure devices were deployed, and manual  pressure was held on bilateral groins until hemostasis was achieved.    Electrophysiological Findings:  Sinus cycle length 953 msec  Intervals-   H-V 69 msec   msec   msec   msec  AV block  ms  AVERP: 500/330 ms    Total RF time: 1640 sec  Fluoro time: 0 min    Arrhythmias:  Atrial flutter was present at baseline TCL 300ms  Tachycardias noted:  1) None    Estimated blood loss - 30 mL    Complications: None    Impression:  1. Atrial fibrillation, persistent  2. Successful isolation of all four pulmonary veins and posterior wall with additional linear ablation  3. Persistent atrial flutter  4. Successful CTI ablation for treatment of atrial flutter    Recommendations:  1. Bed rest for 4 hours  2. Telemetry monitoring during recovery  3. Anticoagulant  therapy for at least 6-12 months  4. Follow up in Arrhythmia clinic in 4 weeks      Conrado Rosas MD  Cardiac Electrophysiology

## 2021-07-02 ENCOUNTER — TELEPHONE (OUTPATIENT)
Dept: CARDIOLOGY | Facility: MEDICAL CENTER | Age: 66
End: 2021-07-02

## 2021-07-02 NOTE — TELEPHONE ENCOUNTER
"Does not monitor weights. Currently taking torsemide 10mg twice daily has been doing this for 3 weeks. Pt not currently sob at rest, no cp.     Reviewed [employer] message with ER restrictions \"Over the weekend, if you swelling increase, if you become short of breath at rest, chest pain, or inability to think clearly you need to go to the ER. If you have gained 3 lbs in a day or 5lbs in a week you should also be seen in the ER for fluid overload\" pt to start monitoring weights.    Wife states pt has a cough, advised if it becomes bothersome should go to urgent care for evaluation. Discussed risk of fluid overload back up into lungs at risk for PNA. She verbalizes understanding and will take pt to be evaluated if it gets worse.    Discussed fluid and salt intake. States pt does not eat a lot of salt. Discussed 1500mg daily intake and salt hidden in processed foods. S/o will try to be more cognoscente of this.   Scheduled pt to have f/u Tuesday with AH  "

## 2021-07-02 NOTE — TELEPHONE ENCOUNTER
RS    Pt wife, Yvonne called regarding the [employer] message between JENNIFER Aggarwal and would like a call back to further discuss due to active symptoms, questions from [employer] message and recommendations. Yvonne would also like to discuss appt options given for 7/6.   Please call: [phone].    Thank you.

## 2021-07-06 ENCOUNTER — TELEPHONE (OUTPATIENT)
Dept: CARDIOLOGY | Facility: MEDICAL CENTER | Age: 66
End: 2021-07-06

## 2021-07-06 ENCOUNTER — OFFICE VISIT (OUTPATIENT)
Dept: CARDIOLOGY | Facility: MEDICAL CENTER | Age: 66
End: 2021-07-06
Payer: MEDICARE

## 2021-07-06 VITALS
DIASTOLIC BLOOD PRESSURE: 84 MMHG | BODY MASS INDEX: 35.28 KG/M2 | OXYGEN SATURATION: 96 % | HEART RATE: 70 BPM | SYSTOLIC BLOOD PRESSURE: 124 MMHG | RESPIRATION RATE: 14 BRPM | WEIGHT: 238.2 LBS | HEIGHT: 69 IN

## 2021-07-06 DIAGNOSIS — I50.22 CHRONIC SYSTOLIC HEART FAILURE (HCC): ICD-10-CM

## 2021-07-06 DIAGNOSIS — I10 ESSENTIAL HYPERTENSION, BENIGN: Chronic | ICD-10-CM

## 2021-07-06 DIAGNOSIS — I48.0 PAF (PAROXYSMAL ATRIAL FIBRILLATION) (HCC): ICD-10-CM

## 2021-07-06 DIAGNOSIS — I25.5 ISCHEMIC CARDIOMYOPATHY: Chronic | ICD-10-CM

## 2021-07-06 DIAGNOSIS — Z79.01 CHRONIC ANTICOAGULATION: ICD-10-CM

## 2021-07-06 DIAGNOSIS — R06.02 SHORTNESS OF BREATH: ICD-10-CM

## 2021-07-06 DIAGNOSIS — E78.5 DYSLIPIDEMIA: Chronic | ICD-10-CM

## 2021-07-06 DIAGNOSIS — I25.10 CORONARY ARTERY DISEASE INVOLVING NATIVE CORONARY ARTERY OF NATIVE HEART WITHOUT ANGINA PECTORIS: Chronic | ICD-10-CM

## 2021-07-06 PROCEDURE — 99214 OFFICE O/P EST MOD 30 MIN: CPT | Performed by: PHYSICIAN ASSISTANT

## 2021-07-06 ASSESSMENT — ENCOUNTER SYMPTOMS
DYSPNEA ON EXERTION: 1
BLOATING: 0
DIZZINESS: 0
WEAKNESS: 0
BRUISES/BLEEDS EASILY: 0
DIAPHORESIS: 0
VOMITING: 0
SYNCOPE: 0
BLURRED VISION: 0
SNORING: 0
FEVER: 0
DECREASED APPETITE: 0
NAUSEA: 0
COUGH: 1
ABDOMINAL PAIN: 0
PALPITATIONS: 0
SHORTNESS OF BREATH: 1
NEAR-SYNCOPE: 0
MYALGIAS: 0
ORTHOPNEA: 0

## 2021-07-06 ASSESSMENT — FIBROSIS 4 INDEX: FIB4 SCORE: 1.7

## 2021-07-06 NOTE — PROGRESS NOTES
"      Cardiology Clinic Follow-up Note    Date of note:    7/6/2021  Primary Care Provider: Adalgisa Hankins M.D. (Inactive)    Name:             Zenon Grijalva  YOB: 1955  MRN:               [id number]    CC: PAF, ICMO, CAD    Primary Cardiologist: Dr. Rodas    Patient HPI:   Zenon Grijalva is a 66 y.o. male with PMH including CAD with hx of anterior MI in 2008.  His LAD was totally occluded.  He had a 3.5 x 28 mm Vision bare-metal stent placed in his LAD, distal to the 1st diagonal, and the 2nd diagonal was jailed by the stent. Subsequently had ICMO with LV thrombus, however, EF recovered to 50% in 2017.  Most recently has had atrial fib events with EF now 35% s/p DCCV 9/2020, 1/2021 then ablation by Dr. Rosas on 6/25/21.      Interim History:  Mr. Grijalva was last seen in this cardiology office by Dr. Rodas on 6/10/2021.  He was scheduled for follow up with JS on 7/16, however, has been having shortness of breath.  Global Gr[employer] message states he can only walk about 300 feet before he has to stop.  He has \"lung congestion\" and LE swelling as well.    He has had productive (brown/green) cough with nasal congestion since his ablation.   He states yesterday was better.      Denies weight gain. His LE swelling is improved as well.     Review of Systems   Constitutional: Positive for malaise/fatigue. Negative for decreased appetite, diaphoresis and fever.   Eyes: Negative for blurred vision.   Cardiovascular: Positive for dyspnea on exertion and leg swelling. Negative for chest pain, near-syncope, orthopnea, palpitations and syncope.   Respiratory: Positive for cough (productive) and shortness of breath. Negative for snoring.    Hematologic/Lymphatic: Negative for bleeding problem. Does not bruise/bleed easily.   Skin: Negative for rash.   Musculoskeletal: Negative for joint pain and myalgias.   Gastrointestinal: Negative for bloating, abdominal pain, nausea and vomiting.   Genitourinary: Negative for " "frequency.   Neurological: Negative for dizziness and weakness.        Past medical history, social history and family history reviewed.  No changes other than what is outlined in HPI.    Current Outpatient Medications   Medication Sig Dispense Refill   • omeprazole (PRILOSEC) 20 MG delayed-release capsule Take 1 capsule by mouth every day. 30 capsule 0   • torsemide (DEMADEX) 10 MG tablet TAKE 1 TABLET BY MOUTH ONCE DAILY AS NEEDED FOR  SWELLING  AND  EDEMA (Patient taking differently: 10 mg 2 times a day. TAKE 1 TABLET BY MOUTH ONCE DAILY AS NEEDED FOR  SWELLING  AND  EDEMA) 90 tablet 0   • rivaroxaban (XARELTO) 20 MG Tab tablet Take 1 Tab by mouth with dinner. 90 Tab 3   • irbesartan (AVAPRO) 150 MG Tab Take 0.5 Tabs by mouth every day. 90 Tab 0   • simvastatin (ZOCOR) 40 MG Tab Take 1 Tab by mouth every day. 90 Tab 3   • carvedilol (COREG) 6.25 MG Tab Take 1 Tab by mouth 2 times a day, with meals. (Patient taking differently: Take 3.125 mg by mouth 2 times a day with meals.) 60 Tab 3   • nitroglycerin (NITROSTAT) 0.4 MG SL Tab Place 1 Tab under tongue as needed for Chest Pain. 25 Tab 3     No current facility-administered medications for this visit.       Allergies   Allergen Reactions   • Other Environmental      Hayfever         Physical Exam:  Ambulatory Vitals  /84 (BP Location: Left arm, Patient Position: Sitting, BP Cuff Size: Adult)   Pulse 70   Resp 14   Ht 1.753 m (5' 9\")   Wt 108 kg (238 lb 3.2 oz)   SpO2 96%    BP Readings from Last 4 Encounters:   07/06/21 124/84   06/25/21 105/71   06/10/21 (!) 94/62   06/01/21 (!) 98/64       Weight/BMI: Body mass index is 35.18 kg/m².  Wt Readings from Last 4 Encounters:   07/06/21 108 kg (238 lb 3.2 oz)   06/25/21 108 kg (238 lb 1.6 oz)   06/10/21 108 kg (238 lb)   06/01/21 109 kg (240 lb 4.8 oz)       General: no apparent distress  Lungs: normal effort, without crackles, + BL lower lobe expiratory wheezing.  Heart: normal rate, regular rhythm, no " murmur, no rub  Ext: 2+ edema around the R ankle, 1+ to the LLE.  Neurological: No focal deficits, no facial asymmetry.  Normal speech.  Psychiatric: Appropriate affect, alert and oriented x 3.   Skin: Warm extremities, no rash.      Lab Data Review:  Lab Results   Component Value Date/Time    CHOLSTRLTOT 115 02/22/2019 07:06 AM    CHOLSTRLTOT 113 07/09/2012 07:21 AM    LDL 60 02/22/2019 07:06 AM    LDL 45 07/09/2012 07:21 AM    HDL 40 02/22/2019 07:06 AM    HDL 41 07/09/2012 07:21 AM    TRIGLYCERIDE 76 02/22/2019 07:06 AM    TRIGLYCERIDE 135 07/09/2012 07:21 AM       Lab Results   Component Value Date/Time    SODIUM 140 06/21/2021 08:39 AM    POTASSIUM 4.6 06/21/2021 08:39 AM    CHLORIDE 103 06/21/2021 08:39 AM    CO2 28 06/21/2021 08:39 AM    GLUCOSE 103 (H) 06/21/2021 08:39 AM    BUN 16 06/21/2021 08:39 AM    CREATININE 0.90 06/21/2021 08:39 AM    CREATININE 0.77 07/09/2012 07:21 AM    BUNCREATRAT 22 02/18/2021 09:32 AM    BUNCREATRAT 14 07/09/2012 07:21 AM     Lab Results   Component Value Date/Time    ALKPHOSPHAT 94 06/21/2021 08:39 AM    ASTSGOT 32 06/21/2021 08:39 AM    ALTSGPT 45 06/21/2021 08:39 AM    TBILIRUBIN 1.3 06/21/2021 08:39 AM      Lab Results   Component Value Date/Time    WBC 7.6 06/21/2021 08:39 AM         Cardiac Imaging and Procedures Review:      Echo 4/19/21:  CONCLUSIONS  Compared to the images of the study done on 08/10/20 there has been a   slight reduction isn the EF  Contrast was used to enhance visualization of the endocardial border.  Left ventricular ejection fraction is visually estimated to be 35%.  Global hypokinesis with regional variation.  Diastolic function is difficult to assess with atrial fibrillation.  Modertely enlarged right ventricle.  Enlarged right atrium.  Severely dilated left atrium.  Trace mitral regurgitation.  Structurally normal aortic valve without significant stenosis or   regurgitation.  Estimated right ventricular systolic pressure  is 35 mmHg.  Normal  "pericardium without effusion.    Echo 21:  CONCLUSIONS  LVEF is severely depressed.  No evidence of ARCADIO thrombus.  Mild MR  No evidence of ASD or PFO    Stress Test 2019:  Report   NUCLEAR IMAGING INTERPRETATION   Abnormal Lexiscan myocardial perfusion study.   Large distal anterior and apical infarct.   No significant ischemia.   Mildly reduced LVSF with EF of 47%. Mid to distal anterior wall hypokinesis.   No ischemic changes with Regadenoson.   No chest pain.   ECG INTERPRETATION   Negative stress ECG for ischemia.    DCCV 20 Dr. Maurer     DCCV 2021 Dr. Do     EP 2021 Dr. Rosas:  Impression:  1. Atrial fibrillation, persistent  2. Successful isolation of all four pulmonary veins and posterior wall with additional linear ablation  3. Persistent atrial flutter  4. Successful CTI ablation for treatment of atrial flutter      Assessment and Clinical Decision Makin   Shortness of breath   -    DDx included volume overload (maybe minimally overloaded), pericardial effusion after ablation, PNA (hosp acquired after intubation), pneumonitis, asthma exacerbation.  -    Will get STAT echo for effusion, CXR, BNP, BMP, CBC    2   Acute on chronic systolic HFrEF 35%, NYHA class III, stage C secondary to mixed ischemic and nonischemic etiologies.   -    Does seem mildly hypervolemic  -    Continue 10 mg torsemide BID  -    Will get BNP as per above.   -    Otherwise continue GDMT.    3   Paroxysmal afib, flutter  -    S/p DCCV 2020, 2021 and ablation 21  -    [id number] at least 2 (cad, chf), on Xarelto 20 mg    4   CAD with hx of anterior MI   -    S/p SYDNEY to an occluded LAD, jailed D2    5   Asthma, COPD? Per recent PFT  -    Follows with Dr. Canas  -    Last seen on 2021, recommended to use neb albuterol Q4 hr as needed.  -    Patient states he has not been using his pulm meds as they were not helpful.  He \"threw them out\"      Azra Guerra PA-C     [facility]

## 2021-07-06 NOTE — TELEPHONE ENCOUNTER
AH    Patient wife, Yvonne, wanted to know if the blood thinners the Pt is on is okay to eat lettuce. They are wanting to lose weight and heard that being on blood thinners and eating salad may cause an issue. Please call them back at [phone].    Thank you,  Vandana JOHNSTON

## 2021-07-06 NOTE — TELEPHONE ENCOUNTER
Returned pt call with advise on leafy greens and xarelto use.  She verbalizes understanding and states no other concerns or questions at this time.  Yvonne is appreciative of information given.

## 2021-07-07 ENCOUNTER — HOSPITAL ENCOUNTER (EMERGENCY)
Dept: RADIOLOGY | Facility: MEDICAL CENTER | Age: 66
End: 2021-07-07
Attending: PHYSICIAN ASSISTANT
Payer: MEDICARE

## 2021-07-07 ENCOUNTER — HOSPITAL ENCOUNTER (OUTPATIENT)
Dept: CARDIOLOGY | Facility: MEDICAL CENTER | Age: 66
End: 2021-07-07
Attending: PHYSICIAN ASSISTANT
Payer: MEDICARE

## 2021-07-07 ENCOUNTER — TELEPHONE (OUTPATIENT)
Dept: CARDIOLOGY | Facility: MEDICAL CENTER | Age: 66
End: 2021-07-07

## 2021-07-07 ENCOUNTER — HOSPITAL ENCOUNTER (OUTPATIENT)
Dept: LAB | Facility: MEDICAL CENTER | Age: 66
End: 2021-07-07
Attending: PHYSICIAN ASSISTANT
Payer: MEDICARE

## 2021-07-07 DIAGNOSIS — R06.02 SHORTNESS OF BREATH: ICD-10-CM

## 2021-07-07 LAB
ANION GAP SERPL CALC-SCNC: 13 MMOL/L (ref 7–16)
BUN SERPL-MCNC: 19 MG/DL (ref 8–22)
CALCIUM SERPL-MCNC: 8.9 MG/DL (ref 8.4–10.2)
CHLORIDE SERPL-SCNC: 100 MMOL/L (ref 96–112)
CO2 SERPL-SCNC: 26 MMOL/L (ref 20–33)
CREAT SERPL-MCNC: 1.04 MG/DL (ref 0.5–1.4)
ERYTHROCYTE [DISTWIDTH] IN BLOOD BY AUTOMATED COUNT: 42.5 FL (ref 35.9–50)
GLUCOSE SERPL-MCNC: 97 MG/DL (ref 65–99)
HCT VFR BLD AUTO: 42 % (ref 42–52)
HGB BLD-MCNC: 14.5 G/DL (ref 14–18)
LV EJECT FRACT  99904: 25
MCH RBC QN AUTO: 32.9 PG (ref 27–33)
MCHC RBC AUTO-ENTMCNC: 34.5 G/DL (ref 33.7–35.3)
MCV RBC AUTO: 95.2 FL (ref 81.4–97.8)
NT-PROBNP SERPL IA-MCNC: 741 PG/ML (ref 0–125)
PLATELET # BLD AUTO: 240 K/UL (ref 164–446)
PMV BLD AUTO: 9.7 FL (ref 9–12.9)
POTASSIUM SERPL-SCNC: 4.1 MMOL/L (ref 3.6–5.5)
RBC # BLD AUTO: 4.41 M/UL (ref 4.7–6.1)
SODIUM SERPL-SCNC: 139 MMOL/L (ref 135–145)
WBC # BLD AUTO: 9.3 K/UL (ref 4.8–10.8)

## 2021-07-07 PROCEDURE — 83880 ASSAY OF NATRIURETIC PEPTIDE: CPT

## 2021-07-07 PROCEDURE — 93308 TTE F-UP OR LMTD: CPT | Mod: 26 | Performed by: INTERNAL MEDICINE

## 2021-07-07 PROCEDURE — 71046 X-RAY EXAM CHEST 2 VIEWS: CPT

## 2021-07-07 PROCEDURE — 80048 BASIC METABOLIC PNL TOTAL CA: CPT

## 2021-07-07 PROCEDURE — 93308 TTE F-UP OR LMTD: CPT

## 2021-07-07 PROCEDURE — 85027 COMPLETE CBC AUTOMATED: CPT

## 2021-07-07 PROCEDURE — 36415 COLL VENOUS BLD VENIPUNCTURE: CPT

## 2021-07-07 NOTE — TELEPHONE ENCOUNTER
I called and spoke with patient after I reviewed his echo, CXR and blood work.     I do not see anything concerning for PNA or infectious process.  I am thinking he could have extra fluid.     Recommend:  1)  Torsemide 20 mg BID starting this afternoon throught Friday morning  2)   I will call and see how he's doing on Friday.     Azra Guerra PA-C  7/7/2021

## 2021-07-07 NOTE — TELEPHONE ENCOUNTER
AH    Patient wife, Yvonne, called to advise they were told the labs were stat and they are not. They have been at the lab since 9:15 as requested. Please call them back at [phone].    Thank you,  Vandana JOHNSTON

## 2021-07-08 NOTE — TELEPHONE ENCOUNTER
They must have gotten done stat, they resulted yesterday and I have spoken with patient.  See telephone note 7/6/2021 at approx 12:04.    Azra Guerra PA-C  7/8/2021

## 2021-07-09 ENCOUNTER — TELEPHONE (OUTPATIENT)
Dept: CARDIOLOGY | Facility: MEDICAL CENTER | Age: 66
End: 2021-07-09

## 2021-07-10 NOTE — TELEPHONE ENCOUNTER
Call on 7/9 to see if inc torsemide from 10 bid to 20 bid was helpful.    He thinks he's doing better  Almost back to his baseline.    He is going to cut back to torsemide 10 mg BID, then daily if he is doing well.    He has follow up with OLINDA on 7/16, advised him to keep this appt.     Azra Guerra PA-C  7/9/2021

## 2021-07-26 ENCOUNTER — TELEPHONE (OUTPATIENT)
Dept: CARDIOLOGY | Facility: MEDICAL CENTER | Age: 66
End: 2021-07-26

## 2021-07-26 ENCOUNTER — OFFICE VISIT (OUTPATIENT)
Dept: CARDIOLOGY | Facility: MEDICAL CENTER | Age: 66
End: 2021-07-26
Payer: MEDICARE

## 2021-07-26 VITALS
HEART RATE: 62 BPM | HEIGHT: 69 IN | OXYGEN SATURATION: 96 % | DIASTOLIC BLOOD PRESSURE: 72 MMHG | WEIGHT: 233.8 LBS | SYSTOLIC BLOOD PRESSURE: 114 MMHG | BODY MASS INDEX: 34.63 KG/M2 | RESPIRATION RATE: 16 BRPM

## 2021-07-26 DIAGNOSIS — I25.5 ISCHEMIC CARDIOMYOPATHY: ICD-10-CM

## 2021-07-26 DIAGNOSIS — I25.10 CORONARY ARTERY DISEASE INVOLVING NATIVE CORONARY ARTERY OF NATIVE HEART WITHOUT ANGINA PECTORIS: Chronic | ICD-10-CM

## 2021-07-26 PROCEDURE — 99214 OFFICE O/P EST MOD 30 MIN: CPT | Performed by: PHYSICIAN ASSISTANT

## 2021-07-26 RX ORDER — TORSEMIDE 20 MG/1
TABLET ORAL
Qty: 135 TABLET | Refills: 3 | Status: ON HOLD | OUTPATIENT
Start: 2021-07-26 | End: 2022-05-22 | Stop reason: SDUPTHER

## 2021-07-26 ASSESSMENT — ENCOUNTER SYMPTOMS
DIZZINESS: 0
VOMITING: 0
BRUISES/BLEEDS EASILY: 0
MYALGIAS: 0
SYNCOPE: 0
BLURRED VISION: 0
COUGH: 0
ABDOMINAL PAIN: 0
SNORING: 0
PALPITATIONS: 0
ORTHOPNEA: 0
SHORTNESS OF BREATH: 0
NEAR-SYNCOPE: 0
DECREASED APPETITE: 0
WEAKNESS: 0
FEVER: 0
DYSPNEA ON EXERTION: 0
NAUSEA: 0
BLOATING: 0
DIAPHORESIS: 0

## 2021-07-26 ASSESSMENT — FIBROSIS 4 INDEX: FIB4 SCORE: 1.31

## 2021-07-26 NOTE — PROGRESS NOTES
"      Cardiology Clinic Follow-up Note    Date of note:    7/26/2021  Primary Care Provider: Adalgisa Hankins M.D. (Inactive)    Name:             Zenon Antonio  YOB: 1955  MRN:               [id number]    CC: PAF, ICMO, CAD    Primary Cardiologist: Dr. Rodas --> Dr. Walters    Patient HPI:   Zenon Antonio is a 66 y.o. male with PMH including CAD with hx of anterior MI in 2008.  His LAD was totally occluded.  He had a 3.5 x 28 mm Vision bare-metal stent placed in his LAD, distal to the 1st diagonal, and the 2nd diagonal was jailed by the stent. Subsequently had ICMO with LV thrombus, however, EF recovered to 50% in 2017.  Most recently has had atrial fib events with EF now 35% s/p DCCV 9/2020, 1/2021 then ablation by Dr. Rosas on 6/25/21.      Interim History:  Mr. Antonio was last seen in this cardiology office by me on 7/6/2021.  He was having some congestion, cough, LE swelling after his ablation on 6/25/21.   I had increased his torsemide from 10 BID to 20 BID.  I called him on 7/9/21 and he said he was doing better.  Echo 7/7/21 without effusion, EF 25%.    Results for BAKARI ANTONIO (MRN [id number]) as of 7/26/2021 07:43   6/21/2021 08:39 7/7/2021 10:36   Sodium 140 139   Potassium 4.6 4.1   Chloride 103 100   Co2 28 26   Anion Gap 9.0 13.0   Glucose 103 (H) 97   Bun 16 19   Creatinine 0.90 1.04   GFR  >60 >60       He returns today for follow up.    He is doing much better.   Cough has resolved, LE resolved.   Still gets a \"tickle\" in his throat at times, but not consistently  Denies chest pain or shortness of breath   Getting ready to go on a hunt.  Has been fishing, denies ZAVALA.    Review of Systems   Constitutional: Negative for decreased appetite, diaphoresis, fever and malaise/fatigue.   Eyes: Negative for blurred vision.   Cardiovascular: Negative for chest pain, dyspnea on exertion, leg swelling, near-syncope, orthopnea, palpitations and syncope.   Respiratory: Negative for " "cough, shortness of breath and snoring.    Hematologic/Lymphatic: Negative for bleeding problem. Does not bruise/bleed easily.   Skin: Negative for rash.   Musculoskeletal: Negative for joint pain and myalgias.   Gastrointestinal: Negative for bloating, abdominal pain, nausea and vomiting.   Genitourinary: Negative for frequency.   Neurological: Negative for dizziness and weakness.        Past medical history, social history and family history reviewed.  No changes other than what is outlined in HPI.    Current Outpatient Medications   Medication Sig Dispense Refill   • omeprazole (PRILOSEC) 20 MG delayed-release capsule Take 1 capsule by mouth every day. 30 capsule 0   • torsemide (DEMADEX) 10 MG tablet TAKE 1 TABLET BY MOUTH ONCE DAILY AS NEEDED FOR  SWELLING  AND  EDEMA (Patient taking differently: 10 mg 2 times a day. TAKE 2 TABLET BY MOUTH ONCE DAILY AS NEEDED FOR  SWELLING  AND  EDEMA) 90 tablet 0   • rivaroxaban (XARELTO) 20 MG Tab tablet Take 1 Tab by mouth with dinner. 90 Tab 3   • irbesartan (AVAPRO) 150 MG Tab Take 0.5 Tabs by mouth every day. 90 Tab 0   • simvastatin (ZOCOR) 40 MG Tab Take 1 Tab by mouth every day. 90 Tab 3   • carvedilol (COREG) 6.25 MG Tab Take 1 Tab by mouth 2 times a day, with meals. (Patient taking differently: Take 3.125 mg by mouth 2 times a day with meals.) 60 Tab 3   • nitroglycerin (NITROSTAT) 0.4 MG SL Tab Place 1 Tab under tongue as needed for Chest Pain. 25 Tab 3     No current facility-administered medications for this visit.       Allergies   Allergen Reactions   • Other Environmental      Hayfever         Physical Exam:  Ambulatory Vitals  /72 (BP Location: Left arm, Patient Position: Sitting, BP Cuff Size: Adult)   Pulse 62   Resp 16   Ht 1.753 m (5' 9\")   Wt 106 kg (233 lb 12.8 oz)   SpO2 96%    BP Readings from Last 4 Encounters:   07/26/21 114/72   07/06/21 124/84   06/25/21 105/71   06/10/21 (!) 94/62       Weight/BMI: Body mass index is 34.53 kg/m².  Wt " Readings from Last 4 Encounters:   07/26/21 106 kg (233 lb 12.8 oz)   07/06/21 108 kg (238 lb 3.2 oz)   06/25/21 108 kg (238 lb 1.6 oz)   06/10/21 108 kg (238 lb)       General: no apparent distress  Lungs: normal effort, without crackles, no wheezing.  Heart: normal rate, regular rhythm, no murmur, no rub  Ext: no edema to the LE.  Neurological: No focal deficits, no facial asymmetry.  Normal speech.  Psychiatric: Appropriate affect, alert and oriented x 3.   Skin: Warm extremities, no rash.      Lab Data Review:  Lab Results   Component Value Date/Time    CHOLSTRLTOT 115 02/22/2019 07:06 AM    CHOLSTRLTOT 113 07/09/2012 07:21 AM    LDL 60 02/22/2019 07:06 AM    LDL 45 07/09/2012 07:21 AM    HDL 40 02/22/2019 07:06 AM    HDL 41 07/09/2012 07:21 AM    TRIGLYCERIDE 76 02/22/2019 07:06 AM    TRIGLYCERIDE 135 07/09/2012 07:21 AM       Lab Results   Component Value Date/Time    SODIUM 139 07/07/2021 10:36 AM    POTASSIUM 4.1 07/07/2021 10:36 AM    CHLORIDE 100 07/07/2021 10:36 AM    CO2 26 07/07/2021 10:36 AM    GLUCOSE 97 07/07/2021 10:36 AM    BUN 19 07/07/2021 10:36 AM    CREATININE 1.04 07/07/2021 10:36 AM    CREATININE 0.77 07/09/2012 07:21 AM    BUNCREATRAT 22 02/18/2021 09:32 AM    BUNCREATRAT 14 07/09/2012 07:21 AM     Lab Results   Component Value Date/Time    ALKPHOSPHAT 94 06/21/2021 08:39 AM    ASTSGOT 32 06/21/2021 08:39 AM    ALTSGPT 45 06/21/2021 08:39 AM    TBILIRUBIN 1.3 06/21/2021 08:39 AM      Lab Results   Component Value Date/Time    WBC 9.3 07/07/2021 10:36 AM         Cardiac Imaging and Procedures Review:      Echo 4/19/21:  CONCLUSIONS  Compared to the images of the study done on 08/10/20 there has been a   slight reduction isn the EF  Contrast was used to enhance visualization of the endocardial border.  Left ventricular ejection fraction is visually estimated to be 35%.  Global hypokinesis with regional variation.  Diastolic function is difficult to assess with atrial fibrillation.  Modertely  enlarged right ventricle.  Enlarged right atrium.  Severely dilated left atrium.  Trace mitral regurgitation.  Structurally normal aortic valve without significant stenosis or   regurgitation.  Estimated right ventricular systolic pressure  is 35 mmHg.  Normal pericardium without effusion.    Echo 21:  CONCLUSIONS  LVEF is severely depressed.  No evidence of ARCADIO thrombus.  Mild MR  No evidence of ASD or PFO    Echo 21:  CONCLUSIONS  Limited Echocardiogram:  No pericardial effusion seen.  Severely reduced left ventricular systolic function.  Left ventricular ejection fraction is visually estimated to be 25%.  Dilated right ventricle.    Stress Test 2019:  Report   NUCLEAR IMAGING INTERPRETATION   Abnormal Lexiscan myocardial perfusion study.   Large distal anterior and apical infarct.   No significant ischemia.   Mildly reduced LVSF with EF of 47%. Mid to distal anterior wall hypokinesis.   No ischemic changes with Regadenoson.   No chest pain.   ECG INTERPRETATION   Negative stress ECG for ischemia.    DCCV 20 Dr. Maurer     DCCV 2021 Dr. Do     EP 2021 Dr. Rosas:  Impression:  1. Atrial fibrillation, persistent  2. Successful isolation of all four pulmonary veins and posterior wall with additional linear ablation  3. Persistent atrial flutter  4. Successful CTI ablation for treatment of atrial flutter      Assessment and Clinical Decision Makin  Cronic systolic HFrEF 35%, NYHA class III, stage C secondary to mixed ischemic and nonischemic etiologies.   -   Continue torsemide 10 AM, 20 in the afternoon  -   Repeat BMP to assess Cr and K  -   Continue coreg 3.125 BID, irbesartan 37.5    2   Paroxysmal afib, flutter  -    S/p DCCV 2020, 2021 and ablation 21  -    [id number] at least 2 (cad, chf), on Xarelto 20 mg    3   CAD with hx of anterior MI   -    S/p SYDNEY to an occluded LAD, jailed D2    4  Asthma, COPD? Per recent PFT  -    Follows with Dr. Canas  -     "Last seen on 6/1/2021, recommended to use neb albuterol Q4 hr as needed.  -    Patient states he has not been using his pulm meds as they were not helpful.  He \"threw them out\"    Patient overall doing much better.   Has follow up with TW 8/13/21.      Azra Guerra PA-C     [facility]    "

## 2021-07-30 DIAGNOSIS — I10 ESSENTIAL HYPERTENSION, BENIGN: Chronic | ICD-10-CM

## 2021-08-02 ENCOUNTER — HOSPITAL ENCOUNTER (OUTPATIENT)
Dept: LAB | Facility: MEDICAL CENTER | Age: 66
End: 2021-08-02
Attending: PHYSICIAN ASSISTANT
Payer: MEDICARE

## 2021-08-02 ENCOUNTER — TELEPHONE (OUTPATIENT)
Dept: CARDIOLOGY | Facility: MEDICAL CENTER | Age: 66
End: 2021-08-02

## 2021-08-02 DIAGNOSIS — I25.10 CORONARY ARTERY DISEASE INVOLVING NATIVE CORONARY ARTERY OF NATIVE HEART WITHOUT ANGINA PECTORIS: Chronic | ICD-10-CM

## 2021-08-02 DIAGNOSIS — I25.5 ISCHEMIC CARDIOMYOPATHY: ICD-10-CM

## 2021-08-02 LAB
ANION GAP SERPL CALC-SCNC: 13 MMOL/L (ref 7–16)
BUN SERPL-MCNC: 20 MG/DL (ref 8–22)
CALCIUM SERPL-MCNC: 8.9 MG/DL (ref 8.4–10.2)
CHLORIDE SERPL-SCNC: 100 MMOL/L (ref 96–112)
CO2 SERPL-SCNC: 24 MMOL/L (ref 20–33)
CREAT SERPL-MCNC: 0.92 MG/DL (ref 0.5–1.4)
GLUCOSE SERPL-MCNC: 97 MG/DL (ref 65–99)
POTASSIUM SERPL-SCNC: 4.1 MMOL/L (ref 3.6–5.5)
SODIUM SERPL-SCNC: 137 MMOL/L (ref 135–145)

## 2021-08-02 PROCEDURE — 80048 BASIC METABOLIC PNL TOTAL CA: CPT

## 2021-08-02 PROCEDURE — 36415 COLL VENOUS BLD VENIPUNCTURE: CPT

## 2021-08-02 RX ORDER — IRBESARTAN 150 MG/1
75 TABLET ORAL
Qty: 45 TABLET | Refills: 3 | Status: SHIPPED | OUTPATIENT
Start: 2021-08-02 | End: 2022-05-27 | Stop reason: CLARIF

## 2021-08-02 NOTE — TELEPHONE ENCOUNTER
I called patient to let him know his labs look great.     Results for BAKARI ANTONIO (MRN [id number]) as of 8/2/2021 16:54   7/7/2021 10:36 8/2/2021 10:09   Sodium 139 137   Potassium 4.1 4.1   Chloride 100 100   Co2 26 24   Anion Gap 13.0 13.0   Glucose 97 97   Bun 19 20   Creatinine 1.04 0.92   GFR  >60 >60     No changes recommended.   Patient voiced appreciation for call.     Azra Guerra PA-C  8/2/2021

## 2021-08-02 NOTE — TELEPHONE ENCOUNTER
----- Message from Marsha Ivy R.N. sent at 8/2/2021  3:41 PM PDT -----  FV scheduled with MD 8/31/2021, thank you!

## 2021-08-31 ENCOUNTER — OFFICE VISIT (OUTPATIENT)
Dept: CARDIOLOGY | Facility: MEDICAL CENTER | Age: 66
End: 2021-08-31
Payer: MEDICARE

## 2021-08-31 VITALS
HEIGHT: 69 IN | DIASTOLIC BLOOD PRESSURE: 68 MMHG | WEIGHT: 234.8 LBS | HEART RATE: 63 BPM | RESPIRATION RATE: 14 BRPM | SYSTOLIC BLOOD PRESSURE: 114 MMHG | OXYGEN SATURATION: 97 % | BODY MASS INDEX: 34.78 KG/M2

## 2021-08-31 DIAGNOSIS — Z79.01 CHRONIC ANTICOAGULATION: ICD-10-CM

## 2021-08-31 DIAGNOSIS — I50.22 CHRONIC SYSTOLIC HEART FAILURE (HCC): ICD-10-CM

## 2021-08-31 DIAGNOSIS — I25.10 CORONARY ARTERY DISEASE INVOLVING NATIVE CORONARY ARTERY OF NATIVE HEART WITHOUT ANGINA PECTORIS: ICD-10-CM

## 2021-08-31 DIAGNOSIS — I10 ESSENTIAL HYPERTENSION: ICD-10-CM

## 2021-08-31 DIAGNOSIS — I48.0 PAF (PAROXYSMAL ATRIAL FIBRILLATION) (HCC): ICD-10-CM

## 2021-08-31 DIAGNOSIS — I25.5 ISCHEMIC CARDIOMYOPATHY: ICD-10-CM

## 2021-08-31 PROCEDURE — 99214 OFFICE O/P EST MOD 30 MIN: CPT | Performed by: INTERNAL MEDICINE

## 2021-08-31 ASSESSMENT — FIBROSIS 4 INDEX: FIB4 SCORE: 1.31

## 2021-08-31 NOTE — PROGRESS NOTES
Chief Complaint   Patient presents with   • Hypertension     F/V Dx: Essential hypertension, benign   • Coronary Artery Disease   • Atrial Fibrillation     F/V Dx : PAF (paroxysmal atrial fibrillation) ([facility])       Subjective     Martín Grijalva is a 66 y.o. male who presents today for initial visit in follow-up of coronary artery disease ischemic cardiomyopathy and PAF.  He has a history of anterior MI status post primary PCI 2008 with subsequent ischemic cardiomyopathy with mostly recovered EF.  Unfortunately he developed atrial fibrillation recently which was coincident with the development of a recurrence of his reduced ejection fraction.  He was maintained on Xarelto and has been cardioverted and underwent atrial fibrillation ablation by Dr. Rosas 6/2021.  Currently maintaining sinus rhythm tolerating his anticoagulation well aside from cost.  Limited echocardiogram just after ablation did not show any change in his ejection fraction which remains at 25%.  He has NYHA class I-II symptomology.  Has a history of cough with ACE inhibitor.    Past Medical History:   Diagnosis Date   • Arrhythmia 2020    A fib   • Arthritis     fingers   • CAD (coronary artery disease) 7/2/2012   • Chronic anticoagulation 7/2/2012   • Congestive heart failure (HCC)    • Dyslipidemia 7/2/2012   • Essential hypertension, benign 7/2/2012   • High cholesterol    • Insomnia     wakes up 2-5 times a night , wakes up to use restroom and sometimes just wakes up    • Ischemic cardiomyopathy 7/2/2012   • Left ventricular thrombosis 7/2/2012   • Myocardial infarct (HCC) 2006   • Pericarditis 7/2/2012   • Pneumonia     history of as a child   • Sleep apnea     told by MD that he has it, scheduled for sleep study    • Snoring     no sleep study done   • SOB (shortness of breath)     no c/o today; on exertion; pt uses 2L o2 qhs only; pt does not know provider   • Stented coronary artery 7/2/2012     Past Surgical History:   Procedure Laterality  Date   • SHOULDER DECOMPRESSION ARTHROSCOPIC Right 11/27/2018    Procedure: SHOULDER DECOMPRESSION ARTHROSCOPIC- SUBACROMIAL CONVERTED TO OPEN;  Surgeon: Ni Weir M.D.;  Location: SURGERY [facility];  Service: Orthopedics   • SHOULDER ARTHROSCOPY W/ ROTATOR CUFF REPAIR Right 11/27/2018    Procedure: OPEN ROTATOR CUFF REPAIR;  Surgeon: Ni Weir M.D.;  Location: SURGERY [facility];  Service: Orthopedics   • CARDIAC CATH, LEFT HEART     • CARPAL TUNNEL RELEASE Bilateral    • STENT PLACEMENT      cardiac stent   • VASECTOMY       History reviewed. No pertinent family history.  Social History     Socioeconomic History   • Marital status:      Spouse name: Not on file   • Number of children: Not on file   • Years of education: Not on file   • Highest education level: Not on file   Occupational History   • Not on file   Tobacco Use   • Smoking status: Never Smoker   • Smokeless tobacco: Former User     Types: Chew   Vaping Use   • Vaping Use: Never used   Substance and Sexual Activity   • Alcohol use: Not Currently     Comment: occ   • Drug use: Not Currently   • Sexual activity: Not on file   Other Topics Concern   • Not on file   Social History Narrative   • Not on file     Social Determinants of Health     Financial Resource Strain:    • Difficulty of Paying Living Expenses:    Food Insecurity:    • Worried About Running Out of Food in the Last Year:    • Ran Out of Food in the Last Year:    Transportation Needs:    • Lack of Transportation (Medical):    • Lack of Transportation (Non-Medical):    Physical Activity:    • Days of Exercise per Week:    • Minutes of Exercise per Session:    Stress:    • Feeling of Stress :    Social Connections:    • Frequency of Communication with Friends and Family:    • Frequency of Social Gatherings with Friends and Family:    • Attends [employer] Services:    • Active Member of Clubs or Organizations:    • Attends Club or Organization Meetings:   "  • Marital Status:    Intimate Partner Violence:    • Fear of Current or Ex-Partner:    • Emotionally Abused:    • Physically Abused:    • Sexually Abused:      Allergies   Allergen Reactions   • Other Environmental      Hayfever     Outpatient Encounter Medications as of 8/31/2021   Medication Sig Dispense Refill   • irbesartan (AVAPRO) 150 MG Tab Take 0.5 Tablets by mouth every day. 45 tablet 3   • torsemide (DEMADEX) 20 MG Tab Take 0.5 tab in AM, 1 tab in  tablet 3   • rivaroxaban (XARELTO) 20 MG Tab tablet Take 1 Tab by mouth with dinner. 90 Tab 3   • simvastatin (ZOCOR) 40 MG Tab Take 1 Tab by mouth every day. 90 Tab 3   • carvedilol (COREG) 6.25 MG Tab Take 1 Tab by mouth 2 times a day, with meals. (Patient taking differently: Take 3.125 mg by mouth 2 times a day with meals.) 60 Tab 3   • nitroglycerin (NITROSTAT) 0.4 MG SL Tab Place 1 Tab under tongue as needed for Chest Pain. 25 Tab 3   • [DISCONTINUED] omeprazole (PRILOSEC) 20 MG delayed-release capsule Take 1 capsule by mouth every day. (Patient not taking: Reported on 8/31/2021) 30 capsule 0     No facility-administered encounter medications on file as of 8/31/2021.     Review of Systems   All other systems reviewed and are negative.             Objective     /68 (BP Location: Left arm, Patient Position: Sitting, BP Cuff Size: Adult)   Pulse 63   Resp 14   Ht 1.753 m (5' 9\")   Wt 107 kg (234 lb 12.8 oz)   SpO2 97%   BMI 34.67 kg/m²     Physical Exam   Constitutional: He is oriented to person, place, and time. He appears well-developed. No distress.   HENT:   Head: Normocephalic and atraumatic.   Right Ear: External ear normal.   Left Ear: External ear normal.   Eyes: Pupils are equal, round, and reactive to light. Conjunctivae are normal. Right eye exhibits no discharge. Left eye exhibits no discharge. No scleral icterus.   Neck: No JVD present. No tracheal deviation present. No thyromegaly present.   Cardiovascular: Normal rate and " regular rhythm. PMI is not displaced. Exam reveals no gallop and no friction rub.   No murmur heard.  Pulses:       Carotid pulses are 2+ on the right side and 2+ on the left side.       Radial pulses are 2+ on the left side.        Popliteal pulses are 2+ on the right side and 2+ on the left side.        Dorsalis pedis pulses are 2+ on the right side and 2+ on the left side.        Posterior tibial pulses are 2+ on the right side and 2+ on the left side.   Pulmonary/Chest: Effort normal and breath sounds normal. No respiratory distress. He has no wheezes. He has no rales. He exhibits no tenderness.   Abdominal: Soft. Bowel sounds are normal. He exhibits no distension. There is no abdominal tenderness.   Musculoskeletal:         General: No tenderness or deformity. Normal range of motion.      Cervical back: Normal range of motion and neck supple.   Neurological: He is alert and oriented to person, place, and time. No cranial nerve deficit (cranial nerves II through XII grossly intact). Coordination normal.   Skin: Skin is warm and dry. No rash noted. He is not diaphoretic. No erythema. No pallor.   Psychiatric: His behavior is normal. Thought content normal.   Vitals reviewed.    LABS:  Lab Results   Component Value Date/Time    CHOLSTRLTOT 115 02/22/2019 07:06 AM    CHOLSTRLTOT 113 07/09/2012 07:21 AM    LDL 60 02/22/2019 07:06 AM    LDL 45 07/09/2012 07:21 AM    HDL 40 02/22/2019 07:06 AM    HDL 41 07/09/2012 07:21 AM    TRIGLYCERIDE 76 02/22/2019 07:06 AM    TRIGLYCERIDE 135 07/09/2012 07:21 AM       Lab Results   Component Value Date/Time    WBC 9.3 07/07/2021 10:36 AM    RBC 4.41 (L) 07/07/2021 10:36 AM    HEMOGLOBIN 14.5 07/07/2021 10:36 AM    HEMATOCRIT 42.0 07/07/2021 10:36 AM    MCV 95.2 07/07/2021 10:36 AM    NEUTSPOLYS 68.80 06/21/2021 08:39 AM    LYMPHOCYTES 19.90 (L) 06/21/2021 08:39 AM    MONOCYTES 9.50 06/21/2021 08:39 AM    EOSINOPHILS 1.10 06/21/2021 08:39 AM    BASOPHILS 0.40 06/21/2021 08:39 AM      Lab Results   Component Value Date/Time    SODIUM 137 08/02/2021 10:09 AM    POTASSIUM 4.1 08/02/2021 10:09 AM    CHLORIDE 100 08/02/2021 10:09 AM    CO2 24 08/02/2021 10:09 AM    GLUCOSE 97 08/02/2021 10:09 AM    BUN 20 08/02/2021 10:09 AM    CREATININE 0.92 08/02/2021 10:09 AM    CREATININE 0.77 07/09/2012 07:21 AM    BUNCREATRAT 22 02/18/2021 09:32 AM    BUNCREATRAT 14 07/09/2012 07:21 AM         Lab Results   Component Value Date/Time    ALKPHOSPHAT 94 06/21/2021 08:39 AM    ASTSGOT 32 06/21/2021 08:39 AM    ALTSGPT 45 06/21/2021 08:39 AM    TBILIRUBIN 1.3 06/21/2021 08:39 AM      No results found for: BNPBTYPENAT   No results found for: TSH  Lab Results   Component Value Date/Time    PROTHROMBTM 19.1 (H) 06/21/2021 08:39 AM    INR 1.65 (H) 06/21/2021 08:39 AM        ECHO CONCLUSIONS (7/7/2021):  Limited Echocardiogram:  No pericardial effusion seen.  Severely reduced left ventricular systolic function.  Left ventricular ejection fraction is visually estimated to be 25%.  Dilated right ventricle.     Compared to the images of the prior study done  6/25/2021: There has   been no significant change.              Assessment & Plan     1. Ischemic cardiomyopathy  EC-ECHOCARDIOGRAM COMPLETE W/O CONT   2. PAF (paroxysmal atrial fibrillation) (HCC)     3. Chronic anticoagulation     4. Essential hypertension     5. Chronic systolic heart failure (HCC)     6. Coronary artery disease involving native coronary artery of native heart without angina pectoris         Medical Decision Making: Today's Assessment/Status/Plan:        Overall symptomatically doing very well with NYHA class I-II symptoms from his ischemic cardiomyopathy with a recent stress test showing scar and no ischemia.  Maintaining sinus rhythm after his PVI and tolerating anticoagulation well.  We discussed Entresto therapy given his reduced ejection fraction however he does have a history of cough with ACE inhibitor which is an interval component of  Entresto.  Therefore we will assess his ejection fraction after 3 months from prior echocardiogram now that he is maintaining sinus rhythm.  If his ejection fraction does not improve to greater than 35% on current therapy I would then recommend a trial of Entresto despite his previous intolerance of ACE inhibitor's.  Subsequently 3 months.  Until EF is reassessed and if still not above 35% then consideration for ICD for primary prevention under the care of Dr. Forrester would be recommended.  Continue other medical therapy including statin for goal LDL less than 70.  Follow-up in 3 months after echocardiogram.  Follow-up with Dr. Rosas as scheduled.

## 2021-09-29 ENCOUNTER — OFFICE VISIT (OUTPATIENT)
Dept: CARDIOLOGY | Facility: MEDICAL CENTER | Age: 66
End: 2021-09-29
Payer: MEDICARE

## 2021-09-29 VITALS
DIASTOLIC BLOOD PRESSURE: 70 MMHG | WEIGHT: 236 LBS | SYSTOLIC BLOOD PRESSURE: 104 MMHG | HEART RATE: 66 BPM | BODY MASS INDEX: 34.96 KG/M2 | HEIGHT: 69 IN | OXYGEN SATURATION: 96 % | RESPIRATION RATE: 16 BRPM

## 2021-09-29 DIAGNOSIS — I48.0 PAF (PAROXYSMAL ATRIAL FIBRILLATION) (HCC): ICD-10-CM

## 2021-09-29 PROCEDURE — 99215 OFFICE O/P EST HI 40 MIN: CPT | Mod: 25 | Performed by: INTERNAL MEDICINE

## 2021-09-29 ASSESSMENT — FIBROSIS 4 INDEX: FIB4 SCORE: 1.31

## 2021-09-29 NOTE — PROGRESS NOTES
Arrhythmia Clinic Note (Established patient)    DOS: 9/29/2021    Chief complaint/Reason for consult: Afib    Interval History: 65 y/o M with persistent Afib and HFrEF, h/o CAD and MI. He thought he had recurrence of Afib but this resolved. Still SOB with exertion.    ROS (+ highlighted in bold):  Constitutional: Fevers/chills/fatigue/weightloss  HEENT: Blurry vision/eye pain/sore throat/hearing loss  Respiratory: Shortness of breath/cough  Cardiovascular: Chest pain/palpitations/edema/orthopnea/syncope  GI: Nausea/vomitting/diarrhea  MSK: Arthralgias/myagias/muscle weakness  Skin: Rash/sores  Neurological: Numbness/tremors/vertigo  Endocrine: Excessive thirst/polyuria/cold intolerance/heat intolerance  Psych: Depression/anxiety    Past Medical History:   Diagnosis Date   • Arrhythmia 2020    A fib   • Arthritis     fingers   • CAD (coronary artery disease) 7/2/2012   • Chronic anticoagulation 7/2/2012   • Congestive heart failure (HCC)    • Dyslipidemia 7/2/2012   • Essential hypertension, benign 7/2/2012   • High cholesterol    • Insomnia     wakes up 2-5 times a night , wakes up to use restroom and sometimes just wakes up    • Ischemic cardiomyopathy 7/2/2012   • Left ventricular thrombosis 7/2/2012   • Myocardial infarct (HCC) 2006   • Pericarditis 7/2/2012   • Pneumonia     history of as a child   • Sleep apnea     told by MD that he has it, scheduled for sleep study    • Snoring     no sleep study done   • SOB (shortness of breath)     no c/o today; on exertion; pt uses 2L o2 qhs only; pt does not know provider   • Stented coronary artery 7/2/2012       Past Surgical History:   Procedure Laterality Date   • SHOULDER DECOMPRESSION ARTHROSCOPIC Right 11/27/2018    Procedure: SHOULDER DECOMPRESSION ARTHROSCOPIC- SUBACROMIAL CONVERTED TO OPEN;  Surgeon: Ni Weir M.D.;  Location: SURGERY [facility];  Service: Orthopedics   • SHOULDER ARTHROSCOPY W/ ROTATOR CUFF REPAIR Right 11/27/2018    Procedure:  OPEN ROTATOR CUFF REPAIR;  Surgeon: Ni Weir M.D.;  Location: SURGERY [facility];  Service: Orthopedics   • CARDIAC CATH, LEFT HEART     • CARPAL TUNNEL RELEASE Bilateral    • STENT PLACEMENT      cardiac stent   • VASECTOMY         Social History     Socioeconomic History   • Marital status:      Spouse name: Not on file   • Number of children: Not on file   • Years of education: Not on file   • Highest education level: Not on file   Occupational History   • Not on file   Tobacco Use   • Smoking status: Never Smoker   • Smokeless tobacco: Former User     Types: Chew   Vaping Use   • Vaping Use: Never used   Substance and Sexual Activity   • Alcohol use: Not Currently     Comment: occ   • Drug use: Not Currently   • Sexual activity: Not on file   Other Topics Concern   • Not on file   Social History Narrative   • Not on file     Social Determinants of Health     Financial Resource Strain:    • Difficulty of Paying Living Expenses:    Food Insecurity:    • Worried About Running Out of Food in the Last Year:    • Ran Out of Food in the Last Year:    Transportation Needs:    • Lack of Transportation (Medical):    • Lack of Transportation (Non-Medical):    Physical Activity:    • Days of Exercise per Week:    • Minutes of Exercise per Session:    Stress:    • Feeling of Stress :    Social Connections:    • Frequency of Communication with Friends and Family:    • Frequency of Social Gatherings with Friends and Family:    • Attends [employer] Services:    • Active Member of Clubs or Organizations:    • Attends Club or Organization Meetings:    • Marital Status:    Intimate Partner Violence:    • Fear of Current or Ex-Partner:    • Emotionally Abused:    • Physically Abused:    • Sexually Abused:        History reviewed. No pertinent family history.    Allergies   Allergen Reactions   • Other Environmental      Hayfever       Current Outpatient Medications   Medication Sig Dispense Refill   •  "irbesartan (AVAPRO) 150 MG Tab Take 0.5 Tablets by mouth every day. 45 tablet 3   • torsemide (DEMADEX) 20 MG Tab Take 0.5 tab in AM, 1 tab in  tablet 3   • rivaroxaban (XARELTO) 20 MG Tab tablet Take 1 Tab by mouth with dinner. 90 Tab 3   • simvastatin (ZOCOR) 40 MG Tab Take 1 Tab by mouth every day. 90 Tab 3   • carvedilol (COREG) 6.25 MG Tab Take 1 Tab by mouth 2 times a day, with meals. (Patient taking differently: Take 3.125 mg by mouth 2 times a day with meals.) 60 Tab 3   • nitroglycerin (NITROSTAT) 0.4 MG SL Tab Place 1 Tab under tongue as needed for Chest Pain. 25 Tab 3     No current facility-administered medications for this visit.       Physical Exam:  Vitals:    09/29/21 0818   BP: 104/70   BP Location: Left arm   Patient Position: Sitting   BP Cuff Size: Adult   Pulse: 66   Resp: 16   SpO2: 96%   Weight: 107 kg (236 lb)   Height: 1.753 m (5' 9\")     General appearance: NAD, conversant   Eyes: anicteric sclerae, moist conjunctivae; no lid-lag; PERRLA  HENT: Atraumatic; oropharynx clear with moist mucous membranes and no mucosal ulcerations; normal hard and soft palate  Neck: Trachea midline; FROM, supple, no thyromegaly or lymphadenopathy  Lungs: CTA, with normal respiratory effort and no intercostal retractions  CV: RRR, no MRGs, no JVD  Abdomen: Soft, non-tender; no masses or HSM  Extremities: No peripheral edema or extremity lymphadenopathy  Skin: Normal temperature, turgor and texture; no rash, ulcers or subcutaneous nodules  Psych: Appropriate affect, alert and oriented to person, place and time    Data:  Lipids:   Lab Results   Component Value Date/Time    CHOLSTRLTOT 115 02/22/2019 07:06 AM    CHOLSTRLTOT 113 07/09/2012 07:21 AM    TRIGLYCERIDE 76 02/22/2019 07:06 AM    TRIGLYCERIDE 135 07/09/2012 07:21 AM    HDL 40 02/22/2019 07:06 AM    HDL 41 07/09/2012 07:21 AM    LDL 60 02/22/2019 07:06 AM    LDL 45 07/09/2012 07:21 AM        BMP:  Lab Results   Component Value Date/Time    SODIUM 137 " 08/02/2021 1009    POTASSIUM 4.1 08/02/2021 1009    CHLORIDE 100 08/02/2021 1009    CO2 24 08/02/2021 1009    GLUCOSE 97 08/02/2021 1009    BUN 20 08/02/2021 1009    CREATININE 0.92 08/02/2021 1009    CALCIUM 8.9 08/02/2021 1009    ANION 13.0 08/02/2021 1009        TSH:   No results found for: TSHULTRASEN     THYROXINE (T4):   No results found for: FREEDIR     CBC:   Lab Results   Component Value Date/Time    WBC 9.3 07/07/2021 10:36 AM    RBC 4.41 (L) 07/07/2021 10:36 AM    HEMOGLOBIN 14.5 07/07/2021 10:36 AM    HEMATOCRIT 42.0 07/07/2021 10:36 AM    MCV 95.2 07/07/2021 10:36 AM    MCH 32.9 07/07/2021 10:36 AM    MCHC 34.5 07/07/2021 10:36 AM    RDW 42.5 07/07/2021 10:36 AM    PLATELETCT 240 07/07/2021 10:36 AM    MPV 9.7 07/07/2021 10:36 AM    NEUTSPOLYS 68.80 06/21/2021 08:39 AM    LYMPHOCYTES 19.90 (L) 06/21/2021 08:39 AM    MONOCYTES 9.50 06/21/2021 08:39 AM    EOSINOPHILS 1.10 06/21/2021 08:39 AM    BASOPHILS 0.40 06/21/2021 08:39 AM    IMMGRAN 0.30 06/21/2021 08:39 AM    IMMGRAN 0 02/22/2019 07:06 AM    NRBC 0.00 06/21/2021 08:39 AM    NEUTS 5.20 06/21/2021 08:39 AM    NEUTS 7.7 (H) 02/22/2019 07:06 AM    LYMPHS 1.50 06/21/2021 08:39 AM    LYMPHS 2.1 02/22/2019 07:06 AM    MONOS 0.72 06/21/2021 08:39 AM    MONOS 0.8 02/22/2019 07:06 AM    EOS 0.08 06/21/2021 08:39 AM    EOS 0.2 02/22/2019 07:06 AM    BASO 0.03 06/21/2021 08:39 AM    BASO 0.0 02/22/2019 07:06 AM    IMMGRANAB 0.02 06/21/2021 08:39 AM    IMMGRANAB 0.0 02/22/2019 07:06 AM    NRBCAB 0.00 06/21/2021 08:39 AM        CBC w/o DIFF  Lab Results   Component Value Date/Time    WBC 9.3 07/07/2021 10:36 AM    RBC 4.41 (L) 07/07/2021 10:36 AM    HEMOGLOBIN 14.5 07/07/2021 10:36 AM    MCV 95.2 07/07/2021 10:36 AM    MCH 32.9 07/07/2021 10:36 AM    MCHC 34.5 07/07/2021 10:36 AM    RDW 42.5 07/07/2021 10:36 AM    MPV 9.7 07/07/2021 10:36 AM       Prior echo/stress reviewed: EF 25%  EKG interpreted by me: NSR with anterior infarct, old    Impression/Plan:  1.  PAF (paroxysmal atrial fibrillation) (HCC)  EKG     1. Persistent Afib s/p ablation  2. ICM EF 25%  3. HFrEF  4. OAC use high risk med    - NSR today  - Discussed ICM and low EF. If echo shows EF <35% despite GDMT and ablation, recommend primary prevention ICD.  - The risk, benefits, and alternatives to ICD placement were discussed in great detail, specific risks mentioned including bleeding, infection, cardiac perforation with possible tamponade requiring pericardiocentesis or open heart surgery. The [place] patient decision making tool was used to decide on ICD implantation.  In addition the possibility of lead dislodgment, inappropriate shocks, pneumothorax, hemothorax were discussed. Also mentioned were the possibility of death, stroke, and myocardial infarction.   - Continue Xarelto for now    FV in 6 months, sooner if needs ICD    Conrado Rosas MD  Cardiac Electrophysiology

## 2021-10-05 LAB — EKG IMPRESSION: NORMAL

## 2021-10-05 PROCEDURE — 93000 ELECTROCARDIOGRAM COMPLETE: CPT | Performed by: INTERNAL MEDICINE

## 2021-11-09 ENCOUNTER — HOSPITAL ENCOUNTER (OUTPATIENT)
Dept: CARDIOLOGY | Facility: MEDICAL CENTER | Age: 66
End: 2021-11-09
Attending: INTERNAL MEDICINE
Payer: MEDICARE

## 2021-11-09 DIAGNOSIS — I25.5 ISCHEMIC CARDIOMYOPATHY: ICD-10-CM

## 2021-11-09 PROCEDURE — 700117 HCHG RX CONTRAST REV CODE 255: Performed by: INTERNAL MEDICINE

## 2021-11-09 PROCEDURE — 93306 TTE W/DOPPLER COMPLETE: CPT

## 2021-11-09 RX ADMIN — HUMAN ALBUMIN MICROSPHERES AND PERFLUTREN 3 ML: 10; .22 INJECTION, SOLUTION INTRAVENOUS at 14:57

## 2021-11-10 DIAGNOSIS — I10 ESSENTIAL HYPERTENSION: ICD-10-CM

## 2021-11-10 DIAGNOSIS — I10 ESSENTIAL HYPERTENSION, BENIGN: Chronic | ICD-10-CM

## 2021-11-10 LAB
LV EJECT FRACT  99904: 45
LV EJECT FRACT MOD 2C 99903: 46.06
LV EJECT FRACT MOD 4C 99902: 50.1
LV EJECT FRACT MOD BP 99901: 52.23

## 2021-11-10 PROCEDURE — 93306 TTE W/DOPPLER COMPLETE: CPT | Mod: 26 | Performed by: INTERNAL MEDICINE

## 2021-11-12 RX ORDER — CARVEDILOL 6.25 MG/1
TABLET ORAL
Qty: 180 TABLET | Refills: 3 | Status: SHIPPED | OUTPATIENT
Start: 2021-11-12 | End: 2023-01-10

## 2021-11-22 ENCOUNTER — OFFICE VISIT (OUTPATIENT)
Dept: CARDIOLOGY | Facility: MEDICAL CENTER | Age: 66
End: 2021-11-22
Payer: MEDICARE

## 2021-11-22 VITALS
OXYGEN SATURATION: 98 % | DIASTOLIC BLOOD PRESSURE: 68 MMHG | BODY MASS INDEX: 34.36 KG/M2 | HEIGHT: 69 IN | WEIGHT: 232 LBS | SYSTOLIC BLOOD PRESSURE: 106 MMHG | HEART RATE: 66 BPM

## 2021-11-22 DIAGNOSIS — Z79.01 CHRONIC ANTICOAGULATION: ICD-10-CM

## 2021-11-22 DIAGNOSIS — I25.10 CORONARY ARTERY DISEASE DUE TO LIPID RICH PLAQUE: ICD-10-CM

## 2021-11-22 DIAGNOSIS — I48.0 PAF (PAROXYSMAL ATRIAL FIBRILLATION) (HCC): ICD-10-CM

## 2021-11-22 DIAGNOSIS — I25.83 CORONARY ARTERY DISEASE DUE TO LIPID RICH PLAQUE: ICD-10-CM

## 2021-11-22 DIAGNOSIS — I25.5 ISCHEMIC CARDIOMYOPATHY: Chronic | ICD-10-CM

## 2021-11-22 DIAGNOSIS — E78.5 DYSLIPIDEMIA: Chronic | ICD-10-CM

## 2021-11-22 DIAGNOSIS — I10 ESSENTIAL HYPERTENSION, BENIGN: Chronic | ICD-10-CM

## 2021-11-22 PROCEDURE — RXMED WILLOW AMBULATORY MEDICATION CHARGE: Performed by: INTERNAL MEDICINE

## 2021-11-22 PROCEDURE — 99214 OFFICE O/P EST MOD 30 MIN: CPT | Performed by: INTERNAL MEDICINE

## 2021-11-22 ASSESSMENT — FIBROSIS 4 INDEX: FIB4 SCORE: 1.31

## 2021-11-22 NOTE — PROGRESS NOTES
Chief Complaint   Patient presents with   • Coronary Artery Disease     follow up   • Atrial Fibrillation       Subjective     Martín Grijalva is a 66 y.o. male who presents today for initial visit in follow-up of coronary artery disease ischemic cardiomyopathy and PAF.  He has a history of anterior MI status post primary PCI 2008 with subsequent ischemic cardiomyopathy with mostly recovered EF.  Unfortunately he developed atrial fibrillation recently which was coincident with the development of a recurrence of his reduced ejection fraction.  He was maintained on Xarelto and has been cardioverted and underwent atrial fibrillation ablation by Dr. Rosas 6/2021.  Currently maintaining sinus rhythm tolerating his anticoagulation well aside from cost.  Has a history of cough with ACE inhibitor.    In the interim study came back no changes.  Echocardiogram subsequently returned with a dramatic improvement in his ejection fraction to 45 to 50% from prior 25%.  Overall feeling well.  NYHA class II symptomology.  No significant A. fib.  Continues to find the cost of anticoagulation with a DOAC burdensome.    Past Medical History:   Diagnosis Date   • Arrhythmia 2020    A fib   • Arthritis     fingers   • CAD (coronary artery disease) 7/2/2012   • Chronic anticoagulation 7/2/2012   • Congestive heart failure (HCC)    • Dyslipidemia 7/2/2012   • Essential hypertension, benign 7/2/2012   • High cholesterol    • Insomnia     wakes up 2-5 times a night , wakes up to use restroom and sometimes just wakes up    • Ischemic cardiomyopathy 7/2/2012   • Left ventricular thrombosis 7/2/2012   • Myocardial infarct (HCC) 2006   • Pericarditis 7/2/2012   • Pneumonia     history of as a child   • Sleep apnea     told by MD that he has it, scheduled for sleep study    • Snoring     no sleep study done   • SOB (shortness of breath)     no c/o today; on exertion; pt uses 2L o2 qhs only; pt does not know provider   • Stented coronary artery  7/2/2012     Past Surgical History:   Procedure Laterality Date   • SHOULDER DECOMPRESSION ARTHROSCOPIC Right 11/27/2018    Procedure: SHOULDER DECOMPRESSION ARTHROSCOPIC- SUBACROMIAL CONVERTED TO OPEN;  Surgeon: Ni Weir M.D.;  Location: SURGERY [facility];  Service: Orthopedics   • SHOULDER ARTHROSCOPY W/ ROTATOR CUFF REPAIR Right 11/27/2018    Procedure: OPEN ROTATOR CUFF REPAIR;  Surgeon: Ni Weir M.D.;  Location: SURGERY [facility];  Service: Orthopedics   • CARDIAC CATH, LEFT HEART     • CARPAL TUNNEL RELEASE Bilateral    • STENT PLACEMENT      cardiac stent   • VASECTOMY       No family history on file.  Social History     Socioeconomic History   • Marital status:      Spouse name: Not on file   • Number of children: Not on file   • Years of education: Not on file   • Highest education level: Not on file   Occupational History   • Not on file   Tobacco Use   • Smoking status: Never Smoker   • Smokeless tobacco: Former User     Types: Chew   Vaping Use   • Vaping Use: Never used   Substance and Sexual Activity   • Alcohol use: Not Currently     Comment: occ   • Drug use: Not Currently   • Sexual activity: Not on file   Other Topics Concern   • Not on file   Social History Narrative   • Not on file     Social Determinants of Health     Financial Resource Strain:    • Difficulty of Paying Living Expenses: Not on file   Food Insecurity:    • Worried About Running Out of Food in the Last Year: Not on file   • Ran Out of Food in the Last Year: Not on file   Transportation Needs:    • Lack of Transportation (Medical): Not on file   • Lack of Transportation (Non-Medical): Not on file   Physical Activity:    • Days of Exercise per Week: Not on file   • Minutes of Exercise per Session: Not on file   Stress:    • Feeling of Stress : Not on file   Social Connections:    • Frequency of Communication with Friends and Family: Not on file   • Frequency of Social Gatherings with Friends  "and Family: Not on file   • Attends [employer] Services: Not on file   • Active Member of Clubs or Organizations: Not on file   • Attends Club or Organization Meetings: Not on file   • Marital Status: Not on file   Intimate Partner Violence:    • Fear of Current or Ex-Partner: Not on file   • Emotionally Abused: Not on file   • Physically Abused: Not on file   • Sexually Abused: Not on file   Housing Stability:    • Unable to Pay for Housing in the Last Year: Not on file   • Number of Places Lived in the Last Year: Not on file   • Unstable Housing in the Last Year: Not on file     Allergies   Allergen Reactions   • Other Environmental      Hayfever     Outpatient Encounter Medications as of 11/22/2021   Medication Sig Dispense Refill   • rivaroxaban (XARELTO) 20 MG Tab tablet Take 1 Tablet by mouth with dinner. 30 Tablet 0   • carvedilol (COREG) 6.25 MG Tab TAKE 1 TABLET BY MOUTH TWICE DAILY WITH MEALS 180 Tablet 3   • irbesartan (AVAPRO) 150 MG Tab Take 0.5 Tablets by mouth every day. 45 tablet 3   • torsemide (DEMADEX) 20 MG Tab Take 0.5 tab in AM, 1 tab in PM (Patient taking differently: 1 tab in PM) 135 tablet 3   • rivaroxaban (XARELTO) 20 MG Tab tablet Take 1 Tab by mouth with dinner. 90 Tab 3   • simvastatin (ZOCOR) 40 MG Tab Take 1 Tab by mouth every day. 90 Tab 3   • nitroglycerin (NITROSTAT) 0.4 MG SL Tab Place 1 Tab under tongue as needed for Chest Pain. 25 Tab 3     No facility-administered encounter medications on file as of 11/22/2021.     Review of Systems   All other systems reviewed and are negative.             Objective     /68 (BP Location: Left arm, Patient Position: Sitting)   Pulse 66   Ht 1.753 m (5' 9\")   Wt 105 kg (232 lb)   SpO2 98%   BMI 34.26 kg/m²     Physical Exam  Vitals reviewed.   Constitutional:       General: He is not in acute distress.     Appearance: He is well-developed. He is not diaphoretic.   HENT:      Head: Normocephalic and atraumatic.      Right Ear: External " ear normal.      Left Ear: External ear normal.   Eyes:      General: No scleral icterus.        Right eye: No discharge.         Left eye: No discharge.      Conjunctiva/sclera: Conjunctivae normal.      Pupils: Pupils are equal, round, and reactive to light.   Neck:      Thyroid: No thyromegaly.      Vascular: No JVD.      Trachea: No tracheal deviation.   Cardiovascular:      Rate and Rhythm: Normal rate and regular rhythm.      Chest Wall: PMI is not displaced.      Pulses:           Carotid pulses are 2+ on the right side and 2+ on the left side.       Radial pulses are 2+ on the left side.        Popliteal pulses are 2+ on the right side and 2+ on the left side.        Dorsalis pedis pulses are 2+ on the right side and 2+ on the left side.        Posterior tibial pulses are 2+ on the right side and 2+ on the left side.      Heart sounds: No murmur heard.  No friction rub. No gallop.    Pulmonary:      Effort: Pulmonary effort is normal. No respiratory distress.      Breath sounds: Normal breath sounds. No wheezing or rales.   Chest:      Chest wall: No tenderness.   Abdominal:      General: Bowel sounds are normal. There is no distension.      Palpations: Abdomen is soft.      Tenderness: There is no abdominal tenderness.   Musculoskeletal:         General: No tenderness or deformity. Normal range of motion.      Cervical back: Normal range of motion and neck supple.   Skin:     General: Skin is warm and dry.      Coloration: Skin is not pale.      Findings: No erythema or rash.   Neurological:      Mental Status: He is alert and oriented to person, place, and time.      Cranial Nerves: No cranial nerve deficit (cranial nerves II through XII grossly intact).      Coordination: Coordination normal.   Psychiatric:         Behavior: Behavior normal.         Thought Content: Thought content normal.       LABS:  Lab Results   Component Value Date/Time    CHOLSTRLTOT 115 02/22/2019 07:06 AM    CHOLSTRLTOT 113  07/09/2012 07:21 AM    LDL 60 02/22/2019 07:06 AM    LDL 45 07/09/2012 07:21 AM    HDL 40 02/22/2019 07:06 AM    HDL 41 07/09/2012 07:21 AM    TRIGLYCERIDE 76 02/22/2019 07:06 AM    TRIGLYCERIDE 135 07/09/2012 07:21 AM       Lab Results   Component Value Date/Time    WBC 9.3 07/07/2021 10:36 AM    RBC 4.41 (L) 07/07/2021 10:36 AM    HEMOGLOBIN 14.5 07/07/2021 10:36 AM    HEMATOCRIT 42.0 07/07/2021 10:36 AM    MCV 95.2 07/07/2021 10:36 AM    NEUTSPOLYS 68.80 06/21/2021 08:39 AM    LYMPHOCYTES 19.90 (L) 06/21/2021 08:39 AM    MONOCYTES 9.50 06/21/2021 08:39 AM    EOSINOPHILS 1.10 06/21/2021 08:39 AM    BASOPHILS 0.40 06/21/2021 08:39 AM     Lab Results   Component Value Date/Time    SODIUM 137 08/02/2021 10:09 AM    POTASSIUM 4.1 08/02/2021 10:09 AM    CHLORIDE 100 08/02/2021 10:09 AM    CO2 24 08/02/2021 10:09 AM    GLUCOSE 97 08/02/2021 10:09 AM    BUN 20 08/02/2021 10:09 AM    CREATININE 0.92 08/02/2021 10:09 AM    CREATININE 0.77 07/09/2012 07:21 AM    BUNCREATRAT 22 02/18/2021 09:32 AM    BUNCREATRAT 14 07/09/2012 07:21 AM         Lab Results   Component Value Date/Time    ALKPHOSPHAT 94 06/21/2021 08:39 AM    ASTSGOT 32 06/21/2021 08:39 AM    ALTSGPT 45 06/21/2021 08:39 AM    TBILIRUBIN 1.3 06/21/2021 08:39 AM      No results found for: BNPBTYPENAT   No results found for: TSH  Lab Results   Component Value Date/Time    PROTHROMBTM 19.1 (H) 06/21/2021 08:39 AM    INR 1.65 (H) 06/21/2021 08:39 AM      ECHO CONCLUSIONS (11/9/2021):  Compared to prior echo on 07/07/2021 - LVEF has improved from 25% to   45%.  Normal left ventricular chamber size.  The left ventricular ejection fraction is visually estimated to be 45%.  Mild tricuspid regurgitation.  Right ventricular systolic pressure is estimated to be 44 mmHg.  Dilated inferior vena cava with inspiratory collapse.    ECHO CONCLUSIONS (7/7/2021):  Limited Echocardiogram:  No pericardial effusion seen.  Severely reduced left ventricular systolic function.  Left  ventricular ejection fraction is visually estimated to be 25%.  Dilated right ventricle.     Compared to the images of the prior study done  6/25/2021: There has   been no significant change.              Assessment & Plan     1. Coronary artery disease due to lipid rich plaque     2. Essential hypertension, benign     3. Dyslipidemia     4. Ischemic cardiomyopathy     5. Chronic anticoagulation     6. PAF (paroxysmal atrial fibrillation) (HCC)         Medical Decision Making: Today's Assessment/Status/Plan:          Doing well stable NYHA class I-II symptoms and improved EF with restoration of sinus rhythm and medical therapy.  Would like to avoid Entresto as he has a history of cough intolerance with ACE inhibitor's in the past and we discussed that Entresto does contain an ACE inhibitor.  As he is continuing to improve recommend deferring this until he is demonstrated maximal recovery on his current medical and interventional regimen.  Continue anticoagulation.  Sample prescription written to help defray the cost.  Warfarin is an acceptable alternativ and is more cost effective.    Follow-up in 3 to 6 months.  We will recheck echocardiogram in 6 months.

## 2021-11-23 ENCOUNTER — PHARMACY VISIT (OUTPATIENT)
Dept: PHARMACY | Facility: MEDICAL CENTER | Age: 66
End: 2021-11-23
Payer: COMMERCIAL

## 2021-12-01 ENCOUNTER — OFFICE VISIT (OUTPATIENT)
Dept: SLEEP MEDICINE | Facility: MEDICAL CENTER | Age: 66
End: 2021-12-01
Payer: MEDICARE

## 2021-12-01 VITALS
HEIGHT: 69 IN | WEIGHT: 238 LBS | DIASTOLIC BLOOD PRESSURE: 60 MMHG | RESPIRATION RATE: 16 BRPM | HEART RATE: 61 BPM | OXYGEN SATURATION: 96 % | BODY MASS INDEX: 35.25 KG/M2 | SYSTOLIC BLOOD PRESSURE: 128 MMHG

## 2021-12-01 DIAGNOSIS — J45.20 MILD INTERMITTENT ASTHMA, UNSPECIFIED WHETHER COMPLICATED: ICD-10-CM

## 2021-12-01 DIAGNOSIS — I48.0 PAF (PAROXYSMAL ATRIAL FIBRILLATION) (HCC): ICD-10-CM

## 2021-12-01 DIAGNOSIS — I50.22 CHRONIC SYSTOLIC HEART FAILURE (HCC): ICD-10-CM

## 2021-12-01 DIAGNOSIS — G47.33 OBSTRUCTIVE SLEEP APNEA: ICD-10-CM

## 2021-12-01 DIAGNOSIS — I10 ESSENTIAL HYPERTENSION, BENIGN: Chronic | ICD-10-CM

## 2021-12-01 DIAGNOSIS — Z78.9 NONSMOKER: ICD-10-CM

## 2021-12-01 PROCEDURE — 99214 OFFICE O/P EST MOD 30 MIN: CPT | Performed by: NURSE PRACTITIONER

## 2021-12-01 RX ORDER — LEVALBUTEROL TARTRATE 45 UG/1
1-2 AEROSOL, METERED ORAL EVERY 4 HOURS PRN
Qty: 1 EACH | Refills: 5 | Status: SHIPPED | OUTPATIENT
Start: 2021-12-01 | End: 2022-07-14

## 2021-12-01 ASSESSMENT — FIBROSIS 4 INDEX: FIB4 SCORE: 1.31

## 2021-12-01 NOTE — PROGRESS NOTES
Chief Complaint   Patient presents with   • Apnea     last seen 6/1/2021        HPI:  Zenon Grijalva is a 66 y.o. year old male here today for follow-up on JOSE and mild asthma.  This is my first time seeing this patient.  Past medical history of CAD and ischemic cardiomyopathy with PAF.  He also has a history of an anterior MI post stenting 2008.  Remains on Xarelto chronically.  Also underwent cardioversion and ablation 2021 for A. fib.  Last OV 6/1/2021 with Dr. Canas    He is currently using CPAP 7 cm; Respironics and obtained 2021.  He has not registered his device to verify if it is part of the recall you.  We provided him with a letter today.  Compliance report 10/31/2021 through 11/29/2021 indicates 100% compliance, average nightly use 8 hours 49 minutes, minimal mask leak with an overall AHI of 4.7/h.  Reviewed with patient.  He tolerates mask and pressure well.  He denies daytime sleepiness or napping.  He feels he overall sleeps on therapy.  He does tend to wake 1-2 times at night on average.  He notes having cough intermittently if he wakes up at midnight.  He is not using any inhalers at this time.  He believes he has hayfever but unsure if he has seasonal allergies.  He notes shortness of breath to be intermittent with activity but does not happen every day.  He denies wheezing, chest pain, chest tightness.  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  Chest x-ray July 2021 noted cardiac silhouette to be enlarged, no discrete opacity, pleural fluid or pneumothorax.  Pleural plaque on the right.  Degenerative changes to the spine.  Reviewed with patient.  Non-smoker and due to no acute symptoms at this time we do not have to pursue further imaging but if patient would like we could further investigate with CT scan of chest but he declines.  Declines influenza vaccination    PULM & SLEEP HX:  Echo 11/9/2021 indicated LVEF improvement from 25 to 45%.  RVSP 44 mmHg  with a dilated inferior vena cava with inspiratory collapse.    PFT December 2020 noted mild to moderate obstructive lung disease with FEV1 2.1 L or 67%, FEV1/FVC ratio 72, FVC 2.93 L or 71%, %, TLC 91% and DLCO 124% predicted.  Patient did have a mild bronchodilator response.  He did undergo a trial of ICS/LABA but found no subjective benefit.      He had screening overnight oximetry showi ng desaturations for 100 minutes ninfa of 75% and was started on nighttime oxygen.  PSG 12/20 showed moderate JOSE with AHI:23/h and desaturations to 81% Sp02.  He was successfully titrated on CPAP: 7 cm of water with resolved JOSE.       ROS: As per HPI and otherwise negative if not stated.    Past Medical History:   Diagnosis Date   • Arrhythmia 2020    A fib   • Arthritis     fingers   • CAD (coronary artery disease) 7/2/2012   • Chronic anticoagulation 7/2/2012   • Congestive heart failure (HCC)    • Dyslipidemia 7/2/2012   • Essential hypertension, benign 7/2/2012   • High cholesterol    • Insomnia     wakes up 2-5 times a night , wakes up to use restroom and sometimes just wakes up    • Ischemic cardiomyopathy 7/2/2012   • Left ventricular thrombosis 7/2/2012   • Myocardial infarct (HCC) 2006   • Pericarditis 7/2/2012   • Pneumonia     history of as a child   • Sleep apnea     told by MD that he has it, scheduled for sleep study    • Snoring     no sleep study done   • SOB (shortness of breath)     no c/o today; on exertion; pt uses 2L o2 qhs only; pt does not know provider   • Stented coronary artery 7/2/2012       Past Surgical History:   Procedure Laterality Date   • SHOULDER DECOMPRESSION ARTHROSCOPIC Right 11/27/2018    Procedure: SHOULDER DECOMPRESSION ARTHROSCOPIC- SUBACROMIAL CONVERTED TO OPEN;  Surgeon: Ni Weir M.D.;  Location: SURGERY [facility];  Service: Orthopedics   • SHOULDER ARTHROSCOPY W/ ROTATOR CUFF REPAIR Right 11/27/2018    Procedure: OPEN ROTATOR CUFF REPAIR;  Surgeon: Ni RODRIGUEZ  BLADE Weir;  Location: SURGERY [facility];  Service: Orthopedics   • CARDIAC CATH, LEFT HEART     • CARPAL TUNNEL RELEASE Bilateral    • STENT PLACEMENT      cardiac stent   • VASECTOMY         History reviewed. No pertinent family history.    Social History     Socioeconomic History   • Marital status:      Spouse name: Not on file   • Number of children: Not on file   • Years of education: Not on file   • Highest education level: Not on file   Occupational History   • Not on file   Tobacco Use   • Smoking status: Never Smoker   • Smokeless tobacco: Former User     Types: Chew   Vaping Use   • Vaping Use: Never used   Substance and Sexual Activity   • Alcohol use: Not Currently     Comment: occ   • Drug use: Not Currently   • Sexual activity: Not on file   Other Topics Concern   • Not on file   Social History Narrative   • Not on file     Social Determinants of Health     Financial Resource Strain:    • Difficulty of Paying Living Expenses: Not on file   Food Insecurity:    • Worried About Running Out of Food in the Last Year: Not on file   • Ran Out of Food in the Last Year: Not on file   Transportation Needs:    • Lack of Transportation (Medical): Not on file   • Lack of Transportation (Non-Medical): Not on file   Physical Activity:    • Days of Exercise per Week: Not on file   • Minutes of Exercise per Session: Not on file   Stress:    • Feeling of Stress : Not on file   Social Connections:    • Frequency of Communication with Friends and Family: Not on file   • Frequency of Social Gatherings with Friends and Family: Not on file   • Attends [employer] Services: Not on file   • Active Member of Clubs or Organizations: Not on file   • Attends Club or Organization Meetings: Not on file   • Marital Status: Not on file   Intimate Partner Violence:    • Fear of Current or Ex-Partner: Not on file   • Emotionally Abused: Not on file   • Physically Abused: Not on file   • Sexually Abused: Not on file  "  Housing Stability:    • Unable to Pay for Housing in the Last Year: Not on file   • Number of Places Lived in the Last Year: Not on file   • Unstable Housing in the Last Year: Not on file       Allergies as of 12/01/2021 - Reviewed 12/01/2021   Allergen Reaction Noted   • Other environmental  07/06/2021        Vitals:  /60 (BP Location: Left arm, Patient Position: Sitting, BP Cuff Size: Adult)   Pulse 61   Resp 16   Ht 1.753 m (5' 9\")   Wt 108 kg (238 lb)   SpO2 96%     Current medications as of today   Current Outpatient Medications   Medication Sig Dispense Refill   • carvedilol (COREG) 6.25 MG Tab TAKE 1 TABLET BY MOUTH TWICE DAILY WITH MEALS 180 Tablet 3   • irbesartan (AVAPRO) 150 MG Tab Take 0.5 Tablets by mouth every day. 45 tablet 3   • torsemide (DEMADEX) 20 MG Tab Take 0.5 tab in AM, 1 tab in PM (Patient taking differently: 1 tab in PM) 135 tablet 3   • rivaroxaban (XARELTO) 20 MG Tab tablet Take 1 Tab by mouth with dinner. 90 Tab 3   • simvastatin (ZOCOR) 40 MG Tab Take 1 Tab by mouth every day. 90 Tab 3   • nitroglycerin (NITROSTAT) 0.4 MG SL Tab Place 1 Tab under tongue as needed for Chest Pain. 25 Tab 3     No current facility-administered medications for this visit.         Physical Exam:   Gen:           Alert and oriented, No apparent distress. Mood and affect appropriate, normal interaction with examiner.  Eyes:          PERRL, EOM intact, sclere white, conjunctive moist.  Ears:          Not examined. No lesions or deformities.  Hearing:     Grossly intact.  Nose:          Normal, no lesions or deformities.  Dentition:    Mask.  Oropharynx:   Mask.  Mallampati Classification: mask  Neck:        Supple, trachea midline, no masses.  Respiratory Effort: No intercostal retractions or use of accessory muscles.   Lung Auscultation:      Clear to auscultation bilaterally; no rales, rhonchi or wheezing.  CV:            Regular rate and rhythm. No murmurs, rubs or gallops.  Abd:           Not " examined.   Lymphadenopathy: Not examined.  Gait and Station: Normal.  Digits and Nails: No clubbing, cyanosis, petechiae, or nodes.   Cranial Nerves: II-XII grossly intact.  Skin:        No rashes, lesions or ulcers noted.               Ext:           No cyanosis or edema.      Assessment:  1. Obstructive sleep apnea     2. Mild intermittent asthma, unspecified whether complicated     3. Chronic systolic heart failure (HCC)     4. Essential hypertension, benign     5. BMI 35.0-35.9,adult  Height And Weight   6. Nonsmoker         Immunizations:    Flu:declines  Pneumovax 23:declines  Prevnar 13:declines  COVID-19: declines    Plan:  1.  Patient appears to be having mild asthma changes with symptoms notable at night.  He does have some ongoing shortness of breath during the day intermittently.  We will initiate levalbuterol HFA inhaler 1 to 2 puffs every 4-6 hours with spacer.  Patient may need prior Auth.  Advise against regular albuterol due to his history of paroxysmal atrial fibrillation and heart failure.  Prior Auth will be initiated.  Spirometry next office visit  2.  Discussed sleep and respiratory hygiene  3.  Continue CPAP nightly.  DME mask/supplies  4.  For primary care for the health concerns and cardiology to manage hypertension  5.  Encourage weight loss through diet and exercise  6.  Follow-up in 6 months with spirometry/compliance report, sooner if needed.    Please note that this dictation was created using voice recognition software. I have made every reasonable attempt to correct obvious errors, but it is possible there are errors of grammar and possibly content that I did not discover before finalizing the note.

## 2022-01-11 ENCOUNTER — TELEPHONE (OUTPATIENT)
Dept: SLEEP MEDICINE | Facility: MEDICAL CENTER | Age: 67
End: 2022-01-11

## 2022-01-11 NOTE — TELEPHONE ENCOUNTER
MEDICATION PRIOR AUTHORIZATION NEEDED:    1. Name of Medication: Xopenex HFA 45 mcg    2. Requested By (Name of Pharmacy): [facility]     3. Is insurance on file current? yes    4. What is the name & phone number of the 3rd party payor? [employer] [phone]

## 2022-01-19 ENCOUNTER — TELEPHONE (OUTPATIENT)
Dept: CARDIOLOGY | Facility: MEDICAL CENTER | Age: 67
End: 2022-01-19

## 2022-01-19 DIAGNOSIS — I48.19 OTHER PERSISTENT ATRIAL FIBRILLATION (HCC): ICD-10-CM

## 2022-01-19 DIAGNOSIS — I48.0 PAROXYSMAL ATRIAL FIBRILLATION (HCC): ICD-10-CM

## 2022-01-19 DIAGNOSIS — E78.5 HYPERLIPIDEMIA, UNSPECIFIED HYPERLIPIDEMIA TYPE: ICD-10-CM

## 2022-01-19 DIAGNOSIS — I48.0 PAF (PAROXYSMAL ATRIAL FIBRILLATION) (HCC): ICD-10-CM

## 2022-01-19 RX ORDER — SIMVASTATIN 40 MG
40 TABLET ORAL
Qty: 90 TABLET | Refills: 3 | Status: SHIPPED | OUTPATIENT
Start: 2022-01-19 | End: 2023-01-09 | Stop reason: SDUPTHER

## 2022-01-19 NOTE — TELEPHONE ENCOUNTER
TW    Pt called stating he has changed pharmacies and needs a new 90 day prescription of rivaroxaban (XARELTO) 20 MG Tab tablet sent to [facility] on [address]. Pt has a week of medication left.    Thank you

## 2022-01-20 NOTE — TELEPHONE ENCOUNTER
TW    Pt is asking for a 90 day supply of:   simvastatin (ZOCOR) 40 MG Tab [[id number]]    Martín - [phone]    [facility] on [address], he is almost out meds.     Thank you,   Ritu MCCOLLUM

## 2022-02-18 ENCOUNTER — OFFICE VISIT (OUTPATIENT)
Dept: CARDIOLOGY | Facility: MEDICAL CENTER | Age: 67
End: 2022-02-18
Payer: MEDICARE

## 2022-02-18 VITALS
HEIGHT: 69 IN | OXYGEN SATURATION: 97 % | HEART RATE: 60 BPM | SYSTOLIC BLOOD PRESSURE: 112 MMHG | RESPIRATION RATE: 16 BRPM | BODY MASS INDEX: 35.16 KG/M2 | DIASTOLIC BLOOD PRESSURE: 70 MMHG | WEIGHT: 237.4 LBS

## 2022-02-18 DIAGNOSIS — I10 ESSENTIAL HYPERTENSION: ICD-10-CM

## 2022-02-18 DIAGNOSIS — E78.5 HYPERLIPIDEMIA, UNSPECIFIED HYPERLIPIDEMIA TYPE: ICD-10-CM

## 2022-02-18 DIAGNOSIS — I48.0 PAF (PAROXYSMAL ATRIAL FIBRILLATION) (HCC): ICD-10-CM

## 2022-02-18 DIAGNOSIS — I25.5 ISCHEMIC CARDIOMYOPATHY: ICD-10-CM

## 2022-02-18 DIAGNOSIS — Z79.01 CHRONIC ANTICOAGULATION: ICD-10-CM

## 2022-02-18 PROCEDURE — 99214 OFFICE O/P EST MOD 30 MIN: CPT | Performed by: INTERNAL MEDICINE

## 2022-02-18 ASSESSMENT — FIBROSIS 4 INDEX: FIB4 SCORE: 1.31

## 2022-02-18 NOTE — PROGRESS NOTES
Chief Complaint   Patient presents with   • Cardiomyopathy (Ischemic)   • Atrial Fibrillation     F/V Dx: PAF (paroxysmal atrial fibrillation) (HCC)     • CHF (Systolic)       Subjective     Martín Grijalva is a 66 y.o. male who presents today for follow-up of coronary artery disease ischemic cardiomyopathy and PAF.  He has a history of anterior MI status post primary PCI 2008 with subsequent ischemic cardiomyopathy with mostly recovered EF.  Unfortunately he developed atrial fibrillation recently which was coincident with the development of a recurrence of his reduced ejection fraction.  He was maintained on Xarelto and has been cardioverted and underwent atrial fibrillation ablation by Dr. Rosas 6/2021.  Currently maintaining sinus rhythm tolerating his anticoagulation well aside from cost.  Has a history of cough with ACE inhibitor. Echocardiogram subsequently returned with a dramatic improvement in his ejection fraction to 45 to 50% from prior 25%.      In the interim feels well with no symptoms no A. fib and tolerating anticoagulation well.  Active and fishing during this winter.    Past Medical History:   Diagnosis Date   • Arrhythmia 2020    A fib   • Arthritis     fingers   • CAD (coronary artery disease) 7/2/2012   • Chronic anticoagulation 7/2/2012   • Congestive heart failure (HCC)    • Dyslipidemia 7/2/2012   • Essential hypertension, benign 7/2/2012   • High cholesterol    • Insomnia     wakes up 2-5 times a night , wakes up to use restroom and sometimes just wakes up    • Ischemic cardiomyopathy 7/2/2012   • Left ventricular thrombosis 7/2/2012   • Myocardial infarct (HCC) 2006   • Pericarditis 7/2/2012   • Pneumonia     history of as a child   • Sleep apnea     told by MD that he has it, scheduled for sleep study    • Snoring     no sleep study done   • SOB (shortness of breath)     no c/o today; on exertion; pt uses 2L o2 qhs only; pt does not know provider   • Stented coronary artery 7/2/2012      Past Surgical History:   Procedure Laterality Date   • SHOULDER DECOMPRESSION ARTHROSCOPIC Right 11/27/2018    Procedure: SHOULDER DECOMPRESSION ARTHROSCOPIC- SUBACROMIAL CONVERTED TO OPEN;  Surgeon: Ni Weir M.D.;  Location: SURGERY [facility];  Service: Orthopedics   • SHOULDER ARTHROSCOPY W/ ROTATOR CUFF REPAIR Right 11/27/2018    Procedure: OPEN ROTATOR CUFF REPAIR;  Surgeon: Ni Weir M.D.;  Location: SURGERY [facility];  Service: Orthopedics   • CARDIAC CATH, LEFT HEART     • CARPAL TUNNEL RELEASE Bilateral    • STENT PLACEMENT      cardiac stent   • VASECTOMY       History reviewed. No pertinent family history.  Social History     Socioeconomic History   • Marital status:      Spouse name: Not on file   • Number of children: Not on file   • Years of education: Not on file   • Highest education level: Not on file   Occupational History   • Not on file   Tobacco Use   • Smoking status: Never Smoker   • Smokeless tobacco: Former User     Types: Chew   Vaping Use   • Vaping Use: Never used   Substance and Sexual Activity   • Alcohol use: Not Currently     Comment: occ   • Drug use: Never   • Sexual activity: Not on file   Other Topics Concern   • Not on file   Social History Narrative   • Not on file     Social Determinants of Health     Financial Resource Strain: Not on file   Food Insecurity: Not on file   Transportation Needs: Not on file   Physical Activity: Not on file   Stress: Not on file   Social Connections: Not on file   Intimate Partner Violence: Not on file   Housing Stability: Not on file     Allergies   Allergen Reactions   • Other Environmental      Hayfever     Outpatient Encounter Medications as of 2/18/2022   Medication Sig Dispense Refill   • rivaroxaban (XARELTO) 20 MG Tab tablet Take 1 Tablet by mouth with dinner. 90 Tablet 3   • simvastatin (ZOCOR) 40 MG Tab Take 1 Tablet by mouth every day. 90 Tablet 3   • levalbuterol (XOPENEX HFA) 45 MCG/ACT  "inhaler Inhale 1-2 Puffs every four hours as needed for Shortness of Breath. 1 Each 5   • carvedilol (COREG) 6.25 MG Tab TAKE 1 TABLET BY MOUTH TWICE DAILY WITH MEALS 180 Tablet 3   • irbesartan (AVAPRO) 150 MG Tab Take 0.5 Tablets by mouth every day. 45 tablet 3   • torsemide (DEMADEX) 20 MG Tab Take 0.5 tab in AM, 1 tab in PM (Patient taking differently: 1 tab in PM) 135 tablet 3   • nitroglycerin (NITROSTAT) 0.4 MG SL Tab Place 1 Tab under tongue as needed for Chest Pain. 25 Tab 3   • [DISCONTINUED] Spacer/Aero-Holding Chambers Device To be used with Xopenex HFA (Patient not taking: Reported on 2/18/2022) 1 Each 0     No facility-administered encounter medications on file as of 2/18/2022.     Review of Systems   All other systems reviewed and are negative.             Objective     /70 (BP Location: Left arm, Patient Position: Sitting, BP Cuff Size: Adult)   Pulse 60   Resp 16   Ht 1.753 m (5' 9\")   Wt 108 kg (237 lb 6.4 oz)   SpO2 97%   BMI 35.06 kg/m²     Physical Exam  Vitals reviewed.   Constitutional:       General: He is not in acute distress.     Appearance: He is well-developed. He is not diaphoretic.   HENT:      Head: Normocephalic and atraumatic.      Right Ear: External ear normal.      Left Ear: External ear normal.   Eyes:      General: No scleral icterus.        Right eye: No discharge.         Left eye: No discharge.      Conjunctiva/sclera: Conjunctivae normal.      Pupils: Pupils are equal, round, and reactive to light.   Neck:      Thyroid: No thyromegaly.      Vascular: No JVD.      Trachea: No tracheal deviation.   Cardiovascular:      Rate and Rhythm: Normal rate and regular rhythm.      Chest Wall: PMI is not displaced.      Pulses:           Carotid pulses are 2+ on the right side and 2+ on the left side.       Radial pulses are 2+ on the left side.        Popliteal pulses are 2+ on the right side and 2+ on the left side.        Dorsalis pedis pulses are 2+ on the right side " and 2+ on the left side.        Posterior tibial pulses are 2+ on the right side and 2+ on the left side.      Heart sounds: No murmur heard.    No friction rub. No gallop.   Pulmonary:      Effort: Pulmonary effort is normal. No respiratory distress.      Breath sounds: Normal breath sounds. No wheezing or rales.   Chest:      Chest wall: No tenderness.   Abdominal:      General: Bowel sounds are normal. There is no distension.      Palpations: Abdomen is soft.      Tenderness: There is no abdominal tenderness.   Musculoskeletal:         General: No tenderness or deformity. Normal range of motion.      Cervical back: Normal range of motion and neck supple.   Skin:     General: Skin is warm and dry.      Coloration: Skin is not pale.      Findings: No erythema or rash.   Neurological:      Mental Status: He is alert and oriented to person, place, and time.      Cranial Nerves: No cranial nerve deficit (cranial nerves II through XII grossly intact).      Coordination: Coordination normal.   Psychiatric:         Behavior: Behavior normal.         Thought Content: Thought content normal.       LABS:  Lab Results   Component Value Date/Time    CHOLSTRLTOT 115 02/22/2019 07:06 AM    CHOLSTRLTOT 113 07/09/2012 07:21 AM    LDL 60 02/22/2019 07:06 AM    LDL 45 07/09/2012 07:21 AM    HDL 40 02/22/2019 07:06 AM    HDL 41 07/09/2012 07:21 AM    TRIGLYCERIDE 76 02/22/2019 07:06 AM    TRIGLYCERIDE 135 07/09/2012 07:21 AM       Lab Results   Component Value Date/Time    WBC 9.3 07/07/2021 10:36 AM    RBC 4.41 (L) 07/07/2021 10:36 AM    HEMOGLOBIN 14.5 07/07/2021 10:36 AM    HEMATOCRIT 42.0 07/07/2021 10:36 AM    MCV 95.2 07/07/2021 10:36 AM    NEUTSPOLYS 68.80 06/21/2021 08:39 AM    LYMPHOCYTES 19.90 (L) 06/21/2021 08:39 AM    MONOCYTES 9.50 06/21/2021 08:39 AM    EOSINOPHILS 1.10 06/21/2021 08:39 AM    BASOPHILS 0.40 06/21/2021 08:39 AM     Lab Results   Component Value Date/Time    SODIUM 137 08/02/2021 10:09 AM    POTASSIUM  4.1 08/02/2021 10:09 AM    CHLORIDE 100 08/02/2021 10:09 AM    CO2 24 08/02/2021 10:09 AM    GLUCOSE 97 08/02/2021 10:09 AM    BUN 20 08/02/2021 10:09 AM    CREATININE 0.92 08/02/2021 10:09 AM    CREATININE 0.77 07/09/2012 07:21 AM    BUNCREATRAT 22 02/18/2021 09:32 AM    BUNCREATRAT 14 07/09/2012 07:21 AM         Lab Results   Component Value Date/Time    ALKPHOSPHAT 94 06/21/2021 08:39 AM    ASTSGOT 32 06/21/2021 08:39 AM    ALTSGPT 45 06/21/2021 08:39 AM    TBILIRUBIN 1.3 06/21/2021 08:39 AM      No results found for: BNPBTYPENAT   No results found for: TSH  Lab Results   Component Value Date/Time    PROTHROMBTM 19.1 (H) 06/21/2021 08:39 AM    INR 1.65 (H) 06/21/2021 08:39 AM      ECHO CONCLUSIONS (11/9/2021):  Compared to prior echo on 07/07/2021 - LVEF has improved from 25% to   45%.  Normal left ventricular chamber size.  The left ventricular ejection fraction is visually estimated to be 45%.  Mild tricuspid regurgitation.  Right ventricular systolic pressure is estimated to be 44 mmHg.  Dilated inferior vena cava with inspiratory collapse.    ECHO CONCLUSIONS (7/7/2021):  Limited Echocardiogram:  No pericardial effusion seen.  Severely reduced left ventricular systolic function.  Left ventricular ejection fraction is visually estimated to be 25%.  Dilated right ventricle.     Compared to the images of the prior study done  6/25/2021: There has   been no significant change.              Assessment & Plan     1. PAF (paroxysmal atrial fibrillation) (HCC)     2. Hyperlipidemia, unspecified hyperlipidemia type     3. Ischemic cardiomyopathy  EC-ECHOCARDIOGRAM COMPLETE W/O CONT   4. Essential hypertension     5. Chronic anticoagulation         Medical Decision Making: Today's Assessment/Status/Plan:          Doing well stable NYHA class I-II symptoms and improved EF with restoration of sinus rhythm and medical therapy.  Would like to avoid Entresto as he has a history of cough intolerance with ACE inhibitor's in  the past and we discussed that Entresto does contain an ACE inhibitor.      Continue current medical regimen and recheck echocardiogram in 3 months which will be 6 months from prior, to assess stability of EF recovery.  Indicates his cough has improved dramatically over this timeframe.  Continue anticoagulation and rate medications.  Anticoagulation profile is appropriate for renal function.

## 2022-03-15 DIAGNOSIS — J45.20 MILD INTERMITTENT ASTHMA, UNSPECIFIED WHETHER COMPLICATED: ICD-10-CM

## 2022-03-15 RX ORDER — INHALER, ASSIST DEVICES
SPACER (EA) MISCELLANEOUS
Qty: 1 EACH | Refills: 5 | Status: ON HOLD | OUTPATIENT
Start: 2022-03-15 | End: 2022-08-01

## 2022-03-15 NOTE — TELEPHONE ENCOUNTER
Caller Name: Zenon Grijalva                 Call Back Number: [phone] (home)         Patient approves a detailed voicemail message: N\A    Have we ever prescribed this med? Yes.  If yes, what date? 12/1/21     Last OV: 12/1/21 tl Casarez     Next OV: 6/1/22 tl Casarez     DX:    Mild intermittent asthma, unspecified whether complicated         Medications:  Current Outpatient Medications   Medication Sig Dispense Refill   • rivaroxaban (XARELTO) 20 MG Tab tablet Take 1 Tablet by mouth with dinner. 90 Tablet 3   • simvastatin (ZOCOR) 40 MG Tab Take 1 Tablet by mouth every day. 90 Tablet 3   • levalbuterol (XOPENEX HFA) 45 MCG/ACT inhaler Inhale 1-2 Puffs every four hours as needed for Shortness of Breath. 1 Each 5   • carvedilol (COREG) 6.25 MG Tab TAKE 1 TABLET BY MOUTH TWICE DAILY WITH MEALS 180 Tablet 3   • irbesartan (AVAPRO) 150 MG Tab Take 0.5 Tablets by mouth every day. 45 tablet 3   • torsemide (DEMADEX) 20 MG Tab Take 0.5 tab in AM, 1 tab in PM (Patient taking differently: 1 tab in PM) 135 tablet 3   • nitroglycerin (NITROSTAT) 0.4 MG SL Tab Place 1 Tab under tongue as needed for Chest Pain. 25 Tab 3     No current facility-administered medications for this visit.

## 2022-05-19 ENCOUNTER — APPOINTMENT (OUTPATIENT)
Dept: CARDIOLOGY | Facility: MEDICAL CENTER | Age: 67
DRG: 291 | End: 2022-05-19
Attending: HOSPITALIST
Payer: MEDICARE

## 2022-05-19 ENCOUNTER — APPOINTMENT (OUTPATIENT)
Dept: RADIOLOGY | Facility: MEDICAL CENTER | Age: 67
DRG: 291 | End: 2022-05-19
Attending: EMERGENCY MEDICINE
Payer: MEDICARE

## 2022-05-19 ENCOUNTER — HOSPITAL ENCOUNTER (INPATIENT)
Facility: MEDICAL CENTER | Age: 67
LOS: 3 days | DRG: 291 | End: 2022-05-22
Attending: EMERGENCY MEDICINE | Admitting: HOSPITALIST
Payer: MEDICARE

## 2022-05-19 DIAGNOSIS — R06.02 SHORTNESS OF BREATH: ICD-10-CM

## 2022-05-19 DIAGNOSIS — J39.8 CONGESTION OF UPPER RESPIRATORY TRACT: ICD-10-CM

## 2022-05-19 DIAGNOSIS — I50.9 ACUTE ON CHRONIC CONGESTIVE HEART FAILURE, UNSPECIFIED HEART FAILURE TYPE (HCC): ICD-10-CM

## 2022-05-19 DIAGNOSIS — R79.89 ELEVATED TROPONIN: ICD-10-CM

## 2022-05-19 DIAGNOSIS — R06.2 WHEEZING: ICD-10-CM

## 2022-05-19 PROBLEM — Z66 DNR (DO NOT RESUSCITATE): Status: ACTIVE | Noted: 2022-05-19

## 2022-05-19 PROBLEM — I50.23 ACUTE ON CHRONIC CLINICAL SYSTOLIC HEART FAILURE (HCC): Status: ACTIVE | Noted: 2022-05-19

## 2022-05-19 LAB
ALBUMIN SERPL BCP-MCNC: 4 G/DL (ref 3.2–4.9)
ALBUMIN/GLOB SERPL: 1.5 G/DL
ALP SERPL-CCNC: 124 U/L (ref 30–99)
ALT SERPL-CCNC: 33 U/L (ref 2–50)
ANION GAP SERPL CALC-SCNC: 12 MMOL/L (ref 7–16)
AST SERPL-CCNC: 33 U/L (ref 12–45)
BASOPHILS # BLD AUTO: 0.6 % (ref 0–1.8)
BASOPHILS # BLD: 0.03 K/UL (ref 0–0.12)
BILIRUB SERPL-MCNC: 1.7 MG/DL (ref 0.1–1.5)
BUN SERPL-MCNC: 20 MG/DL (ref 8–22)
CALCIUM SERPL-MCNC: 8.5 MG/DL (ref 8.4–10.2)
CHLORIDE SERPL-SCNC: 101 MMOL/L (ref 96–112)
CO2 SERPL-SCNC: 24 MMOL/L (ref 20–33)
CREAT SERPL-MCNC: 0.92 MG/DL (ref 0.5–1.4)
EKG IMPRESSION: NORMAL
EOSINOPHIL # BLD AUTO: 0.1 K/UL (ref 0–0.51)
EOSINOPHIL NFR BLD: 1.9 % (ref 0–6.9)
ERYTHROCYTE [DISTWIDTH] IN BLOOD BY AUTOMATED COUNT: 46.3 FL (ref 35.9–50)
FLUAV RNA SPEC QL NAA+PROBE: NEGATIVE
FLUBV RNA SPEC QL NAA+PROBE: NEGATIVE
GFR SERPLBLD CREATININE-BSD FMLA CKD-EPI: 91 ML/MIN/1.73 M 2
GLOBULIN SER CALC-MCNC: 2.6 G/DL (ref 1.9–3.5)
GLUCOSE SERPL-MCNC: 113 MG/DL (ref 65–99)
HCT VFR BLD AUTO: 42.3 % (ref 42–52)
HGB BLD-MCNC: 14.5 G/DL (ref 14–18)
IMM GRANULOCYTES # BLD AUTO: 0.01 K/UL (ref 0–0.11)
IMM GRANULOCYTES NFR BLD AUTO: 0.2 % (ref 0–0.9)
LACTATE BLD-SCNC: 1.4 MMOL/L (ref 0.5–2)
LYMPHOCYTES # BLD AUTO: 0.7 K/UL (ref 1–4.8)
LYMPHOCYTES NFR BLD: 13.3 % (ref 22–41)
MCH RBC QN AUTO: 32.3 PG (ref 27–33)
MCHC RBC AUTO-ENTMCNC: 34.3 G/DL (ref 33.7–35.3)
MCV RBC AUTO: 94.2 FL (ref 81.4–97.8)
MONOCYTES # BLD AUTO: 0.76 K/UL (ref 0–0.85)
MONOCYTES NFR BLD AUTO: 14.4 % (ref 0–13.4)
NEUTROPHILS # BLD AUTO: 3.68 K/UL (ref 1.82–7.42)
NEUTROPHILS NFR BLD: 69.6 % (ref 44–72)
NRBC # BLD AUTO: 0 K/UL
NRBC BLD-RTO: 0 /100 WBC
NT-PROBNP SERPL IA-MCNC: 699 PG/ML (ref 0–125)
PLATELET # BLD AUTO: 151 K/UL (ref 164–446)
PMV BLD AUTO: 10.9 FL (ref 9–12.9)
POTASSIUM SERPL-SCNC: 3.8 MMOL/L (ref 3.6–5.5)
PROCALCITONIN SERPL-MCNC: 0.07 NG/ML
PROT SERPL-MCNC: 6.6 G/DL (ref 6–8.2)
RBC # BLD AUTO: 4.49 M/UL (ref 4.7–6.1)
RSV RNA SPEC QL NAA+PROBE: NEGATIVE
SARS-COV-2 RNA RESP QL NAA+PROBE: NOTDETECTED
SODIUM SERPL-SCNC: 137 MMOL/L (ref 135–145)
SPECIMEN SOURCE: NORMAL
TROPONIN T SERPL-MCNC: 38 NG/L (ref 6–19)
TROPONIN T SERPL-MCNC: 44 NG/L (ref 6–19)
TROPONIN T SERPL-MCNC: 45 NG/L (ref 6–19)
TROPONIN T SERPL-MCNC: 46 NG/L (ref 6–19)
TSH SERPL DL<=0.005 MIU/L-ACNC: 1.08 UIU/ML (ref 0.38–5.33)
WBC # BLD AUTO: 5.3 K/UL (ref 4.8–10.8)

## 2022-05-19 PROCEDURE — 94660 CPAP INITIATION&MGMT: CPT

## 2022-05-19 PROCEDURE — 700102 HCHG RX REV CODE 250 W/ 637 OVERRIDE(OP): Performed by: HOSPITALIST

## 2022-05-19 PROCEDURE — 700111 HCHG RX REV CODE 636 W/ 250 OVERRIDE (IP): Performed by: EMERGENCY MEDICINE

## 2022-05-19 PROCEDURE — 96374 THER/PROPH/DIAG INJ IV PUSH: CPT

## 2022-05-19 PROCEDURE — 84145 PROCALCITONIN (PCT): CPT

## 2022-05-19 PROCEDURE — 93306 TTE W/DOPPLER COMPLETE: CPT | Mod: 26 | Performed by: INTERNAL MEDICINE

## 2022-05-19 PROCEDURE — 93005 ELECTROCARDIOGRAM TRACING: CPT

## 2022-05-19 PROCEDURE — 80053 COMPREHEN METABOLIC PANEL: CPT

## 2022-05-19 PROCEDURE — A9270 NON-COVERED ITEM OR SERVICE: HCPCS | Performed by: HOSPITALIST

## 2022-05-19 PROCEDURE — 93306 TTE W/DOPPLER COMPLETE: CPT

## 2022-05-19 PROCEDURE — 700117 HCHG RX CONTRAST REV CODE 255: Performed by: HOSPITALIST

## 2022-05-19 PROCEDURE — 770020 HCHG ROOM/CARE - TELE (206)

## 2022-05-19 PROCEDURE — 93005 ELECTROCARDIOGRAM TRACING: CPT | Performed by: EMERGENCY MEDICINE

## 2022-05-19 PROCEDURE — 700111 HCHG RX REV CODE 636 W/ 250 OVERRIDE (IP): Performed by: HOSPITALIST

## 2022-05-19 PROCEDURE — 99223 1ST HOSP IP/OBS HIGH 75: CPT | Mod: AI | Performed by: HOSPITALIST

## 2022-05-19 PROCEDURE — A9270 NON-COVERED ITEM OR SERVICE: HCPCS | Performed by: EMERGENCY MEDICINE

## 2022-05-19 PROCEDURE — C9803 HOPD COVID-19 SPEC COLLECT: HCPCS | Performed by: EMERGENCY MEDICINE

## 2022-05-19 PROCEDURE — 0241U HCHG SARS-COV-2 COVID-19 NFCT DS RESP RNA 4 TRGT MIC: CPT

## 2022-05-19 PROCEDURE — 83605 ASSAY OF LACTIC ACID: CPT

## 2022-05-19 PROCEDURE — 83880 ASSAY OF NATRIURETIC PEPTIDE: CPT

## 2022-05-19 PROCEDURE — 85025 COMPLETE CBC W/AUTO DIFF WBC: CPT

## 2022-05-19 PROCEDURE — 71045 X-RAY EXAM CHEST 1 VIEW: CPT

## 2022-05-19 PROCEDURE — 84443 ASSAY THYROID STIM HORMONE: CPT

## 2022-05-19 PROCEDURE — 36415 COLL VENOUS BLD VENIPUNCTURE: CPT

## 2022-05-19 PROCEDURE — 99285 EMERGENCY DEPT VISIT HI MDM: CPT

## 2022-05-19 PROCEDURE — 84484 ASSAY OF TROPONIN QUANT: CPT

## 2022-05-19 PROCEDURE — 700102 HCHG RX REV CODE 250 W/ 637 OVERRIDE(OP): Performed by: EMERGENCY MEDICINE

## 2022-05-19 RX ORDER — ONDANSETRON 4 MG/1
4 TABLET, ORALLY DISINTEGRATING ORAL EVERY 4 HOURS PRN
Status: DISCONTINUED | OUTPATIENT
Start: 2022-05-19 | End: 2022-05-22 | Stop reason: HOSPADM

## 2022-05-19 RX ORDER — ALBUTEROL SULFATE 90 UG/1
2 AEROSOL, METERED RESPIRATORY (INHALATION) ONCE
Status: COMPLETED | OUTPATIENT
Start: 2022-05-19 | End: 2022-05-19

## 2022-05-19 RX ORDER — HYDRALAZINE HYDROCHLORIDE 20 MG/ML
10 INJECTION INTRAMUSCULAR; INTRAVENOUS EVERY 4 HOURS PRN
Status: DISCONTINUED | OUTPATIENT
Start: 2022-05-19 | End: 2022-05-22 | Stop reason: HOSPADM

## 2022-05-19 RX ORDER — IRBESARTAN 150 MG/1
75 TABLET ORAL NIGHTLY
Status: DISCONTINUED | OUTPATIENT
Start: 2022-05-19 | End: 2022-05-22 | Stop reason: HOSPADM

## 2022-05-19 RX ORDER — FUROSEMIDE 10 MG/ML
20 INJECTION INTRAMUSCULAR; INTRAVENOUS ONCE
Status: COMPLETED | OUTPATIENT
Start: 2022-05-19 | End: 2022-05-19

## 2022-05-19 RX ORDER — LEVALBUTEROL TARTRATE 45 UG/1
1-2 AEROSOL, METERED ORAL EVERY 4 HOURS PRN
Status: DISCONTINUED | OUTPATIENT
Start: 2022-05-19 | End: 2022-05-19

## 2022-05-19 RX ORDER — CARVEDILOL 6.25 MG/1
6.25 TABLET ORAL 2 TIMES DAILY WITH MEALS
Status: DISCONTINUED | OUTPATIENT
Start: 2022-05-19 | End: 2022-05-22 | Stop reason: HOSPADM

## 2022-05-19 RX ORDER — ONDANSETRON 2 MG/ML
4 INJECTION INTRAMUSCULAR; INTRAVENOUS EVERY 4 HOURS PRN
Status: DISCONTINUED | OUTPATIENT
Start: 2022-05-19 | End: 2022-05-22 | Stop reason: HOSPADM

## 2022-05-19 RX ORDER — ALBUTEROL SULFATE 90 UG/1
1-2 AEROSOL, METERED RESPIRATORY (INHALATION) EVERY 4 HOURS PRN
Status: DISCONTINUED | OUTPATIENT
Start: 2022-05-19 | End: 2022-05-22 | Stop reason: HOSPADM

## 2022-05-19 RX ORDER — BISACODYL 10 MG
10 SUPPOSITORY, RECTAL RECTAL
Status: DISCONTINUED | OUTPATIENT
Start: 2022-05-19 | End: 2022-05-22 | Stop reason: HOSPADM

## 2022-05-19 RX ORDER — SIMVASTATIN 20 MG
40 TABLET ORAL EVERY EVENING
Status: DISCONTINUED | OUTPATIENT
Start: 2022-05-19 | End: 2022-05-22 | Stop reason: HOSPADM

## 2022-05-19 RX ORDER — SPIRONOLACTONE 25 MG/1
25 TABLET ORAL
Status: DISCONTINUED | OUTPATIENT
Start: 2022-05-19 | End: 2022-05-22 | Stop reason: HOSPADM

## 2022-05-19 RX ORDER — FUROSEMIDE 10 MG/ML
80 INJECTION INTRAMUSCULAR; INTRAVENOUS
Status: COMPLETED | OUTPATIENT
Start: 2022-05-19 | End: 2022-05-21

## 2022-05-19 RX ORDER — FUROSEMIDE 10 MG/ML
80 INJECTION INTRAMUSCULAR; INTRAVENOUS
Status: DISCONTINUED | OUTPATIENT
Start: 2022-05-19 | End: 2022-05-19

## 2022-05-19 RX ORDER — AMOXICILLIN 250 MG
2 CAPSULE ORAL 2 TIMES DAILY
Status: DISCONTINUED | OUTPATIENT
Start: 2022-05-19 | End: 2022-05-22 | Stop reason: HOSPADM

## 2022-05-19 RX ORDER — POLYETHYLENE GLYCOL 3350 17 G/17G
1 POWDER, FOR SOLUTION ORAL
Status: DISCONTINUED | OUTPATIENT
Start: 2022-05-19 | End: 2022-05-22 | Stop reason: HOSPADM

## 2022-05-19 RX ORDER — POTASSIUM CHLORIDE 20 MEQ/1
20 TABLET, EXTENDED RELEASE ORAL 2 TIMES DAILY
Status: DISCONTINUED | OUTPATIENT
Start: 2022-05-19 | End: 2022-05-22 | Stop reason: HOSPADM

## 2022-05-19 RX ADMIN — HUMAN ALBUMIN MICROSPHERES AND PERFLUTREN 3 ML: 10; .22 INJECTION, SOLUTION INTRAVENOUS at 17:37

## 2022-05-19 RX ADMIN — BENZOCAINE AND MENTHOL 1 LOZENGE: 15; 3.6 LOZENGE ORAL at 18:14

## 2022-05-19 RX ADMIN — IRBESARTAN 75 MG: 150 TABLET ORAL at 22:07

## 2022-05-19 RX ADMIN — POTASSIUM CHLORIDE 20 MEQ: 20 TABLET, EXTENDED RELEASE ORAL at 17:53

## 2022-05-19 RX ADMIN — ALBUTEROL SULFATE 2 PUFF: 90 AEROSOL, METERED RESPIRATORY (INHALATION) at 14:47

## 2022-05-19 RX ADMIN — CARVEDILOL 6.25 MG: 6.25 TABLET, FILM COATED ORAL at 17:53

## 2022-05-19 RX ADMIN — SIMVASTATIN 40 MG: 20 TABLET, FILM COATED ORAL at 17:53

## 2022-05-19 RX ADMIN — ALBUTEROL SULFATE 2 PUFF: 90 AEROSOL, METERED RESPIRATORY (INHALATION) at 07:24

## 2022-05-19 RX ADMIN — ALBUTEROL SULFATE 2 PUFF: 90 AEROSOL, METERED RESPIRATORY (INHALATION) at 09:43

## 2022-05-19 RX ADMIN — FUROSEMIDE 80 MG: 10 INJECTION, SOLUTION INTRAMUSCULAR; INTRAVENOUS at 15:31

## 2022-05-19 RX ADMIN — FUROSEMIDE 20 MG: 10 INJECTION, SOLUTION INTRAMUSCULAR; INTRAVENOUS at 07:28

## 2022-05-19 RX ADMIN — SPIRONOLACTONE 25 MG: 25 TABLET ORAL at 14:40

## 2022-05-19 RX ADMIN — RIVAROXABAN 20 MG: 20 TABLET, FILM COATED ORAL at 17:53

## 2022-05-19 ASSESSMENT — CHA2DS2 SCORE
PRIOR STROKE OR TIA OR THROMBOEMBOLISM: NO
CHF OR LEFT VENTRICULAR DYSFUNCTION: YES
CHA2DS2 VASC SCORE: 4
DIABETES: NO
SEX: MALE
VASCULAR DISEASE: YES
AGE 75 OR GREATER: NO
HYPERTENSION: YES
AGE 65 TO 74: YES

## 2022-05-19 ASSESSMENT — COGNITIVE AND FUNCTIONAL STATUS - GENERAL
DAILY ACTIVITIY SCORE: 24
SUGGESTED CMS G CODE MODIFIER DAILY ACTIVITY: CH
MOBILITY SCORE: 24
SUGGESTED CMS G CODE MODIFIER MOBILITY: CH

## 2022-05-19 ASSESSMENT — ENCOUNTER SYMPTOMS
SHORTNESS OF BREATH: 1
COUGH: 0
HEARTBURN: 0
CHILLS: 0
NERVOUS/ANXIOUS: 0
EYES NEGATIVE: 1
DIZZINESS: 0
SEIZURES: 0
ABDOMINAL PAIN: 0
LOSS OF CONSCIOUSNESS: 0
CONSTIPATION: 0
DIARRHEA: 0
BLOOD IN STOOL: 0
PSYCHIATRIC NEGATIVE: 1
NEUROLOGICAL NEGATIVE: 1
VOMITING: 0
WHEEZING: 0
FEVER: 0
BRUISES/BLEEDS EASILY: 0
GASTROINTESTINAL NEGATIVE: 1
HEADACHES: 0
DOUBLE VISION: 0
NAUSEA: 0
FOCAL WEAKNESS: 0
PALPITATIONS: 0
MUSCULOSKELETAL NEGATIVE: 1
DIAPHORESIS: 0
HEMOPTYSIS: 0

## 2022-05-19 ASSESSMENT — LIFESTYLE VARIABLES
EVER FELT BAD OR GUILTY ABOUT YOUR DRINKING: NO
AVERAGE NUMBER OF DAYS PER WEEK YOU HAVE A DRINK CONTAINING ALCOHOL: 0.5
HAVE PEOPLE ANNOYED YOU BY CRITICIZING YOUR DRINKING: NO
CONSUMPTION TOTAL: NEGATIVE
TOTAL SCORE: 0
ALCOHOL_USE: YES
HOW MANY TIMES IN THE PAST YEAR HAVE YOU HAD 5 OR MORE DRINKS IN A DAY: 0
EVER HAD A DRINK FIRST THING IN THE MORNING TO STEADY YOUR NERVES TO GET RID OF A HANGOVER: NO
TOTAL SCORE: 0
ON A TYPICAL DAY WHEN YOU DRINK ALCOHOL HOW MANY DRINKS DO YOU HAVE: 1
HAVE YOU EVER FELT YOU SHOULD CUT DOWN ON YOUR DRINKING: NO
TOTAL SCORE: 0

## 2022-05-19 ASSESSMENT — PATIENT HEALTH QUESTIONNAIRE - PHQ9
1. LITTLE INTEREST OR PLEASURE IN DOING THINGS: NOT AT ALL
1. LITTLE INTEREST OR PLEASURE IN DOING THINGS: NOT AT ALL
2. FEELING DOWN, DEPRESSED, IRRITABLE, OR HOPELESS: NOT AT ALL
SUM OF ALL RESPONSES TO PHQ9 QUESTIONS 1 AND 2: 0
SUM OF ALL RESPONSES TO PHQ9 QUESTIONS 1 AND 2: 0
2. FEELING DOWN, DEPRESSED, IRRITABLE, OR HOPELESS: NOT AT ALL

## 2022-05-19 ASSESSMENT — FIBROSIS 4 INDEX
FIB4 SCORE: 1.31
FIB4 SCORE: 2.51

## 2022-05-19 ASSESSMENT — PAIN DESCRIPTION - PAIN TYPE: TYPE: ACUTE PAIN

## 2022-05-19 NOTE — ASSESSMENT & PLAN NOTE
Discussed with patient in detail advanced directives and he wishes to be DO NOT RESUSCITATE DO NOT INTUBATE.

## 2022-05-19 NOTE — ED PROVIDER NOTES
ED Provider Note    CHIEF COMPLAINT  Chief Complaint   Patient presents with   • Congestion   • Shortness of Breath       HPI  Zenon Grijalva is a 66 y.o. male who presents to the emergency department complaining of shortness of breath and chest congestion.  Patient states he has nasal congestion and congestion in his chest.  He feels like he is coughing up some green stuff.  He has mild associated shortness of breath.  Denies any chest pressure.  No fevers or chills.  No sore throat, intermittent rhinorrhea is noted.  No history of PE or DVT.  Does have history of heart disease including CAD and CHF.  Is chronically on blood thinners.  No history of blood clots.  Does have dyspnea on exertion mild orthopnea.  Denies any other aggravating alleviating factors or associated complaints.    REVIEW OF SYSTEMS  See HPI for further details. All other systems are negative.    PAST MEDICAL HISTORY  Past Medical History:   Diagnosis Date   • Arrhythmia 2020    A fib   • Arthritis     fingers   • CAD (coronary artery disease) 7/2/2012   • Chronic anticoagulation 7/2/2012   • Congestive heart failure (HCC)    • Dyslipidemia 7/2/2012   • Essential hypertension, benign 7/2/2012   • High cholesterol    • Insomnia     wakes up 2-5 times a night , wakes up to use restroom and sometimes just wakes up    • Ischemic cardiomyopathy 7/2/2012   • Left ventricular thrombosis 7/2/2012   • Myocardial infarct (HCC) 2006   • Pericarditis 7/2/2012   • Pneumonia     history of as a child   • Sleep apnea     told by MD that he has it, scheduled for sleep study    • Snoring     no sleep study done   • SOB (shortness of breath)     no c/o today; on exertion; pt uses 2L o2 qhs only; pt does not know provider   • Stented coronary artery 7/2/2012       FAMILY HISTORY  No family history on file.    SOCIAL HISTORY  Social History     Socioeconomic History   • Marital status:    Tobacco Use   • Smoking status: Never Smoker   • Smokeless  "tobacco: Former User     Types: Chew   Vaping Use   • Vaping Use: Never used   Substance and Sexual Activity   • Alcohol use: Not Currently     Comment: occ   • Drug use: Never       SURGICAL HISTORY  Past Surgical History:   Procedure Laterality Date   • SHOULDER DECOMPRESSION ARTHROSCOPIC Right 11/27/2018    Procedure: SHOULDER DECOMPRESSION ARTHROSCOPIC- SUBACROMIAL CONVERTED TO OPEN;  Surgeon: Ni Weir M.D.;  Location: SURGERY [facility];  Service: Orthopedics   • SHOULDER ARTHROSCOPY W/ ROTATOR CUFF REPAIR Right 11/27/2018    Procedure: OPEN ROTATOR CUFF REPAIR;  Surgeon: Ni Weir M.D.;  Location: SURGERY [facility];  Service: Orthopedics   • CARDIAC CATH, LEFT HEART     • CARPAL TUNNEL RELEASE Bilateral    • STENT PLACEMENT      cardiac stent   • VASECTOMY         CURRENT MEDICATIONS  Home Medications     Reviewed by Lucy Latham R.N. (Registered Nurse) on 05/19/22 at 0541  Med List Status: Not Addressed   Medication Last Dose Status   carvedilol (COREG) 6.25 MG Tab  Active   irbesartan (AVAPRO) 150 MG Tab  Active   levalbuterol (XOPENEX HFA) 45 MCG/ACT inhaler  Active   nitroglycerin (NITROSTAT) 0.4 MG SL Tab  Active   rivaroxaban (XARELTO) 20 MG Tab tablet  Active   simvastatin (ZOCOR) 40 MG Tab  Active   Spacer/Aero-Holding Chambers (VALVED HOLDING CHAMBER) Device  Active   torsemide (DEMADEX) 20 MG Tab  Active                ALLERGIES  Allergies   Allergen Reactions   • Other Environmental      Hayfever       PHYSICAL EXAM  VITAL SIGNS: /57   Pulse 71   Temp 36.9 °C (98.5 °F) (Oral)   Resp 18   Ht 1.798 m (5' 10.8\")   Wt 107 kg (236 lb 8.9 oz)   SpO2 94%   BMI 33.18 kg/m²    Constitutional: Well developed, Well nourished, No acute distress, Non-toxic appearance.   HENT: Normocephalic, Atraumatic,  Eyes: PERRL, EOMI, Conjunctiva normal, No discharge.   Neck: Normal range of motion, No tenderness  Cardiovascular: Irregular irregular no murmurs rubs or " gallops.  Thorax & Lungs: Wheezing bilaterally.  No crackles.  Good air movement  Abdomen: Bowel sounds normal, Soft, No tenderness  Skin: Warm, Dry, No erythema, No rash.   Back: No tenderness, No CVA tenderness. .   Musculoskeletal: Good range of motion in all major joints, no asymmetric edema  Neurologic: Alert,  No focal deficits noted.   Psychiatric: Affect normal     Results for orders placed or performed during the hospital encounter of 05/19/22   CBC with Differential   Result Value Ref Range    WBC 5.3 4.8 - 10.8 K/uL    RBC 4.49 (L) 4.70 - 6.10 M/uL    Hemoglobin 14.5 14.0 - 18.0 g/dL    Hematocrit 42.3 42.0 - 52.0 %    MCV 94.2 81.4 - 97.8 fL    MCH 32.3 27.0 - 33.0 pg    MCHC 34.3 33.7 - 35.3 g/dL    RDW 46.3 35.9 - 50.0 fL    Platelet Count 151 (L) 164 - 446 K/uL    MPV 10.9 9.0 - 12.9 fL    Neutrophils-Polys 69.60 44.00 - 72.00 %    Lymphocytes 13.30 (L) 22.00 - 41.00 %    Monocytes 14.40 (H) 0.00 - 13.40 %    Eosinophils 1.90 0.00 - 6.90 %    Basophils 0.60 0.00 - 1.80 %    Immature Granulocytes 0.20 0.00 - 0.90 %    Nucleated RBC 0.00 /100 WBC    Neutrophils (Absolute) 3.68 1.82 - 7.42 K/uL    Lymphs (Absolute) 0.70 (L) 1.00 - 4.80 K/uL    Monos (Absolute) 0.76 0.00 - 0.85 K/uL    Eos (Absolute) 0.10 0.00 - 0.51 K/uL    Baso (Absolute) 0.03 0.00 - 0.12 K/uL    Immature Granulocytes (abs) 0.01 0.00 - 0.11 K/uL    NRBC (Absolute) 0.00 K/uL   Comp Metabolic Panel   Result Value Ref Range    Sodium 137 135 - 145 mmol/L    Potassium 3.8 3.6 - 5.5 mmol/L    Chloride 101 96 - 112 mmol/L    Co2 24 20 - 33 mmol/L    Anion Gap 12.0 7.0 - 16.0    Glucose 113 (H) 65 - 99 mg/dL    Bun 20 8 - 22 mg/dL    Creatinine 0.92 0.50 - 1.40 mg/dL    Calcium 8.5 8.4 - 10.2 mg/dL    AST(SGOT) 33 12 - 45 U/L    ALT(SGPT) 33 2 - 50 U/L    Alkaline Phosphatase 124 (H) 30 - 99 U/L    Total Bilirubin 1.7 (H) 0.1 - 1.5 mg/dL    Albumin 4.0 3.2 - 4.9 g/dL    Total Protein 6.6 6.0 - 8.2 g/dL    Globulin 2.6 1.9 - 3.5 g/dL    A-G  Ratio 1.5 g/dL   CoV-2, FLU A/B, and RSV by PCR (2-4 Hours CEPHEID) : Collect NP swab in VTM    Specimen: Respirate   Result Value Ref Range    Influenza virus A RNA Negative Negative    Influenza virus B, PCR Negative Negative    RSV, PCR Negative Negative    SARS-CoV-2 by PCR NotDetected     SARS-CoV-2 Source NP Swab    TROPONIN   Result Value Ref Range    Troponin T 44 (H) 6 - 19 ng/L   proBrain Natriuretic Peptide, NT   Result Value Ref Range    NT-proBNP 699 (H) 0 - 125 pg/mL   PROCALCITONIN   Result Value Ref Range    Procalcitonin 0.07 <0.25 ng/mL   LACTIC ACID   Result Value Ref Range    Lactic Acid 1.4 0.5 - 2.0 mmol/L   ESTIMATED GFR   Result Value Ref Range    GFR (CKD-EPI) 91 >60 mL/min/1.73 m 2   TROPONIN   Result Value Ref Range    Troponin T 38 (H) 6 - 19 ng/L   EKG   Result Value Ref Range    Report       [facility] Emergency Dept.    Test Date:  2022  Pt Name:    HAMZAH ANTONIO               Department: [facility]  MRN:        [id number]                      [facility]  Gender:     Male                         Technician: [id number]  :        1955                   Requested By:ER TRIAGE PROTOCOL  Order #:    [id number]                    Reading MD: ALBERTO VANEGAS. KAYE    Measurements  Intervals                                Axis  Rate:       71                           P:  CA:                                      QRS:        248  QRSD:       88                           T:          42  QT:         440  QTc:        479    Interpretive Statements  ATRIAL FIBRILLATION  LEFT ANTERIOR FASCICULAR BLOCK  BORDERLINE LOW VOLTAGE IN FRONTAL LEADS  CONSIDER RIGHT VENTRICULAR HYPERTROPHY  CONSIDER ANTERIOR INFARCT  BORDERLINE PROLONGED QT INTERVAL  Compared to ECG 2021 08:25:22  Sinus rhythm no longer present  Myocardial infarct finding still present   Electronically Signed On 2022 6:13:15 PDT by ALBERTO VANEGAS. KAYE           RADIOLOGY/PROCEDURES  DX-CHEST-PORTABLE (1 VIEW)   Final Result         1.  No acute cardiopulmonary disease.   2.  Cardiomegaly      EC-ECHOCARDIOGRAM COMPLETE W/O CONT    (Results Pending)         COURSE & MEDICAL DECISION MAKING  Pertinent Labs & Imaging studies reviewed. (See chart for details)    The patient presents the emergency department for evaluation of shortness of breath and chest congestion.  Differential diagnosis includes was not limited to COVID-19, influenza, pneumonia, viral illness, bronchospasm, heart failure, and ACS.    Old chart was reviewed for baseline labs, imaging, echocardiogram for comparison.    Patient is worked up for the above differential diagnosis with labs and imaging.    The patient has an x-ray that is unremarkable per radiology although it looks suspicious for edema to me.  EKG is nonspecific.  Troponin is mildly elevated as is his BNP.  He is not having any chest pain.  I doubt that this is ACS but remains a differential diagnosis.      Patient has crackles and wheezes throughout but this has viability of heart failure and some bronchospasm he is given some Lasix and albuterol.  He is reassessed after it is actually improved.  He was quite tachypneic when he came in he still very dyspneic with ambulation.  At this point with no white count lactic acidosis or elevated procalcitonin I do not think he has pneumonia.  He does not appear septic.  Patient is adamant he does not want to stay in the hospital.    At this point I explained with an elevated troponin and dyspnea I cannot exclude cardiac etiology and I do not know why he is having such wheezing.  I recommend he stay for further work-up and treatment and trending of his troponins and exclusion of a cardiac etiology.  The patient does have known heart disease previous MI and chronic CHF with a decreased ejection fraction.  Patient is refusing to stay.  Finally a second troponin and then reassess and have him sign out  AMA.    Second troponin came back somewhat improved.  The patient however is much more dyspneic.  He states he is feeling worse again he is more short of breath.  His lungs sound somewhat improved from his previous reevaluation but he still has some wheezes.  At this point because of his exertional dyspnea and elevated BNP and troponin he will be hospitalized.  Discussed case with the hospitalist care is transferred at that time.        FINAL IMPRESSION  1. Shortness of breath     2. Wheezing     3. Acute on chronic congestive heart failure, unspecified heart failure type (HCC)     4. Elevated troponin         2.   3.         Electronically signed by: Manas Nixon M.D., 5/19/2022 6:13 AM

## 2022-05-19 NOTE — ASSESSMENT & PLAN NOTE
Patient has a previous stent  Continue with medication optimization  Currently denies any chest pain

## 2022-05-19 NOTE — PROGRESS NOTES
4 Eyes Skin Assessment Completed by JENNIFER Rankin and JENNIFER Robledo.    Head WDL  Ears WDL  Nose WDL  Mouth WDL  Neck WDL  Breast/Chest WDL  Shoulder Blades WDL  Spine WDL  (R) Arm/Elbow/Hand WDL  (L) Arm/Elbow/Hand WDL  Abdomen WDL  Groin WDL  Scrotum/Coccyx/Buttocks WDL  (R) Leg WDL  (L) Leg WDL  (R) Heel/Foot/Toe WDL  (L) Heel/Foot/Toe WDL          Devices In Places Tele Box      Interventions In Place N/A    Possible Skin Injury No    Pictures Uploaded Into Epic N/A  Wound Consult Placed N/A  RN Wound Prevention Protocol Ordered No

## 2022-05-19 NOTE — ED NOTES
Medicated as ordered.  VS re-checked.  Pt denies pain and denies shortness of breath at this time.  Pt and wife asking for test results - ERP made aware.

## 2022-05-19 NOTE — CARE PLAN
The patient is Stable - Low risk of patient condition declining or worsening    Shift Goals  Clinical Goals: echo, diurese  Patient Goals: improve SOB    Progress made toward(s) clinical / shift goals:    Problem: Care Map:  Admission Optimal Outcome for the Heart Failure Patient  Goal: Admission:  Optimal Care of the heart failure patient  Outcome: Progressing     Problem: Care Map:  Day 1 Optimal Outcome for the Heart Failure Patient  Goal: Day 1:  Optimal Care of the heart failure patient  Outcome: Progressing  Echo, diurese, monitor I/O     Problem: Knowledge Deficit - Standard  Goal: Patient and family/care givers will demonstrate understanding of plan of care, disease process/condition, diagnostic tests and medications  Outcome: Progressing   Education provided    Patient is not progressing towards the following goals:

## 2022-05-19 NOTE — H&P
Hospital Medicine History & Physical Note    Date of Service  5/19/2022    Primary Care Physician  Adalgisa Hankins M.D. (Inactive)    Consultants  None    Specialist Names: None cool    Code Status  DNAR/DNI    Chief Complaint  Chief Complaint   Patient presents with   • Congestion   • Shortness of Breath       History of Presenting Illness  Zenon Grijalva is a 66 y.o. male who presented 5/19/2022 with shortness of breath.  The patient is known to have ischemic cardiomyopathy secondary to coronary artery disease and hypertension.  The patient's most recent left ventricular ejection fraction is up from 25% to 45%.  The patient at this point is with pulmonary edema and diffuse crackles with respiratory difficulties.  The patient is requiring oxygen support and will need at this point aggressive diuresis as well as an echocardiogram to evaluate his current situation.  Patient will need strict intake and output and weight monitoring.  Patient will need optimization of his medications.  We will need to adjust blood pressure management as we are diuresing him aggressively to make sure that the patient does not further decline.  This cannot be done in an outpatient setting and will require inpatient monitoring..    I discussed the plan of care with patient, family, bedside RN, charge RN,  and pharmacy.    Review of Systems  Review of Systems   Constitutional: Positive for malaise/fatigue. Negative for chills, diaphoresis and fever.   HENT: Negative.    Eyes: Negative.  Negative for double vision.   Respiratory: Positive for shortness of breath. Negative for cough, hemoptysis and wheezing.    Cardiovascular: Positive for leg swelling. Negative for chest pain and palpitations.   Gastrointestinal: Negative.  Negative for abdominal pain, blood in stool, constipation, diarrhea, heartburn, nausea and vomiting.   Genitourinary: Negative.  Negative for frequency, hematuria and urgency.   Musculoskeletal:  Negative.  Negative for joint pain.   Skin: Negative.  Negative for itching and rash.   Neurological: Negative.  Negative for dizziness, focal weakness, seizures, loss of consciousness and headaches.   Endo/Heme/Allergies: Negative.  Does not bruise/bleed easily.   Psychiatric/Behavioral: Negative.  Negative for suicidal ideas. The patient is not nervous/anxious.    All other systems reviewed and are negative.      Past Medical History   has a past medical history of Arrhythmia (2020), Arthritis, CAD (coronary artery disease) (7/2/2012), Chronic anticoagulation (7/2/2012), Congestive heart failure (HCC), Dyslipidemia (7/2/2012), Essential hypertension, benign (7/2/2012), High cholesterol, Insomnia, Ischemic cardiomyopathy (7/2/2012), Left ventricular thrombosis (7/2/2012), Myocardial infarct (HCC) (2006), Pericarditis (7/2/2012), Pneumonia, Sleep apnea, Snoring, SOB (shortness of breath), and Stented coronary artery (7/2/2012).    Surgical History   has a past surgical history that includes cardiac cath, left heart; vasectomy; stent placement; carpal tunnel release (Bilateral); shoulder decompression arthroscopic (Right, 11/27/2018); and shoulder arthroscopy w/ rotator cuff repair (Right, 11/27/2018).     Family History  family history is not on file.   Family history reviewed with patient. There is no family history that is pertinent to the chief complaint.     Social History   reports that he has never smoked. He has quit using smokeless tobacco.  His smokeless tobacco use included chew. He reports previous alcohol use. He reports that he does not use drugs.    Allergies  Allergies   Allergen Reactions   • Other Environmental      Hayfever       Medications  Prior to Admission Medications   Prescriptions Last Dose Informant Patient Reported? Taking?   Dextromethorphan-GG-APAP (CORICIDIN HBP COLD/COUGH/FLU PO) 5/19/2022 at 0430 Patient Yes Yes   Sig: Take 30 mL by mouth every four hours as needed.    Spacer/Aero-Holding Chambers (VALVED HOLDING CHAMBER) Device   No No   Sig: USE AS DIRECTED WITH INHALER   carvedilol (COREG) 6.25 MG Tab 5/19/2022 at 0430 Patient No No   Sig: TAKE 1 TABLET BY MOUTH TWICE DAILY WITH MEALS   Patient taking differently: Take 6.25 mg by mouth 2 times a day.   irbesartan (AVAPRO) 150 MG Tab 5/18/2022 at 1800 Patient No No   Sig: Take 0.5 Tablets by mouth every day.   Patient taking differently: Take 75 mg by mouth every evening.   levalbuterol (XOPENEX HFA) 45 MCG/ACT inhaler 5/17/2022 at Unknown time Patient No No   Sig: Inhale 1-2 Puffs every four hours as needed for Shortness of Breath.   rivaroxaban (XARELTO) 20 MG Tab tablet 5/18/2022 at 1800 Patient No No   Sig: Take 1 Tablet by mouth with dinner.   simvastatin (ZOCOR) 40 MG Tab 5/19/2022 at 0430 Patient No No   Sig: Take 1 Tablet by mouth every day.   torsemide (DEMADEX) 20 MG Tab 5/18/2022 at 1800 Patient No No   Sig: Take 0.5 tab in AM, 1 tab in PM   Patient taking differently: Take 20 mg by mouth every evening.      Facility-Administered Medications: None       Physical Exam  Temp:  [36.9 °C (98.5 °F)] 36.9 °C (98.5 °F)  Pulse:  [61-97] 97  Resp:  [18-33] 29  BP: (108-124)/(57-78) 124/77  SpO2:  [93 %-96 %] 93 %  Blood Pressure : 124/77   Temperature: 36.9 °C (98.5 °F)   Pulse: 97   Respiration: (!) 29   Pulse Oximetry: 93 %       Physical Exam  Vitals and nursing note reviewed. Exam conducted with a chaperone present.   Constitutional:       General: He is not in acute distress.     Appearance: He is well-developed. He is obese. He is ill-appearing.   HENT:      Head: Normocephalic and atraumatic.      Right Ear: External ear normal.      Left Ear: External ear normal.      Nose: Nose normal.      Mouth/Throat:      Mouth: Mucous membranes are moist.      Pharynx: Oropharynx is clear.   Eyes:      Extraocular Movements: Extraocular movements intact.      Conjunctiva/sclera: Conjunctivae normal.      Pupils: Pupils are  equal, round, and reactive to light.   Neck:      Thyroid: No thyromegaly.      Vascular: No JVD.   Cardiovascular:      Rate and Rhythm: Normal rate and regular rhythm.      Pulses: Normal pulses.      Heart sounds: Normal heart sounds.   Pulmonary:      Effort: Tachypnea present.      Breath sounds: Examination of the right-upper field reveals decreased breath sounds. Examination of the left-upper field reveals decreased breath sounds. Examination of the right-middle field reveals decreased breath sounds. Examination of the left-middle field reveals decreased breath sounds. Examination of the right-lower field reveals decreased breath sounds and rhonchi. Examination of the left-lower field reveals decreased breath sounds and rhonchi. Decreased breath sounds and rhonchi present.   Chest:      Chest wall: No tenderness.   Abdominal:      General: Abdomen is flat. Bowel sounds are normal. There is no distension.      Palpations: Abdomen is soft. There is no mass.      Tenderness: There is no abdominal tenderness. There is no guarding or rebound.   Musculoskeletal:         General: Normal range of motion.      Cervical back: Normal range of motion and neck supple.      Right lower leg: Edema present.      Left lower leg: Edema present.   Lymphadenopathy:      Cervical: No cervical adenopathy.   Skin:     General: Skin is warm and dry.      Capillary Refill: Capillary refill takes less than 2 seconds.      Findings: No rash.   Neurological:      General: No focal deficit present.      Mental Status: He is alert and oriented to person, place, and time. Mental status is at baseline.      Cranial Nerves: No cranial nerve deficit.      Deep Tendon Reflexes: Reflexes are normal and symmetric.   Psychiatric:         Mood and Affect: Mood normal.         Behavior: Behavior normal.         Thought Content: Thought content normal.         Judgment: Judgment normal.         Laboratory:  Recent Labs     05/19/22  0555   WBC 5.3    RBC 4.49*   HEMOGLOBIN 14.5   HEMATOCRIT 42.3   MCV 94.2   MCH 32.3   MCHC 34.3   RDW 46.3   PLATELETCT 151*   MPV 10.9     Recent Labs     05/19/22  0555   SODIUM 137   POTASSIUM 3.8   CHLORIDE 101   CO2 24   GLUCOSE 113*   BUN 20   CREATININE 0.92   CALCIUM 8.5     Recent Labs     05/19/22  0555   ALTSGPT 33   ASTSGOT 33   ALKPHOSPHAT 124*   TBILIRUBIN 1.7*   GLUCOSE 113*         Recent Labs     05/19/22  0618   NTPROBNP 699*         Recent Labs     05/19/22  0618 05/19/22  0935   TROPONINT 44* 38*       Imaging:  DX-CHEST-PORTABLE (1 VIEW)   Final Result         1.  No acute cardiopulmonary disease.   2.  Cardiomegaly      EC-ECHOCARDIOGRAM COMPLETE W/O CONT    (Results Pending)       X-Ray:  I have personally reviewed the images and compared with prior images.  EKG:  I have personally reviewed the images and compared with prior images.    Assessment/Plan:  Justification for Admission Status  I anticipate this patient will require at least two midnights for appropriate medical management, necessitating inpatient admission because Ongoing respiratory failure secondary to uncontrolled heart failure.  The patient at this point has eaten some salty foods including soy sauce and at this point will need aggressive diuresis for at least 48 hours before we can safely discharge him home    * Acute on chronic clinical systolic heart failure (HCC)- (present on admission)  Assessment & Plan  Patient over the past several days has been getting more short of breath.  He says the shortness of breath is always there but it began to get worse over the past 3 days  Patient also has noted swelling in the lower extremities for which he uses torsemide at home as well as compression stockings  Patient comes in the hospital with shortness of breath is found to be in early pulmonary edema as well as elevated BNP and troponin levels  Patient will be diuresed with Lasix 80 mg IV twice daily for 4 doses and then will reassess for need of  diuresis  Monitor intake and output  Obtain an echocardiogram  Discussed in detail with the patient about not eating salty foods and diet and exercise.    Coronary artery disease due to lipid rich plaque- (present on admission)  Assessment & Plan  Patient has a previous stent  Continue with medication optimization  Currently denies any chest pain    PAF (paroxysmal atrial fibrillation) (HCC)- (present on admission)  Assessment & Plan  Continue at this point with rate control and anticoagulation    Ischemic cardiomyopathy- (present on admission)  Assessment & Plan  History of ischemic cardiomyopathy with ejection fraction of 25%.  With medication management patient's condition has improved to 45%.    DNR (do not resuscitate)  Assessment & Plan  Discussed with patient in detail advanced directives and he wishes to be DO NOT RESUSCITATE DO NOT INTUBATE.    Obstructive sleep apnea- (present on admission)  Assessment & Plan  CPAP at night    Dyslipidemia- (present on admission)  Assessment & Plan  Low-fat low-cholesterol diet  Statin  Fasting lipid panel    Essential hypertension, benign- (present on admission)  Assessment & Plan  Optimize blood pressure management keep systolic blood pressure less than 140 diastolic under 90.        VTE prophylaxis: SCDs/TEDs

## 2022-05-19 NOTE — DISCHARGE PLANNING
ER CM met with pt and spouse at Bedside. Very pleasant AOX 4. Lives in a 2 story home with spouse. Bedroom upstairs. He actually owns a second home ( [address] [place] [address])  with Bedroom downstairs if needed. NO DME or O2 at home but does have CPAP from [facility].   PCP Adalgisa Hankins but has not actually seen her for 15 years RX [facility]. He has a DPOA at home and wife will provide copy  Care Transition Team Assessment    Information Source  Orientation Level: Oriented X4  Information Given By: Patient, Spouse  Informant's Name: Zenon/Yvonne  Who is responsible for making decisions for patient? : Patient         Elopement Risk  Legal Hold: No  Ambulatory or Self Mobile in Wheelchair: No-Not an Elopement Risk    Interdisciplinary Discharge Planning  Primary Care Physician: Adalgisa Hankins (Has not actually seen for 15 years)  Lives with - Patient's Self Care Capacity: Spouse  Support Systems: Spouse / Significant Other  Housing / Facility: 2 Story House (Bedroom upstairs. Has 2 story homes one has br downstairs if needed)  Do You Take your Prescribed Medications Regularly: Yes  Assistance Needed: No  Durable Medical Equipment: Other - Specify (CPAP)  DME Provider / Phone: [facility]    Discharge Preparedness  What is your plan after discharge?: Home with help  What are your discharge supports?: Spouse  Prior Functional Level: Ambulatory, Independent with Activities of Daily Living, Independent with Medication Management    Functional Assesment  Prior Functional Level: Ambulatory, Independent with Activities of Daily Living, Independent with Medication Management    Finances  Prescription Coverage: Yes ([facility])              Advance Directive  Advance Directive?: DPOA for Health Care (Wife will bring in copy for chart)    Domestic Abuse  Have you ever been the victim of abuse or violence?: No         Discharge Risks or Barriers  Discharge risks or barriers?: No PCP (no recent  visit)    Anticipated Discharge Information  Discharge Disposition: Discharged to home/self care (01)

## 2022-05-19 NOTE — ASSESSMENT & PLAN NOTE
History of ischemic cardiomyopathy with ejection fraction of 25%.  With medication management patient's condition has improved to 45%.

## 2022-05-19 NOTE — ASSESSMENT & PLAN NOTE
Increased shortness of breath, orthopnea after eating take out chinese food  Increased swelling in lower extremities bilaterally  IV lasix BID  Echo with EF 35%  BB, spironolactone  IV lasix BID  Will add metolazone today  Net negative 4.7L  Strict I&O

## 2022-05-20 ENCOUNTER — PATIENT OUTREACH (OUTPATIENT)
Dept: HEALTH INFORMATION MANAGEMENT | Facility: OTHER | Age: 67
End: 2022-05-20
Payer: MEDICARE

## 2022-05-20 PROBLEM — E66.811 CLASS 1 OBESITY IN ADULT: Status: ACTIVE | Noted: 2022-05-20

## 2022-05-20 PROBLEM — E66.9 CLASS 1 OBESITY IN ADULT: Status: ACTIVE | Noted: 2022-05-20

## 2022-05-20 LAB
ANION GAP SERPL CALC-SCNC: 14 MMOL/L (ref 7–16)
BASOPHILS # BLD AUTO: 0.4 % (ref 0–1.8)
BASOPHILS # BLD: 0.03 K/UL (ref 0–0.12)
BUN SERPL-MCNC: 17 MG/DL (ref 8–22)
CALCIUM SERPL-MCNC: 8.5 MG/DL (ref 8.4–10.2)
CHLORIDE SERPL-SCNC: 99 MMOL/L (ref 96–112)
CO2 SERPL-SCNC: 22 MMOL/L (ref 20–33)
CREAT SERPL-MCNC: 0.73 MG/DL (ref 0.5–1.4)
EKG IMPRESSION: NORMAL
EOSINOPHIL # BLD AUTO: 0.03 K/UL (ref 0–0.51)
EOSINOPHIL NFR BLD: 0.4 % (ref 0–6.9)
ERYTHROCYTE [DISTWIDTH] IN BLOOD BY AUTOMATED COUNT: 47.8 FL (ref 35.9–50)
GFR SERPLBLD CREATININE-BSD FMLA CKD-EPI: 100 ML/MIN/1.73 M 2
GLUCOSE SERPL-MCNC: 106 MG/DL (ref 65–99)
HCT VFR BLD AUTO: 44.9 % (ref 42–52)
HGB BLD-MCNC: 14.9 G/DL (ref 14–18)
IMM GRANULOCYTES # BLD AUTO: 0.02 K/UL (ref 0–0.11)
IMM GRANULOCYTES NFR BLD AUTO: 0.3 % (ref 0–0.9)
LYMPHOCYTES # BLD AUTO: 1.42 K/UL (ref 1–4.8)
LYMPHOCYTES NFR BLD: 20.9 % (ref 22–41)
MAGNESIUM SERPL-MCNC: 1.9 MG/DL (ref 1.5–2.5)
MCH RBC QN AUTO: 31.8 PG (ref 27–33)
MCHC RBC AUTO-ENTMCNC: 33.2 G/DL (ref 33.7–35.3)
MCV RBC AUTO: 95.7 FL (ref 81.4–97.8)
MONOCYTES # BLD AUTO: 0.99 K/UL (ref 0–0.85)
MONOCYTES NFR BLD AUTO: 14.6 % (ref 0–13.4)
NEUTROPHILS # BLD AUTO: 4.29 K/UL (ref 1.82–7.42)
NEUTROPHILS NFR BLD: 63.4 % (ref 44–72)
NRBC # BLD AUTO: 0 K/UL
NRBC BLD-RTO: 0 /100 WBC
NT-PROBNP SERPL IA-MCNC: 1339 PG/ML (ref 0–125)
PLATELET # BLD AUTO: 155 K/UL (ref 164–446)
PMV BLD AUTO: 10.6 FL (ref 9–12.9)
POTASSIUM SERPL-SCNC: 3.8 MMOL/L (ref 3.6–5.5)
RBC # BLD AUTO: 4.69 M/UL (ref 4.7–6.1)
SODIUM SERPL-SCNC: 135 MMOL/L (ref 135–145)
WBC # BLD AUTO: 6.8 K/UL (ref 4.8–10.8)

## 2022-05-20 PROCEDURE — 85025 COMPLETE CBC W/AUTO DIFF WBC: CPT

## 2022-05-20 PROCEDURE — 94660 CPAP INITIATION&MGMT: CPT

## 2022-05-20 PROCEDURE — 700102 HCHG RX REV CODE 250 W/ 637 OVERRIDE(OP): Performed by: INTERNAL MEDICINE

## 2022-05-20 PROCEDURE — 93010 ELECTROCARDIOGRAM REPORT: CPT | Performed by: INTERNAL MEDICINE

## 2022-05-20 PROCEDURE — A9270 NON-COVERED ITEM OR SERVICE: HCPCS | Performed by: INTERNAL MEDICINE

## 2022-05-20 PROCEDURE — 700111 HCHG RX REV CODE 636 W/ 250 OVERRIDE (IP): Performed by: HOSPITALIST

## 2022-05-20 PROCEDURE — 94760 N-INVAS EAR/PLS OXIMETRY 1: CPT

## 2022-05-20 PROCEDURE — 80048 BASIC METABOLIC PNL TOTAL CA: CPT

## 2022-05-20 PROCEDURE — 99233 SBSQ HOSP IP/OBS HIGH 50: CPT | Performed by: INTERNAL MEDICINE

## 2022-05-20 PROCEDURE — 83735 ASSAY OF MAGNESIUM: CPT

## 2022-05-20 PROCEDURE — A9270 NON-COVERED ITEM OR SERVICE: HCPCS | Performed by: HOSPITALIST

## 2022-05-20 PROCEDURE — 83880 ASSAY OF NATRIURETIC PEPTIDE: CPT

## 2022-05-20 PROCEDURE — 700102 HCHG RX REV CODE 250 W/ 637 OVERRIDE(OP): Performed by: HOSPITALIST

## 2022-05-20 PROCEDURE — 770020 HCHG ROOM/CARE - TELE (206)

## 2022-05-20 PROCEDURE — 93005 ELECTROCARDIOGRAM TRACING: CPT | Performed by: HOSPITALIST

## 2022-05-20 RX ORDER — FLUTICASONE PROPIONATE 50 MCG
1 SPRAY, SUSPENSION (ML) NASAL 2 TIMES DAILY
Status: DISCONTINUED | OUTPATIENT
Start: 2022-05-20 | End: 2022-05-22 | Stop reason: HOSPADM

## 2022-05-20 RX ORDER — GUAIFENESIN 600 MG/1
600 TABLET, EXTENDED RELEASE ORAL EVERY 12 HOURS
Status: DISCONTINUED | OUTPATIENT
Start: 2022-05-20 | End: 2022-05-22 | Stop reason: HOSPADM

## 2022-05-20 RX ADMIN — FUROSEMIDE 80 MG: 10 INJECTION, SOLUTION INTRAMUSCULAR; INTRAVENOUS at 06:24

## 2022-05-20 RX ADMIN — POTASSIUM CHLORIDE 20 MEQ: 20 TABLET, EXTENDED RELEASE ORAL at 06:24

## 2022-05-20 RX ADMIN — SENNOSIDES AND DOCUSATE SODIUM 2 TABLET: 50; 8.6 TABLET ORAL at 06:24

## 2022-05-20 RX ADMIN — SENNOSIDES AND DOCUSATE SODIUM 2 TABLET: 50; 8.6 TABLET ORAL at 17:38

## 2022-05-20 RX ADMIN — IRBESARTAN 75 MG: 150 TABLET ORAL at 17:38

## 2022-05-20 RX ADMIN — GUAIFENESIN 600 MG: 600 TABLET, EXTENDED RELEASE ORAL at 11:56

## 2022-05-20 RX ADMIN — FUROSEMIDE 80 MG: 10 INJECTION, SOLUTION INTRAMUSCULAR; INTRAVENOUS at 16:09

## 2022-05-20 RX ADMIN — CARVEDILOL 6.25 MG: 6.25 TABLET, FILM COATED ORAL at 17:38

## 2022-05-20 RX ADMIN — GUAIFENESIN 600 MG: 600 TABLET, EXTENDED RELEASE ORAL at 17:41

## 2022-05-20 RX ADMIN — FLUTICASONE PROPIONATE 50 MCG: 50 SPRAY, METERED NASAL at 11:57

## 2022-05-20 RX ADMIN — CARVEDILOL 6.25 MG: 6.25 TABLET, FILM COATED ORAL at 09:25

## 2022-05-20 RX ADMIN — SPIRONOLACTONE 25 MG: 25 TABLET ORAL at 06:24

## 2022-05-20 RX ADMIN — FLUTICASONE PROPIONATE 50 MCG: 50 SPRAY, METERED NASAL at 17:41

## 2022-05-20 RX ADMIN — SIMVASTATIN 40 MG: 20 TABLET, FILM COATED ORAL at 17:39

## 2022-05-20 RX ADMIN — RIVAROXABAN 20 MG: 20 TABLET, FILM COATED ORAL at 17:38

## 2022-05-20 RX ADMIN — POTASSIUM CHLORIDE 20 MEQ: 20 TABLET, EXTENDED RELEASE ORAL at 17:39

## 2022-05-20 ASSESSMENT — PAIN DESCRIPTION - PAIN TYPE
TYPE: ACUTE PAIN
TYPE: ACUTE PAIN

## 2022-05-20 ASSESSMENT — ENCOUNTER SYMPTOMS
CHILLS: 0
PALPITATIONS: 0
HEADACHES: 0
DIARRHEA: 0
COUGH: 1
NAUSEA: 0
DOUBLE VISION: 0
ABDOMINAL PAIN: 0
BACK PAIN: 0
BLURRED VISION: 0
FALLS: 0
ORTHOPNEA: 1
SHORTNESS OF BREATH: 1
DIZZINESS: 0
SORE THROAT: 0
VOMITING: 0
HALLUCINATIONS: 0
FEVER: 0

## 2022-05-20 ASSESSMENT — LIFESTYLE VARIABLES: SUBSTANCE_ABUSE: 0

## 2022-05-20 NOTE — PROGRESS NOTES
Received report from previous shift nurse. Assumed patient's cares Pt is up in chair. Pt denies any pain, discomfort, or any needs at this time. Encouraged Pt to call for assistance if needed. Call light within reach. Chair alarm is on.

## 2022-05-20 NOTE — CARE PLAN
The patient is Stable - Low risk of patient condition declining or worsening    Shift Goals  Clinical Goals: Monitor telemetry. ECHO in the morning  Patient Goals: Sleep    Progress made toward(s) clinical / shift goals:  Pt on SR, will continue monitoring      Patient is not progressing towards the following goals:      Problem: Knowledge Deficit - Standard  Goal: Patient and family/care givers will demonstrate understanding of plan of care, disease process/condition, diagnostic tests and medications  Outcome: Progressing

## 2022-05-20 NOTE — PROGRESS NOTES
Hospital Medicine Daily Progress Note    Date of Service  5/20/2022    Chief Complaint  Zenon Grijalva is a 66 y.o. male admitted 5/19/2022 with shortness of breath    Hospital Course  Zenon Grijalva is a 66 y.o. male who presented 5/19/2022 with shortness of breath.  The patient is known to have ischemic cardiomyopathy secondary to coronary artery disease and hypertension.  The patient's most recent left ventricular ejection fraction is up from 25% to 45%.  The patient at this point is with pulmonary edema and diffuse crackles with respiratory difficulties.  The patient is requiring oxygen support and will need at this point aggressive diuresis as well as an echocardiogram to evaluate his current situation.  Patient will need strict intake and output and weight monitoring.  Patient will need optimization of his medications.  We will need to adjust blood pressure management as we are diuresing him aggressively to make sure that the patient does not further decline.  This cannot be done in an outpatient setting and will require inpatient monitoring.      Interval Problem Update  Patient was seen and examined at bedside.  No acute events overnight. Patient is resting comfortably in bed and in no acute distress.     Lasix BID  Mucinex for upper respiratotry congestion     I have personally seen and examined the patient at bedside. I discussed the plan of care with family, bedside RN, charge RN,  and pharmacy.    Code Status  DNAR/DNI    Disposition  Patient is not medically cleared for discharge.   Anticipate discharge to to home with close outpatient follow-up.  I have placed the appropriate orders for post-discharge needs.    Review of Systems  Review of Systems   Constitutional: Negative for chills and fever.   HENT: Positive for congestion. Negative for sore throat.    Eyes: Negative for blurred vision and double vision.   Respiratory: Positive for cough and shortness of breath.     Cardiovascular: Positive for orthopnea and leg swelling. Negative for chest pain and palpitations.   Gastrointestinal: Negative for abdominal pain, diarrhea, nausea and vomiting.   Genitourinary: Negative for dysuria and frequency.   Musculoskeletal: Negative for back pain and falls.   Skin: Negative for rash.   Neurological: Negative for dizziness and headaches.   Psychiatric/Behavioral: Negative for hallucinations and substance abuse.        Physical Exam  Temp:  [36.4 °C (97.6 °F)-37.2 °C (98.9 °F)] 37.2 °C (98.9 °F)  Pulse:  [65-79] 65  Resp:  [16-18] 18  BP: ()/(58-78) 97/63  SpO2:  [91 %-100 %] 93 %    Physical Exam  Constitutional:       General: He is not in acute distress.     Appearance: He is obese. He is not toxic-appearing.   HENT:      Head: Normocephalic.      Right Ear: External ear normal.      Left Ear: External ear normal.      Nose: Nose normal. No congestion.      Mouth/Throat:      Mouth: Mucous membranes are moist.      Pharynx: No oropharyngeal exudate.   Eyes:      General: No scleral icterus.     Pupils: Pupils are equal, round, and reactive to light.   Cardiovascular:      Rate and Rhythm: Normal rate and regular rhythm.      Heart sounds: No murmur heard.  Pulmonary:      Breath sounds: No wheezing.   Abdominal:      Palpations: Abdomen is soft.      Tenderness: There is no abdominal tenderness. There is no guarding or rebound.   Musculoskeletal:         General: Normal range of motion.      Right lower leg: Edema present.      Left lower leg: Edema present.   Skin:     General: Skin is warm.      Capillary Refill: Capillary refill takes less than 2 seconds.      Coloration: Skin is not jaundiced.      Findings: No bruising.   Neurological:      General: No focal deficit present.      Mental Status: He is alert and oriented to person, place, and time. Mental status is at baseline.      Cranial Nerves: No cranial nerve deficit.   Psychiatric:         Mood and Affect: Mood normal.          Behavior: Behavior normal.         Fluids    Intake/Output Summary (Last 24 hours) at 5/20/2022 1204  Last data filed at 5/20/2022 0800  Gross per 24 hour   Intake 800 ml   Output 3450 ml   Net -2650 ml       Laboratory  Recent Labs     05/19/22  0555 05/20/22 0228   WBC 5.3 6.8   RBC 4.49* 4.69*   HEMOGLOBIN 14.5 14.9   HEMATOCRIT 42.3 44.9   MCV 94.2 95.7   MCH 32.3 31.8   MCHC 34.3 33.2*   RDW 46.3 47.8   PLATELETCT 151* 155*   MPV 10.9 10.6     Recent Labs     05/19/22 0555 05/20/22 0228   SODIUM 137 135   POTASSIUM 3.8 3.8   CHLORIDE 101 99   CO2 24 22   GLUCOSE 113* 106*   BUN 20 17   CREATININE 0.92 0.73   CALCIUM 8.5 8.5                   Imaging  EC-ECHOCARDIOGRAM COMPLETE W/ CONT   Final Result      DX-CHEST-PORTABLE (1 VIEW)   Final Result         1.  No acute cardiopulmonary disease.   2.  Cardiomegaly           Assessment/Plan  * Acute on chronic clinical systolic heart failure (HCC)- (present on admission)  Assessment & Plan  Increased shortness of breath, orthopnea after eating take out chinese food  Increased swelling in lower extremities bilaterally  IV lasix BID  Echo with EF 35%  BB, spironolactone  Daily weights  Strict I&O    PAF (paroxysmal atrial fibrillation) ([facility])- (present on admission)  Assessment & Plan  Continue at this point with rate control and anticoagulation    Ischemic cardiomyopathy- (present on admission)  Assessment & Plan  History of ischemic cardiomyopathy with ejection fraction of 25%.  With medication management patient's condition has improved to 45%.    Class 1 obesity in adult  Assessment & Plan  Due to excess caloric intake    DNR (do not resuscitate)  Assessment & Plan  Discussed with patient in detail advanced directives and he wishes to be DO NOT RESUSCITATE DO NOT INTUBATE.    Obstructive sleep apnea- (present on admission)  Assessment & Plan  CPAP at night    Coronary artery disease due to lipid rich plaque- (present on admission)  Assessment &  Plan  Patient has a previous stent  Continue with medication optimization  Currently denies any chest pain    Dyslipidemia- (present on admission)  Assessment & Plan  Low-fat low-cholesterol diet  Statin  Fasting lipid panel    Essential hypertension, benign- (present on admission)  Assessment & Plan  Optimize blood pressure management keep systolic blood pressure less than 140 diastolic under 90.       VTE prophylaxis: SCDs/TEDs and therapeutic anticoagulation with xarelto    I have performed a physical exam and reviewed and updated ROS and Plan today (5/20/2022). In review of yesterday's note (5/19/2022), there are no changes except as documented above.

## 2022-05-20 NOTE — HOSPITAL COURSE
Zenon Grijalva is a 66 y.o. male who presented 5/19/2022 with shortness of breath.  The patient is known to have ischemic cardiomyopathy secondary to coronary artery disease and hypertension.  The patient's most recent left ventricular ejection fraction is up from 25% to 45%.  The patient at this point is with pulmonary edema and diffuse crackles with respiratory difficulties.  The patient is requiring oxygen support and will need at this point aggressive diuresis as well as an echocardiogram to evaluate his current situation.  Patient will need strict intake and output and weight monitoring.  Patient will need optimization of his medications.  We will need to adjust blood pressure management as we are diuresing him aggressively to make sure that the patient does not further decline.  This cannot be done in an outpatient setting and will require inpatient monitoring.

## 2022-05-20 NOTE — PROGRESS NOTES
Telemetry shift summary:  Rhythm: SR     HR Range: 60s to 90s      Measurements from strip printed at 01:59:34   DE: 0.20   QRS 0.08   QT 0.36     Ectopies: Rare to occasional PAC.      6 beats of V-Tach at 04:30

## 2022-05-21 LAB
ALBUMIN SERPL BCP-MCNC: 3.7 G/DL (ref 3.2–4.9)
BUN SERPL-MCNC: 24 MG/DL (ref 8–22)
CALCIUM SERPL-MCNC: 8.7 MG/DL (ref 8.4–10.2)
CHLORIDE SERPL-SCNC: 99 MMOL/L (ref 96–112)
CO2 SERPL-SCNC: 24 MMOL/L (ref 20–33)
CREAT SERPL-MCNC: 0.92 MG/DL (ref 0.5–1.4)
GFR SERPLBLD CREATININE-BSD FMLA CKD-EPI: 91 ML/MIN/1.73 M 2
GLUCOSE SERPL-MCNC: 86 MG/DL (ref 65–99)
MAGNESIUM SERPL-MCNC: 2.1 MG/DL (ref 1.5–2.5)
PHOSPHATE SERPL-MCNC: 3.3 MG/DL (ref 2.5–4.5)
POTASSIUM SERPL-SCNC: 3.7 MMOL/L (ref 3.6–5.5)
SODIUM SERPL-SCNC: 137 MMOL/L (ref 135–145)

## 2022-05-21 PROCEDURE — A9270 NON-COVERED ITEM OR SERVICE: HCPCS | Performed by: HOSPITALIST

## 2022-05-21 PROCEDURE — 700102 HCHG RX REV CODE 250 W/ 637 OVERRIDE(OP): Performed by: INTERNAL MEDICINE

## 2022-05-21 PROCEDURE — A9270 NON-COVERED ITEM OR SERVICE: HCPCS | Performed by: INTERNAL MEDICINE

## 2022-05-21 PROCEDURE — 94760 N-INVAS EAR/PLS OXIMETRY 1: CPT

## 2022-05-21 PROCEDURE — 700105 HCHG RX REV CODE 258: Performed by: INTERNAL MEDICINE

## 2022-05-21 PROCEDURE — 700111 HCHG RX REV CODE 636 W/ 250 OVERRIDE (IP): Performed by: HOSPITALIST

## 2022-05-21 PROCEDURE — 700111 HCHG RX REV CODE 636 W/ 250 OVERRIDE (IP): Performed by: INTERNAL MEDICINE

## 2022-05-21 PROCEDURE — 99233 SBSQ HOSP IP/OBS HIGH 50: CPT | Performed by: INTERNAL MEDICINE

## 2022-05-21 PROCEDURE — 80069 RENAL FUNCTION PANEL: CPT

## 2022-05-21 PROCEDURE — 94660 CPAP INITIATION&MGMT: CPT

## 2022-05-21 PROCEDURE — 700102 HCHG RX REV CODE 250 W/ 637 OVERRIDE(OP): Performed by: HOSPITALIST

## 2022-05-21 PROCEDURE — 83735 ASSAY OF MAGNESIUM: CPT

## 2022-05-21 PROCEDURE — 770020 HCHG ROOM/CARE - TELE (206)

## 2022-05-21 RX ORDER — SODIUM CHLORIDE, SODIUM LACTATE, POTASSIUM CHLORIDE, AND CALCIUM CHLORIDE .6; .31; .03; .02 G/100ML; G/100ML; G/100ML; G/100ML
500 INJECTION, SOLUTION INTRAVENOUS ONCE
Status: COMPLETED | OUTPATIENT
Start: 2022-05-21 | End: 2022-05-21

## 2022-05-21 RX ORDER — FUROSEMIDE 10 MG/ML
100 INJECTION INTRAMUSCULAR; INTRAVENOUS
Status: DISCONTINUED | OUTPATIENT
Start: 2022-05-21 | End: 2022-05-22 | Stop reason: HOSPADM

## 2022-05-21 RX ORDER — METOLAZONE 5 MG/1
5 TABLET ORAL
Status: DISCONTINUED | OUTPATIENT
Start: 2022-05-21 | End: 2022-05-22 | Stop reason: HOSPADM

## 2022-05-21 RX ADMIN — FLUTICASONE PROPIONATE 50 MCG: 50 SPRAY, METERED NASAL at 17:48

## 2022-05-21 RX ADMIN — GUAIFENESIN 600 MG: 600 TABLET, EXTENDED RELEASE ORAL at 17:50

## 2022-05-21 RX ADMIN — CARVEDILOL 6.25 MG: 6.25 TABLET, FILM COATED ORAL at 08:31

## 2022-05-21 RX ADMIN — POTASSIUM CHLORIDE 20 MEQ: 20 TABLET, EXTENDED RELEASE ORAL at 17:49

## 2022-05-21 RX ADMIN — POTASSIUM CHLORIDE 20 MEQ: 20 TABLET, EXTENDED RELEASE ORAL at 06:34

## 2022-05-21 RX ADMIN — SPIRONOLACTONE 25 MG: 25 TABLET ORAL at 06:36

## 2022-05-21 RX ADMIN — SIMVASTATIN 40 MG: 20 TABLET, FILM COATED ORAL at 17:53

## 2022-05-21 RX ADMIN — SODIUM CHLORIDE, POTASSIUM CHLORIDE, SODIUM LACTATE AND CALCIUM CHLORIDE 500 ML: 600; 310; 30; 20 INJECTION, SOLUTION INTRAVENOUS at 20:30

## 2022-05-21 RX ADMIN — GUAIFENESIN 600 MG: 600 TABLET, EXTENDED RELEASE ORAL at 06:35

## 2022-05-21 RX ADMIN — CARVEDILOL 6.25 MG: 6.25 TABLET, FILM COATED ORAL at 17:48

## 2022-05-21 RX ADMIN — IRBESARTAN 75 MG: 150 TABLET ORAL at 17:48

## 2022-05-21 RX ADMIN — FUROSEMIDE 100 MG: 10 INJECTION, SOLUTION INTRAMUSCULAR; INTRAVENOUS at 16:17

## 2022-05-21 RX ADMIN — FLUTICASONE PROPIONATE 50 MCG: 50 SPRAY, METERED NASAL at 06:34

## 2022-05-21 RX ADMIN — SENNOSIDES AND DOCUSATE SODIUM 2 TABLET: 50; 8.6 TABLET ORAL at 17:48

## 2022-05-21 RX ADMIN — METOLAZONE 5 MG: 5 TABLET ORAL at 10:21

## 2022-05-21 RX ADMIN — RIVAROXABAN 20 MG: 20 TABLET, FILM COATED ORAL at 17:50

## 2022-05-21 RX ADMIN — FUROSEMIDE 80 MG: 10 INJECTION, SOLUTION INTRAMUSCULAR; INTRAVENOUS at 06:41

## 2022-05-21 ASSESSMENT — ENCOUNTER SYMPTOMS
ORTHOPNEA: 1
COUGH: 1
BLURRED VISION: 0
PALPITATIONS: 0
DIARRHEA: 0
CHILLS: 0
NAUSEA: 0
VOMITING: 0
HEADACHES: 0
SHORTNESS OF BREATH: 1
FALLS: 0
SORE THROAT: 0
HALLUCINATIONS: 0
ABDOMINAL PAIN: 0
BACK PAIN: 0
FEVER: 0
DOUBLE VISION: 0
DIZZINESS: 0

## 2022-05-21 ASSESSMENT — LIFESTYLE VARIABLES: SUBSTANCE_ABUSE: 0

## 2022-05-21 ASSESSMENT — FIBROSIS 4 INDEX: FIB4 SCORE: 2.45

## 2022-05-21 ASSESSMENT — PAIN DESCRIPTION - PAIN TYPE
TYPE: ACUTE PAIN
TYPE: ACUTE PAIN

## 2022-05-21 NOTE — PROGRESS NOTES
Hospital Medicine Daily Progress Note    Date of Service  5/21/2022    Chief Complaint  Zenon Grijalva is a 66 y.o. male admitted 5/19/2022 with shortness of breath    Hospital Course  Zenon Grijalva is a 66 y.o. male who presented 5/19/2022 with shortness of breath.  The patient is known to have ischemic cardiomyopathy secondary to coronary artery disease and hypertension.  The patient's most recent left ventricular ejection fraction is up from 25% to 45%.  The patient at this point is with pulmonary edema and diffuse crackles with respiratory difficulties.  The patient is requiring oxygen support and will need at this point aggressive diuresis as well as an echocardiogram to evaluate his current situation.  Patient will need strict intake and output and weight monitoring.  Patient will need optimization of his medications.  We will need to adjust blood pressure management as we are diuresing him aggressively to make sure that the patient does not further decline.  This cannot be done in an outpatient setting and will require inpatient monitoring.      Interval Problem Update  Patient was seen and examined at bedside.  No acute events overnight. Patient is resting comfortably in bed and in no acute distress.     IV lasix BID  Will add metolazone today  Anticipate discharge in AM  Net negative 4.7L    I have personally seen and examined the patient at bedside. I discussed the plan of care with family, bedside RN, charge RN,  and pharmacy.    Code Status  DNAR/DNI    Disposition  Patient is not medically cleared for discharge.   Anticipate discharge to to home with close outpatient follow-up.  I have placed the appropriate orders for post-discharge needs.    Review of Systems  Review of Systems   Constitutional: Negative for chills and fever.   HENT: Positive for congestion. Negative for sore throat.    Eyes: Negative for blurred vision and double vision.   Respiratory: Positive for cough  and shortness of breath.    Cardiovascular: Positive for orthopnea and leg swelling. Negative for chest pain and palpitations.   Gastrointestinal: Negative for abdominal pain, diarrhea, nausea and vomiting.   Genitourinary: Negative for dysuria and frequency.   Musculoskeletal: Negative for back pain and falls.   Skin: Negative for rash.   Neurological: Negative for dizziness and headaches.   Psychiatric/Behavioral: Negative for hallucinations and substance abuse.        Physical Exam  Temp:  [36.4 °C (97.5 °F)-37.2 °C (98.9 °F)] 36.5 °C (97.7 °F)  Pulse:  [] 77  Resp:  [16-18] 16  BP: ()/() 117/65  SpO2:  [90 %-94 %] 91 %    Physical Exam  Constitutional:       General: He is not in acute distress.     Appearance: He is obese. He is not toxic-appearing.      Comments: Pleasant, conversational   HENT:      Head: Normocephalic.      Right Ear: External ear normal.      Left Ear: External ear normal.      Nose: Nose normal. No congestion.      Mouth/Throat:      Mouth: Mucous membranes are moist.      Pharynx: No oropharyngeal exudate.   Eyes:      General: No scleral icterus.     Pupils: Pupils are equal, round, and reactive to light.   Cardiovascular:      Rate and Rhythm: Normal rate and regular rhythm.      Heart sounds: No murmur heard.  Pulmonary:      Breath sounds: No wheezing.   Abdominal:      Palpations: Abdomen is soft.      Tenderness: There is no abdominal tenderness. There is no guarding or rebound.   Musculoskeletal:         General: Normal range of motion.      Right lower leg: Edema present.      Left lower leg: Edema present.      Comments: Lower extremity edema improving   Skin:     General: Skin is warm.      Capillary Refill: Capillary refill takes less than 2 seconds.      Coloration: Skin is not jaundiced.      Findings: No bruising.   Neurological:      General: No focal deficit present.      Mental Status: He is alert and oriented to person, place, and time. Mental status is  at baseline.      Cranial Nerves: No cranial nerve deficit.   Psychiatric:         Mood and Affect: Mood normal.         Behavior: Behavior normal.         Fluids    Intake/Output Summary (Last 24 hours) at 5/21/2022 0927  Last data filed at 5/21/2022 0800  Gross per 24 hour   Intake 960 ml   Output 3000 ml   Net -2040 ml       Laboratory  Recent Labs     05/19/22  0555 05/20/22  0228   WBC 5.3 6.8   RBC 4.49* 4.69*   HEMOGLOBIN 14.5 14.9   HEMATOCRIT 42.3 44.9   MCV 94.2 95.7   MCH 32.3 31.8   MCHC 34.3 33.2*   RDW 46.3 47.8   PLATELETCT 151* 155*   MPV 10.9 10.6     Recent Labs     05/19/22 0555 05/20/22 0228 05/21/22  0400   SODIUM 137 135 137   POTASSIUM 3.8 3.8 3.7   CHLORIDE 101 99 99   CO2 24 22 24   GLUCOSE 113* 106* 86   BUN 20 17 24*   CREATININE 0.92 0.73 0.92   CALCIUM 8.5 8.5 8.7                   Imaging  EC-ECHOCARDIOGRAM COMPLETE W/ CONT   Final Result      DX-CHEST-PORTABLE (1 VIEW)   Final Result         1.  No acute cardiopulmonary disease.   2.  Cardiomegaly           Assessment/Plan  * Acute on chronic clinical systolic heart failure (HCC)- (present on admission)  Assessment & Plan  Increased shortness of breath, orthopnea after eating take out chinese food  Increased swelling in lower extremities bilaterally  IV lasix BID  Echo with EF 35%  BB, spironolactone  IV lasix BID  Will add metolazone today  Net negative 4.7L  Strict I&O    PAF (paroxysmal atrial fibrillation) (HCC)- (present on admission)  Assessment & Plan  Continue at this point with rate control and anticoagulation    Ischemic cardiomyopathy- (present on admission)  Assessment & Plan  History of ischemic cardiomyopathy with ejection fraction of 25%.  With medication management patient's condition has improved to 45%.    Class 1 obesity in adult  Assessment & Plan  Due to excess caloric intake    DNR (do not resuscitate)  Assessment & Plan  Discussed with patient in detail advanced directives and he wishes to be DO NOT RESUSCITATE  DO NOT INTUBATE.    Obstructive sleep apnea- (present on admission)  Assessment & Plan  CPAP at night    Coronary artery disease due to lipid rich plaque- (present on admission)  Assessment & Plan  Patient has a previous stent  Continue with medication optimization  Currently denies any chest pain    Dyslipidemia- (present on admission)  Assessment & Plan  Low-fat low-cholesterol diet  Statin  Fasting lipid panel    Essential hypertension, benign- (present on admission)  Assessment & Plan  Optimize blood pressure management keep systolic blood pressure less than 140 diastolic under 90.       VTE prophylaxis: SCDs/TEDs and therapeutic anticoagulation with xarelto    I have performed a physical exam and reviewed and updated ROS and Plan today (5/21/2022). In review of yesterday's note (5/20/2022), there are no changes except as documented above.

## 2022-05-21 NOTE — PROGRESS NOTES
Awaken Pt for VS. Pt denies any pain or discomfort at this time. No changes from previous assessments. Safety measures in place.

## 2022-05-21 NOTE — PROGRESS NOTES
12-hour chart check complete.    Monitor Summary  Rhythm: SR   Rate: 74  Ectopy:   Measurements: 0.18/0.08/0.42

## 2022-05-21 NOTE — CARE PLAN
The patient is Watcher - Medium risk of patient condition declining or worsening    Shift Goals  Clinical Goals: Diuresis  Patient Goals: Discharge  Family Goals: NA    Progress made toward(s) clinical / shift goals:    Problem: Care Map:  Admission Optimal Outcome for the Heart Failure Patient  Goal: Admission:  Optimal Care of the heart failure patient  Outcome: Progressing     Problem: Knowledge Deficit - Standard  Goal: Patient and family/care givers will demonstrate understanding of plan of care, disease process/condition, diagnostic tests and medications  Outcome: Progressing       Patient is not progressing towards the following goals:

## 2022-05-21 NOTE — PROGRESS NOTES
Telemetry shift summary:  Rhythm: SR, SB     HR Range: 50s to 60s   Down to 40      Measurements from strip printed at 03:30:23   GA: 0.16   QRS 0.08   QT 0.40     Ectopies: `Rare PAC, PVC, and couplets.

## 2022-05-21 NOTE — FLOWSHEET NOTE
05/21/22 0703   Events/Summary/Plan   Events/Summary/Plan Pt up off home CPAP on RA using IS   Vital Signs   Pulse 62   Respiration 18   Pulse Oximetry 94 %   $ Pulse Oximetry (Spot Check) Yes   Respiratory Assessment   Respiratory Pattern Within Normal Limits   Level of Consciousness Alert   Chest Exam   Work Of Breathing / Effort Within Normal Limits   Breath Sounds   RUL Breath Sounds Clear   RML Breath Sounds Clear   RLL Breath Sounds Clear;Diminished   EUGENIO Breath Sounds Clear   LLL Breath Sounds Clear;Diminished   Secretions   Cough Non Productive   Oxygen   O2 (LPM) 0   O2 Delivery Device None - Room Air   Non-Invasive Ventilation [employer]   Nocturnal CPAP or BIPAP CPAP - Home Unit   $ System Evaluation Yes   FiO2 or LPM 0

## 2022-05-21 NOTE — PROGRESS NOTES
Received report from previous shift nurse Yolanda RODRIGUEZ. Assumed patient's cares Pt is up in bed . Pt denies any pain, discomfort, or any needs at this time. Encouraged Pt to call for assistance if needed. Call light within reach.

## 2022-05-21 NOTE — CARE PLAN
The patient is Watcher - Medium risk of patient condition declining or worsening    Shift Goals  Clinical Goals: Monitor respiratory status. Diuresis  Patient Goals: Sleep  Family Goals: NA    Progress made toward(s) clinical / shift goals:  Pt states SOB has been improving. Able to maintain O2 wilma > 90% at RA    Patient is not progressing towards the following goals:      Problem: Knowledge Deficit - Standard  Goal: Patient and family/care givers will demonstrate understanding of plan of care, disease process/condition, diagnostic tests and medications  Outcome: Progressing

## 2022-05-22 VITALS
TEMPERATURE: 97.6 F | WEIGHT: 225.09 LBS | OXYGEN SATURATION: 95 % | DIASTOLIC BLOOD PRESSURE: 76 MMHG | RESPIRATION RATE: 17 BRPM | HEART RATE: 89 BPM | SYSTOLIC BLOOD PRESSURE: 105 MMHG | BODY MASS INDEX: 33.34 KG/M2 | HEIGHT: 69 IN

## 2022-05-22 LAB
ALBUMIN SERPL BCP-MCNC: 4 G/DL (ref 3.2–4.9)
BUN SERPL-MCNC: 30 MG/DL (ref 8–22)
CALCIUM SERPL-MCNC: 9.2 MG/DL (ref 8.4–10.2)
CHLORIDE SERPL-SCNC: 95 MMOL/L (ref 96–112)
CO2 SERPL-SCNC: 23 MMOL/L (ref 20–33)
CREAT SERPL-MCNC: 0.85 MG/DL (ref 0.5–1.4)
GFR SERPLBLD CREATININE-BSD FMLA CKD-EPI: 95 ML/MIN/1.73 M 2
GLUCOSE SERPL-MCNC: 101 MG/DL (ref 65–99)
MAGNESIUM SERPL-MCNC: 2.1 MG/DL (ref 1.5–2.5)
PHOSPHATE SERPL-MCNC: 3.9 MG/DL (ref 2.5–4.5)
POTASSIUM SERPL-SCNC: 3.7 MMOL/L (ref 3.6–5.5)
SODIUM SERPL-SCNC: 133 MMOL/L (ref 135–145)

## 2022-05-22 PROCEDURE — 99239 HOSP IP/OBS DSCHRG MGMT >30: CPT | Performed by: INTERNAL MEDICINE

## 2022-05-22 PROCEDURE — 700102 HCHG RX REV CODE 250 W/ 637 OVERRIDE(OP): Performed by: HOSPITALIST

## 2022-05-22 PROCEDURE — 80069 RENAL FUNCTION PANEL: CPT

## 2022-05-22 PROCEDURE — A9270 NON-COVERED ITEM OR SERVICE: HCPCS | Performed by: INTERNAL MEDICINE

## 2022-05-22 PROCEDURE — 83735 ASSAY OF MAGNESIUM: CPT

## 2022-05-22 PROCEDURE — 700111 HCHG RX REV CODE 636 W/ 250 OVERRIDE (IP): Performed by: INTERNAL MEDICINE

## 2022-05-22 PROCEDURE — A9270 NON-COVERED ITEM OR SERVICE: HCPCS | Performed by: HOSPITALIST

## 2022-05-22 PROCEDURE — 700102 HCHG RX REV CODE 250 W/ 637 OVERRIDE(OP): Performed by: INTERNAL MEDICINE

## 2022-05-22 RX ORDER — TORSEMIDE 20 MG/1
20 TABLET ORAL EVERY MORNING
Qty: 30 TABLET | Refills: 0 | Status: SHIPPED | OUTPATIENT
Start: 2022-05-22 | End: 2022-05-27

## 2022-05-22 RX ORDER — FLUTICASONE PROPIONATE 50 MCG
1 SPRAY, SUSPENSION (ML) NASAL 2 TIMES DAILY
Qty: 16 G | Refills: 0 | Status: ON HOLD | OUTPATIENT
Start: 2022-05-22 | End: 2022-08-01

## 2022-05-22 RX ORDER — SPIRONOLACTONE 25 MG/1
25 TABLET ORAL DAILY
Qty: 30 TABLET | Refills: 0 | Status: SHIPPED | OUTPATIENT
Start: 2022-05-23 | End: 2022-07-25 | Stop reason: SDUPTHER

## 2022-05-22 RX ADMIN — CARVEDILOL 6.25 MG: 6.25 TABLET, FILM COATED ORAL at 08:54

## 2022-05-22 RX ADMIN — FUROSEMIDE 100 MG: 10 INJECTION, SOLUTION INTRAMUSCULAR; INTRAVENOUS at 05:42

## 2022-05-22 RX ADMIN — SPIRONOLACTONE 25 MG: 25 TABLET ORAL at 05:36

## 2022-05-22 RX ADMIN — GUAIFENESIN 600 MG: 600 TABLET, EXTENDED RELEASE ORAL at 05:36

## 2022-05-22 RX ADMIN — METOLAZONE 5 MG: 5 TABLET ORAL at 05:35

## 2022-05-22 RX ADMIN — SENNOSIDES AND DOCUSATE SODIUM 2 TABLET: 50; 8.6 TABLET ORAL at 05:37

## 2022-05-22 RX ADMIN — POTASSIUM CHLORIDE 20 MEQ: 20 TABLET, EXTENDED RELEASE ORAL at 05:36

## 2022-05-22 ASSESSMENT — FIBROSIS 4 INDEX: FIB4 SCORE: 2.45

## 2022-05-22 NOTE — PROGRESS NOTES
Report received from conrelius Madrigal sitting up in a chair.    Morning assessment done about 0800 as breakfast tray were delivered.  Pt reports the cough is better but still there and that he can walk much further.    Circled back with morning coreg when he had finished breakfast.  HF booklet given and education given with discharging medication as well as filling in bp and weight in the booklet.  Asked pt about his diet and he states he will be changing to low-salt after today.  Also spoke about f/u with cardiology and his appt is for Friday.  IV removed and tele monitor removed.      Discharging Patient home per physician order.  Discharged with wife to home at 1025.  Demonstrated understanding of discharge instructions, follow up appointments, home medications, prescriptions sent to [facility], and nursing care instructions for HF.  Ambulating without assistance, voiding without difficulty, pain well controlled, tolerating oral medications, oxygen saturation greater than 90% , tolerating diet.   Educational handouts given and discussed.  Verbalized understanding of discharge instructions and educational handouts.  All questions answered.  Belongings with patient at time of discharge. Pt was sent home with heart failure booklet and cpap machine.

## 2022-05-22 NOTE — CARE PLAN
Problem: Care Map:  Admission Optimal Outcome for the Heart Failure Patient  Goal: Admission:  Optimal Care of the heart failure patient  Outcome: Not Met     Problem: Knowledge Deficit - Standard  Goal: Patient and family/care givers will demonstrate understanding of plan of care, disease process/condition, diagnostic tests and medications  Outcome: Progressing   The patient is Watcher - Medium risk of patient condition declining or worsening    Shift Goals  Clinical Goals: Diuresis  Patient Goals: Discharge  Family Goals: NA    Progress made toward(s) clinical / shift goals:      Patient is not progressing towards the following goals: Patient converted to a fib 70-100s, Dr. Ram aware.      Problem: Care Map:  Admission Optimal Outcome for the Heart Failure Patient  Goal: Admission:  Optimal Care of the heart failure patient  Outcome: Not Met

## 2022-05-22 NOTE — DISCHARGE INSTRUCTIONS
Discharge Instructions    Discharged to home by car with relative. Discharged via wheelchair, hospital escort: Yes.  Special equipment needed: Not Applicable    Be sure to schedule a follow-up appointment with your primary care doctor or any specialists as instructed.     Discharge Plan:        I understand that a diet low in cholesterol, fat, and sodium is recommended for good health. Unless I have been given specific instructions below for another diet, I accept this instruction as my diet prescription.   Other diet: Low Salt Diet    Special Instructions:     HF Patient Discharge Instructions  Monitor your weight daily, and maintain a weight chart, to track your weight changes.   Activity as tolerated, unless your Doctor has ordered otherwise. Other activity order: NA.  Follow a low fat, low cholesterol, low salt diet unless instructed otherwise by your Doctor. Read the labels on the back of food products and track your intake of fat, cholesterol and salt.   Fluid Restriction No. If a Fluid Restriction has been ordered by your Doctor, measure fluids with a measuring cup to ensure that you are not exceeding the restriction.   No smoking.  Oxygen No. If your Doctor has ordered that you wear Oxygen at home, it is important to wear it as ordered.  Did you receive an explanation from staff on the importance of taking each of your medications and why it is necessary to stay on the medications the physician/care provider has ordered? Yes  Do you have any questions concerning how to manage your heart failure and what to do should you have any increased signs and symptoms after you go home? No  Do you feel like your heart failure care team involved you in the care treatment plan and allowed you to make decisions regarding your care while in the hospital and addressed any discharge needs you might have? Yes    See the educational handout provided at discharge for more information on monitoring your daily weight, activity  and diet. This also explains more about Heart Failure, symptoms of a flare-up and some of the tests that you have undergone.     Warning Signs of a Flare-Up include:  Swelling in the ankles or lower legs.  Shortness of breath, while at rest, or while doing normal activities.   Shortness of breath at night when in bed, or coughing in bed.   Requiring more pillows to sleep at night, or needing to sit up at night to sleep.  Feeling weak, dizzy or fatigued.     When to call your Doctor:  Call [facility] about questions regarding the discharge instructions you were given [phone].  (Discharge Unit Med-Tele)  Call your Primary Care Physician or Cardiologist if:   You experience any pain radiating to your jaw or neck.  You have any difficulty breathing.  You experience weight gain of 2 lbs in a day or 5 lbs in a week.   You feel any palpitations or irregular heartbeats.  You become dizzy or lose consciousness.   If you have had an angiogram or had a pacemaker or AICD placed, and experience:  Bleeding, drainage or swelling at the surgical / puncture site.  Fever greater than 100.0 F  Shock from internal defibrillator.  Cool and / or numb extremities.    Is patient discharged on Warfarin / Coumadin?   No     Depression / Suicide Risk    As you are discharged from this [facility], it is important to learn how to keep safe from harming yourself.    Recognize the warning signs:  Abrupt changes in personality, positive or negative- including increase in energy   Giving away possessions  Change in eating patterns- significant weight changes-  positive or negative  Change in sleeping patterns- unable to sleep or sleeping all the time   Unwillingness or inability to communicate  Depression  Unusual sadness, discouragement and loneliness  Talk of wanting to die  Neglect of personal appearance   Rebelliousness- reckless behavior  Withdrawal from people/activities they love  Confusion-  inability to concentrate     If you or a loved one observes any of these behaviors or has concerns about self-harm, here's what you can do:  Talk about it- your feelings and reasons for harming yourself  Remove any means that you might use to hurt yourself (examples: pills, rope, extension cords, firearm)  Get professional help from the community (Mental Health, Substance Abuse, psychological counseling)  Do not be alone:Call your Safe Contact- someone whom you trust who will be there for you.  Call your local CRISIS HOTLINE 339-8422 or 530-603-7119  Call your local Children's Mobile Crisis Response Team [place] (253) 142-4021 or www.[employer]  Call the toll free National Suicide Prevention Hotlines   National Suicide Prevention Lifeline 378-211-VYAR (4606)  National Hope Line Network 800-SUICIDE (533-6592)

## 2022-05-22 NOTE — DISCHARGE SUMMARY
Discharge Summary    CHIEF COMPLAINT ON ADMISSION  Chief Complaint   Patient presents with   • Congestion   • Shortness of Breath       Reason for Admission  Shortness of Breath, Congestion      Admission Date  5/19/2022    CODE STATUS  Prior    HPI & HOSPITAL COURSE  Zenon Grijalva is a 66 y.o. male who presented 5/19/2022 with shortness of breath.  The patient is known to have ischemic cardiomyopathy secondary to coronary artery disease and hypertension.  The patient's most recent left ventricular ejection fraction is up from 25% to 45%.  The patient at this point is with pulmonary edema and diffuse crackles with respiratory difficulties. The patient was aggressively diuresed. Repeat Echo demonstrated EF 35%. Net 7.8L removed during hospitalization. Discussed monitoring daily weight at home as well as low salt diet. Patient was felt to be euvolemic. Patient was determined satisfactory for discharge with appropriate follow up.    Therefore, he is discharged in fair and stable condition to home with close outpatient follow-up.    The patient met 2-midnight criteria for an inpatient stay at the time of discharge.    Discharge Date  5/22/2022    FOLLOW UP ITEMS POST DISCHARGE  Please follow up with PCP in 3-5 days for post hospitalization follow up and medication reconciliation.       DISCHARGE DIAGNOSES  Principal Problem:    Acute on chronic clinical systolic heart failure (HCC) POA: Yes  Active Problems:    Ischemic cardiomyopathy (Chronic) POA: Yes      Overview: 2008 note;  Transthoracic echocardiogram showed ischemic cardiomyopathy       with EF of 40-45%, with septal and anterior hypokinesis and apical       akinesis.  There was no significant valvular abnormalities    PAF (paroxysmal atrial fibrillation) (HCC) POA: Yes    Essential hypertension, benign (Chronic) POA: Yes    Dyslipidemia (Chronic) POA: Yes    Coronary artery disease due to lipid rich plaque POA: Yes    Obstructive sleep apnea POA: Yes     DNR (do not resuscitate) POA: Unknown    Class 1 obesity in adult POA: Unknown  Resolved Problems:    * No resolved hospital problems. *      FOLLOW UP  Future Appointments   Date Time Provider Department Center   5/27/2022  9:00 AM REBEKA Hernandez [facility] None   6/7/2022  7:15 AM [facility] EXAM 9 ECHO [facility] [address]   6/9/2022  8:30 AM Davon Walters M.D. [facility] None   7/27/2022  9:30 AM SPIROMETRY/6MW [facility] None   7/27/2022 10:00 AM REBEKA Cooley [facility] None     No follow-up provider specified.    MEDICATIONS ON DISCHARGE     Medication List      START taking these medications      Instructions   fluticasone 50 MCG/ACT nasal spray  Commonly known as: FLONASE   Administer 1 Spray into affected nostril(S) 2 times a day.  Dose: 1 Spray     spironolactone 25 MG Tabs  Start taking on: May 23, 2022  Commonly known as: ALDACTONE   Take 1 Tablet by mouth every day.  Dose: 25 mg        CHANGE how you take these medications      Instructions   carvedilol 6.25 MG Tabs  What changed: when to take this  Commonly known as: COREG   TAKE 1 TABLET BY MOUTH TWICE DAILY WITH MEALS     irbesartan 150 MG Tabs  What changed: when to take this  Commonly known as: AVAPRO   Take 0.5 Tablets by mouth every day.  Dose: 75 mg     torsemide 20 MG Tabs  What changed:   · how much to take  · how to take this  · when to take this  · additional instructions  Commonly known as: DEMADEX   Take 1 Tablet by mouth every morning for 30 days.  Dose: 20 mg        CONTINUE taking these medications      Instructions   CORICIDIN HBP COLD/COUGH/FLU PO   Take 30 mL by mouth every four hours as needed.  Dose: 30 mL     levalbuterol 45 MCG/ACT inhaler  Commonly known as: XOPENEX HFA   Inhale 1-2 Puffs every four hours as needed for Shortness of Breath.  Dose: 1-2 Puff     rivaroxaban 20 MG Tabs tablet  Commonly known as: Xarelto   Take 1 Tablet by mouth with dinner.  Dose: 20 mg     simvastatin 40 MG Tabs  Commonly known as: ZOCOR   Take 1 Tablet by  mouth every day.  Dose: 40 mg     Valved Holding Chamber Salma   USE AS DIRECTED WITH INHALER            Allergies  Allergies   Allergen Reactions   • Other Environmental      Hayfever       DIET  No orders of the defined types were placed in this encounter.      ACTIVITY  As tolerated.  Weight bearing as tolerated    CONSULTATIONS  N/A    PROCEDURES  N/A    LABORATORY  Lab Results   Component Value Date    SODIUM 133 (L) 05/22/2022    POTASSIUM 3.7 05/22/2022    CHLORIDE 95 (L) 05/22/2022    CO2 23 05/22/2022    GLUCOSE 101 (H) 05/22/2022    BUN 30 (H) 05/22/2022    CREATININE 0.85 05/22/2022    CREATININE 0.77 07/09/2012        Lab Results   Component Value Date    WBC 6.8 05/20/2022    HEMOGLOBIN 14.9 05/20/2022    HEMATOCRIT 44.9 05/20/2022    PLATELETCT 155 (L) 05/20/2022        Total time of the discharge process exceeds 39 minutes.

## 2022-05-22 NOTE — PROGRESS NOTES
Monitor Summary:    Rhythm: SR  Ectopy: Rare to occ PAC, PVC, 4 beat V Tach and 5 beat V Tach  Rate; 61`-96  .16/.08/.40

## 2022-05-23 ENCOUNTER — TELEPHONE (OUTPATIENT)
Dept: CARDIOLOGY | Facility: MEDICAL CENTER | Age: 67
End: 2022-05-23
Payer: MEDICARE

## 2022-05-23 ENCOUNTER — PATIENT OUTREACH (OUTPATIENT)
Dept: HEALTH INFORMATION MANAGEMENT | Facility: OTHER | Age: 67
End: 2022-05-23
Payer: MEDICARE

## 2022-05-23 DIAGNOSIS — I10 ESSENTIAL HYPERTENSION: ICD-10-CM

## 2022-05-23 DIAGNOSIS — I10 ESSENTIAL HYPERTENSION, BENIGN: ICD-10-CM

## 2022-05-23 DIAGNOSIS — I48.0 PAF (PAROXYSMAL ATRIAL FIBRILLATION) (HCC): ICD-10-CM

## 2022-05-23 DIAGNOSIS — I25.5 ISCHEMIC CARDIOMYOPATHY: ICD-10-CM

## 2022-05-23 NOTE — TELEPHONE ENCOUNTER
Called pt's spouse Yvonne (confirmed contact), pt was recently hospitalized and discharged. She said he was given potassium while pt was admitted and later given spironolactone. Pt was not prescribed potassium upon discharge. She is unsure if spironolactone is causing the nitin horse or if pt should go back on potassium. Pt denies other symptoms like SOB or LE swelling, she said these improved prior to pt's discharge. Did advise non-pharmacological treatment like heat application, stretching muscles, and hydration with water. Advised will reach out to TW if any other recommendations.

## 2022-05-23 NOTE — TELEPHONE ENCOUNTER
TW    Caller: Yvonne Tapia, pt's significant other    Medication Questions: Yvonne stated that since starting the new medication spironolactone (ALDACTONE) 25 MG Tab, pt has had nitin horses in both of his legs.    Reporting Symptoms: Yvonne stated that pt has a very bad charley horse in both of his legs and it won't go away.      Callback Number: [phone]      Thank you,  Marlyn LOPEZ

## 2022-05-23 NOTE — TELEPHONE ENCOUNTER
MATEUS    Caller: Yvonne  Topic/issue: she would like to see if there is anything that Martín can take to get rid of the nitin horses in his legs that his medication is giving him  Callback Number: [phone]    Thank you,   Hoda ESCAMILLA

## 2022-05-24 ENCOUNTER — TELEPHONE (OUTPATIENT)
Dept: CARDIOLOGY | Facility: MEDICAL CENTER | Age: 67
End: 2022-05-24
Payer: MEDICARE

## 2022-05-24 RX ORDER — POTASSIUM CHLORIDE 20 MEQ/1
20 TABLET, EXTENDED RELEASE ORAL 2 TIMES DAILY
Qty: 180 TABLET | Refills: 0 | Status: SHIPPED | OUTPATIENT
Start: 2022-05-24 | End: 2022-07-25

## 2022-05-24 NOTE — TELEPHONE ENCOUNTER
Davon Walters M.D.  You 2 hours ago (12:25 PM)     Okay to restart potassium at prior dosing      Noted per MAR, pt was taking KCl 20mEq BID during hospital stay. RX sent to pharmacy on file. Called and left detailed personal VM for pt's spouse Yvonne. Advised to call back in any questions and confirmed FV with YOGI on 05/27/22.

## 2022-05-25 ENCOUNTER — TELEPHONE (OUTPATIENT)
Dept: CARDIOLOGY | Facility: MEDICAL CENTER | Age: 67
End: 2022-05-25
Payer: MEDICARE

## 2022-05-25 NOTE — TELEPHONE ENCOUNTER
Received call from his wife. The patient has been hypotensive tonight. He did take carvedilol, irbesartan, torsemide today. He was instructed to lye down, rehydrate and call with blood pressure reading tomorrow morning for further directions.     Thanks.  SAIDA. 05/24/22. 19:43

## 2022-05-25 NOTE — TELEPHONE ENCOUNTER
TW    Caller:Kim wife Yvonne   Topic/issue: BP has been low. 73/51 and 76/60. They called the on call doctor and were told that he was overmedicated and dehydrated  Callback Number: [phone] if she can not answer she will call right back.    Thank you,  Hoda ESCAMILLA

## 2022-05-25 NOTE — PROGRESS NOTES
CHW Calos will discharge patient from CCM services due to lack of contact after x3 TC attempts 05/25/22 .

## 2022-05-25 NOTE — TELEPHONE ENCOUNTER
"Called pt back to discuss. He reported the following readings from this morning:  /72  HR 72    Regarding symptoms, pt stated, \"I've got this congestion in my lungs and nose, when I cough it just wears me out.\" Pt reported SOB with coughing, no other cardiac symptoms. Pt said he has not taken his medications yet this morning however did advise if SBP greater than 100, he should be safe to continue meds at this time. Also notified pt of KCl script sent yesterday afternoon to help prevent nitin horses and he said he received the script. Advised will reach out to TW if any new med changes recommended. He verbalized understanding and was appreciative.    To TW - pt reported low BP readings from last night, however improved this morning. Please advise if any new changes. Thank you!  "

## 2022-05-25 NOTE — TELEPHONE ENCOUNTER
Davon Walters M.D.  You 22 minutes ago (2:27 PM)     Maybe have him drop by to see a nurse practitioner this week   Thanks      Noted pt is scheduled to see YOGI 05/27/22 at 0900.

## 2022-05-27 ENCOUNTER — OFFICE VISIT (OUTPATIENT)
Dept: CARDIOLOGY | Facility: MEDICAL CENTER | Age: 67
End: 2022-05-27
Payer: MEDICARE

## 2022-05-27 ENCOUNTER — OFFICE VISIT (OUTPATIENT)
Dept: URGENT CARE | Facility: PHYSICIAN GROUP | Age: 67
End: 2022-05-27
Payer: MEDICARE

## 2022-05-27 ENCOUNTER — HOSPITAL ENCOUNTER (OUTPATIENT)
Dept: RADIOLOGY | Facility: MEDICAL CENTER | Age: 67
End: 2022-05-27
Attending: EMERGENCY MEDICINE
Payer: MEDICARE

## 2022-05-27 VITALS
HEIGHT: 69 IN | OXYGEN SATURATION: 96 % | WEIGHT: 224 LBS | TEMPERATURE: 98.6 F | RESPIRATION RATE: 15 BRPM | DIASTOLIC BLOOD PRESSURE: 60 MMHG | HEART RATE: 64 BPM | SYSTOLIC BLOOD PRESSURE: 100 MMHG | BODY MASS INDEX: 33.18 KG/M2

## 2022-05-27 VITALS
RESPIRATION RATE: 16 BRPM | OXYGEN SATURATION: 98 % | TEMPERATURE: 98.2 F | SYSTOLIC BLOOD PRESSURE: 112 MMHG | WEIGHT: 224 LBS | HEART RATE: 52 BPM | DIASTOLIC BLOOD PRESSURE: 72 MMHG | BODY MASS INDEX: 33.18 KG/M2 | HEIGHT: 69 IN

## 2022-05-27 DIAGNOSIS — I50.20 ACC/AHA STAGE C SYSTOLIC HEART FAILURE (HCC): ICD-10-CM

## 2022-05-27 DIAGNOSIS — R05.9 COUGH: ICD-10-CM

## 2022-05-27 DIAGNOSIS — I25.10 CORONARY ARTERY DISEASE INVOLVING NATIVE CORONARY ARTERY OF NATIVE HEART WITHOUT ANGINA PECTORIS: ICD-10-CM

## 2022-05-27 DIAGNOSIS — R53.83 FATIGUE, UNSPECIFIED TYPE: ICD-10-CM

## 2022-05-27 DIAGNOSIS — I25.83 CORONARY ARTERY DISEASE DUE TO LIPID RICH PLAQUE: ICD-10-CM

## 2022-05-27 DIAGNOSIS — I25.5 ISCHEMIC CARDIOMYOPATHY: ICD-10-CM

## 2022-05-27 DIAGNOSIS — G47.33 OBSTRUCTIVE SLEEP APNEA SYNDROME: ICD-10-CM

## 2022-05-27 DIAGNOSIS — E78.5 DYSLIPIDEMIA: ICD-10-CM

## 2022-05-27 DIAGNOSIS — Z79.01 CHRONIC ANTICOAGULATION: ICD-10-CM

## 2022-05-27 DIAGNOSIS — J01.90 ACUTE RHINOSINUSITIS: ICD-10-CM

## 2022-05-27 DIAGNOSIS — I25.10 CORONARY ARTERY DISEASE DUE TO LIPID RICH PLAQUE: ICD-10-CM

## 2022-05-27 DIAGNOSIS — Z95.5 STENTED CORONARY ARTERY: ICD-10-CM

## 2022-05-27 DIAGNOSIS — I48.0 PAF (PAROXYSMAL ATRIAL FIBRILLATION) (HCC): ICD-10-CM

## 2022-05-27 DIAGNOSIS — R06.02 SHORTNESS OF BREATH: ICD-10-CM

## 2022-05-27 DIAGNOSIS — I50.9 HEART FAILURE, NYHA CLASS 3 (HCC): ICD-10-CM

## 2022-05-27 PROCEDURE — 99203 OFFICE O/P NEW LOW 30 MIN: CPT | Mod: 25 | Performed by: EMERGENCY MEDICINE

## 2022-05-27 PROCEDURE — 71046 X-RAY EXAM CHEST 2 VIEWS: CPT

## 2022-05-27 PROCEDURE — 93000 ELECTROCARDIOGRAM COMPLETE: CPT | Performed by: EMERGENCY MEDICINE

## 2022-05-27 PROCEDURE — 94618 PULMONARY STRESS TESTING: CPT | Performed by: NURSE PRACTITIONER

## 2022-05-27 PROCEDURE — 99214 OFFICE O/P EST MOD 30 MIN: CPT | Mod: 25 | Performed by: NURSE PRACTITIONER

## 2022-05-27 RX ORDER — TORSEMIDE 20 MG/1
20 TABLET ORAL
Qty: 90 TABLET | Refills: 3 | Status: SHIPPED | OUTPATIENT
Start: 2022-05-27 | End: 2024-01-30 | Stop reason: SDUPTHER

## 2022-05-27 RX ORDER — SACUBITRIL AND VALSARTAN 24; 26 MG/1; MG/1
1 TABLET, FILM COATED ORAL 2 TIMES DAILY
Qty: 60 TABLET | Refills: 11 | Status: SHIPPED | OUTPATIENT
Start: 2022-05-27 | End: 2022-07-14 | Stop reason: SDUPTHER

## 2022-05-27 RX ORDER — DOXYCYCLINE HYCLATE 100 MG
100 TABLET ORAL 2 TIMES DAILY
Qty: 10 TABLET | Refills: 0 | Status: SHIPPED | OUTPATIENT
Start: 2022-05-27 | End: 2022-06-01

## 2022-05-27 ASSESSMENT — MINNESOTA LIVING WITH HEART FAILURE QUESTIONNAIRE (MLHF)
DIFFICULTY SOCIALIZING WITH FAMILY OR FRIENDS: 5
MAKING YOU WORRY: 4
EATING LESS FOODS YOU LIKE: 5
DIFFICULTY WITH SEXUAL ACTIVITIES: 5
WORKING AROUND THE HOUSE OR YARD DIFFICULT: 5
TOTAL_SCORE: 95
DIFFICULTY SLEEPING WELL AT NIGHT: 5
DIFFICULTY GOING AWAY FROM HOME: 5
DIFFICULTY TO CONCENTRATE OR REMEMBERING THINGS: 1
MAKING YOU FEEL DEPRESSED: 5
COSTING YOU MONEY FOR MEDICAL CARE: 5
GIVING YOU SIDE EFFECTS FROM TREATMENTS: 5
TIRED, FATIGUED OR LOW ON ENERGY: 5
WALKING ABOUT OR CLIMBING STAIRS DIFFICULT: 5
FEELING LIKE A BURDEN TO FAMILY AND FRIENDS: 5
SWELLING IN ANKLES OR LEGS: 5
LOSS OF SELF CONTROL IN YOUR LIFE: 5
MAKING YOU STAY IN A HOSPITAL: 5
HAVING TO SIT OR LIE DOWN DURING THE DAY: 5
DIFFICULTY WORKING TO EARN A LIVING: 0
DIFFICULTY WITH RECREATIONAL PASTIMES, SPORTS, HOBBIES: 5
MAKING YOU SHORT OF BREATH: 5

## 2022-05-27 ASSESSMENT — ENCOUNTER SYMPTOMS
SINUS PRESSURE: 0
HEADACHES: 0
ABDOMINAL PAIN: 0
PALPITATIONS: 0
INSOMNIA: 1
PND: 0
SORE THROAT: 0
VOMITING: 0
ORTHOPNEA: 0
WHEEZING: 1
SHORTNESS OF BREATH: 1
SPUTUM PRODUCTION: 1
CLAUDICATION: 0
DIZZINESS: 0
DIARRHEA: 0
COUGH: 1
COUGH: 1
WEAKNESS: 1
SPUTUM PRODUCTION: 1
PALPITATIONS: 0
HOARSE VOICE: 1
MYALGIAS: 0
HEMOPTYSIS: 0
FEVER: 0

## 2022-05-27 ASSESSMENT — FIBROSIS 4 INDEX
FIB4 SCORE: 2.45
FIB4 SCORE: 2.45

## 2022-05-27 ASSESSMENT — 6 MINUTE WALK TEST (6MWT): TOTAL DISTANCE WALKED (METERS): 286.43

## 2022-05-27 NOTE — PATIENT INSTRUCTIONS
Stop Irbesartan     Start Entresto 24-26 mg twice a day next day    Try taking torsemide 20 mg daily as needed for weight gain, increased Shortness of breath or swelling.

## 2022-05-27 NOTE — PROGRESS NOTES
Chief Complaint   Patient presents with   • Atrial Fibrillation     F/V DX: PAF (paroxysmal atrial fibrillation) ([facility])        Subjective     Martín Grijalva is a 66 y.o. male who presents today for follow up on his heart failure with his wife, Yvonne.    Patient of Dr. Walters.  He was last seen in clinic on 2/18/2022.  No changes are made during that visit.    Patient comes in to the office today after he had a recent hospitalization from 5/19/2022 through 5/22/2022.  Patient presented with congestion and shortness of breath.  Patient was found to have pulmonary edema with diffuse crackles and respiratory difficulties.  He was aggressively diuresed.  Repeat echo was performed which showed a LVEF down to 35%.     Patient comes in the office today reporting symptoms of congestion, cough with green and brown sputum.  He denies necessarily having a fever, but states he feels warm.  He does continue to have episodes of shortness of breath especially with his cough and with exertion.  He does feel weak and feels unsteady is on his feet.  He denies chest pain, palpitations, orthopnea, PND, edema or dizziness/lightheadedness.  He is having difficulty sleeping at night.    Patient does mention he feels his symptoms occurred after he went back into atrial fibrillation since his ablation last year.    His Home weights are ranging between 216-218 lbs.     Patient is trying to monitor her sodium intake.    Patient does have a history of sleep apnea and has not been able to use his CPAP since the recall.    Initial 6 minute walk test, patient was able to complete 286 m during his 6 minute walk test. His O2 saturation at baseline was 96% and at the end of the test, the O2 saturation was 97%. He reported level 2 of dyspnea on Juan Antonio scale.  Patient was able to walk for 6 minutes.    MLWHF score 95    Additonally, patient has the following medical problems:    -Atrial fibrillation, s/p ablation on 6/25/2021, on  Xarelto    -CAD, history of anterior MI in 2008, BMS to the LAD, LV thrombus    -JOSE, has CPAP, unable to use until he gets replacement due to recall    -Hypertension    Past Medical History:   Diagnosis Date   • Arrhythmia 2020    A fib   • Arthritis     fingers   • CAD (coronary artery disease) 7/2/2012   • Chronic anticoagulation 7/2/2012   • Congestive heart failure (HCC)    • Dyslipidemia 7/2/2012   • Essential hypertension, benign 7/2/2012   • High cholesterol    • Insomnia     wakes up 2-5 times a night , wakes up to use restroom and sometimes just wakes up    • Ischemic cardiomyopathy 7/2/2012   • Left ventricular thrombosis 7/2/2012   • Myocardial infarct (HCC) 2006   • Pericarditis 7/2/2012   • Pneumonia     history of as a child   • Sleep apnea     told by MD that he has it, scheduled for sleep study    • Snoring     no sleep study done   • SOB (shortness of breath)     no c/o today; on exertion; pt uses 2L o2 qhs only; pt does not know provider   • Stented coronary artery 7/2/2012     Past Surgical History:   Procedure Laterality Date   • SHOULDER DECOMPRESSION ARTHROSCOPIC Right 11/27/2018    Procedure: SHOULDER DECOMPRESSION ARTHROSCOPIC- SUBACROMIAL CONVERTED TO OPEN;  Surgeon: Ni Weir M.D.;  Location: SURGERY [facility];  Service: Orthopedics   • SHOULDER ARTHROSCOPY W/ ROTATOR CUFF REPAIR Right 11/27/2018    Procedure: OPEN ROTATOR CUFF REPAIR;  Surgeon: Ni Weir M.D.;  Location: SURGERY [facility];  Service: Orthopedics   • CARDIAC CATH, LEFT HEART     • CARPAL TUNNEL RELEASE Bilateral    • STENT PLACEMENT      cardiac stent   • VASECTOMY       History reviewed. No pertinent family history.  Social History     Socioeconomic History   • Marital status:      Spouse name: Not on file   • Number of children: Not on file   • Years of education: Not on file   • Highest education level: Not on file   Occupational History   • Not on file   Tobacco Use   • Smoking  status: Never Smoker   • Smokeless tobacco: Former User     Types: Chew   Vaping Use   • Vaping Use: Never used   Substance and Sexual Activity   • Alcohol use: Not Currently     Comment: occ   • Drug use: Never   • Sexual activity: Not on file   Other Topics Concern   • Not on file   Social History Narrative   • Not on file     Social Determinants of Health     Financial Resource Strain: Not on file   Food Insecurity: Not on file   Transportation Needs: Not on file   Physical Activity: Not on file   Stress: Not on file   Social Connections: Not on file   Intimate Partner Violence: Not on file   Housing Stability: Not on file     Allergies   Allergen Reactions   • Ace Inhibitors Cough   • Other Environmental      Hayfever     Outpatient Encounter Medications as of 5/27/2022   Medication Sig Dispense Refill   • sacubitril-valsartan (ENTRESTO) 24-26 MG Tab tablet Take 1 Tablet by mouth 2 times a day. 60 Tablet 11   • torsemide (DEMADEX) 20 MG Tab Take 1 Tablet by mouth 1 time a day as needed (for weight gain, increased shortness of breath or swelling). 90 Tablet 3   • potassium chloride SA (KDUR) 20 MEQ Tab CR Take 1 Tablet by mouth 2 times a day. 180 Tablet 0   • fluticasone (FLONASE) 50 MCG/ACT nasal spray Administer 1 Spray into affected nostril(S) 2 times a day. 16 g 0   • spironolactone (ALDACTONE) 25 MG Tab Take 1 Tablet by mouth every day. 30 Tablet 0   • Dextromethorphan-GG-APAP (CORICIDIN HBP COLD/COUGH/FLU PO) Take 30 mL by mouth every four hours as needed.     • Spacer/Aero-Holding Chambers (VALVED HOLDING CHAMBER) Device USE AS DIRECTED WITH INHALER 1 Each 5   • rivaroxaban (XARELTO) 20 MG Tab tablet Take 1 Tablet by mouth with dinner. 90 Tablet 3   • simvastatin (ZOCOR) 40 MG Tab Take 1 Tablet by mouth every day. 90 Tablet 3   • levalbuterol (XOPENEX HFA) 45 MCG/ACT inhaler Inhale 1-2 Puffs every four hours as needed for Shortness of Breath. 1 Each 5   • carvedilol (COREG) 6.25 MG Tab TAKE 1 TABLET BY  "MOUTH TWICE DAILY WITH MEALS (Patient taking differently: Take 6.25 mg by mouth 2 times a day.) 180 Tablet 3   • [DISCONTINUED] torsemide (DEMADEX) 20 MG Tab Take 1 Tablet by mouth every morning for 30 days. 30 Tablet 0   • [DISCONTINUED] irbesartan (AVAPRO) 150 MG Tab Take 0.5 Tablets by mouth every day. (Patient taking differently: Take 75 mg by mouth every evening.) 45 tablet 3     No facility-administered encounter medications on file as of 5/27/2022.     Review of Systems   Constitutional: Negative for fever and malaise/fatigue.   HENT: Positive for congestion.    Respiratory: Positive for cough, sputum production (green-brown ) and shortness of breath.    Cardiovascular: Negative for chest pain, palpitations, orthopnea, claudication, leg swelling and PND.   Gastrointestinal: Negative for abdominal pain.   Musculoskeletal: Negative for myalgias.   Neurological: Positive for weakness. Negative for dizziness.        Unsteady on feet at times   Psychiatric/Behavioral: The patient has insomnia.    All other systems reviewed and are negative.             Objective     /60 (BP Location: Left arm, Patient Position: Sitting, BP Cuff Size: Adult)   Pulse 64   Temp 37 °C (98.6 °F)   Resp 15   Ht 1.753 m (5' 9\")   Wt 102 kg (224 lb)   SpO2 96%   BMI 33.08 kg/m²     Physical Exam  Vitals reviewed.   Constitutional:       Appearance: He is well-developed.   HENT:      Head: Normocephalic and atraumatic.   Eyes:      Pupils: Pupils are equal, round, and reactive to light.   Neck:      Vascular: No JVD.   Cardiovascular:      Rate and Rhythm: Normal rate. Rhythm irregular.      Heart sounds: Normal heart sounds.   Pulmonary:      Effort: Pulmonary effort is normal. No respiratory distress.      Breath sounds: Normal breath sounds. No wheezing or rales.   Abdominal:      General: Bowel sounds are normal.      Palpations: Abdomen is soft.   Musculoskeletal:         General: Normal range of motion.      Cervical " back: Normal range of motion and neck supple.      Right lower leg: No edema.      Left lower leg: No edema.   Skin:     General: Skin is warm and dry.   Neurological:      General: No focal deficit present.      Mental Status: He is alert and oriented to person, place, and time.   Psychiatric:         Behavior: Behavior normal.       Lab Results   Component Value Date/Time    CHOLSTRLTOT 115 02/22/2019 07:06 AM    CHOLSTRLTOT 113 07/09/2012 07:21 AM    LDL 60 02/22/2019 07:06 AM    LDL 45 07/09/2012 07:21 AM    HDL 40 02/22/2019 07:06 AM    HDL 41 07/09/2012 07:21 AM    TRIGLYCERIDE 76 02/22/2019 07:06 AM    TRIGLYCERIDE 135 07/09/2012 07:21 AM       Lab Results   Component Value Date/Time    SODIUM 133 (L) 05/22/2022 04:29 AM    POTASSIUM 3.7 05/22/2022 04:29 AM    CHLORIDE 95 (L) 05/22/2022 04:29 AM    CO2 23 05/22/2022 04:29 AM    GLUCOSE 101 (H) 05/22/2022 04:29 AM    BUN 30 (H) 05/22/2022 04:29 AM    CREATININE 0.85 05/22/2022 04:29 AM    CREATININE 0.77 07/09/2012 07:21 AM    BUNCREATRAT 22 02/18/2021 09:32 AM    BUNCREATRAT 14 07/09/2012 07:21 AM     Lab Results   Component Value Date/Time    ALKPHOSPHAT 124 (H) 05/19/2022 05:55 AM    ASTSGOT 33 05/19/2022 05:55 AM    ALTSGPT 33 05/19/2022 05:55 AM    TBILIRUBIN 1.7 (H) 05/19/2022 05:55 AM        Transthoracic Echo Report 4/20/2017  No prior study is available for comparison.   Technically difficult study .   Mildly reduced left ventricular systolic function.  Left ventricular ejection fraction is visually estimated to be 50%.  No significant valve abnormalities.      Myocardial Perfusion Report 6/7/2019   NUCLEAR IMAGING INTERPRETATION   Abnormal Lexiscan myocardial perfusion study.   Large distal anterior and apical infarct.   No significant ischemia.   Mildly reduced LVSF with EF of 47%. Mid to distal anterior wall hypokinesis.   No ischemic changes with Regadenoson.   No chest pain.   ECG INTERPRETATION   Negative stress ECG for ischemia.      Transthoracic Echo Report 8/10/2020  Compared to the images of the study done 04- there has been a   reduciton isn septal contractility. The patient is also now in atrial   fib.   Moderately reduced left ventricular systolic function.  Left ventricular ejection fraction is visually estimated to be 40%.No   pericardial effusion seen.  Estimated right ventricular systolic pressure is  22 mmHg.     Thrombus is not observed in the left ventricular apex.  Diastolic function is difficult to assess with atrial fibrillation.  Akinesis of apex, septum and anterior wall.  Mildly dilated right ventricle.  Enlarged right atrium.  Mildly dilated left atrium.  Structurally normal mitral valve without significant stenosis or   regurgitation.  Structurally normal aortic valve without significant stenosis or   regurgitation.     Transesophageal Echo Report 9/24/2020  No thrombus detected in the left atrial appendage.  Mildly reduced left ventricular systolic function.     Transthoracic Echo Report 4/19/2021  Compared to the images of the study done on 08/10/20 there has been a   slight reduction isn the EF  Contrast was used to enhance visualization of the endocardial border.  Left ventricular ejection fraction is visually estimated to be 35%.  Global hypokinesis with regional variation.  Diastolic function is difficult to assess with atrial fibrillation.  Modertely enlarged right ventricle.  Enlarged right atrium.  Severely dilated left atrium.  Trace mitral regurgitation.  Structurally normal aortic valve without significant stenosis or   regurgitation.  Estimated right ventricular systolic pressure  is 35 mmHg.  Normal pericardium without effusion.  .   Transesophageal Echo Report 6/25/2021  LVEF is severely depressed.  No evidence of ARCADIO thrombus.  Mild MR  No evidence of ASD or PFO     Transthoracic Echo Report 7/7/2021  Limited Echocardiogram:  No pericardial effusion seen.  Severely reduced left ventricular systolic  function.  Left ventricular ejection fraction is visually estimated to be 25%.  Dilated right ventricle.     Compared to the images of the prior study done  6/25/2021: There has   been no significant change.      Transthoracic Echo Report 11/9/2021  Compared to prior echo on 07/07/2021 - LVEF has improved from 25% to   45%.  Normal left ventricular chamber size.  The left ventricular ejection fraction is visually estimated to be 45%.  Mild tricuspid regurgitation.  Right ventricular systolic pressure is estimated to be 44 mmHg.  Dilated inferior vena cava with inspiratory collapse.    Transthoracic Echo Report 5/19/2022  Fair quality study, improved with contrast.  The left ventricular ejection fraction is visually estimated to be 35%,   moderately reduced.  Hypokinesis of the basal to mid anterior septum, basal inferior septum   and entire apex.  Right ventricular systolic pressure is estimated to be 35 mmHg.     Compared to the prior echo on 11/09/2021, EF is further declined, wall   motion abnormalities are now present.         Assessment & Plan     1. Heart failure, NYHA class 3 ([facility])  Referral to Pharmacotherapy Service   2. ACC/AHA stage C systolic heart failure ([facility])  Referral to Pharmacotherapy Service   3. Shortness of breath     4. PAF (paroxysmal atrial fibrillation) ([facility])     5. Ischemic cardiomyopathy     6. Chronic anticoagulation     7. Coronary artery disease due to lipid rich plaque     8. Dyslipidemia     9. Stented coronary artery     10. Coronary artery disease involving native coronary artery of native heart without angina pectoris     11. Obstructive sleep apnea syndrome         Medical Decision Making: Today's Assessment/Status/Plan:        HFrEF, Stage C, Class 3, LVEF 35%, previously 45%, as low as 25%: Based on physical examination findings, patient is euvolemic. No JVD, lungs are clear to auscultation, no pitting edema in bilateral lower extremities, no ascites.  -Heart failure due to  likely mixed between atrial fibrillation and ischemic cardiomyopathy  -Patient reports he is back in atrial fibrillation after his ablation.  Recommend patient to discuss with EP to consider repeat ablation or other treatment.  Patient would likely benefit from rhythm control  -ACE-I/ARB/ARNI: If able, I would like patient to stop irbesartan and start Entresto 24-26 mg twice a day, patient is tolerating ARB and should be able to tolerate Entresto  -Evidence Based Beta-blocker: Continue 6.25 mg twice a day  -Aldosterone Antagonist: Continue spironolactone 25 mg daily  -Diuretic: Try taking torsemide 20 mg daily as needed for weight gain, increased shortness of breath or swelling  -SGLT2 inhibitor: Consider at follow-up visit  -Labs: none  -Patient has scheduled echo next week, will cancel and have him reschedule for 3 months, if LVEF not >35%, then will discuss/consider ICD for primary Prevention  -Reinforced s/sx of worsening heart failure with patient and weight monitoring. Pt verbalizes understanding. Pt to call office or RTC if present.    -PUMP line number [phone] (PUMP) reviewed with patient  -Heart Failure Education: pt to be contacted by HF nurse for further education   -Advanced care planning: Advanced directive and POLST to be discussed at future visit  -Patient referred over to the pharmacotherapy program for further optimization of medical therapy     Atrial fibrillation:  -s/p ablation 6/25/2021  -Patient to make a follow-up visit with EP to discuss rhythm control  -Continue Xarelto 20 mg daily    CAD, history of BMS stent to LAD in 2008:  -Continue simvastatin 40 mg daily  -Currently on Xarelto    Sleep apnea:  -Recommend patient to discuss CPAP with pulmonary  -Likely not using CPAP contributing to his A. fib episode    Hypertension: Stable, borderline low  -Recommendation per above  -Will follow    FU in clinic in 2 weeks with pharmacotherapy and back in the heart failure clinic in 4 weeks.  Patient  to make a follow-up visit with EP to discuss A. fib.  Patient also recommended to go to the urgent care to evaluate his congestion and possible need for antibiotics or other treatment. Sooner if needed.    Patient verbalizes understanding and agrees with the plan of care.     PLEASE NOTE: This Note was created using voice recognition Software. I have made every reasonable attempt to correct obvious errors, but I expect that there are errors of grammar and possibly content that I did not discover before finalizing the note

## 2022-05-27 NOTE — PROGRESS NOTES
"Subjective     Martín Grijalva is a 66 y.o. male who presents with Sinus Problem (Runny nose, yellow green brown. ) and Cough (Chest congestion x1 week.)            Sinus Problem  This is a recurrent problem. Episode onset: over one week. There has been no fever. Associated symptoms include congestion, coughing and a hoarse voice. Pertinent negatives include no headaches, sinus pressure or sore throat.       Review of Systems   Constitutional: Positive for malaise/fatigue.   HENT: Positive for congestion and hoarse voice. Negative for nosebleeds, sinus pressure and sore throat.         Purulent nasal discharge noted.   Respiratory: Positive for cough, sputum production and wheezing. Negative for hemoptysis.    Cardiovascular: Negative for chest pain and palpitations.   Gastrointestinal: Negative for diarrhea and vomiting.   Neurological: Negative for headaches.   Endo/Heme/Allergies: Positive for environmental allergies.              Objective     /72   Pulse (!) 52   Temp 36.8 °C (98.2 °F) (Tympanic)   Resp 16   Ht 1.753 m (5' 9\")   Wt 102 kg (224 lb)   SpO2 98%   BMI 33.08 kg/m²      Physical Exam  Constitutional:       General: He is not in acute distress.     Appearance: He is well-developed. He is not ill-appearing or toxic-appearing.   HENT:      Head: Normocephalic.      Nose: Mucosal edema present.      Mouth/Throat:      Pharynx: Uvula midline. No oropharyngeal exudate or posterior oropharyngeal erythema.   Eyes:      Conjunctiva/sclera: Conjunctivae normal.   Neck:      Vascular: No JVD.      Trachea: Trachea and phonation normal.   Cardiovascular:      Rate and Rhythm: Tachycardia present. Rhythm irregularly irregular.  No extrasystoles are present.     Heart sounds: Normal heart sounds. No murmur heard.  Pulmonary:      Effort: No tachypnea, prolonged expiration or respiratory distress.      Breath sounds: Wheezing and rhonchi present.   Musculoskeletal:      Cervical back: Neck supple. "      Right lower leg: No edema.      Left lower leg: No edema.   Lymphadenopathy:      Cervical: No cervical adenopathy.   Skin:     General: Skin is warm and dry.   Neurological:      Mental Status: He is alert.   Psychiatric:         Behavior: Behavior is cooperative.                             Assessment & Plan        1. Acute rhinosinusitis  Rx doxycycline  Recommended supportive care measures, including rest, increasing oral fluid intake and use of over-the-counter medications for relief of symptoms.  REF PCP  2. Cough  - DX-CHEST-2 VIEWS; Interpretation per radiologist:   No acute cardiopulmonary process identified  There are areas of calcific pleural plaquing noted  3. Fatigue, unspecified type  - EKG - Clinic Performed  Atrial fibrillation, ventricular response 107, no significant ST-T wave abnormality.  Advised follow-up with cardiology.

## 2022-05-31 NOTE — DOCUMENTATION QUERY
[facility]                                                                       Query Response Note      PATIENT:               HAMZAH ANTONIO  ACCT #:                  [id number]  MRN:                     [id number]  :                      1955  ADMIT DATE:       2022 5:36 AM  DISCH DATE:        2022 10:23 AM  RESPONDING  PROVIDER #:        [id number]           QUERY TEXT:    Based on your clinical judgement, the clinical findings discussed, and after study; please select the most appropriate diagnosis for this patient.            The patient's Clinical Indicators include:  The H&P in this medical record notes that this patient is admitted for ongoing respirator failure secondary to uncontrolled heart failure. Of note the respiratory failure is not noted subsequent progress notes or DC Summary.   It is noted on admission that the patient complained of SOB and chest congestion.  He reported that he had eaten some salty foods including soy sauce.  He has a history of chronic systolic heart failure.  Current EF 45% up from 25% (21).  Orders for O2 per guidelines noted to be cancelled. O2 sat in the ED 94% with respiratory rate of 18.  No documentation of continuous O2, flowsheets indicate patient was on RA. The was not RT protocols initiated.  PE noted + for dyspnea on exertion and orthopnea, no acute distress.  CXR was noted negative for acute findings.   Imaging noted early pulmonary edema.  Admitting BNP was 699 and Trop was 44->38->45->46 respectively.  Treatment consisted of aggressive diuresis and dietary education and reinforcement regarding salty foods.  Risks of dietary indiscretions in heart failure putting this patient at significant risk for worsening CHF.      Thank you,  Teri Osorio RN, BSN  Clinical    [facility]  Connect via "[employer]"  [id number]  [email or web address]  Options provided:   -- Yes, respiratory failure is a valid diagnosis, please document additional clinical indicators   -- Respiratory failure does not exist, is ruled out, and amended documentation provided in the medical record   -- Unable to provide additional clarity regarding the diagnosis   -- Unable to determine      Query created by: Teri Osorio on 5/27/2022 1:56 PM    RESPONSE TEXT:    Respiratory failure does not exist, is ruled out, and amended documentation provided in the medical record          Electronically signed by:  ASMITA QUINONES MD 5/31/2022 7:14 AM

## 2022-06-04 ENCOUNTER — OFFICE VISIT (OUTPATIENT)
Dept: URGENT CARE | Facility: PHYSICIAN GROUP | Age: 67
End: 2022-06-04
Payer: MEDICARE

## 2022-06-04 VITALS
HEART RATE: 122 BPM | HEIGHT: 69 IN | BODY MASS INDEX: 32.14 KG/M2 | SYSTOLIC BLOOD PRESSURE: 122 MMHG | OXYGEN SATURATION: 97 % | TEMPERATURE: 98.1 F | WEIGHT: 217 LBS | RESPIRATION RATE: 16 BRPM | DIASTOLIC BLOOD PRESSURE: 86 MMHG

## 2022-06-04 DIAGNOSIS — J01.90 ACUTE RHINOSINUSITIS: ICD-10-CM

## 2022-06-04 PROCEDURE — 99213 OFFICE O/P EST LOW 20 MIN: CPT | Performed by: PHYSICIAN ASSISTANT

## 2022-06-04 RX ORDER — DOXYCYCLINE HYCLATE 100 MG
100 TABLET ORAL 2 TIMES DAILY
Qty: 10 TABLET | Refills: 0 | Status: SHIPPED | OUTPATIENT
Start: 2022-06-04 | End: 2022-06-09

## 2022-06-04 ASSESSMENT — FIBROSIS 4 INDEX: FIB4 SCORE: 2.48

## 2022-06-04 ASSESSMENT — ENCOUNTER SYMPTOMS
SORE THROAT: 0
HEADACHES: 0
EYE DISCHARGE: 0
COUGH: 1
VOMITING: 0
NAUSEA: 0
EYE REDNESS: 0
MYALGIAS: 0
SHORTNESS OF BREATH: 0
FEVER: 0

## 2022-06-07 ENCOUNTER — OFFICE VISIT (OUTPATIENT)
Dept: CARDIOLOGY | Facility: MEDICAL CENTER | Age: 67
End: 2022-06-07
Payer: MEDICARE

## 2022-06-07 ENCOUNTER — TELEPHONE (OUTPATIENT)
Dept: CARDIOLOGY | Facility: MEDICAL CENTER | Age: 67
End: 2022-06-07

## 2022-06-07 VITALS
RESPIRATION RATE: 14 BRPM | DIASTOLIC BLOOD PRESSURE: 82 MMHG | HEART RATE: 82 BPM | OXYGEN SATURATION: 98 % | HEIGHT: 69 IN | BODY MASS INDEX: 33.33 KG/M2 | SYSTOLIC BLOOD PRESSURE: 120 MMHG | WEIGHT: 225 LBS

## 2022-06-07 DIAGNOSIS — I48.0 PAF (PAROXYSMAL ATRIAL FIBRILLATION) (HCC): ICD-10-CM

## 2022-06-07 PROCEDURE — 93000 ELECTROCARDIOGRAM COMPLETE: CPT | Performed by: INTERNAL MEDICINE

## 2022-06-07 PROCEDURE — 99215 OFFICE O/P EST HI 40 MIN: CPT | Mod: 25 | Performed by: INTERNAL MEDICINE

## 2022-06-07 ASSESSMENT — FIBROSIS 4 INDEX: FIB4 SCORE: 2.48

## 2022-06-07 NOTE — TELEPHONE ENCOUNTER
----- Message from Conrado Rosas M.D. sent at 6/7/2022  2:05 PM PDT -----  Please schedule Afib ablation, no meds to hold, pt would like to be on a wait list, thanks

## 2022-06-07 NOTE — TELEPHONE ENCOUNTER
Deepika,    Can you please enter the orders for this procedure?    afib ablation w/[facility]    Thank You,  Julissa

## 2022-06-07 NOTE — PROGRESS NOTES
Arrhythmia Clinic Note (Established patient)    DOS: 6/7/2022    Chief complaint/Reason for consult: Afib    Interval History: 66 y/o M with persistent Afib. Ablation last year with good initial success and improvement in EF from 25 to 45%. Last month started feeling unwell with cough and SOB. Found to have CHF exacerbation, EF 35%, and Afib with RVR. Symptoms improved now but still feeling not back to his old self.    ROS (+ highlighted in bold):  Constitutional: Fevers/chills/fatigue/weightloss  HEENT: Blurry vision/eye pain/sore throat/hearing loss  Respiratory: Shortness of breath/cough  Cardiovascular: Chest pain/palpitations/edema/orthopnea/syncope  GI: Nausea/vomitting/diarrhea  MSK: Arthralgias/myagias/muscle weakness  Skin: Rash/sores  Neurological: Numbness/tremors/vertigo  Endocrine: Excessive thirst/polyuria/cold intolerance/heat intolerance  Psych: Depression/anxiety    Past Medical History:   Diagnosis Date   • Arrhythmia 2020    A fib   • Arthritis     fingers   • CAD (coronary artery disease) 7/2/2012   • Chronic anticoagulation 7/2/2012   • Congestive heart failure (HCC)    • Dyslipidemia 7/2/2012   • Essential hypertension, benign 7/2/2012   • High cholesterol    • Insomnia     wakes up 2-5 times a night , wakes up to use restroom and sometimes just wakes up    • Ischemic cardiomyopathy 7/2/2012   • Left ventricular thrombosis 7/2/2012   • Myocardial infarct (HCC) 2006   • Pericarditis 7/2/2012   • Pneumonia     history of as a child   • Sleep apnea     told by MD that he has it, scheduled for sleep study    • Snoring     no sleep study done   • SOB (shortness of breath)     no c/o today; on exertion; pt uses 2L o2 qhs only; pt does not know provider   • Stented coronary artery 7/2/2012       Past Surgical History:   Procedure Laterality Date   • SHOULDER DECOMPRESSION ARTHROSCOPIC Right 11/27/2018    Procedure: SHOULDER DECOMPRESSION ARTHROSCOPIC- SUBACROMIAL CONVERTED TO OPEN;  Surgeon: Ni RODRIGUEZ  BLADE Weir;  Location: [facility];  Service: Orthopedics   • SHOULDER ARTHROSCOPY W/ ROTATOR CUFF REPAIR Right 11/27/2018    Procedure: OPEN ROTATOR CUFF REPAIR;  Surgeon: Ni Weir M.D.;  Location: [facility];  Service: Orthopedics   • CARDIAC CATH, LEFT HEART     • CARPAL TUNNEL RELEASE Bilateral    • STENT PLACEMENT      cardiac stent   • VASECTOMY         Social History     Socioeconomic History   • Marital status:      Spouse name: Not on file   • Number of children: Not on file   • Years of education: Not on file   • Highest education level: Not on file   Occupational History   • Not on file   Tobacco Use   • Smoking status: Never Smoker   • Smokeless tobacco: Former User     Types: Chew   Vaping Use   • Vaping Use: Never used   Substance and Sexual Activity   • Alcohol use: Not Currently     Comment: occ   • Drug use: Never   • Sexual activity: Not on file   Other Topics Concern   • Not on file   Social History Narrative   • Not on file     Social Determinants of Health     Financial Resource Strain: Not on file   Food Insecurity: Not on file   Transportation Needs: Not on file   Physical Activity: Not on file   Stress: Not on file   Social Connections: Not on file   Intimate Partner Violence: Not on file   Housing Stability: Not on file       History reviewed. No pertinent family history.   No family history of premature CAD    Allergies   Allergen Reactions   • Ace Inhibitors Cough   • Other Environmental      Hayfever       Current Outpatient Medications   Medication Sig Dispense Refill   • doxycycline (VIBRAMYCIN) 100 MG Tab Take 1 Tablet by mouth 2 times a day for 5 days. 10 Tablet 0   • sacubitril-valsartan (ENTRESTO) 24-26 MG Tab tablet Take 1 Tablet by mouth 2 times a day. 60 Tablet 11   • torsemide (DEMADEX) 20 MG Tab Take 1 Tablet by mouth 1 time a day as needed (for weight gain, increased shortness of breath or swelling). 90 Tablet 3   • potassium  "chloride SA (KDUR) 20 MEQ Tab CR Take 1 Tablet by mouth 2 times a day. 180 Tablet 0   • fluticasone (FLONASE) 50 MCG/ACT nasal spray Administer 1 Spray into affected nostril(S) 2 times a day. 16 g 0   • spironolactone (ALDACTONE) 25 MG Tab Take 1 Tablet by mouth every day. 30 Tablet 0   • Dextromethorphan-GG-APAP (CORICIDIN HBP COLD/COUGH/FLU PO) Take 30 mL by mouth every four hours as needed.     • Spacer/Aero-Holding Chambers (VALVED HOLDING CHAMBER) Device USE AS DIRECTED WITH INHALER 1 Each 5   • rivaroxaban (XARELTO) 20 MG Tab tablet Take 1 Tablet by mouth with dinner. 90 Tablet 3   • simvastatin (ZOCOR) 40 MG Tab Take 1 Tablet by mouth every day. 90 Tablet 3   • levalbuterol (XOPENEX HFA) 45 MCG/ACT inhaler Inhale 1-2 Puffs every four hours as needed for Shortness of Breath. 1 Each 5   • carvedilol (COREG) 6.25 MG Tab TAKE 1 TABLET BY MOUTH TWICE DAILY WITH MEALS (Patient taking differently: Take 6.25 mg by mouth 2 times a day.) 180 Tablet 3     No current facility-administered medications for this visit.       Physical Exam:  Vitals:    06/07/22 1335   BP: 120/82   BP Location: Left arm   Patient Position: Sitting   BP Cuff Size: Adult   Pulse: 82   Resp: 14   SpO2: 98%   Weight: 102 kg (225 lb)   Height: 1.753 m (5' 9\")     General appearance: NAD, conversant   Eyes: anicteric sclerae, moist conjunctivae; no lid-lag; PERRLA  HENT: Atraumatic; oropharynx clear with moist mucous membranes and no mucosal ulcerations; normal hard and soft palate  Neck: Trachea midline; FROM, supple, no thyromegaly or lymphadenopathy  Lungs: CTA, with normal respiratory effort and no intercostal retractions  CV: Irregular, no MRGs, no JVD  Abdomen: Soft, non-tender; no masses or HSM  Extremities: No peripheral edema or extremity lymphadenopathy  Skin: Normal temperature, turgor and texture; no rash, ulcers or subcutaneous nodules  Psych: Appropriate affect, alert and oriented to person, place and time    Data:  Lipids:   Lab " Results   Component Value Date/Time    CHOLSTRLTOT 115 02/22/2019 07:06 AM    CHOLSTRLTOT 113 07/09/2012 07:21 AM    TRIGLYCERIDE 76 02/22/2019 07:06 AM    TRIGLYCERIDE 135 07/09/2012 07:21 AM    HDL 40 02/22/2019 07:06 AM    HDL 41 07/09/2012 07:21 AM    LDL 60 02/22/2019 07:06 AM    LDL 45 07/09/2012 07:21 AM        BMP:  Lab Results   Component Value Date/Time    SODIUM 133 (L) 05/22/2022 0429    POTASSIUM 3.7 05/22/2022 0429    CHLORIDE 95 (L) 05/22/2022 0429    CO2 23 05/22/2022 0429    GLUCOSE 101 (H) 05/22/2022 0429    BUN 30 (H) 05/22/2022 0429    CREATININE 0.85 05/22/2022 0429    CALCIUM 9.2 05/22/2022 0429    ANION 14.0 05/20/2022 0228        TSH:   Lab Results   Component Value Date/Time    TSHULTRASEN 1.080 05/19/2022 0555        THYROXINE (T4):   No results found for: BARBARAIR     CBC:   Lab Results   Component Value Date/Time    WBC 6.8 05/20/2022 02:28 AM    RBC 4.69 (L) 05/20/2022 02:28 AM    HEMOGLOBIN 14.9 05/20/2022 02:28 AM    HEMATOCRIT 44.9 05/20/2022 02:28 AM    MCV 95.7 05/20/2022 02:28 AM    MCH 31.8 05/20/2022 02:28 AM    MCHC 33.2 (L) 05/20/2022 02:28 AM    RDW 47.8 05/20/2022 02:28 AM    PLATELETCT 155 (L) 05/20/2022 02:28 AM    MPV 10.6 05/20/2022 02:28 AM    NEUTSPOLYS 63.40 05/20/2022 02:28 AM    LYMPHOCYTES 20.90 (L) 05/20/2022 02:28 AM    MONOCYTES 14.60 (H) 05/20/2022 02:28 AM    EOSINOPHILS 0.40 05/20/2022 02:28 AM    BASOPHILS 0.40 05/20/2022 02:28 AM    IMMGRAN 0.30 05/20/2022 02:28 AM    IMMGRAN 0 02/22/2019 07:06 AM    NRBC 0.00 05/20/2022 02:28 AM    NEUTS 4.29 05/20/2022 02:28 AM    NEUTS 7.7 (H) 02/22/2019 07:06 AM    LYMPHS 1.42 05/20/2022 02:28 AM    LYMPHS 2.1 02/22/2019 07:06 AM    MONOS 0.99 (H) 05/20/2022 02:28 AM    MONOS 0.8 02/22/2019 07:06 AM    EOS 0.03 05/20/2022 02:28 AM    EOS 0.2 02/22/2019 07:06 AM    BASO 0.03 05/20/2022 02:28 AM    BASO 0.0 02/22/2019 07:06 AM    IMMGRANAB 0.02 05/20/2022 02:28 AM    IMMGRANAB 0.0 02/22/2019 07:06 AM    NRBCAB 0.00  05/20/2022 02:28 AM        CBC w/o DIFF  Lab Results   Component Value Date/Time    WBC 6.8 05/20/2022 02:28 AM    RBC 4.69 (L) 05/20/2022 02:28 AM    HEMOGLOBIN 14.9 05/20/2022 02:28 AM    MCV 95.7 05/20/2022 02:28 AM    MCH 31.8 05/20/2022 02:28 AM    MCHC 33.2 (L) 05/20/2022 02:28 AM    RDW 47.8 05/20/2022 02:28 AM    MPV 10.6 05/20/2022 02:28 AM       Prior echo/stress reviewed: EF 35%    EKG interpreted by me: Afib vs atypical flutter    Impression/Plan:  1. PAF (paroxysmal atrial fibrillation) (HCC)  EKG     1. pAF with RVR  2. Atrial flutter  3. Anticoagulation management  4. 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 CAD s/p MI and PCI    - Continue Xarelto for OAC labs reviewed  - Discussed treatment options for treating recurrent AF/AFL resulting in heart failure. Pt prefers ablation over AADs.  - We discussed pulmonary vein isolation for therapeutic management and continued rhythm control.  We discussed the risks and benefits of this procedure.  Risks include 1-3% risk of major cardiovascular event including stroke, myocardial infarction, phrenic nerve damage, esophageal injury and/or fistula formation, cardiac perforation, pericardial effusion, tamponade, major bleeding, or death.  - We will proceed with scheduling redo afib ablation    Schedule redo PVI/flutter, consider VOM ethanol ablation as well    Conrado Rosas MD  Cardiac Electrophysiology

## 2022-06-08 NOTE — TELEPHONE ENCOUNTER
Patient scheduled for afib ablation on 8-1-22 with Dr. Rosas. Patient has been instructed to check in at 8:30 for 10:30 case time. Message sent to authorizations. Emailed Carto.

## 2022-06-09 ENCOUNTER — TELEPHONE (OUTPATIENT)
Dept: SCHEDULING | Facility: IMAGING CENTER | Age: 67
End: 2022-06-09

## 2022-06-13 ENCOUNTER — NON-PROVIDER VISIT (OUTPATIENT)
Dept: CARDIOLOGY | Facility: MEDICAL CENTER | Age: 67
End: 2022-06-13
Payer: MEDICARE

## 2022-06-13 VITALS
HEART RATE: 136 BPM | SYSTOLIC BLOOD PRESSURE: 86 MMHG | WEIGHT: 229.6 LBS | DIASTOLIC BLOOD PRESSURE: 55 MMHG | BODY MASS INDEX: 33.91 KG/M2

## 2022-06-13 PROCEDURE — 99211 OFF/OP EST MAY X REQ PHY/QHP: CPT | Performed by: STUDENT IN AN ORGANIZED HEALTH CARE EDUCATION/TRAINING PROGRAM

## 2022-06-13 ASSESSMENT — FIBROSIS 4 INDEX: FIB4 SCORE: 2.48

## 2022-06-13 NOTE — PROGRESS NOTES
CHF Pharmacotherapy visit - Visit    Date of Service: 06/13/22    Informed written consent was given on: 6/13/22    Zenon Rondonmalick is here for CHF    HPI  Pertinent Interval History since last visit:   • Pt's initial visit    Most recent EF:  35%      Current Outpatient Medications:   •  Entresto, 1 Tablet, Oral, BID, Taking  •  torsemide, 20 mg, Oral, QDAY PRN, Taking  •  potassium chloride SA, 20 mEq, Oral, BID (Patient taking differently: 10 mEq, Oral, 2 TIMES DAILY), Taking  •  fluticasone, 1 Spray, Nasal, BID, Taking  •  spironolactone, 25 mg, Oral, DAILY (Patient taking differently: 50 mg, Oral, DAILY), Taking  •  Valved Holding Chamber, USE AS DIRECTED WITH INHALER, Taking  •  rivaroxaban, 20 mg, Oral, PM MEAL, Taking  •  simvastatin, 40 mg, Oral, QDAY, Taking  •  levalbuterol, 1-2 Puff, Inhalation, Q4HRS PRN, Taking  •  carvedilol, TAKE 1 TABLET BY MOUTH TWICE DAILY WITH MEALS (Patient taking differently: 6.25 mg, Oral, 2 TIMES DAILY WITH MEALS), Taking    Current Adherence to CHF Therapies:  Complete    Current CHF Medications - including dose:   • Entresto or ACE/ARB: Entresto 24-26 mg BID  • Beta blocker: Carvedilol 6.25 mg BID  • Diuretic: Torsemide 20 mg once daily PRN  • Aldosterone antagonist: Spironolactone 25 mg once daily    Vitals:    06/13/22 1541   BP: (!) 86/55   Pulse: (!) 136       Home BP and HR:  BP typically ~ 118/74, HR ~ 99    Change in weight: Stable    Exercise habits: minimal exercise - walks for ~1 mile daily, will ride his bike occasionally    Diet: low sodium    SOCIAL HISTORY  Social History     Tobacco Use   Smoking Status Never Smoker   Smokeless Tobacco Former User   • Types: Chew        DATA REVIEW  No results found for: HBA1C       Lab Results   Component Value Date/Time    CHOLSTRLTOT 115 02/22/2019 07:06 AM    CHOLSTRLTOT 113 07/09/2012 07:21 AM    LDL 60 02/22/2019 07:06 AM    LDL 45 07/09/2012 07:21 AM    HDL 40 02/22/2019 07:06 AM    HDL 41 07/09/2012 07:21 AM     TRIGLYCERIDE 76 02/22/2019 07:06 AM    TRIGLYCERIDE 135 07/09/2012 07:21 AM       Lab Results   Component Value Date/Time    SODIUM 133 (L) 05/22/2022 04:29 AM    POTASSIUM 3.7 05/22/2022 04:29 AM    CHLORIDE 95 (L) 05/22/2022 04:29 AM    CO2 23 05/22/2022 04:29 AM    GLUCOSE 101 (H) 05/22/2022 04:29 AM    BUN 30 (H) 05/22/2022 04:29 AM    CREATININE 0.85 05/22/2022 04:29 AM    CREATININE 0.77 07/09/2012 07:21 AM    BUNCREATRAT 22 02/18/2021 09:32 AM    BUNCREATRAT 14 07/09/2012 07:21 AM     Lab Results   Component Value Date/Time    ALKPHOSPHAT 124 (H) 05/19/2022 05:55 AM    ASTSGOT 33 05/19/2022 05:55 AM    ALTSGPT 33 05/19/2022 05:55 AM    TBILIRUBIN 1.7 (H) 05/19/2022 05:55 AM    INR 1.65 (H) 06/21/2021 08:39 AM    ALBUMIN 4.0 05/22/2022 04:29 AM      No components found for: MICROALBUMINCREATRATIOURINE    Renal function:  Calculated creatinine clearance: ~ 99 ml/min     Other:  Immunization History   Administered Date(s) Administered   • PFIZER PURPLE CAP SARS-COV-2 VACCINATION (12+) 03/01/2021, 03/22/2021, 09/27/2021     Up to date on pneumococcal vaccine? Pt not amenable to receiving at this time.        ASSESSMENT AND PLAN  1. CHF  • Provided basic education on CHF, preventing exacerbation, documenting daily weights and BP, importance of medication adherence  • Conducted medication reconciliation w/ pt today in clinic. Discussed MOA, SE, and administration techniques.  • Reviewed proper BP measuring techniques. Provided educational handout to pt today.  • Pt reported BP is well controlled. His reported HR is elevated.   • Pt's clinic BP was low and HR was elevated - high suspicion that pt is in Afib today. Pt states he feels at his baseline. Denies dizziness, palpitations, etc.  o Recommended pt go to the ED for further evaluation and possible cardioversion - he adamantly refuses.   o Pt given strict precautions to have a low threshold to go to the ED, especially if he notes sx of  Afib/hypotension/palpitations/stroke.   - Also advised pt CTM home BP and report to the ED ASAP if vitals remain the same as they were today in clinic.  o Advised pt to bring his BP monitor to our next appt to calibrate w/ clinic monitor.    Resulting CHF medications today (changes are bolded)  • Entresto or ACE/ARB: Entresto 24-26 mg BID  • Beta blocker: Carvedilol 6.25 mg BID  • Diuretic: Torsemide 20 mg once daily PRN  • Aldosterone antagonist: Spironolactone 25 mg once daily    Lifestyle Recommendations From Today's Visit:   • Continue to eat DASH/MED style diet.   • Continue to exercise as tolerated.     Significant changes to laboratory values since last visit that require repeat labs:  • N/a    Blood Work Ordered At Today's visit:   None    Studies Ordered at Todays Visit:  None     Follow-Up:   3 weeks (soonest available)    Angel Hutson, PharmD, BCACP      CC:  Adalgisa Hankins M.D. (Inactive)  No ref. provider found    xMedications reconciled  xFlow sheets updated

## 2022-06-16 LAB — EKG IMPRESSION: NORMAL

## 2022-07-06 NOTE — PROGRESS NOTES
CHF Pharmacotherapy visit - Visit    Date of Service: 07/07/22    Informed written consent was given on: 6/13/22    Zenon Grijalva is here for CHF    HPI  Pertinent Interval History since last visit:   • Pt is overall feeling great. He has an ablation on 8/1    Most recent EF:  35%      Current Outpatient Medications:   •  Entresto, 1 Tablet, Oral, BID  •  torsemide, 20 mg, Oral, QDAY PRN  •  potassium chloride SA, 20 mEq, Oral, BID (Patient taking differently: 10 mEq, Oral, 2 TIMES DAILY)  •  fluticasone, 1 Spray, Nasal, BID  •  spironolactone, 25 mg, Oral, DAILY (Patient taking differently: 50 mg, Oral, DAILY)  •  Valved Holding Chamber, USE AS DIRECTED WITH INHALER  •  rivaroxaban, 20 mg, Oral, PM MEAL  •  simvastatin, 40 mg, Oral, QDAY  •  levalbuterol, 1-2 Puff, Inhalation, Q4HRS PRN  •  carvedilol, TAKE 1 TABLET BY MOUTH TWICE DAILY WITH MEALS (Patient taking differently: 6.25 mg, Oral, 2 TIMES DAILY WITH MEALS)    Current Adherence to CHF Therapies:  Complete    Current CHF Medications - including dose:   • Entresto or ACE/ARB: Entresto 24-26 mg BID  • Beta blocker: Carvedilol 6.25 mg BID  • Diuretic: Torsemide 20 mg once daily PRN  • Aldosterone antagonist: Spironolactone 25 mg once daily    Vitals:    07/07/22 1502   BP: (!) 99/71   Pulse: (!) 150       Home BP and HR:  BP typically ~ 118/74, HR ~ 90    Change in weight: Stable    Exercise habits: minimal exercise - walks for ~1 mile daily, will ride his bike occasionally    Diet: low sodium    SOCIAL HISTORY  Social History     Tobacco Use   Smoking Status Never Smoker   Smokeless Tobacco Former User   • Types: Chew        DATA REVIEW  No results found for: HBA1C       Lab Results   Component Value Date/Time    CHOLSTRLTOT 115 02/22/2019 07:06 AM    CHOLSTRLTOT 113 07/09/2012 07:21 AM    LDL 60 02/22/2019 07:06 AM    LDL 45 07/09/2012 07:21 AM    HDL 40 02/22/2019 07:06 AM    HDL 41 07/09/2012 07:21 AM    TRIGLYCERIDE 76 02/22/2019 07:06 AM     TRIGLYCERIDE 135 07/09/2012 07:21 AM       Lab Results   Component Value Date/Time    SODIUM 133 (L) 05/22/2022 04:29 AM    POTASSIUM 3.7 05/22/2022 04:29 AM    CHLORIDE 95 (L) 05/22/2022 04:29 AM    CO2 23 05/22/2022 04:29 AM    GLUCOSE 101 (H) 05/22/2022 04:29 AM    BUN 30 (H) 05/22/2022 04:29 AM    CREATININE 0.85 05/22/2022 04:29 AM    CREATININE 0.77 07/09/2012 07:21 AM    BUNCREATRAT 22 02/18/2021 09:32 AM    BUNCREATRAT 14 07/09/2012 07:21 AM     Lab Results   Component Value Date/Time    ALKPHOSPHAT 124 (H) 05/19/2022 05:55 AM    ASTSGOT 33 05/19/2022 05:55 AM    ALTSGPT 33 05/19/2022 05:55 AM    TBILIRUBIN 1.7 (H) 05/19/2022 05:55 AM    INR 1.65 (H) 06/21/2021 08:39 AM    ALBUMIN 4.0 05/22/2022 04:29 AM      No components found for: MICROALBUMINCREATRATIOURINE    Renal function:  Calculated creatinine clearance: ~ 99 ml/min     Other:  Immunization History   Administered Date(s) Administered   • PFIZER PURPLE CAP SARS-COV-2 VACCINATION (12+) 03/01/2021, 03/22/2021, 09/27/2021     Up to date on pneumococcal vaccine? Pt not amenable to receiving at this time.      ASSESSMENT AND PLAN  1. CHF  • Pt is currently tolerating medication regimen  • He denies any chest pain, dizziness, or fatigue  • He uses his smart watch for BP - readings did not match our BP monitor in clinic  • BP in office is borderline low and HR is high d/t atrial fibrillation - pt continues to be asymptomatic  • Did not increase medications d/t low BP    Resulting CHF medications today (changes are bolded)  • Entresto or ACE/ARB: Entresto 24-26 mg BID  • Beta blocker: Carvedilol 6.25 mg BID  • Diuretic: Torsemide 20 mg once daily PRN  • Aldosterone antagonist: Spironolactone 25 mg once daily    Lifestyle Recommendations From Today's Visit:   • Continue to eat DASH/MED style diet.   • Continue to exercise as tolerated.     Significant changes to laboratory values since last visit that require repeat labs:  • N/a    Blood Work Ordered At  Today's visit:   None    Studies Ordered at Todays Visit:  None     Follow-Up:   3 weeks with Ladonna WILLIAM  7 weeks with pharmacy    Sandra Harden, PharmD, BCACP      CC:  Adalgisa Hankins M.D. (Inactive)  No ref. provider found    xMedications reconciled  xFlow sheets updated

## 2022-07-07 ENCOUNTER — NON-PROVIDER VISIT (OUTPATIENT)
Dept: CARDIOLOGY | Facility: MEDICAL CENTER | Age: 67
End: 2022-07-07
Payer: MEDICARE

## 2022-07-07 VITALS
SYSTOLIC BLOOD PRESSURE: 99 MMHG | BODY MASS INDEX: 33.97 KG/M2 | HEART RATE: 150 BPM | DIASTOLIC BLOOD PRESSURE: 71 MMHG | WEIGHT: 230 LBS

## 2022-07-07 PROCEDURE — 99211 OFF/OP EST MAY X REQ PHY/QHP: CPT | Performed by: STUDENT IN AN ORGANIZED HEALTH CARE EDUCATION/TRAINING PROGRAM

## 2022-07-07 ASSESSMENT — FIBROSIS 4 INDEX: FIB4 SCORE: 2.48

## 2022-07-14 ENCOUNTER — PHARMACY VISIT (OUTPATIENT)
Dept: PHARMACY | Facility: MEDICAL CENTER | Age: 67
End: 2022-07-14
Payer: COMMERCIAL

## 2022-07-14 ENCOUNTER — OFFICE VISIT (OUTPATIENT)
Dept: CARDIOLOGY | Facility: MEDICAL CENTER | Age: 67
End: 2022-07-14
Payer: MEDICARE

## 2022-07-14 VITALS
SYSTOLIC BLOOD PRESSURE: 102 MMHG | OXYGEN SATURATION: 97 % | RESPIRATION RATE: 14 BRPM | HEART RATE: 101 BPM | BODY MASS INDEX: 33.56 KG/M2 | HEIGHT: 69 IN | DIASTOLIC BLOOD PRESSURE: 78 MMHG | WEIGHT: 226.6 LBS

## 2022-07-14 DIAGNOSIS — I48.0 PAROXYSMAL ATRIAL FIBRILLATION (HCC): ICD-10-CM

## 2022-07-14 DIAGNOSIS — I48.19 OTHER PERSISTENT ATRIAL FIBRILLATION (HCC): ICD-10-CM

## 2022-07-14 DIAGNOSIS — I48.0 PAF (PAROXYSMAL ATRIAL FIBRILLATION) (HCC): ICD-10-CM

## 2022-07-14 PROCEDURE — 99213 OFFICE O/P EST LOW 20 MIN: CPT | Performed by: NURSE PRACTITIONER

## 2022-07-14 PROCEDURE — RXMED WILLOW AMBULATORY MEDICATION CHARGE: Performed by: NURSE PRACTITIONER

## 2022-07-14 RX ORDER — SACUBITRIL AND VALSARTAN 24; 26 MG/1; MG/1
1 TABLET, FILM COATED ORAL 2 TIMES DAILY
Qty: 60 TABLET | Refills: 0 | Status: SHIPPED | OUTPATIENT
Start: 2022-07-14 | End: 2022-11-29 | Stop reason: SDUPTHER

## 2022-07-14 ASSESSMENT — ENCOUNTER SYMPTOMS
ORTHOPNEA: 0
WHEEZING: 0
NERVOUS/ANXIOUS: 0
HEMOPTYSIS: 0
WEAKNESS: 0
NAUSEA: 0
DIZZINESS: 0
PND: 0
SHORTNESS OF BREATH: 0
VOMITING: 0
COUGH: 0
CLAUDICATION: 0
PALPITATIONS: 0
SPUTUM PRODUCTION: 0

## 2022-07-14 ASSESSMENT — FIBROSIS 4 INDEX: FIB4 SCORE: 2.48

## 2022-07-14 NOTE — PROGRESS NOTES
Chief Complaint   Patient presents with   • Atrial Fibrillation     F/V Dx: PAF (paroxysmal atrial fibrillation) ([facility])   • Cardiomyopathy (Ischemic)   • Dyslipidemia       Subjective:   Zenon Grijalva is a 67 y.o. male who presents today for follow up atrial fibrillation and CAD.    He was previously by Dr. Rodas in our clinic, now with Dr. Rosas. history of CAD with anterior MI in nov 2008 and had bare metal stent LAD with jailing second diagonal artery, then LAD stent 2017, atrial fibrillation, dyslipidemia, and hypertension.    Last seen 6/2022 with Dr. Rosas, plan for Schedule redo PVI/flutter, consider VOM ethanol ablation as well 8/1/2022.     Interim events:  patient is doing well since starting entresto. He is anxiously waiting for the ablation.   Will like samples of entresto and xarelto due to high copays at his pharmacy.     Past Medical History:   Diagnosis Date   • Arrhythmia 2020    A fib   • Arthritis     fingers   • CAD (coronary artery disease) 7/2/2012   • Chronic anticoagulation 7/2/2012   • Congestive heart failure (HCC)    • Dyslipidemia 7/2/2012   • Essential hypertension, benign 7/2/2012   • High cholesterol    • Insomnia     wakes up 2-5 times a night , wakes up to use restroom and sometimes just wakes up    • Ischemic cardiomyopathy 7/2/2012   • Left ventricular thrombosis 7/2/2012   • Myocardial infarct (HCC) 2006   • Pericarditis 7/2/2012   • Pneumonia     history of as a child   • Sleep apnea     told by MD that he has it, scheduled for sleep study    • Snoring     no sleep study done   • SOB (shortness of breath)     no c/o today; on exertion; pt uses 2L o2 qhs only; pt does not know provider   • Stented coronary artery 7/2/2012     Past Surgical History:   Procedure Laterality Date   • SHOULDER DECOMPRESSION ARTHROSCOPIC Right 11/27/2018    Procedure: SHOULDER DECOMPRESSION ARTHROSCOPIC- SUBACROMIAL CONVERTED TO OPEN;  Surgeon: Ni Weir M.D.;  Location: SURGERY [facility]  [facility];  Service: Orthopedics   • SHOULDER ARTHROSCOPY W/ ROTATOR CUFF REPAIR Right 11/27/2018    Procedure: OPEN ROTATOR CUFF REPAIR;  Surgeon: Ni Weir M.D.;  Location: SURGERY [facility];  Service: Orthopedics   • CARDIAC CATH, LEFT HEART     • CARPAL TUNNEL RELEASE Bilateral    • STENT PLACEMENT      cardiac stent   • VASECTOMY       History reviewed. No pertinent family history.  Social History     Socioeconomic History   • Marital status:      Spouse name: Not on file   • Number of children: Not on file   • Years of education: Not on file   • Highest education level: Not on file   Occupational History   • Not on file   Tobacco Use   • Smoking status: Never Smoker   • Smokeless tobacco: Former User     Types: Chew   Vaping Use   • Vaping Use: Never used   Substance and Sexual Activity   • Alcohol use: Not Currently     Comment: occ   • Drug use: Never   • Sexual activity: Not on file   Other Topics Concern   • Not on file   Social History Narrative   • Not on file     Social Determinants of Health     Financial Resource Strain: Not on file   Food Insecurity: Not on file   Transportation Needs: Not on file   Physical Activity: Not on file   Stress: Not on file   Social Connections: Not on file   Intimate Partner Violence: Not on file   Housing Stability: Not on file     Allergies   Allergen Reactions   • Ace Inhibitors Cough   • Other Environmental      Hayfever     Outpatient Encounter Medications as of 7/14/2022   Medication Sig Dispense Refill   • sacubitril-valsartan (ENTRESTO) 24-26 MG Tab tablet Take 1 Tablet by mouth 2 times a day. 60 Tablet 0   • rivaroxaban (XARELTO) 20 MG Tab tablet Take 1 Tablet by mouth with dinner. 30 Tablet 0   • torsemide (DEMADEX) 20 MG Tab Take 1 Tablet by mouth 1 time a day as needed (for weight gain, increased shortness of breath or swelling). 90 Tablet 3   • potassium chloride SA (KDUR) 20 MEQ Tab CR Take 1 Tablet by mouth 2 times a day. (Patient taking  "differently: Take 10 mEq by mouth 2 times a day.) 180 Tablet 0   • fluticasone (FLONASE) 50 MCG/ACT nasal spray Administer 1 Spray into affected nostril(S) 2 times a day. (Patient taking differently: Administer 1 Spray into affected nostril(S) as needed.) 16 g 0   • Spacer/Aero-Holding Chambers (VALVED HOLDING CHAMBER) Device USE AS DIRECTED WITH INHALER (Patient taking differently: as needed.) 1 Each 5   • simvastatin (ZOCOR) 40 MG Tab Take 1 Tablet by mouth every day. 90 Tablet 3   • carvedilol (COREG) 6.25 MG Tab TAKE 1 TABLET BY MOUTH TWICE DAILY WITH MEALS (Patient taking differently: Take 6.25 mg by mouth 2 times a day with meals.) 180 Tablet 3   • [DISCONTINUED] sacubitril-valsartan (ENTRESTO) 24-26 MG Tab tablet Take 1 Tablet by mouth 2 times a day. 60 Tablet 11   • spironolactone (ALDACTONE) 25 MG Tab Take 1 Tablet by mouth every day. (Patient not taking: Reported on 7/14/2022) 30 Tablet 0   • [DISCONTINUED] rivaroxaban (XARELTO) 20 MG Tab tablet Take 1 Tablet by mouth with dinner. 90 Tablet 3   • [DISCONTINUED] levalbuterol (XOPENEX HFA) 45 MCG/ACT inhaler Inhale 1-2 Puffs every four hours as needed for Shortness of Breath. (Patient not taking: Reported on 7/14/2022) 1 Each 5     No facility-administered encounter medications on file as of 7/14/2022.     Review of Systems   Constitutional: Negative for malaise/fatigue.   Respiratory: Negative for cough, hemoptysis, sputum production, shortness of breath and wheezing.    Cardiovascular: Negative for chest pain, palpitations, orthopnea, claudication, leg swelling and PND.   Gastrointestinal: Negative for nausea and vomiting.   Neurological: Negative for dizziness and weakness.   Psychiatric/Behavioral: The patient is not nervous/anxious.         Objective:   /78 (BP Location: Left arm, Patient Position: Sitting, BP Cuff Size: Adult)   Pulse (!) 101   Resp 14   Ht 1.753 m (5' 9\")   Wt 103 kg (226 lb 9.6 oz)   SpO2 97%   BMI 33.46 kg/m² "     Physical Exam  Constitutional:       General: He is not in acute distress.     Appearance: He is well-developed. He is not diaphoretic.   HENT:      Head: Normocephalic and atraumatic.   Eyes:      Pupils: Pupils are equal, round, and reactive to light.   Neck:      Vascular: No JVD.   Cardiovascular:      Rate and Rhythm: Normal rate. Rhythm irregular.      Heart sounds: Normal heart sounds.   Pulmonary:      Effort: Pulmonary effort is normal.      Breath sounds: Normal breath sounds.   Abdominal:      General: Bowel sounds are normal. There is no distension.      Palpations: Abdomen is soft.   Skin:     General: Skin is warm and dry.   Neurological:      Mental Status: He is alert and oriented to person, place, and time.   Psychiatric:         Behavior: Behavior normal.         Thought Content: Thought content normal.         Judgment: Judgment normal.     Echocardiography Laboratory  5/19/2022  Fair quality study, improved with contrast.  The left ventricular ejection fraction is visually estimated to be 35%,   moderately reduced.  Hypokinesis of the basal to mid anterior septum, basal inferior septum   and entire apex.  Right ventricular systolic pressure is estimated to be 35 mmHg.      Echocardiography Laboratory  8/10/2020  Compared to the images of the study done 04- there has been a reduciton isn septal contractility. The patient is also now in atrial fib.   Moderately reduced left ventricular systolic function.  Left ventricular ejection fraction is visually estimated to be 40%.No   pericardial effusion seen.  Estimated right ventricular systolic pressure is  22 mmHg.   Thrombus is not observed in the left ventricular apex.  Diastolic function is difficult to assess with atrial fibrillation.  Akinesis of apex, septum and anterior wall.  Mildly dilated right ventricle.  Enlarged right atrium.Mildly dilated left atrium.  Structurally normal mitral valve without significant stenosis or    Regurgitation. Structurally normal aortic valve without significant stenosis or   Regurgitation.      Myocardial Perfusion   6/7/2019   Abnormal Lexiscan myocardial perfusion study.   Large distal anterior and apical infarct.   No significant ischemia.   Mildly reduced LVSF with EF of 47%. Mid to distal anterior wall hypokinesis.   No ischemic changes with Regadenoson.   No chest pain.   ECG INTERPRETATION   Negative stress ECG for ischemia.    Assessment:     1. PAF (paroxysmal atrial fibrillation) (HCC)  rivaroxaban (XARELTO) 20 MG Tab tablet   2. Paroxysmal atrial fibrillation (HCC)  rivaroxaban (XARELTO) 20 MG Tab tablet   3. Other persistent atrial fibrillation (HCC)  rivaroxaban (XARELTO) 20 MG Tab tablet       Medical Decision Making:  Today's Assessment / Status / Plan:     1. Persistent atrial fibrillation s/p DCCV:  - pulse is irregular, continue bb therapy   - plan for ablation   - continue xarelto 20mg qd and samples sent to [facility]     2. Ischemic cardiomyopathy:   - echo showed reduced ef 40%  - euvolemic upon examination   - continue coreg 6.25mg BID and entresto 24-26mg BID (samples sent over to [facility] pharmacy)   - continue torsemide 10mg qd as needed and aldactone 25mg qd     3. CAD s/p PCI to LAD:  - denies any angina   - continue statin therapy   - bb as noted above     Follow up in after ablation.

## 2022-07-25 ENCOUNTER — OFFICE VISIT (OUTPATIENT)
Dept: CARDIOLOGY | Facility: MEDICAL CENTER | Age: 67
End: 2022-07-25
Payer: MEDICARE

## 2022-07-25 VITALS
DIASTOLIC BLOOD PRESSURE: 70 MMHG | RESPIRATION RATE: 15 BRPM | WEIGHT: 228 LBS | HEIGHT: 69 IN | OXYGEN SATURATION: 97 % | HEART RATE: 100 BPM | SYSTOLIC BLOOD PRESSURE: 100 MMHG | BODY MASS INDEX: 33.77 KG/M2

## 2022-07-25 DIAGNOSIS — G47.33 OBSTRUCTIVE SLEEP APNEA SYNDROME: ICD-10-CM

## 2022-07-25 DIAGNOSIS — Z79.01 CHRONIC ANTICOAGULATION: ICD-10-CM

## 2022-07-25 DIAGNOSIS — I25.83 CORONARY ARTERY DISEASE DUE TO LIPID RICH PLAQUE: ICD-10-CM

## 2022-07-25 DIAGNOSIS — I50.9 HEART FAILURE, NYHA CLASS 2 (HCC): ICD-10-CM

## 2022-07-25 DIAGNOSIS — Z79.899 HIGH RISK MEDICATION USE: ICD-10-CM

## 2022-07-25 DIAGNOSIS — I50.9 ACUTE ON CHRONIC CONGESTIVE HEART FAILURE, UNSPECIFIED HEART FAILURE TYPE (HCC): ICD-10-CM

## 2022-07-25 DIAGNOSIS — I10 ESSENTIAL HYPERTENSION, BENIGN: ICD-10-CM

## 2022-07-25 DIAGNOSIS — I50.20 ACC/AHA STAGE C SYSTOLIC HEART FAILURE (HCC): ICD-10-CM

## 2022-07-25 DIAGNOSIS — I25.5 ISCHEMIC CARDIOMYOPATHY: ICD-10-CM

## 2022-07-25 DIAGNOSIS — I25.10 CORONARY ARTERY DISEASE DUE TO LIPID RICH PLAQUE: ICD-10-CM

## 2022-07-25 PROCEDURE — 99214 OFFICE O/P EST MOD 30 MIN: CPT | Performed by: NURSE PRACTITIONER

## 2022-07-25 RX ORDER — SPIRONOLACTONE 25 MG/1
25 TABLET ORAL DAILY
Qty: 30 TABLET | Refills: 0 | Status: SHIPPED | OUTPATIENT
Start: 2022-07-25 | End: 2022-07-26

## 2022-07-25 RX ORDER — DAPAGLIFLOZIN 10 MG/1
10 TABLET, FILM COATED ORAL DAILY
Qty: 30 TABLET | Refills: 11 | Status: SHIPPED | OUTPATIENT
Start: 2022-07-25 | End: 2023-05-16 | Stop reason: SDUPTHER

## 2022-07-25 ASSESSMENT — ENCOUNTER SYMPTOMS
PND: 0
MYALGIAS: 0
COUGH: 0
ORTHOPNEA: 0
SHORTNESS OF BREATH: 0
ABDOMINAL PAIN: 0
CLAUDICATION: 0
FEVER: 0
PALPITATIONS: 0
DIZZINESS: 0

## 2022-07-25 ASSESSMENT — FIBROSIS 4 INDEX: FIB4 SCORE: 2.48

## 2022-07-25 NOTE — PROGRESS NOTES
Chief Complaint   Patient presents with   • Atrial Fibrillation     F/V DX: PAF (paroxysmal atrial fibrillation) (HCC)       Subjective     Martín Grijalva is a 66 y.o. male who presents today for follow up on his heart failure with his wife, Yvonne.    Patient of Dr. Walters and current patient of EP and the heart failure clinic.  He was last seen in HF clinic on 5/27/2022.  During his last visit, patient was recommended to stop irbesartan and start Entresto 24-26 mg twice a day, and take the diuretic as needed.  He reports only using his diuretic about 1 time since that visit.    Patient did have a follow-up with NATALIIA Harrison on 7/14/2022.  No changes were made during that visit.    Patient does have a pending ablation scheduled on 8/1/2022.    Patient feels well, denies chest pain, shortness of breath, palpitations, dizziness/lightheadedness, orthopnea, PND or Edema.  He mentions having a little bit of swelling after he went camping few weeks back, but states that it improved after taking a dose of diuretic.  Patient states it was likely due to his diet.    His Home weights are ranging between 218-220 lbs.     Patient does have a history of sleep apnea and has not been able to use his CPAP since the recall.  He does have a follow-up with sleep medicine in a couple of days.    Patient mentions he is concerned about some of the cost of his medications.    Patient is planning on going to [facility] for a week or so in September.  He does not want to cancel his trip.    Additonally, patient has the following medical problems:    -hospitalization from 5/19/2022 through 5/22/2022.  Patient presented with congestion and shortness of breath.  Patient was found to have pulmonary edema with diffuse crackles and respiratory difficulties.  He was aggressively diuresed.  Repeat echo was performed which showed a LVEF down to 35%.    -Atrial fibrillation, s/p ablation on 6/25/2021, on Xarelto    -CAD, history of  anterior MI in 2008, BMS to the LAD, LV thrombus    -JOSE, has CPAP, unable to use until he gets replacement due to recall    -Hypertension    Past Medical History:   Diagnosis Date   • Arrhythmia 2020    A fib   • Arthritis     fingers   • CAD (coronary artery disease) 7/2/2012   • Chronic anticoagulation 7/2/2012   • Congestive heart failure (HCC)    • Dyslipidemia 7/2/2012   • Essential hypertension, benign 7/2/2012   • High cholesterol    • Insomnia     wakes up 2-5 times a night , wakes up to use restroom and sometimes just wakes up    • Ischemic cardiomyopathy 7/2/2012   • Left ventricular thrombosis 7/2/2012   • Myocardial infarct (HCC) 2006   • Pericarditis 7/2/2012   • Pneumonia     history of as a child   • Sleep apnea     told by MD that he has it, scheduled for sleep study    • Snoring     no sleep study done   • SOB (shortness of breath)     no c/o today; on exertion; pt uses 2L o2 qhs only; pt does not know provider   • Stented coronary artery 7/2/2012     Past Surgical History:   Procedure Laterality Date   • SHOULDER DECOMPRESSION ARTHROSCOPIC Right 11/27/2018    Procedure: SHOULDER DECOMPRESSION ARTHROSCOPIC- SUBACROMIAL CONVERTED TO OPEN;  Surgeon: Ni Weir M.D.;  Location: SURGERY [facility];  Service: Orthopedics   • SHOULDER ARTHROSCOPY W/ ROTATOR CUFF REPAIR Right 11/27/2018    Procedure: OPEN ROTATOR CUFF REPAIR;  Surgeon: Ni Weir M.D.;  Location: SURGERY [facility];  Service: Orthopedics   • CARDIAC CATH, LEFT HEART     • CARPAL TUNNEL RELEASE Bilateral    • STENT PLACEMENT      cardiac stent   • VASECTOMY       History reviewed. No pertinent family history.  Social History     Socioeconomic History   • Marital status:      Spouse name: Not on file   • Number of children: Not on file   • Years of education: Not on file   • Highest education level: Not on file   Occupational History   • Not on file   Tobacco Use   • Smoking status: Never Smoker   •  Smokeless tobacco: Former User     Types: Chew   Vaping Use   • Vaping Use: Never used   Substance and Sexual Activity   • Alcohol use: Not Currently     Comment: occ   • Drug use: Never   • Sexual activity: Not on file   Other Topics Concern   • Not on file   Social History Narrative   • Not on file     Social Determinants of Health     Financial Resource Strain: Not on file   Food Insecurity: Not on file   Transportation Needs: Not on file   Physical Activity: Not on file   Stress: Not on file   Social Connections: Not on file   Intimate Partner Violence: Not on file   Housing Stability: Not on file     Allergies   Allergen Reactions   • Ace Inhibitors Cough   • Other Environmental      Hayfever     Outpatient Encounter Medications as of 7/25/2022   Medication Sig Dispense Refill   • spironolactone (ALDACTONE) 25 MG Tab Take 1 Tablet by mouth every day. 30 Tablet 0   • dapagliflozin propanediol (FARXIGA) 10 MG Tab Take 1 Tablet by mouth every day. 30 Tablet 11   • sacubitril-valsartan (ENTRESTO) 24-26 MG Tab tablet Take 1 Tablet by mouth 2 times a day. 60 Tablet 0   • rivaroxaban (XARELTO) 20 MG Tab tablet Take 1 Tablet by mouth with dinner. 30 Tablet 0   • torsemide (DEMADEX) 20 MG Tab Take 1 Tablet by mouth 1 time a day as needed (for weight gain, increased shortness of breath or swelling). 90 Tablet 3   • fluticasone (FLONASE) 50 MCG/ACT nasal spray Administer 1 Spray into affected nostril(S) 2 times a day. (Patient taking differently: Administer 1 Spray into affected nostril(S) as needed.) 16 g 0   • Spacer/Aero-Holding Chambers (VALVED HOLDING CHAMBER) Device USE AS DIRECTED WITH INHALER (Patient taking differently: as needed.) 1 Each 5   • simvastatin (ZOCOR) 40 MG Tab Take 1 Tablet by mouth every day. 90 Tablet 3   • carvedilol (COREG) 6.25 MG Tab TAKE 1 TABLET BY MOUTH TWICE DAILY WITH MEALS (Patient taking differently: Take 6.25 mg by mouth 2 times a day with meals.) 180 Tablet 3   • [DISCONTINUED]  "potassium chloride SA (KDUR) 20 MEQ Tab CR Take 1 Tablet by mouth 2 times a day. (Patient taking differently: Take 10 mEq by mouth 2 times a day.) 180 Tablet 0   • [DISCONTINUED] spironolactone (ALDACTONE) 25 MG Tab Take 1 Tablet by mouth every day. (Patient not taking: No sig reported) 30 Tablet 0     No facility-administered encounter medications on file as of 7/25/2022.     Review of Systems   Constitutional: Negative for fever and malaise/fatigue.   Respiratory: Negative for cough and shortness of breath.    Cardiovascular: Positive for leg swelling (Slight after camping). Negative for chest pain, palpitations, orthopnea, claudication and PND.   Gastrointestinal: Negative for abdominal pain.   Musculoskeletal: Negative for myalgias.   Neurological: Negative for dizziness.   All other systems reviewed and are negative.             Objective     /70 (BP Location: Right arm, Patient Position: Sitting, BP Cuff Size: Adult)   Pulse 100   Resp 15   Ht 1.753 m (5' 9\")   Wt 103 kg (228 lb)   SpO2 97%   BMI 33.67 kg/m²     Physical Exam  Vitals reviewed.   Constitutional:       Appearance: He is well-developed.   HENT:      Head: Normocephalic and atraumatic.   Eyes:      Pupils: Pupils are equal, round, and reactive to light.   Neck:      Vascular: No JVD.   Cardiovascular:      Rate and Rhythm: Normal rate. Rhythm irregular.      Heart sounds: Normal heart sounds.   Pulmonary:      Effort: Pulmonary effort is normal. No respiratory distress.      Breath sounds: Normal breath sounds. No wheezing or rales.   Abdominal:      General: Bowel sounds are normal.      Palpations: Abdomen is soft.   Musculoskeletal:         General: Normal range of motion.      Cervical back: Normal range of motion and neck supple.      Right lower leg: No edema.      Left lower leg: No edema.   Skin:     General: Skin is warm and dry.   Neurological:      General: No focal deficit present.      Mental Status: He is alert and " oriented to person, place, and time.   Psychiatric:         Behavior: Behavior normal.       Lab Results   Component Value Date/Time    CHOLSTRLTOT 115 02/22/2019 07:06 AM    CHOLSTRLTOT 113 07/09/2012 07:21 AM    LDL 60 02/22/2019 07:06 AM    LDL 45 07/09/2012 07:21 AM    HDL 40 02/22/2019 07:06 AM    HDL 41 07/09/2012 07:21 AM    TRIGLYCERIDE 76 02/22/2019 07:06 AM    TRIGLYCERIDE 135 07/09/2012 07:21 AM       Lab Results   Component Value Date/Time    SODIUM 133 (L) 05/22/2022 04:29 AM    POTASSIUM 3.7 05/22/2022 04:29 AM    CHLORIDE 95 (L) 05/22/2022 04:29 AM    CO2 23 05/22/2022 04:29 AM    GLUCOSE 101 (H) 05/22/2022 04:29 AM    BUN 30 (H) 05/22/2022 04:29 AM    CREATININE 0.85 05/22/2022 04:29 AM    CREATININE 0.77 07/09/2012 07:21 AM    BUNCREATRAT 22 02/18/2021 09:32 AM    BUNCREATRAT 14 07/09/2012 07:21 AM     Lab Results   Component Value Date/Time    ALKPHOSPHAT 124 (H) 05/19/2022 05:55 AM    ASTSGOT 33 05/19/2022 05:55 AM    ALTSGPT 33 05/19/2022 05:55 AM    TBILIRUBIN 1.7 (H) 05/19/2022 05:55 AM        Transthoracic Echo Report 4/20/2017  No prior study is available for comparison.   Technically difficult study .   Mildly reduced left ventricular systolic function.  Left ventricular ejection fraction is visually estimated to be 50%.  No significant valve abnormalities.      Myocardial Perfusion Report 6/7/2019   NUCLEAR IMAGING INTERPRETATION   Abnormal Lexiscan myocardial perfusion study.   Large distal anterior and apical infarct.   No significant ischemia.   Mildly reduced LVSF with EF of 47%. Mid to distal anterior wall hypokinesis.   No ischemic changes with Regadenoson.   No chest pain.   ECG INTERPRETATION   Negative stress ECG for ischemia.     Transthoracic Echo Report 8/10/2020  Compared to the images of the study done 04- there has been a   reduciton isn septal contractility. The patient is also now in atrial   fib.   Moderately reduced left ventricular systolic function.  Left  ventricular ejection fraction is visually estimated to be 40%.No   pericardial effusion seen.  Estimated right ventricular systolic pressure is  22 mmHg.     Thrombus is not observed in the left ventricular apex.  Diastolic function is difficult to assess with atrial fibrillation.  Akinesis of apex, septum and anterior wall.  Mildly dilated right ventricle.  Enlarged right atrium.  Mildly dilated left atrium.  Structurally normal mitral valve without significant stenosis or   regurgitation.  Structurally normal aortic valve without significant stenosis or   regurgitation.     Transesophageal Echo Report 9/24/2020  No thrombus detected in the left atrial appendage.  Mildly reduced left ventricular systolic function.     Transthoracic Echo Report 4/19/2021  Compared to the images of the study done on 08/10/20 there has been a   slight reduction isn the EF  Contrast was used to enhance visualization of the endocardial border.  Left ventricular ejection fraction is visually estimated to be 35%.  Global hypokinesis with regional variation.  Diastolic function is difficult to assess with atrial fibrillation.  Modertely enlarged right ventricle.  Enlarged right atrium.  Severely dilated left atrium.  Trace mitral regurgitation.  Structurally normal aortic valve without significant stenosis or   regurgitation.  Estimated right ventricular systolic pressure  is 35 mmHg.  Normal pericardium without effusion.  .   Transesophageal Echo Report 6/25/2021  LVEF is severely depressed.  No evidence of ARCADIO thrombus.  Mild MR  No evidence of ASD or PFO     Transthoracic Echo Report 7/7/2021  Limited Echocardiogram:  No pericardial effusion seen.  Severely reduced left ventricular systolic function.  Left ventricular ejection fraction is visually estimated to be 25%.  Dilated right ventricle.     Compared to the images of the prior study done  6/25/2021: There has   been no significant change.      Transthoracic Echo Report  11/9/2021  Compared to prior echo on 07/07/2021 - LVEF has improved from 25% to   45%.  Normal left ventricular chamber size.  The left ventricular ejection fraction is visually estimated to be 45%.  Mild tricuspid regurgitation.  Right ventricular systolic pressure is estimated to be 44 mmHg.  Dilated inferior vena cava with inspiratory collapse.    Transthoracic Echo Report 5/19/2022  Fair quality study, improved with contrast.  The left ventricular ejection fraction is visually estimated to be 35%, moderately reduced.  Hypokinesis of the basal to mid anterior septum, basal inferior septum   and entire apex.  Right ventricular systolic pressure is estimated to be 35 mmHg.     Compared to the prior echo on 11/09/2021, EF is further declined, wall motion abnormalities are now present.     6MWT MLWHF   5/27/2022  286 m walked 6 min  95                           Assessment & Plan     1. Acute on chronic congestive heart failure, unspecified heart failure type (HCC)  spironolactone (ALDACTONE) 25 MG Tab   2. ACC/AHA stage C systolic heart failure (HCC)  Basic Metabolic Panel   3. Heart failure, NYHA class 2 (HCC)  Basic Metabolic Panel   4. High risk medication use  Basic Metabolic Panel   5. Ischemic cardiomyopathy     6. Essential hypertension, benign     7. Coronary artery disease due to lipid rich plaque     8. Chronic anticoagulation     9. Obstructive sleep apnea syndrome         Medical Decision Making: Today's Assessment/Status/Plan:        HFrEF, Stage C, Class 1-2, LVEF 35%, previously 45%, as low as 25%: Based on physical examination findings, patient is euvolemic. No JVD, lungs are clear to auscultation, no pitting edema in bilateral lower extremities, no ascites.  -Heart failure due to likely mixed between atrial fibrillation and ischemic cardiomyopathy  -ACE-I/ARB/ARNI: Continue Entresto 24-26 mg twice a day  -Evidence Based Beta-blocker: Continue carvedilol 6.25 mg twice a day  -Aldosterone Antagonist:  Restart/continue spironolactone 25 mg daily, patient ran out of prescription  -Diuretic: Continue torsemide 20 mg daily as needed for weight gain, increased shortness of breath or swelling  -Stop potassium  -SGLT2 inhibitor: Start Farxiga 10 mg daily, patient to notify our office if this medicine is cost prohibitive  -Labs: Patient does have labs due in a couple days for preop, patient to get a BMP in 2 weeks  -Will repeat echo after ablation, if LVEF not >35%, then will discuss/consider ICD for primary Prevention  -Reinforced s/sx of worsening heart failure with patient and weight monitoring. Pt verbalizes understanding. Pt to call office or RTC if present.    -PUMP line number [phone] (PUMP) reviewed with patient  -Heart Failure Education: pt to be contacted by HF nurse for further education   -Advanced care planning: Advanced directive and POLST to be discussed at future visit  -Patient referred over to the pharmacotherapy program for further optimization of medical therapy     Atrial fibrillation:  -s/p ablation 6/25/2021, Repeat ablation 8/1/2022  -Continue Xarelto 20 mg daily    CAD, history of BMS stent to LAD in 2008:  -Continue simvastatin 40 mg daily  -Currently on Xarelto    Sleep apnea:  -Recommend patient to discuss CPAP with pulmonary  -Likely not using CPAP contributing to his A. fib episode    Hypertension: Stable, borderline low  -Recommendation per above  -Will follow    Patient is planning on going to [place] in September and does not want to cancel his trip.  Reviewed indication to use diuretics.  Patient encouraged to bring in medications.    Follow-up in the heart failure clinic either before or after his trip to [place].  Patient will also have a follow-up after his ablation with EP    Patient verbalizes understanding and agrees with the plan of care.     PLEASE NOTE: This Note was created using voice recognition Software. I have made every reasonable attempt to correct obvious errors, but I  expect that there are errors of grammar and possibly content that I did not discover before finalizing the note

## 2022-07-27 ENCOUNTER — OFFICE VISIT (OUTPATIENT)
Dept: SLEEP MEDICINE | Facility: MEDICAL CENTER | Age: 67
End: 2022-07-27
Payer: MEDICARE

## 2022-07-27 ENCOUNTER — NON-PROVIDER VISIT (OUTPATIENT)
Dept: SLEEP MEDICINE | Facility: MEDICAL CENTER | Age: 67
End: 2022-07-27
Payer: MEDICARE

## 2022-07-27 VITALS
BODY MASS INDEX: 33.8 KG/M2 | OXYGEN SATURATION: 94 % | SYSTOLIC BLOOD PRESSURE: 118 MMHG | RESPIRATION RATE: 16 BRPM | HEART RATE: 103 BPM | HEIGHT: 68 IN | DIASTOLIC BLOOD PRESSURE: 64 MMHG | WEIGHT: 223 LBS

## 2022-07-27 VITALS — BODY MASS INDEX: 33.8 KG/M2 | WEIGHT: 223 LBS | HEIGHT: 68 IN

## 2022-07-27 DIAGNOSIS — G47.33 OBSTRUCTIVE SLEEP APNEA: ICD-10-CM

## 2022-07-27 DIAGNOSIS — I48.91 ATRIAL FIBRILLATION, UNSPECIFIED TYPE (HCC): ICD-10-CM

## 2022-07-27 DIAGNOSIS — J45.20 MILD INTERMITTENT ASTHMA, UNSPECIFIED WHETHER COMPLICATED: ICD-10-CM

## 2022-07-27 DIAGNOSIS — J45.909 ASTHMA, UNSPECIFIED ASTHMA SEVERITY, UNSPECIFIED WHETHER COMPLICATED, UNSPECIFIED WHETHER PERSISTENT: ICD-10-CM

## 2022-07-27 DIAGNOSIS — Z78.9 NONSMOKER: ICD-10-CM

## 2022-07-27 PROCEDURE — 99214 OFFICE O/P EST MOD 30 MIN: CPT | Performed by: NURSE PRACTITIONER

## 2022-07-27 PROCEDURE — 94060 EVALUATION OF WHEEZING: CPT | Performed by: INTERNAL MEDICINE

## 2022-07-27 RX ORDER — BUDESONIDE AND FORMOTEROL FUMARATE DIHYDRATE 80; 4.5 UG/1; UG/1
2 AEROSOL RESPIRATORY (INHALATION) 2 TIMES DAILY
Qty: 1 EACH | Refills: 11 | Status: SHIPPED | OUTPATIENT
Start: 2022-07-27 | End: 2022-07-27 | Stop reason: SDUPTHER

## 2022-07-27 RX ORDER — BUDESONIDE AND FORMOTEROL FUMARATE DIHYDRATE 80; 4.5 UG/1; UG/1
2 AEROSOL RESPIRATORY (INHALATION) 2 TIMES DAILY
Qty: 3 EACH | Refills: 3 | Status: SHIPPED | OUTPATIENT
Start: 2022-07-27 | End: 2023-05-09 | Stop reason: SDUPTHER

## 2022-07-27 ASSESSMENT — PULMONARY FUNCTION TESTS
FEV1: 2.55
FEV1_PERCENT_CHANGE: 6
FEV1: 2.41
FEV1_PREDICTED: 4.04
FEV1_PERCENT_CHANGE: 5
FEV1/FVC: 73
FVC_PERCENT_PREDICTED: 60
FEV1/FVC_PERCENT_PREDICTED: 98
FVC_PERCENT_PREDICTED: 64
FVC_PREDICTED: 5.4
FEV1/FVC_PREDICTED: 74.81
FEV1/FVC: 72.86
FEV1_PERCENT_PREDICTED: 63
FEV1_PREDICTED: 59
FEV1/FVC_PERCENT_CHANGE: 83
FVC: 3.29
FEV1/FVC_PERCENT_PREDICTED: 98
FVC: 3.5

## 2022-07-27 ASSESSMENT — FIBROSIS 4 INDEX
FIB4 SCORE: 2.48
FIB4 SCORE: 2.48

## 2022-07-27 NOTE — PROGRESS NOTES
Chief Complaint   Patient presents with   • Follow-Up     Asthma/ JOSE // last seen 2021   • Results     [place]        HPI:  Zenon Grijalva is a 67 y.o. year old male here today for follow-up on asthma and JOSE.  Last office visit 2021    Accompanied by his wife today.    [facility] stGstrstastdstest:st st1st Spirometry 2022 notes obstruction characteristic of asthma with FVC 3.29 L 60%, FEV1 2.41 L or 59%, FEV1/FVC ratio 73.  No significant bronchodilator response.  Reviewed with patient.  He is currently not on any inhalers.  He does have albuterol available to him at home.  He also has a nebulizer but has not felt the need to utilize it.  We reviewed the pathophysiology of asthma and the need to start a daily maintenance inhaler to improve but also maintain his lung function.    Since last office visit he was in ER 2022 for shortness of breath/congestion.  Patient was noted to be in heart failure and aggressively diuresed.  Repeat echo noted EF of 35%.  Monitoring daily weights was discussed with patient.  He was started on spironolactone 25 mg daily and Flonase.  He was to continue Xarelto, simvastatin and ALVARO as needed.  Imaging with chest x-ray 2022 noted no acute cardiopulmonary process but again identified areas of calcific pleural plaquing.  Patient notes a history of being a  and his father also had findings of asbestosis of which she  from.  No prior CT imaging on chart.  He denies any significant shortness of breath, cough, phlegm, chest pain/tightness or wheezing.  His wife notes him having some type of infection on May 19 during ER visit that she felt was related to his lungs but ultimately he was having heart failure.    He is currently using CPAP 7 cm; Respironics device obtained .  He is requesting prescriptions to pay out-of-pocket for a new home device and travel device since he has not received his device from the recall.  He denies any symptoms associated with recall  or black debris on inspection.  Advise he continue current therapy due to his history of cardiac issues.  Compliance report 6/27/2022 through 7/26/2022 indicates 93.3% compliance, average nightly use 8 hours 29 minutes, minimal mask leak with an overall AHI of 4.5/h.  Reviewed with patient.      PULM & SLEEP HX:  Echo 11/9/2021 indicated LVEF improvement from 25 to 45%.  RVSP 44 mmHg with a dilated inferior vena cava with inspiratory collapse.     PFT December 2020 noted mild to moderate obstructive lung disease with FEV1 2.1 L or 67%, FEV1/FVC ratio 72, FVC 2.93 L or 71%, %, TLC 91% and DLCO 124% predicted.  Patient did have a mild bronchodilator response.  He did undergo a trial of ICS/LABA but found no subjective benefit.       He had screening overnight oximetry showi ng desaturations for 100 minutes ninfa of 75% and was started on nighttime oxygen.  PSG 12/20 showed moderate JOSE with AHI:23/h and desaturations to 81% Sp02.  He was successfully titrated on CPAP: 7 cm of water with resolved JOSE.       ROS: As per HPI and otherwise negative if not stated.    Past Medical History:   Diagnosis Date   • Arrhythmia 2020    A fib   • Arthritis     fingers   • CAD (coronary artery disease) 7/2/2012   • Chronic anticoagulation 7/2/2012   • Congestive heart failure (HCC)    • Dyslipidemia 7/2/2012   • Essential hypertension, benign 7/2/2012   • High cholesterol    • Insomnia     wakes up 2-5 times a night , wakes up to use restroom and sometimes just wakes up    • Ischemic cardiomyopathy 7/2/2012   • Left ventricular thrombosis 7/2/2012   • Myocardial infarct (HCC) 2006   • Pericarditis 7/2/2012   • Pneumonia     history of as a child   • Sleep apnea     told by MD that he has it, scheduled for sleep study    • Snoring     no sleep study done   • SOB (shortness of breath)     no c/o today; on exertion; pt uses 2L o2 qhs only; pt does not know provider   • Stented coronary artery 7/2/2012       Past Surgical History:  "  Procedure Laterality Date   • SHOULDER DECOMPRESSION ARTHROSCOPIC Right 11/27/2018    Procedure: SHOULDER DECOMPRESSION ARTHROSCOPIC- SUBACROMIAL CONVERTED TO OPEN;  Surgeon: Ni Weir M.D.;  Location: SURGERY [facility];  Service: Orthopedics   • SHOULDER ARTHROSCOPY W/ ROTATOR CUFF REPAIR Right 11/27/2018    Procedure: OPEN ROTATOR CUFF REPAIR;  Surgeon: Ni Weir M.D.;  Location: SURGERY [facility];  Service: Orthopedics   • CARDIAC CATH, LEFT HEART     • CARPAL TUNNEL RELEASE Bilateral    • STENT PLACEMENT      cardiac stent   • VASECTOMY         History reviewed. No pertinent family history.    Social History     Socioeconomic History   • Marital status:      Spouse name: Not on file   • Number of children: Not on file   • Years of education: Not on file   • Highest education level: Not on file   Occupational History   • Not on file   Tobacco Use   • Smoking status: Never Smoker   • Smokeless tobacco: Former User     Types: Chew   Vaping Use   • Vaping Use: Never used   Substance and Sexual Activity   • Alcohol use: Not Currently     Comment: occ   • Drug use: Never   • Sexual activity: Not on file   Other Topics Concern   • Not on file   Social History Narrative   • Not on file     Social Determinants of Health     Financial Resource Strain: Not on file   Food Insecurity: Not on file   Transportation Needs: Not on file   Physical Activity: Not on file   Stress: Not on file   Social Connections: Not on file   Intimate Partner Violence: Not on file   Housing Stability: Not on file       Allergies as of 07/27/2022 - Reviewed 07/27/2022   Allergen Reaction Noted   • Ace inhibitors Cough 05/27/2022   • Other environmental  07/06/2021        Vitals:  /64 (BP Location: Left arm, Patient Position: Sitting, BP Cuff Size: Adult)   Pulse (!) 103   Resp 16   Ht 1.715 m (5' 7.5\")   Wt 101 kg (223 lb)   SpO2 94%     Current medications as of today   Current Outpatient " Medications   Medication Sig Dispense Refill   • budesonide-formoterol (SYMBICORT) 80-4.5 MCG/ACT Aerosol Inhale 2 Puffs 2 times a day. Use spacer. Rinse mouth after each use. 3 Each 3   • spironolactone (ALDACTONE) 25 MG Tab TAKE 1 TABLET BY MOUTH EVERY DAY 90 Tablet 3   • dapagliflozin propanediol (FARXIGA) 10 MG Tab Take 1 Tablet by mouth every day. 30 Tablet 11   • sacubitril-valsartan (ENTRESTO) 24-26 MG Tab tablet Take 1 Tablet by mouth 2 times a day. 60 Tablet 0   • rivaroxaban (XARELTO) 20 MG Tab tablet Take 1 Tablet by mouth with dinner. 30 Tablet 0   • torsemide (DEMADEX) 20 MG Tab Take 1 Tablet by mouth 1 time a day as needed (for weight gain, increased shortness of breath or swelling). 90 Tablet 3   • fluticasone (FLONASE) 50 MCG/ACT nasal spray Administer 1 Spray into affected nostril(S) 2 times a day. (Patient taking differently: Administer 1 Spray into affected nostril(S) as needed.) 16 g 0   • Spacer/Aero-Holding Chambers (VALVED HOLDING CHAMBER) Device USE AS DIRECTED WITH INHALER (Patient taking differently: as needed.) 1 Each 5   • simvastatin (ZOCOR) 40 MG Tab Take 1 Tablet by mouth every day. 90 Tablet 3   • carvedilol (COREG) 6.25 MG Tab TAKE 1 TABLET BY MOUTH TWICE DAILY WITH MEALS (Patient taking differently: Take 6.25 mg by mouth 2 times a day with meals.) 180 Tablet 3     No current facility-administered medications for this visit.         Physical Exam:   Gen:           Alert and oriented, No apparent distress. Mood and affect appropriate, normal interaction with examiner.  Eyes:          PERRL, EOM intact, sclere white, conjunctive moist.  Ears:          Not examined.   Hearing:     Grossly intact.  Nose:          Normal, no lesions or deformities.  Dentition:    Mask.  Oropharynx:   Mask.  Mallampati Classification: mask  Neck:        Supple, trachea midline, no masses.  Respiratory Effort: No intercostal retractions or use of accessory muscles.   Lung Auscultation:      Clear to  auscultation bilaterally; no rales, rhonchi or wheezing.  CV:            A. Fib.  Abd:           Not examined.   Lymphadenopathy: Not examined.  Gait and Station: Normal.  Digits and Nails: No clubbing, cyanosis, petechiae, or nodes.   Cranial Nerves: II-XII grossly intact.  Skin:        No rashes, lesions or ulcers noted.               Ext:           No cyanosis or edema.      Assessment:  1. Obstructive sleep apnea  DME CPAP    DME CPAP    DME Mask and Supplies   2. Asthma, unspecified asthma severity, unspecified whether complicated, unspecified whether persistent     3. Atrial fibrillation, unspecified type (HCC)     4. BMI 34.0-34.9,adult  HEIGHT AND WEIGHT   5. Nonsmoker         Immunizations:    Flu:recommend in the fall  Pneumovax 23:recommend  Prevnar 13:recommend  COVID-19: 7/7/22, 9/27/21, 3/22/21, 3/1/21    Plan:  1.  Patient has asthma based on spirometry.  We will attempt trial of Symbicort 80 mg 2 puffs twice daily with spacer.  May continue nebulizer albuterol HFA inhaler as needed.  Rx sent to pharmacy to check cost.  Advised I would like to keep patient on this consistently and reassess his symptoms at the next office visit.  Consider updating spirometry/PFT in 3 to 6 months.  2.  Patient sleep apnea appears well controlled.  He has no complaints regarding his sleep supporting to obtain a new device due to recall.  Rx is written for CPAP and travel CPAP pay for devices out-of-pocket.  Continue CPAP 7 cm.  Consider overnight oximetry after heart ablation to rule out nocturnal hypoxia on therapy  DME mask/supplies  3.  Discussed sleep and respiratory hygiene  4.  Encourage weight loss through ongoing diuretic use, healthy eating and regular activity  5.  Follow-up in 3 months to review respiratory symptoms with compliance review for CPAP, sooner if needed.    Please note that this dictation was created using voice recognition software. I have made every reasonable attempt to correct obvious errors,  but it is possible there are errors of grammar and possibly content that I did not discover before finalizing the note.

## 2022-07-27 NOTE — PATIENT INSTRUCTIONS
Start symbicort 2 puffs twice daily with spacer, rinse mouth after use  May use albuterol HFA as needed with spacer    RESMED CPAP - continue using CPAP at night to help heart rhythm/function    Start with talking to [facility]    Other option:  DME:  [facility] / ph [phone] / fax [phone]  [facility]  [address] [place] [id number]  #[phone]

## 2022-07-27 NOTE — PROCEDURES
Good patient effort & cooperation. The results of this test meet the ATS standards for acceptability and repeatability. Predicted values were GLI-2012. A bronchodilator of Ventolin HFA-2 puffs with a space was administered to the patient.    1. There is no significant obstructive ventilatory defect on spirometry.  The lack of obstruction does not rule out asthma. There is a partial bronchodilator response, most pronounced in the mid flow rates and peak expiratory flow.    2. There is a proportional reduction in the forced volumes, suggestive of restriction.  3. Flow volume loop is consistent with the above interpretation.  4. Compared to prior testing in 2020, FEV1 has improved from 2.10 L to 2.41 L, or 59% predicted.    I, Franki Patel MD, am the author of this note (interpretation of PFT).  __________  Franki Patel MD  Pulmonary and Critical Care Medicine  [facility]

## 2022-07-28 ENCOUNTER — PRE-ADMISSION TESTING (OUTPATIENT)
Dept: ADMISSIONS | Facility: MEDICAL CENTER | Age: 67
End: 2022-07-28
Attending: INTERNAL MEDICINE
Payer: MEDICARE

## 2022-07-28 DIAGNOSIS — Z01.812 PRE-OPERATIVE LABORATORY EXAMINATION: ICD-10-CM

## 2022-07-28 DIAGNOSIS — Z01.810 PRE-OPERATIVE CARDIOVASCULAR EXAMINATION: ICD-10-CM

## 2022-07-28 LAB
ALBUMIN SERPL BCP-MCNC: 4.2 G/DL (ref 3.2–4.9)
ALBUMIN/GLOB SERPL: 1.8 G/DL
ALP SERPL-CCNC: 77 U/L (ref 30–99)
ALT SERPL-CCNC: 18 U/L (ref 2–50)
ANION GAP SERPL CALC-SCNC: 12 MMOL/L (ref 7–16)
AST SERPL-CCNC: 17 U/L (ref 12–45)
BASOPHILS # BLD AUTO: 0.7 % (ref 0–1.8)
BASOPHILS # BLD: 0.05 K/UL (ref 0–0.12)
BILIRUB SERPL-MCNC: 1.7 MG/DL (ref 0.1–1.5)
BUN SERPL-MCNC: 26 MG/DL (ref 8–22)
CALCIUM SERPL-MCNC: 9.1 MG/DL (ref 8.5–10.5)
CHLORIDE SERPL-SCNC: 101 MMOL/L (ref 96–112)
CO2 SERPL-SCNC: 25 MMOL/L (ref 20–33)
CREAT SERPL-MCNC: 0.96 MG/DL (ref 0.5–1.4)
EKG IMPRESSION: NORMAL
EOSINOPHIL # BLD AUTO: 0.17 K/UL (ref 0–0.51)
EOSINOPHIL NFR BLD: 2.4 % (ref 0–6.9)
ERYTHROCYTE [DISTWIDTH] IN BLOOD BY AUTOMATED COUNT: 49.9 FL (ref 35.9–50)
GFR SERPLBLD CREATININE-BSD FMLA CKD-EPI: 87 ML/MIN/1.73 M 2
GLOBULIN SER CALC-MCNC: 2.3 G/DL (ref 1.9–3.5)
GLUCOSE SERPL-MCNC: 87 MG/DL (ref 65–99)
HCT VFR BLD AUTO: 40.2 % (ref 42–52)
HGB BLD-MCNC: 13.8 G/DL (ref 14–18)
IMM GRANULOCYTES # BLD AUTO: 0.03 K/UL (ref 0–0.11)
IMM GRANULOCYTES NFR BLD AUTO: 0.4 % (ref 0–0.9)
INR PPP: 1.78 (ref 0.87–1.13)
LYMPHOCYTES # BLD AUTO: 1.49 K/UL (ref 1–4.8)
LYMPHOCYTES NFR BLD: 20.8 % (ref 22–41)
MCH RBC QN AUTO: 32.9 PG (ref 27–33)
MCHC RBC AUTO-ENTMCNC: 34.3 G/DL (ref 33.7–35.3)
MCV RBC AUTO: 95.9 FL (ref 81.4–97.8)
MONOCYTES # BLD AUTO: 0.7 K/UL (ref 0–0.85)
MONOCYTES NFR BLD AUTO: 9.8 % (ref 0–13.4)
NEUTROPHILS # BLD AUTO: 4.72 K/UL (ref 1.82–7.42)
NEUTROPHILS NFR BLD: 65.9 % (ref 44–72)
NRBC # BLD AUTO: 0 K/UL
NRBC BLD-RTO: 0 /100 WBC
PLATELET # BLD AUTO: 193 K/UL (ref 164–446)
PMV BLD AUTO: 10.8 FL (ref 9–12.9)
POTASSIUM SERPL-SCNC: 4.1 MMOL/L (ref 3.6–5.5)
PROT SERPL-MCNC: 6.5 G/DL (ref 6–8.2)
PROTHROMBIN TIME: 20.3 SEC (ref 12–14.6)
RBC # BLD AUTO: 4.19 M/UL (ref 4.7–6.1)
SODIUM SERPL-SCNC: 138 MMOL/L (ref 135–145)
WBC # BLD AUTO: 7.2 K/UL (ref 4.8–10.8)

## 2022-07-28 PROCEDURE — 93010 ELECTROCARDIOGRAM REPORT: CPT | Performed by: INTERNAL MEDICINE

## 2022-07-28 PROCEDURE — 85610 PROTHROMBIN TIME: CPT

## 2022-07-28 PROCEDURE — 93005 ELECTROCARDIOGRAM TRACING: CPT

## 2022-07-28 PROCEDURE — 36415 COLL VENOUS BLD VENIPUNCTURE: CPT

## 2022-07-28 PROCEDURE — 85025 COMPLETE CBC W/AUTO DIFF WBC: CPT

## 2022-07-28 PROCEDURE — 80053 COMPREHEN METABOLIC PANEL: CPT

## 2022-07-28 ASSESSMENT — FIBROSIS 4 INDEX: FIB4 SCORE: 2.48

## 2022-08-01 ENCOUNTER — ANESTHESIA EVENT (OUTPATIENT)
Dept: CARDIOLOGY | Facility: MEDICAL CENTER | Age: 67
End: 2022-08-01
Payer: MEDICARE

## 2022-08-01 ENCOUNTER — APPOINTMENT (OUTPATIENT)
Dept: CARDIOLOGY | Facility: MEDICAL CENTER | Age: 67
End: 2022-08-01
Attending: INTERNAL MEDICINE
Payer: MEDICARE

## 2022-08-01 ENCOUNTER — ANESTHESIA (OUTPATIENT)
Dept: CARDIOLOGY | Facility: MEDICAL CENTER | Age: 67
End: 2022-08-01
Payer: MEDICARE

## 2022-08-01 ENCOUNTER — HOSPITAL ENCOUNTER (OUTPATIENT)
Facility: MEDICAL CENTER | Age: 67
End: 2022-08-01
Attending: INTERNAL MEDICINE | Admitting: INTERNAL MEDICINE
Payer: MEDICARE

## 2022-08-01 VITALS
RESPIRATION RATE: 14 BRPM | BODY MASS INDEX: 33.67 KG/M2 | DIASTOLIC BLOOD PRESSURE: 54 MMHG | WEIGHT: 227.29 LBS | TEMPERATURE: 97 F | OXYGEN SATURATION: 96 % | HEIGHT: 69 IN | HEART RATE: 69 BPM | SYSTOLIC BLOOD PRESSURE: 94 MMHG

## 2022-08-01 DIAGNOSIS — I48.0 PAF (PAROXYSMAL ATRIAL FIBRILLATION) (HCC): ICD-10-CM

## 2022-08-01 LAB
ACT BLD: 260 SEC (ref 74–137)
ACT BLD: 329 SEC (ref 74–137)
EKG IMPRESSION: NORMAL
INR PPP: 1.57 (ref 0.87–1.13)
PROTHROMBIN TIME: 18.4 SEC (ref 12–14.6)

## 2022-08-01 PROCEDURE — 93655 ICAR CATH ABLTJ DSCRT ARRHYT: CPT | Performed by: INTERNAL MEDICINE

## 2022-08-01 PROCEDURE — 700101 HCHG RX REV CODE 250: Performed by: STUDENT IN AN ORGANIZED HEALTH CARE EDUCATION/TRAINING PROGRAM

## 2022-08-01 PROCEDURE — 93312 ECHO TRANSESOPHAGEAL: CPT | Mod: 26 | Performed by: INTERNAL MEDICINE

## 2022-08-01 PROCEDURE — 93623 PRGRMD STIMJ&PACG IV RX NFS: CPT | Mod: 26 | Performed by: INTERNAL MEDICINE

## 2022-08-01 PROCEDURE — 160035 HCHG PACU - 1ST 60 MINS PHASE I

## 2022-08-01 PROCEDURE — 700101 HCHG RX REV CODE 250

## 2022-08-01 PROCEDURE — 700105 HCHG RX REV CODE 258: Performed by: STUDENT IN AN ORGANIZED HEALTH CARE EDUCATION/TRAINING PROGRAM

## 2022-08-01 PROCEDURE — 160046 HCHG PACU - 1ST 60 MINS PHASE II

## 2022-08-01 PROCEDURE — 85347 COAGULATION TIME ACTIVATED: CPT | Mod: 91

## 2022-08-01 PROCEDURE — 93312 ECHO TRANSESOPHAGEAL: CPT

## 2022-08-01 PROCEDURE — 93656 COMPRE EP EVAL ABLTJ ATR FIB: CPT | Performed by: INTERNAL MEDICINE

## 2022-08-01 PROCEDURE — 00537 ANES CARDIAC EP PROCEDURES: CPT | Performed by: STUDENT IN AN ORGANIZED HEALTH CARE EDUCATION/TRAINING PROGRAM

## 2022-08-01 PROCEDURE — 160047 HCHG PACU  - EA ADDL 30 MINS PHASE II

## 2022-08-01 PROCEDURE — 700105 HCHG RX REV CODE 258: Performed by: INTERNAL MEDICINE

## 2022-08-01 PROCEDURE — 85610 PROTHROMBIN TIME: CPT

## 2022-08-01 PROCEDURE — 93005 ELECTROCARDIOGRAM TRACING: CPT | Performed by: INTERNAL MEDICINE

## 2022-08-01 PROCEDURE — 160002 HCHG RECOVERY MINUTES (STAT)

## 2022-08-01 PROCEDURE — 160036 HCHG PACU - EA ADDL 30 MINS PHASE I

## 2022-08-01 PROCEDURE — 700111 HCHG RX REV CODE 636 W/ 250 OVERRIDE (IP)

## 2022-08-01 PROCEDURE — 93010 ELECTROCARDIOGRAM REPORT: CPT | Performed by: INTERNAL MEDICINE

## 2022-08-01 PROCEDURE — 93623 PRGRMD STIMJ&PACG IV RX NFS: CPT

## 2022-08-01 PROCEDURE — 700111 HCHG RX REV CODE 636 W/ 250 OVERRIDE (IP): Performed by: STUDENT IN AN ORGANIZED HEALTH CARE EDUCATION/TRAINING PROGRAM

## 2022-08-01 PROCEDURE — 93657 TX L/R ATRIAL FIB ADDL: CPT | Performed by: INTERNAL MEDICINE

## 2022-08-01 PROCEDURE — 36415 COLL VENOUS BLD VENIPUNCTURE: CPT

## 2022-08-01 RX ORDER — OXYCODONE HCL 5 MG/5 ML
10 SOLUTION, ORAL ORAL
Status: DISCONTINUED | OUTPATIENT
Start: 2022-08-01 | End: 2022-08-01 | Stop reason: HOSPADM

## 2022-08-01 RX ORDER — ONDANSETRON 2 MG/ML
4 INJECTION INTRAMUSCULAR; INTRAVENOUS
Status: DISCONTINUED | OUTPATIENT
Start: 2022-08-01 | End: 2022-08-01 | Stop reason: HOSPADM

## 2022-08-01 RX ORDER — HYDROMORPHONE HYDROCHLORIDE 1 MG/ML
0.1 INJECTION, SOLUTION INTRAMUSCULAR; INTRAVENOUS; SUBCUTANEOUS
Status: DISCONTINUED | OUTPATIENT
Start: 2022-08-01 | End: 2022-08-01 | Stop reason: HOSPADM

## 2022-08-01 RX ORDER — DEXAMETHASONE SODIUM PHOSPHATE 4 MG/ML
INJECTION, SOLUTION INTRA-ARTICULAR; INTRALESIONAL; INTRAMUSCULAR; INTRAVENOUS; SOFT TISSUE PRN
Status: DISCONTINUED | OUTPATIENT
Start: 2022-08-01 | End: 2022-08-01 | Stop reason: SURG

## 2022-08-01 RX ORDER — HEPARIN SODIUM 200 [USP'U]/100ML
INJECTION, SOLUTION INTRAVENOUS
Status: COMPLETED
Start: 2022-08-01 | End: 2022-08-01

## 2022-08-01 RX ORDER — METOPROLOL TARTRATE 1 MG/ML
1 INJECTION, SOLUTION INTRAVENOUS
Status: DISCONTINUED | OUTPATIENT
Start: 2022-08-01 | End: 2022-08-01 | Stop reason: HOSPADM

## 2022-08-01 RX ORDER — HYDRALAZINE HYDROCHLORIDE 20 MG/ML
5 INJECTION INTRAMUSCULAR; INTRAVENOUS
Status: DISCONTINUED | OUTPATIENT
Start: 2022-08-01 | End: 2022-08-01 | Stop reason: HOSPADM

## 2022-08-01 RX ORDER — HALOPERIDOL 5 MG/ML
1 INJECTION INTRAMUSCULAR
Status: DISCONTINUED | OUTPATIENT
Start: 2022-08-01 | End: 2022-08-01 | Stop reason: HOSPADM

## 2022-08-01 RX ORDER — LABETALOL HYDROCHLORIDE 5 MG/ML
5 INJECTION, SOLUTION INTRAVENOUS
Status: DISCONTINUED | OUTPATIENT
Start: 2022-08-01 | End: 2022-08-01 | Stop reason: HOSPADM

## 2022-08-01 RX ORDER — PROTAMINE SULFATE 10 MG/ML
INJECTION, SOLUTION INTRAVENOUS
Status: COMPLETED
Start: 2022-08-01 | End: 2022-08-01

## 2022-08-01 RX ORDER — MIDAZOLAM HYDROCHLORIDE 1 MG/ML
INJECTION INTRAMUSCULAR; INTRAVENOUS
Status: COMPLETED
Start: 2022-08-01 | End: 2022-08-01

## 2022-08-01 RX ORDER — DIPHENHYDRAMINE HYDROCHLORIDE 50 MG/ML
12.5 INJECTION INTRAMUSCULAR; INTRAVENOUS
Status: DISCONTINUED | OUTPATIENT
Start: 2022-08-01 | End: 2022-08-01 | Stop reason: HOSPADM

## 2022-08-01 RX ORDER — BUPIVACAINE HYDROCHLORIDE 2.5 MG/ML
INJECTION, SOLUTION EPIDURAL; INFILTRATION; INTRACAUDAL
Status: COMPLETED
Start: 2022-08-01 | End: 2022-08-01

## 2022-08-01 RX ORDER — ISOPROTERENOL HYDROCHLORIDE 0.2 MG/ML
INJECTION, SOLUTION INTRAVENOUS
Status: COMPLETED
Start: 2022-08-01 | End: 2022-08-01

## 2022-08-01 RX ORDER — SODIUM CHLORIDE, SODIUM LACTATE, POTASSIUM CHLORIDE, CALCIUM CHLORIDE 600; 310; 30; 20 MG/100ML; MG/100ML; MG/100ML; MG/100ML
INJECTION, SOLUTION INTRAVENOUS CONTINUOUS
Status: ACTIVE | OUTPATIENT
Start: 2022-08-01 | End: 2022-08-01

## 2022-08-01 RX ORDER — LIDOCAINE HYDROCHLORIDE 20 MG/ML
INJECTION, SOLUTION EPIDURAL; INFILTRATION; INTRACAUDAL; PERINEURAL
Status: COMPLETED
Start: 2022-08-01 | End: 2022-08-01

## 2022-08-01 RX ORDER — HEPARIN SODIUM 1000 [USP'U]/ML
INJECTION, SOLUTION INTRAVENOUS; SUBCUTANEOUS
Status: COMPLETED
Start: 2022-08-01 | End: 2022-08-01

## 2022-08-01 RX ORDER — PHENYLEPHRINE HYDROCHLORIDE 10 MG/ML
INJECTION, SOLUTION INTRAMUSCULAR; INTRAVENOUS; SUBCUTANEOUS PRN
Status: DISCONTINUED | OUTPATIENT
Start: 2022-08-01 | End: 2022-08-01 | Stop reason: SURG

## 2022-08-01 RX ORDER — HYDROMORPHONE HYDROCHLORIDE 1 MG/ML
0.2 INJECTION, SOLUTION INTRAMUSCULAR; INTRAVENOUS; SUBCUTANEOUS
Status: DISCONTINUED | OUTPATIENT
Start: 2022-08-01 | End: 2022-08-01 | Stop reason: HOSPADM

## 2022-08-01 RX ORDER — OXYCODONE HCL 5 MG/5 ML
5 SOLUTION, ORAL ORAL
Status: DISCONTINUED | OUTPATIENT
Start: 2022-08-01 | End: 2022-08-01 | Stop reason: HOSPADM

## 2022-08-01 RX ORDER — HYDROMORPHONE HYDROCHLORIDE 1 MG/ML
0.4 INJECTION, SOLUTION INTRAMUSCULAR; INTRAVENOUS; SUBCUTANEOUS
Status: DISCONTINUED | OUTPATIENT
Start: 2022-08-01 | End: 2022-08-01 | Stop reason: HOSPADM

## 2022-08-01 RX ADMIN — LIDOCAINE HYDROCHLORIDE 10 ML: 20 INJECTION, SOLUTION EPIDURAL; INFILTRATION; INTRACAUDAL; PERINEURAL at 12:29

## 2022-08-01 RX ADMIN — ROCURONIUM BROMIDE 50 MG: 10 INJECTION, SOLUTION INTRAVENOUS at 12:11

## 2022-08-01 RX ADMIN — PHENYLEPHRINE HYDROCHLORIDE 200 MCG: 10 INJECTION INTRAVENOUS at 12:54

## 2022-08-01 RX ADMIN — PHENYLEPHRINE HYDROCHLORIDE 100 MCG: 10 INJECTION INTRAVENOUS at 12:49

## 2022-08-01 RX ADMIN — PHENYLEPHRINE HYDROCHLORIDE 100 MCG: 10 INJECTION INTRAVENOUS at 13:38

## 2022-08-01 RX ADMIN — PHENYLEPHRINE HYDROCHLORIDE 20 MCG/MIN: 10 INJECTION INTRAVENOUS at 12:11

## 2022-08-01 RX ADMIN — HEPARIN SODIUM 6000 UNITS: 200 INJECTION, SOLUTION INTRAVENOUS at 12:29

## 2022-08-01 RX ADMIN — DEXAMETHASONE SODIUM PHOSPHATE 4 MG: 4 INJECTION, SOLUTION INTRA-ARTICULAR; INTRALESIONAL; INTRAMUSCULAR; INTRAVENOUS; SOFT TISSUE at 12:12

## 2022-08-01 RX ADMIN — ATROPINE SULFATE 0.4 MG: 0.4 INJECTION, SOLUTION INTRAMUSCULAR; INTRAVENOUS; SUBCUTANEOUS at 12:54

## 2022-08-01 RX ADMIN — ROCURONIUM BROMIDE 20 MG: 10 INJECTION, SOLUTION INTRAVENOUS at 13:02

## 2022-08-01 RX ADMIN — HEPARIN SODIUM 15000 UNITS: 1000 INJECTION, SOLUTION INTRAVENOUS; SUBCUTANEOUS at 12:50

## 2022-08-01 RX ADMIN — PHENYLEPHRINE HYDROCHLORIDE 100 MCG: 10 INJECTION INTRAVENOUS at 13:06

## 2022-08-01 RX ADMIN — PHENYLEPHRINE HYDROCHLORIDE 100 MCG: 10 INJECTION INTRAVENOUS at 12:51

## 2022-08-01 RX ADMIN — PROPOFOL 150 MG: 10 INJECTION, EMULSION INTRAVENOUS at 12:11

## 2022-08-01 RX ADMIN — SUGAMMADEX 200 MG: 100 INJECTION, SOLUTION INTRAVENOUS at 13:34

## 2022-08-01 RX ADMIN — FENTANYL CITRATE 100 MCG: 50 INJECTION, SOLUTION INTRAMUSCULAR; INTRAVENOUS at 12:03

## 2022-08-01 RX ADMIN — MIDAZOLAM 2 MG: 1 INJECTION INTRAMUSCULAR; INTRAVENOUS at 12:03

## 2022-08-01 RX ADMIN — ISOPROTERENOL HYDROCHLORIDE 200 MCG: 0.2 INJECTION, SOLUTION INTRACARDIAC; INTRAMUSCULAR; INTRAVENOUS; SUBCUTANEOUS at 13:11

## 2022-08-01 RX ADMIN — PROTAMINE SULFATE 50 MG: 10 INJECTION, SOLUTION INTRAVENOUS at 14:01

## 2022-08-01 RX ADMIN — HEPARIN SODIUM 6000 UNITS: 1000 INJECTION, SOLUTION INTRAVENOUS; SUBCUTANEOUS at 13:21

## 2022-08-01 RX ADMIN — BUPIVACAINE HYDROCHLORIDE: 2.5 INJECTION, SOLUTION EPIDURAL; INFILTRATION; INTRACAUDAL; PERINEURAL at 12:29

## 2022-08-01 RX ADMIN — EPHEDRINE SULFATE 5 MG: 50 INJECTION INTRAVENOUS at 15:24

## 2022-08-01 RX ADMIN — SODIUM CHLORIDE, POTASSIUM CHLORIDE, SODIUM LACTATE AND CALCIUM CHLORIDE: 600; 310; 30; 20 INJECTION, SOLUTION INTRAVENOUS at 12:01

## 2022-08-01 ASSESSMENT — FIBROSIS 4 INDEX: FIB4 SCORE: 1.39

## 2022-08-01 ASSESSMENT — PAIN DESCRIPTION - PAIN TYPE
TYPE: ACUTE PAIN
TYPE: SURGICAL PAIN
TYPE: ACUTE PAIN
TYPE: CHRONIC PAIN

## 2022-08-01 NOTE — DISCHARGE INSTRUCTIONS
HOME CARE INSTRUCTIONS    ACTIVITY: Rest and take it easy for the first 24 hours.  A responsible adult is recommended to remain with you during that time.  It is normal to feel sleepy.  We encourage you to not do anything that requires balance, judgment or coordination.    FOR 24 HOURS DO NOT:  Drive, operate machinery or run household appliances.  Drink beer or alcoholic beverages.  Make important decisions or sign legal documents.      DIET: To avoid nausea, slowly advance diet as tolerated, avoiding spicy or greasy foods for the first day.  Add more substantial food to your diet according to your physician's instructions.  Babies can be fed formula or breast milk as soon as they are hungry.  INCREASE FLUIDS AND FIBER TO AVOID CONSTIPATION.     [facility] Post Ablation Patient Instructions:  No lifting > 10 lbs x 1 week.    No soaking in baths, hot tubs, pools x 1 week.  May shower the day after discharge and take off groin dressings and leave uncovered.  Continue to monitor sites daily for warmth, redness, discolored drainage.  It is common to have a small lump in the area where the cather was (usually the size of a small marble); this will go away but takes approximately 6 weeks to normalize.   3.   Please take all medications as prescribed to you; please do not stop any medications prescribed post ablation unless directed by your healthcare provider.    4.   Please do not miss any doses of your blood thinner (if you have been started on, or take chronic blood thinners) without discussion with your healthcare provider first.   5.   Please walk and take deep breaths after discharge.  After discharge, if  experiences neurological changes/signs of stroke, high fever, you should be seen in the emergency dept.   6.   It is possible you may experience some chest discomfort or chest tightness post ablation.  This is usually secondary to inflammation and irritation of the tissues at  "the area of the ablation.  If this occurs, it is advised to try 400 mg of Ibuprofen with food as needed up to three times a day for a maximum of two days.  This should help to decrease pain and tissue inflammation.          **Please notify the office ([phone]) if this occurs.         ** DO NOT TAKE Ibuprofen IF HISTORY OF SIGNIFICANT BLEEDING OR KIDNEY DISEASE WITHOUT DISCUSSING WITH YOUR CARDIOLOGY PROVIDER FIRST.          ** If pain becomes severe or you have additional symptoms you may need to be medically evaluated; please contact the cardiology office ([phone]) for further direction.   7. It is possible that you may experience arrhythmia/Atrial Fibrillation post ablation.  This is secondary to irritation and inflammation of the cardiac tissues from the ablation.  If you have atrial fibrillation all day or feel poorly with it, please notify your cardiologist's via phone ([phone]) or [employer].    8.  Please contact call our office ([phone]) or message via Bettery message if you have any questions or concerns post procedurally.  9. You need to be seen for post ablation follow up 2-4 weeks post procedure.  If you do not have a follow up appointment scheduled, please call [phone] to schedule your follow up appointment.        SURGICAL DRESSING/BATHING: Post Angiogram Groin Care Instructions     INSTRUCTIONS  Examine (look and feel) the site of your incision site TODAY so you can recognize changes that should be called to your doctor (see below).  Avoid straining either by lifting or pulling objects for 4-5 days. Avoid lifting over 5 pounds.   For at least 72 hours, if you should sneeze or cough, please hold pressure over your groin area.  If you should begin to have oozing from the catheterization site, please hold firm pressure and call your doctor's office immediately.  If profuse bleeding occurs from the catheterization site, hold firm pressure and call \"[phone]\" immediately for " assistance.  Remove bandage after 24 hours. 8/2/2022 @ 4PM    MISCELLANEOUS INSTRUCTIONS  Bruising may occur as a result of heart catheterization. Some of the discoloration may travel down the leg, going from blue to green in color.  A small round lump under the catheterization site will remain for up to six weeks.  If any questions arise call your physician's office. The Contact Center is open Monday through Friday 7AM to 5PM and may speak to a nurse at [phone], or toll free at [phone].   You should call 911 if you develop problems with breathing or chest pain    MEDICATIONS: Resume taking daily medication.  Take prescribed pain medication with food.  If no medication is prescribed, you may take non-aspirin pain medication if needed.  PAIN MEDICATION CAN BE VERY CONSTIPATING.  Take a stool softener or laxative such as senokot, pericolace, or milk of magnesia if needed.      You should CALL YOUR PHYSICIAN if you develop:  Fever greater than 101 degrees F.  Pain not relieved by medication, or persistent nausea or vomiting.  Excessive bleeding (blood soaking through dressing) or unexpected drainage from the wound.  Extreme redness or swelling around the incision site, drainage of pus or foul smelling drainage.  Inability to urinate or empty your bladder within 8 hours.  Problems with breathing or chest pain.    You should call 911 if you develop problems with breathing or chest pain.  If you are unable to contact your doctor or surgical center, you should go to the nearest emergency room or urgent care center.  Physician's telephone #: [phone]    MILD FLU-LIKE SYMPTOMS ARE NORMAL.  YOU MAY EXPERIENCE GENERALIZED MUSCLE ACHES, THROAT IRRITATION, HEADACHE AND/OR SOME NAUSEA.    If any questions arise, call your doctor.  If your doctor is not available, please feel free to call the Surgical Center at [phone].  The Center is open Monday through Friday from 7AM to 7PM.      A registered nurse may  call you a few days after your surgery to see how you are doing after your procedure.    You may also receive a survey in the mail within the next two weeks and we ask that you take a few moments to complete the survey and return it to us.  Our goal is to provide you with very good care and we value your comments.     Depression / Suicide Risk    As you are discharged from this [facility] facility, it is important to learn how to keep safe from harming yourself.    Recognize the warning signs:  Abrupt changes in personality, positive or negative- including increase in energy   Giving away possessions  Change in eating patterns- significant weight changes-  positive or negative  Change in sleeping patterns- unable to sleep or sleeping all the time   Unwillingness or inability to communicate  Depression  Unusual sadness, discouragement and loneliness  Talk of wanting to die  Neglect of personal appearance   Rebelliousness- reckless behavior  Withdrawal from people/activities they love  Confusion- inability to concentrate     If you or a loved one observes any of these behaviors or has concerns about self-harm, here's what you can do:  Talk about it- your feelings and reasons for harming yourself  Remove any means that you might use to hurt yourself (examples: pills, rope, extension cords, firearm)  Get professional help from the community (Mental Health, Substance Abuse, psychological counseling)  Do not be alone:Call your Safe Contact- someone whom you trust who will be there for you.  Call your local CRISIS HOTLINE 042-5075 or 973-082-8618  Call your local Children's Mobile Crisis Response Team [place] (253) 711-5169 or www.[employer]  Call the toll free National Suicide Prevention Hotlines   National Suicide Prevention Lifeline 237-829-ZPJY (2084)  National Hope Line Network 800-SUICIDE (457-4394)    I acknowledge receipt and understanding of these Home Care instructions.

## 2022-08-01 NOTE — ANESTHESIA TIME REPORT
Anesthesia Start and Stop Event Times     Date Time Event    8/1/2022 1158 Ready for Procedure     1201 Anesthesia Start     1430 Anesthesia Stop        Responsible Staff  08/01/22    Name Role Begin End    Mannie Cantu M.D. Anesth 1201 1430        Overtime Reason:  no overtime (within assigned shift)    Comments:

## 2022-08-01 NOTE — ANESTHESIA PROCEDURE NOTES
Airway    Date/Time: 8/1/2022 12:12 PM  Performed by: Manine Cantu M.D.  Authorized by: Mannie Cantu M.D.     Location:  OR  Urgency:  Elective  Indications for Airway Management:  Anesthesia      Spontaneous Ventilation: absent    Sedation Level:  Deep  Preoxygenated: Yes    Patient Position:  Sniffing  Mask Difficulty Assessment:  2 - vent by mask + OA or adjuvant +/- NMBA  Final Airway Type:  Endotracheal airway  Final Endotracheal Airway:  ETT  Cuffed: Yes    Technique Used for Successful ETT Placement:  Direct laryngoscopy    Insertion Site:  Oral  Blade Type:  Alvarez  Laryngoscope Blade/Videolaryngoscope Blade Size:  2  ETT Size (mm):  7.5  Measured from:  Teeth  ETT to Teeth (cm):  24  Placement Verified by: auscultation and capnometry    Cormack-Lehane Classification:  Grade I - full view of glottis  Number of Attempts at Approach:  1  Ventilation Between Attempts:  None  Number of Other Approaches Attempted:  0

## 2022-08-01 NOTE — ANESTHESIA PREPROCEDURE EVALUATION
Date/Time: 08/01/22 1100    Scheduled providers: Conrado Rosas M.D.; Mannie Cantu M.D.    Procedure: CL-EP ABLATION ATRIAL FIBRILLATION    Diagnosis:       PAF (paroxysmal atrial fibrillation) (HCC) [I48.0]      Paroxysmal atrial fibrillation [I48.0]    Indications: See Associated Dx    Location: [facility] - CATH LAB - [facility]          Relevant Problems   ANESTHESIA   (positive) Obstructive sleep apnea      NEURO   (positive) History of acute anterior wall MI      CARDIAC   (positive) Coronary artery disease due to lipid rich plaque   (positive) Essential hypertension, benign   (positive) PAF (paroxysmal atrial fibrillation) (HCC)   (positive) Stented coronary artery       Physical Exam    Airway   Mallampati: II  TM distance: >3 FB  Neck ROM: full       Cardiovascular - normal exam  Rhythm: regular  Rate: normal  (-) murmur     Dental - normal exam           Pulmonary - normal exam  Breath sounds clear to auscultation     Abdominal    Neurological - normal exam                 Anesthesia Plan    ASA 3   ASA physical status 3 criteria: moderate reduction of ejection fraction, CAD/stents (> 3 months) and MI or angina - history (> 3 months)    Plan - general       Airway plan will be ETT          Induction: intravenous    Postoperative Plan: Postoperative administration of opioids is intended.    Pertinent diagnostic labs and testing reviewed    Informed Consent:    Anesthetic plan and risks discussed with patient.    Use of blood products discussed with: patient whom consented to blood products.

## 2022-08-01 NOTE — OP REPORT
Electrophysiology Procedure Note  [facility]    Procedures Performed:  Pulmonary Vein Isolation  Additional ablation for atrial fibrillation  Ablation of additional arrhythmia x2  Intracardiac Echocardiography  Three-dimensional intracardiac mapping  IV isoproterenol infusion with programmed stimulation  Trans-esophageal echocardiogram    Electrophysiologist: Conrado Rosas MD    Assistant(s): None    Anesthesia: General anesthesia was provided by Dr. Cantu of the Anesthesiology service.    Statement of Medical Necessity: This is a 67  year-old male with history of symptomatic persistent atrial  fibrillation     Pre-procedure ECG: Afib    Post-procedure ECG: Sinus     Description of Procedure:    Access and catheter placement: After obtaining informed written consent, the patient was  brought to the EP lab in the fasting, non-sedated stated. The patient was sedated and intubated  by the anesthesiologist. The patient was prepped and draped in the usual sterile fashion. Using  the modified Seldinger technique, access was obtained in bilateral  femoral veins. Guidewires were advanced into the IVC. In the right femoral vein, 2 sheaths of   8F were placed. In the left femoral vein 2 sheaths of 8F were placed. A deflectable  decapolar catheter was advanced through the LFV to the coronary sinus. An 8F intracardiac  echo catheter was advanced to the right atrium.     We performed DCCV at 200J restoring NSR. We tested the CTI line from prior ablation and noted conduction remained across the CTI line. Ablation was performed at 40 at the site of the leak resulting in immediate block >230ms.     Through one of the 8F short sheaths in the RFV,  a long wire was advanced to the SVC. The short sheath was  changed out for an 8.5 F braided Tor-flex (Set.fm) sheath which was advanced to the SVC. The wire was  removed and the dilator was flushed. A 71-cm transseptal needle (Set.fm) was advanced to the tip of  the  dilator, and the obturator was removed. The transseptal needle was attached to the manifold and  flushed. Under intracardiac echocardiographic guidance, the sheath/needle   system was withdrawn to the fossa ovalis. At this time, 15,000 units of heparin were administered.   Additional boluses of heparin were given as needed to keep -350 sec during LA dwell time.   The needle was advanced out of the dilator, and RF energy applied via the [place] needle.   ICE visualized the needle passage through the fossa ovalis into the left  atrium. Confirmation of left atrial location was confirmed by injecting saline and transducing  pressure. The dilator was advanced over the needle into the left atrium, and then the sheath was advanced over the dilator. The dilator and  needle were removed. The sheath was flushed and connected to a continuous heparinized   saline drip.  The transseptal catheterization  procedure was repeated through the second RFV access site a 98-cm transseptal needle  and a medium-curl Vizigo (BiosPuzl Rhodes) sheath. Left atrial location was again confirmed by injecting saline  and transducing pressure. The needle and dilator were removed. The sheath was attached to  the drip line and flushed.     A 3.5-mm externally-irrigated ablation catheter (Biosense-Rhodes   Thermocool ST SF DF-curve) was advanced through the Vizigo sheath into the left atrium. A duodecapolar Penta-Ray catheter (Biosense-Rhodes) was  advanced through the Tor-flex sheath into the left atrium. A 3-dimensional electroanatomical  map was constructed using the CARTO system.    At baseline all 4 veins and posterior wall were noted to have reconnected. Mapping of the chamber with LAT was performed demonstrated a lead on the inferior LIPV. Ablation at this location at 40W resulted in LPV isolation. The posterior wall was then mapped and a floor leak was seen near the LPVs, ablation here at 40W isolated the posterior wall. The RPVs  were isolated.    The left atrial sheaths and catheters were withdrawn to the right atrium.     Isuprel 2-4mcg/min was administered and burst pacing was performed in the left atrium. We noted frequent PACs of varying morpohology from both the right and left atrium. However we were able to induce sustained ectopic atrial rhythm. This was mapped in the RA and found to be of Luz Terminalis origin. The phrenic nerve was mapped on ICE and pacing. Ablation was performed at the site of ectopic activity without any phrenic capture at 35W resulting in termination of this rhythm. Frequent PACs were still seen but unable to be mapped due to variable locations. The SVC did not have much signal. The CTI was durably blocked at the end of the case.    ICE visualization   of the pericardium confirmed no pericardial effusion at the end of the case. The  patient was awakened from anesthesia, catheters and sheaths were removed, Vascade MVP closure devices were deployed, and manual  pressure was held on bilateral groins until hemostasis was achieved.    Electrophysiological Findings:     msec   msec   msec    Total RF time: 187 sec  Fluoro time: 0 min    Estimated blood loss - 30 mL    Complications: None    Impression:  1. Atrial fibrillation, persistent  2. Successful isolation of all four pulmonary veins. Successful posterior wall isolation for treatment of atrial fibrillation.   3. Typical flutter with CTI gap, successful ablation of CTI with block  4. Atrial tachycardia, luz terminalis, successful ablation  5. Frequent PACs/PVCs noted.    Recommendations:  1. Bed rest for 4 hours  2. Telemetry monitoring during recovery  3. Anticoagulant therapy for at least 6-12 months  4. Follow up in Arrhythmia clinic in 4 weeks      Conrado Rosas MD  Cardiac Electrophysiology

## 2022-08-01 NOTE — OR NURSING
"1411: Report received from JENNIFER Mayorga in Cath Lab. Patient to be brought to [address] for 2 hours of bedrest.   1422: Patient arrived to [address] with Cath Lab RN and anesthesia. ID confirmed. Oral airway in place, on 8L via mask. Patient arousing during monitor placement. Puncture sites viewed by both RNs, both sites C/D/I, soft, without hematoma, sutures present on R side.   1425: Oral airway removed, on 8L via mask. Patient denies pain/nausea.   1430: BP obtained, delayed due to monitor set up issues; slightly hypotensive but patient arouseable, without complaints of dizziness or lightheadedness. IVF infusing via gravity and gurney in trendelenburg position.   Both sites C/D/I, soft, without hematoma, sutures present on R side.   1445: Patient awake, alert, with no complaints of pain or nausea. BP improved with IVF infusion and gurney positioning. On RA.  Both sites C/D/I, soft, without hematoma, sutures present on R side.   1500: Patient tolerating PO intake, denies nausea, reports some R groin soreness but declines pain meds.   Both sites C/D/I, soft, without hematoma, sutures present on R side.   1515: Patient remains hypotensive after finishing post-procedure IVF. Awake, alert, asymptomatic.   Both sites C/D/I, soft, without hematoma, sutures present on R side.   1524: Ephedrine given per eMAR. Dr. Cantu notified at 1528.  1530: Second dose of ephedrine given per eMAR.   Both sites C/D/I, soft, without hematoma, sutures present on R side. Level of soreness stable on R side.    1538: Dr. Cantu okay with current BPs (80s/40-50s) since he's asymptomatic.  1545: Patient's wife updated via phone.  Both sites C/D/I, soft, without hematoma, sutures present on R side.   1552: EKG Tech at bedside for post-procedure EKG.   1559: EKG completed and placed in chart.  1600: Patient rating R groin pain 3/10, reporting \"soreness\", declining pain meds.   Both sites C/D/I, soft, without hematoma, sutures present on R side.  "   1627: Report called to JENNIFER Yadav in Phase 2. Questions answered. Patient to go to room 30.   1630: Arterial line pulled and pressure held.   1645: Hemostasis achieved at arterial line site; pressure dressing applied.   1650: Patient discharged from Phase 1; transported via gurney to Phase 2 room 30. Bilateral groin sites assessed by receiving RNJenifer.

## 2022-08-01 NOTE — H&P
[facility]  Electrophysiology Pre-procedure H&P    DOS:8/1/2022    Planned Procedure: AF/AFl ablation    Chief complaint/Reason for Procedure: AFib    HPI: 66 y/o M with persistent AF/AFL s/p ablation 13 months ago, with recurrent AF/AFL and heart failure, for ablation.      Past Medical History:   Diagnosis Date   • Arrhythmia 2020    A fib   • Arthritis     fingers   • CAD (coronary artery disease) 7/2/2012   • Chronic anticoagulation 7/2/2012   • Congestive heart failure (HCC)    • Dyslipidemia 7/2/2012   • Essential hypertension, benign 7/2/2012   • High cholesterol    • Insomnia     wakes up 2-5 times a night , wakes up to use restroom and sometimes just wakes up    • Ischemic cardiomyopathy 7/2/2012   • Left ventricular thrombosis 7/2/2012   • Myocardial infarct (HCC) 2006   • Pericarditis 7/2/2012   • Pneumonia     history of as a child   • Sleep apnea     told by MD that he has it, scheduled for sleep study    • Snoring     no sleep study done   • SOB (shortness of breath)     no c/o today; on exertion; pt uses 2L o2 qhs only; pt does not know provider   • Stented coronary artery 7/2/2012       Past Surgical History:   Procedure Laterality Date   • SHOULDER DECOMPRESSION ARTHROSCOPIC Right 11/27/2018    Procedure: SHOULDER DECOMPRESSION ARTHROSCOPIC- SUBACROMIAL CONVERTED TO OPEN;  Surgeon: Ni Weir M.D.;  Location: SURGERY [facility];  Service: Orthopedics   • SHOULDER ARTHROSCOPY W/ ROTATOR CUFF REPAIR Right 11/27/2018    Procedure: OPEN ROTATOR CUFF REPAIR;  Surgeon: Ni Weir M.D.;  Location: SURGERY [facility];  Service: Orthopedics   • CARDIAC CATH, LEFT HEART     • CARPAL TUNNEL RELEASE Bilateral    • STENT PLACEMENT      cardiac stent   • VASECTOMY         Social History     Socioeconomic History   • Marital status:      Spouse name: Not on file   • Number of children: Not on file   • Years of education: Not on file   • Highest education  "level: Not on file   Occupational History   • Not on file   Tobacco Use   • Smoking status: Never Smoker   • Smokeless tobacco: Former User     Types: Chew   Vaping Use   • Vaping Use: Never used   Substance and Sexual Activity   • Alcohol use: Not Currently     Comment: occ   • Drug use: Never   • Sexual activity: Not on file   Other Topics Concern   • Not on file   Social History Narrative   • Not on file     Social Determinants of Health     Financial Resource Strain: Not on file   Food Insecurity: Not on file   Transportation Needs: Not on file   Physical Activity: Not on file   Stress: Not on file   Social Connections: Not on file   Intimate Partner Violence: Not on file   Housing Stability: Not on file       History reviewed. No pertinent family history.    Allergies   Allergen Reactions   • Ace Inhibitors Cough   • Other Environmental      Hayfever       Current Facility-Administered Medications   Medication Dose Route Frequency Provider Last Rate Last Admin   • lidocaine (XYLOCAINE) 1 % injection 0.5 mL  0.5 mL Intradermal Once PRN Conrado Rosas M.D.       • lactated ringers infusion   Intravenous Continuous Conrado Rosas M.D.       • BUPIVACAINE HCL (PF) 0.25 % INJ SOLN            • HEPARIN SODIUM (PORCINE) 1000 UNIT/ML INJ SOLN            • LIDOCAINE HCL (PF) 2 % INJ SOLN            • HEPARIN (PORCINE) IN NACL 2000-0.9 UNIT/L-% IV SOLN                Physical Exam:  Vitals:    07/28/22 0710 08/01/22 0827   BP:  100/57   Pulse:  82   Resp:  18   Temp:  36.4 °C (97.5 °F)   TempSrc:  Temporal   SpO2:  96%   Weight: 103 kg (227 lb 15.3 oz) 103 kg (227 lb 4.7 oz)   Height: 1.753 m (5' 9\") 1.753 m (5' 9\")     General appearance: NAD, conversant   Neck: Trachea midline; FROM, supple, no thyromegaly or lymphadenopathy  CV: RRR, no MRGs, no JVD   Extremities: No peripheral edema or extremity lymphadenopathy  Skin: Normal temperature, turgor and texture; no rash, ulcers or subcutaneous nodules  Psych: " Appropriate affect, alert and oriented to person, place and time    Data:  Lab Results   Component Value Date/Time    CHOLSTRLTOT 115 02/22/2019 07:06 AM    CHOLSTRLTOT 113 07/09/2012 07:21 AM    LDL 60 02/22/2019 07:06 AM    LDL 45 07/09/2012 07:21 AM    HDL 40 02/22/2019 07:06 AM    HDL 41 07/09/2012 07:21 AM    TRIGLYCERIDE 76 02/22/2019 07:06 AM    TRIGLYCERIDE 135 07/09/2012 07:21 AM       Lab Results   Component Value Date/Time    SODIUM 138 07/28/2022 07:37 AM    POTASSIUM 4.1 07/28/2022 07:37 AM    CHLORIDE 101 07/28/2022 07:37 AM    CO2 25 07/28/2022 07:37 AM    GLUCOSE 87 07/28/2022 07:37 AM    BUN 26 (H) 07/28/2022 07:37 AM    CREATININE 0.96 07/28/2022 07:37 AM    CREATININE 0.77 07/09/2012 07:21 AM    BUNCREATRAT 22 02/18/2021 09:32 AM    BUNCREATRAT 14 07/09/2012 07:21 AM     Lab Results   Component Value Date/Time    ALKPHOSPHAT 77 07/28/2022 07:37 AM    ASTSGOT 17 07/28/2022 07:37 AM    ALTSGPT 18 07/28/2022 07:37 AM    TBILIRUBIN 1.7 (H) 07/28/2022 07:37 AM      No results found for: BNPBTYPENAT            EKG interpreted by me: Afib    Impression/Plan:  1) Persistent Afib  2) AFL    Plan AF/AFL ablation. We discussed pulmonary vein isolation for therapeutic management and continued rhythm control.  We discussed the risks and benefits of this procedure.  Risks include 1-3% risk of major cardiovascular event including stroke, myocardial infarction, phrenic nerve damage, esophageal injury and/or fistula formation, cardiac perforation, pericardial effusion, tamponade, major bleeding, or death.  I quoted a 70 to 80% chance free of atrial fibrillation at 12 months.  We discussed that he may also need a second procedure.        Conrado Rosas MD  Cardiac Electrophysiology

## 2022-08-01 NOTE — ANESTHESIA PROCEDURE NOTES
Arterial Line  Performed by: Mannie Cantu M.D.  Authorized by: Mannie Cantu M.D.     Start Time:  8/1/2022 12:15 PM  End Time:  8/1/2022 12:17 PM  Localization: surface landmarks    Patient Location:  OR  Indication: continuous blood pressure monitoring        Catheter Size:  20 G  Seldinger Technique?: Yes    Laterality:  Right  Site:  Radial artery  Line Secured:  Antimicrobial disc, tape and transparent dressing  Events: patient tolerated procedure well with no complications

## 2022-08-02 ASSESSMENT — PAIN SCALES - GENERAL: PAIN_LEVEL: 0

## 2022-08-02 NOTE — PROGRESS NOTES
1652 Patient to stage two from PACU. VSS. Denies pain or nausea at this time. Bilateral groin cath sites with guaze and tegaderm, right side with sutures and has scant amount of bloody drainage. Left side CDI. Patient on bed rest until 1822.    1710 Nurse practitioner Kaitlin at bedside to removed right groin site sutures. Patient tolerated well, new dressing in place.    D/C instructions given and patient and wife educated on instructions, all questions addressed.

## 2022-08-02 NOTE — ANESTHESIA POSTPROCEDURE EVALUATION
Patient: Zenon Grijalva    Procedure Summary     Date: 08/01/22 Room / Location: [facility] IMAGING - CATH LAB [facility]    Anesthesia Start: 1201 Anesthesia Stop: 1430    Procedure: CL-EP ABLATION ATRIAL FIBRILLATION Diagnosis:       PAF (paroxysmal atrial fibrillation) (HCC)      Paroxysmal atrial fibrillation      (See Associated Dx)    Scheduled Providers: Conrado Rosas M.D.; Mannie Cantu M.D. Responsible Provider: Mannie Cantu M.D.    Anesthesia Type: general ASA Status: 3          Final Anesthesia Type: general  Last vitals  BP   Blood Pressure : (!) 94/54, Arterial BP: (!) 83/49    Temp   36.1 °C (97 °F)    Pulse   69   Resp   14    SpO2   96 %      Anesthesia Post Evaluation    Patient location during evaluation: PACU  Patient participation: complete - patient participated  Level of consciousness: awake and alert  Pain score: 0    Airway patency: patent  Anesthetic complications: no  Cardiovascular status: hemodynamically stable  Respiratory status: acceptable  Hydration status: euvolemic    PONV: none          No complications documented.     Nurse Pain Score: 0 (NPRS)

## 2022-08-03 ENCOUNTER — OFFICE VISIT (OUTPATIENT)
Dept: MEDICAL GROUP | Facility: PHYSICIAN GROUP | Age: 67
End: 2022-08-03
Payer: MEDICARE

## 2022-08-03 VITALS
WEIGHT: 230 LBS | HEART RATE: 93 BPM | TEMPERATURE: 97.4 F | HEIGHT: 69 IN | DIASTOLIC BLOOD PRESSURE: 68 MMHG | BODY MASS INDEX: 34.07 KG/M2 | SYSTOLIC BLOOD PRESSURE: 102 MMHG | OXYGEN SATURATION: 98 %

## 2022-08-03 DIAGNOSIS — R17 TOTAL BILIRUBIN, ELEVATED: ICD-10-CM

## 2022-08-03 DIAGNOSIS — G47.33 OBSTRUCTIVE SLEEP APNEA: ICD-10-CM

## 2022-08-03 DIAGNOSIS — I10 ESSENTIAL HYPERTENSION, BENIGN: Chronic | ICD-10-CM

## 2022-08-03 DIAGNOSIS — I48.0 PAF (PAROXYSMAL ATRIAL FIBRILLATION) (HCC): ICD-10-CM

## 2022-08-03 DIAGNOSIS — J45.20 MILD INTERMITTENT ASTHMA WITHOUT COMPLICATION: ICD-10-CM

## 2022-08-03 DIAGNOSIS — Z76.89 ENCOUNTER TO ESTABLISH CARE: ICD-10-CM

## 2022-08-03 DIAGNOSIS — I50.22 CHRONIC SYSTOLIC HEART FAILURE (HCC): ICD-10-CM

## 2022-08-03 DIAGNOSIS — E78.5 DYSLIPIDEMIA: Chronic | ICD-10-CM

## 2022-08-03 PROBLEM — I50.23 ACUTE ON CHRONIC CLINICAL SYSTOLIC HEART FAILURE (HCC): Status: RESOLVED | Noted: 2022-05-19 | Resolved: 2022-08-03

## 2022-08-03 PROCEDURE — 99214 OFFICE O/P EST MOD 30 MIN: CPT | Performed by: NURSE PRACTITIONER

## 2022-08-03 ASSESSMENT — FIBROSIS 4 INDEX: FIB4 SCORE: 1.39

## 2022-08-03 NOTE — ASSESSMENT & PLAN NOTE
New diagnosis with recent PFTs on 7/27/2022.  He has followed up with pulmonology. Reports he has albuterol inhaler and has not needed to use. He was recently started on Symbicort 80-4.5 mcg/act 2 puff twice daily. He has been tolerating the medication.

## 2022-08-03 NOTE — ASSESSMENT & PLAN NOTE
Continue with optimizing his blood pressure.  Blood pressure slightly on low side, asymptomatic.  Continue to monitor.

## 2022-08-03 NOTE — ASSESSMENT & PLAN NOTE
He is taking simvastatin 40 daily.  He is tolerating the medication.  Due for updated lipid panel.  He states that up until his recent ablation 3 days ago, he was regularly exercising.  He notes that he has to wait at least 1 month before he resumes his exercise.

## 2022-08-03 NOTE — PROGRESS NOTES
Subjective:     CC:  Diagnoses of Encounter to establish care, PAF (paroxysmal atrial fibrillation) (HCC), Chronic systolic heart failure (HCC), Dyslipidemia, Obstructive sleep apnea, Total bilirubin, elevated, Mild intermittent asthma without complication, and Essential hypertension, benign were pertinent to this visit.    HISTORY OF THE PRESENT ILLNESS: Patient is a 67 y.o. male. This pleasant patient is here today to establish care with his wife Yvonne and discuss the following. His prior PCP was 20 years ago.    PAF (paroxysmal atrial fibrillation) (HCC)  He has had 2 ablations with his most recent ablation 3 days ago. Reports successful ablation this time. He is followed by cardiology. No new chest pain, shortness of breath or palpations. He is taking carvedilol 6.25 twice daily and Xarelto 20 mg every evening.  He is followed by cardiology.    Chronic systolic heart failure (HCC)  Currently followed by both cardiology and pharmacotherapy.  He is currently on carvedilol 6.25 mg twice daily with meals, entresto 24-26 mg twice daily, spironolactone 25 mg daily, and torsemide 20 mg daily as needed. No recent uses of torsemide. Recently started on Farxiga 10 mg daily. He has upcoming [place] trip planned.     Dyslipidemia  He is taking simvastatin 40 daily.  He is tolerating the medication.  Due for updated lipid panel.  He states that up until his recent ablation 3 days ago, he was regularly exercising.  He notes that he has to wait at least 1 month before he resumes his exercise.      Obstructive sleep apnea  He is wearing CPAP at night. Followed by pulmonology.     Mild intermittent asthma without complication  New diagnosis with recent PFTs on 7/27/2022.  He has followed up with pulmonology. Reports he has albuterol inhaler and has not needed to use. He was recently started on Symbicort 80-4.5 mcg/act 2 puff twice daily. He has been tolerating the medication.    Essential hypertension, benign  Continue with  optimizing his blood pressure.  Blood pressure slightly on low side, asymptomatic.  Continue to monitor.      Allergies: Ace inhibitors and Other environmental    Current Outpatient Medications Ordered in Epic   Medication Sig Dispense Refill   • FLUTICASONE PROPIONATE, NASAL, NA Administer  into affected nostril(S).     • budesonide-formoterol (SYMBICORT) 80-4.5 MCG/ACT Aerosol Inhale 2 Puffs 2 times a day. Use spacer. Rinse mouth after each use. 3 Each 3   • spironolactone (ALDACTONE) 25 MG Tab TAKE 1 TABLET BY MOUTH EVERY DAY 90 Tablet 3   • dapagliflozin propanediol (FARXIGA) 10 MG Tab Take 1 Tablet by mouth every day. 30 Tablet 11   • sacubitril-valsartan (ENTRESTO) 24-26 MG Tab tablet Take 1 Tablet by mouth 2 times a day. 60 Tablet 0   • rivaroxaban (XARELTO) 20 MG Tab tablet Take 1 Tablet by mouth with dinner. 30 Tablet 0   • torsemide (DEMADEX) 20 MG Tab Take 1 Tablet by mouth 1 time a day as needed (for weight gain, increased shortness of breath or swelling). 90 Tablet 3   • simvastatin (ZOCOR) 40 MG Tab Take 1 Tablet by mouth every day. 90 Tablet 3   • carvedilol (COREG) 6.25 MG Tab TAKE 1 TABLET BY MOUTH TWICE DAILY WITH MEALS (Patient taking differently: No sig reported) 180 Tablet 3     No current Epic-ordered facility-administered medications on file.       Past Medical History:   Diagnosis Date   • Acute on chronic clinical systolic heart failure (HCC) 5/19/2022   • Arrhythmia 2020    A fib   • Arthritis     fingers   • CAD (coronary artery disease) 7/2/2012   • Chronic anticoagulation 7/2/2012   • Congestive heart failure (HCC)    • Dyslipidemia 7/2/2012   • Essential hypertension, benign 7/2/2012   • High cholesterol    • Insomnia     wakes up 2-5 times a night , wakes up to use restroom and sometimes just wakes up    • Ischemic cardiomyopathy 7/2/2012   • Left ventricular thrombosis 7/2/2012   • Myocardial infarct (HCC) 2006   • Pericarditis 7/2/2012   • Pneumonia     history of as a child   •  "Sleep apnea     told by MD that he has it, scheduled for sleep study    • Snoring     no sleep study done   • SOB (shortness of breath)     no c/o today; on exertion; pt uses 2L o2 qhs only; pt does not know provider   • Stented coronary artery 7/2/2012       Past Surgical History:   Procedure Laterality Date   • SHOULDER DECOMPRESSION ARTHROSCOPIC Right 11/27/2018    Procedure: SHOULDER DECOMPRESSION ARTHROSCOPIC- SUBACROMIAL CONVERTED TO OPEN;  Surgeon: Ni Weir M.D.;  Location: SURGERY [facility];  Service: Orthopedics   • SHOULDER ARTHROSCOPY W/ ROTATOR CUFF REPAIR Right 11/27/2018    Procedure: OPEN ROTATOR CUFF REPAIR;  Surgeon: Ni Weir M.D.;  Location: SURGERY [facility];  Service: Orthopedics   • STENT PLACEMENT  2007    cardiac stent   • CARDIAC CATH, LEFT HEART     • CARPAL TUNNEL RELEASE Bilateral    • OTHER      cardioversions x 2   • VASECTOMY         Social History     Tobacco Use   • Smoking status: Never Smoker   • Smokeless tobacco: Former User     Types: Chew   Vaping Use   • Vaping Use: Never used   Substance Use Topics   • Alcohol use: Not Currently     Comment: occ   • Drug use: Never       Social History     Social History Narrative   • Not on file       Family History   Problem Relation Age of Onset   • No Known Problems Mother    • Other Father         abestos   • Heart Disease Maternal Grandmother    • No Known Problems Maternal Grandfather    • No Known Problems Paternal Grandmother    • Heart Disease Paternal Grandfather    • No Known Problems Daughter    • No Known Problems Son        Health Maintenance: Reviewed. Declines immunization. Requesting colonoscopy records.       Objective:     Vital signs reviewed   Exam: /68 (BP Location: Right arm, Patient Position: Sitting, BP Cuff Size: Adult)   Pulse 93   Temp 36.3 °C (97.4 °F) (Temporal)   Ht 1.753 m (5' 9\")   Wt 104 kg (230 lb)   SpO2 98%  Body mass index is 33.97 kg/m².    Gen: Alert and " oriented, No apparent distress. Wife present in exam room.   Neck: Neck is supple without lymphadenopathy.  Lungs: Normal effort, CTA bilaterally, no wheezes, rhonchi, or rales.    CV: Regular rate and rhythm. No murmurs, rubs, or gallops.  Ext: No clubbing, cyanosis, edema      Assessment & Plan:   67 y.o. male with the following -    1. Encounter to establish care  Acute uncomplicated problem.  Care established today.    2. PAF (paroxysmal atrial fibrillation) (HCC)  Chronic stable problem.  Continue follow-up with cardiology and electrophysiology.  Continue with carvedilol 6.25 mg twice daily and Xarelto 20 mg with evening meal.    3. Chronic systolic heart failure (HCC)  Chronic stable problem.  Continue follow-up with cardiology and pharmacotherapy.  Continue with carvedilol  6.25 mg twice daily with meals, entresto 24-26 mg twice daily, spironolactone 25 mg daily, and torsemide 20 mg daily as needed.    4. Dyslipidemia  Chronic stable problem.  Continue with simvastatin 40 mg daily.  Due for updated lipid panel.  - Lipid Profile; Future    5. Obstructive sleep apnea  Chronic stable problem.  Continue with nightly CPAP.  Continue follow-up pulmonology.    6. Total bilirubin, elevated  Chronic stable problem.  Review of chart shows persistent elevated total bilirubin level.  He is not have any right upper quadrant pain, nausea, vomiting or jaundice.  Plan to check ultrasound.  Consider referral to GI.  - US-RUQ; Future    7. Mild intermittent asthma without complication  Chronic stable problem.  Continue follow-up with pulmonology.  Continue with Symbicort 2 puff twice daily and albuterol as needed.    8. Essential hypertension, benign  Chronic stable problem.  Blood pressure is at goal.  Continue to monitor.      Return in about 6 months (around 2/3/2023) for Hypertension, cholesterol.    Please note that this dictation was created using voice recognition software. I have made every reasonable attempt to  correct obvious errors, but I expect that there are errors of grammar and possibly content that I did not discover before finalizing the note.

## 2022-08-03 NOTE — ASSESSMENT & PLAN NOTE
He has had 2 ablations with his most recent ablation 3 days ago. Reports successful ablation this time. He is followed by cardiology. No new chest pain, shortness of breath or palpations. He is taking carvedilol 6.25 twice daily and Xarelto 20 mg every evening.  He is followed by cardiology.

## 2022-08-03 NOTE — LETTER
[facility]  REBEKA Stone  [address] [place] [address]  Fax: [phone]   Authorization for Release/Disclosure of   Protected Health Information   Name: ZENON ANTONIO : 1955 SSN: [id number]   Address: [facility] [place] [address] Phone:    [phone] (home)    I authorize the entity listed below to release/disclose the PHI below to:   [facility]/REBEKA Stone and REBEKA Stone   Provider or Entity Name:  [facility]   Address   City, State, Zip   Phone:      Fax:     Reason for request: continuity of care   Information to be released:    [x] LAST COLONOSCOPY,  including any PATH REPORT and follow-up  [  ] LAST FIT/COLOGUARD RESULT [  ] LAST DEXA  [  ] LAST MAMMOGRAM  [  ] LAST PAP  [  ] LAST LABS [  ] RETINA EXAM REPORT  [  ] IMMUNIZATION RECORDS  [  ] Release all info      [  ] Check here and initial the line next to each item to release ALL health information INCLUDING  _____ Care and treatment for drug and / or alcohol abuse  _____ HIV testing, infection status, or AIDS  _____ Genetic Testing    DATES OF SERVICE OR TIME PERIOD TO BE DISCLOSED: _____________  I understand and acknowledge that:  * This Authorization may be revoked at any time by you in writing, except if your health information has already been used or disclosed.  * Your health information that will be used or disclosed as a result of you signing this authorization could be re-disclosed by the recipient. If this occurs, your re-disclosed health information may no longer be protected by State or Federal laws.  * You may refuse to sign this Authorization. Your refusal will not affect your ability to obtain treatment.  * This Authorization becomes effective upon signing and will  on (date) __________.      If no date is indicated, this Authorization will  one (1) year from the signature date.    Name: Zenon Dumont  Schlink    Signature:   Date:     8/3/2022       PLEASE FAX REQUESTED RECORDS BACK TO: [phone]

## 2022-08-03 NOTE — ASSESSMENT & PLAN NOTE
Currently followed by both cardiology and pharmacotherapy.  He is currently on carvedilol 6.25 mg twice daily with meals, entresto 24-26 mg twice daily, spironolactone 25 mg daily, and torsemide 20 mg daily as needed. No recent uses of torsemide. Recently started on Farxiga 10 mg daily. He has upcoming [place] trip planned.

## 2022-08-08 ENCOUNTER — TELEPHONE (OUTPATIENT)
Dept: SLEEP MEDICINE | Facility: MEDICAL CENTER | Age: 67
End: 2022-08-08
Payer: MEDICARE

## 2022-08-08 NOTE — TELEPHONE ENCOUNTER
"Pt walked in and states that airlines needs a note or proof of \" Fit to Fly\" and note from provider stating why he needs to bring his CPAP and battery on board so that they will let him fly.  [Pt just informed me that he needs it for travel 9/5. Needs ASAP. Please, call pt when ready for pickup.       "

## 2022-08-11 ENCOUNTER — HOSPITAL ENCOUNTER (OUTPATIENT)
Dept: LAB | Facility: MEDICAL CENTER | Age: 67
End: 2022-08-11
Attending: NURSE PRACTITIONER
Payer: MEDICARE

## 2022-08-11 DIAGNOSIS — I50.9 HEART FAILURE, NYHA CLASS 2 (HCC): ICD-10-CM

## 2022-08-11 DIAGNOSIS — I50.20 ACC/AHA STAGE C SYSTOLIC HEART FAILURE (HCC): ICD-10-CM

## 2022-08-11 DIAGNOSIS — E78.5 DYSLIPIDEMIA: Chronic | ICD-10-CM

## 2022-08-11 DIAGNOSIS — Z79.899 HIGH RISK MEDICATION USE: ICD-10-CM

## 2022-08-11 LAB
ANION GAP SERPL CALC-SCNC: 13 MMOL/L (ref 7–16)
BUN SERPL-MCNC: 13 MG/DL (ref 8–22)
CALCIUM SERPL-MCNC: 9.2 MG/DL (ref 8.5–10.5)
CHLORIDE SERPL-SCNC: 102 MMOL/L (ref 96–112)
CHOLEST SERPL-MCNC: 123 MG/DL (ref 100–199)
CO2 SERPL-SCNC: 20 MMOL/L (ref 20–33)
CREAT SERPL-MCNC: 0.91 MG/DL (ref 0.5–1.4)
FASTING STATUS PATIENT QL REPORTED: NORMAL
GFR SERPLBLD CREATININE-BSD FMLA CKD-EPI: 92 ML/MIN/1.73 M 2
GLUCOSE SERPL-MCNC: 88 MG/DL (ref 65–99)
HDLC SERPL-MCNC: 42 MG/DL
LDLC SERPL CALC-MCNC: 66 MG/DL
POTASSIUM SERPL-SCNC: 4.8 MMOL/L (ref 3.6–5.5)
SODIUM SERPL-SCNC: 135 MMOL/L (ref 135–145)
TRIGL SERPL-MCNC: 75 MG/DL (ref 0–149)

## 2022-08-11 PROCEDURE — 80061 LIPID PANEL: CPT

## 2022-08-11 PROCEDURE — 80048 BASIC METABOLIC PNL TOTAL CA: CPT

## 2022-08-11 PROCEDURE — 36415 COLL VENOUS BLD VENIPUNCTURE: CPT

## 2022-08-24 NOTE — PROGRESS NOTES
CHF Pharmacotherapy visit - Visit    Date of Service: 08/24/22    Informed written consent was given on: 6/13/22    Zenon Grijalva is here for CHF    HPI  Pertinent Interval History since last visit:   Started Farxiga - pt is tolerating it. He reports it is $140/month    Most recent EF:  35%      Current Outpatient Medications:     FLUTICASONE PROPIONATE, NASAL, NA, Administer  into affected nostril(S).    budesonide-formoterol, 2 Puff, Inhalation, BID    spironolactone, 25 mg, Oral, DAILY    dapagliflozin propanediol, 10 mg, Oral, DAILY    Entresto, 1 Tablet, Oral, BID    rivaroxaban, 20 mg, Oral, PM MEAL    torsemide, 20 mg, Oral, QDAY PRN    simvastatin, 40 mg, Oral, QDAY    carvedilol, TAKE 1 TABLET BY MOUTH TWICE DAILY WITH MEALS (Patient taking differently: 6.25 mg, Oral, 2 TIMES DAILY WITH MEALS, Other)    Current Adherence to CHF Therapies:  Complete    Current CHF Medications - including dose:   Entresto or ACE/ARB: Entresto 24-26 mg BID  Beta blocker: Carvedilol 6.25 mg BID  Diuretic: Torsemide 20 mg once daily PRN - haven't needed   Aldosterone antagonist: Spironolactone 25 mg once daily  SGLT2i: Farxiga 10 mg daily    There were no vitals filed for this visit.      Home BP and HR:  -120s   HR ~ 70-80    Change in weight: Stable    Exercise habits: minimal exercise - walks for ~1 mile daily, will ride his bike occasionally    Diet: low sodium    SOCIAL HISTORY  Social History     Tobacco Use   Smoking Status Never   Smokeless Tobacco Former    Types: Chew        DATA REVIEW  No results found for: HBA1C       Lab Results   Component Value Date/Time    CHOLSTRLTOT 123 08/11/2022 10:53 AM    LDL 66 08/11/2022 10:53 AM    HDL 42 08/11/2022 10:53 AM    TRIGLYCERIDE 75 08/11/2022 10:53 AM       Lab Results   Component Value Date/Time    SODIUM 135 08/11/2022 10:53 AM    POTASSIUM 4.8 08/11/2022 10:53 AM    CHLORIDE 102 08/11/2022 10:53 AM    CO2 20 08/11/2022 10:53 AM    GLUCOSE 88 08/11/2022  10:53 AM    BUN 13 08/11/2022 10:53 AM    CREATININE 0.91 08/11/2022 10:53 AM    CREATININE 0.77 07/09/2012 07:21 AM    BUNCREATRAT 22 02/18/2021 09:32 AM    BUNCREATRAT 14 07/09/2012 07:21 AM     Lab Results   Component Value Date/Time    ALKPHOSPHAT 77 07/28/2022 07:37 AM    ASTSGOT 17 07/28/2022 07:37 AM    ALTSGPT 18 07/28/2022 07:37 AM    TBILIRUBIN 1.7 (H) 07/28/2022 07:37 AM    INR 1.57 (H) 08/01/2022 10:00 AM    ALBUMIN 4.2 07/28/2022 07:37 AM      No components found for: MICROALBUMINCREATRATIOURINE    Renal function:  Calculated creatinine clearance: ~ 99 ml/min     Other:  Immunization History   Administered Date(s) Administered    PFIZER PURPLE CAP SARS-COV-2 VACCINATION (12+) 03/01/2021, 03/22/2021, 09/27/2021     Up to date on pneumococcal vaccine? Pt not amenable to receiving at this time.      ASSESSMENT AND PLAN  CHF  Pt is currently tolerating medication regimen  He denies any chest pain, dizziness, or fatigue  BP in office is borderline low and HR is high d/t atrial fibrillation - pt continues to be asymptomatic  Did not increase medications d/t low BP    Resulting CHF medications today (changes are bolded)  Entresto or ACE/ARB: Entresto 24-26 mg BID  Beta blocker: Carvedilol 6.25 mg BID  Diuretic: Torsemide 20 mg once daily PRN  Aldosterone antagonist: Spironolactone 25 mg once daily  SGLT2i: Farxiga 10 mg daily    Lifestyle Recommendations From Today's Visit:   Continue to eat DASH/MED style diet.   Continue to exercise as tolerated.     Significant changes to laboratory values since last visit that require repeat labs:  N/a    Blood Work Ordered At Today's visit:   None    Studies Ordered at Todays Visit:  None     Follow-Up:   PRN with pharmacy as his medications cannot be titrated d/t low BP    Sandra Harden, PharmD

## 2022-08-25 ENCOUNTER — NON-PROVIDER VISIT (OUTPATIENT)
Dept: CARDIOLOGY | Facility: MEDICAL CENTER | Age: 67
End: 2022-08-25
Payer: MEDICARE

## 2022-08-25 VITALS
SYSTOLIC BLOOD PRESSURE: 95 MMHG | BODY MASS INDEX: 33.23 KG/M2 | DIASTOLIC BLOOD PRESSURE: 76 MMHG | HEART RATE: 137 BPM | WEIGHT: 225 LBS

## 2022-08-25 PROCEDURE — 99211 OFF/OP EST MAY X REQ PHY/QHP: CPT | Performed by: INTERNAL MEDICINE

## 2022-08-25 ASSESSMENT — FIBROSIS 4 INDEX: FIB4 SCORE: 1.39

## 2022-09-22 ENCOUNTER — TELEPHONE (OUTPATIENT)
Dept: CARDIOLOGY | Facility: MEDICAL CENTER | Age: 67
End: 2022-09-22
Payer: MEDICARE

## 2022-09-22 NOTE — TELEPHONE ENCOUNTER
"[facility]    Caller: Yvonne (spouse)    Topic/issue: Pt's wife called and stated her  received some \"bug bites\" while outside and wanted to know if it was okay for him to take benadryl to help?    Callback Number: [phone]    "

## 2022-09-23 NOTE — TELEPHONE ENCOUNTER
Spoke with Martín Russell's wife. They wanted to check if he can use PO benadryl with his cardiac medications    Just came back from [place], has a bite over his left ankle that is bothering him. started topical benadryl 2 days ago, and neospirin today. Slight improvement.    I advised topical 1% hydrocortisone 3 times a day and topical benadryl, and only use PO benadryl 25mg prn close to bedtime if absolutely needed. I advised them to come to the ER if it worsens over next 24 hours to exclude cellulitis that will warrant different treatment approach.    They were appreciative of the call.

## 2022-09-26 ENCOUNTER — OFFICE VISIT (OUTPATIENT)
Dept: CARDIOLOGY | Facility: MEDICAL CENTER | Age: 67
End: 2022-09-26
Payer: MEDICARE

## 2022-09-26 VITALS
RESPIRATION RATE: 16 BRPM | SYSTOLIC BLOOD PRESSURE: 102 MMHG | HEIGHT: 69 IN | BODY MASS INDEX: 33.92 KG/M2 | DIASTOLIC BLOOD PRESSURE: 68 MMHG | WEIGHT: 229 LBS | HEART RATE: 60 BPM | OXYGEN SATURATION: 96 %

## 2022-09-26 DIAGNOSIS — I48.19 PERSISTENT ATRIAL FIBRILLATION (HCC): ICD-10-CM

## 2022-09-26 DIAGNOSIS — G47.33 OBSTRUCTIVE SLEEP APNEA SYNDROME: ICD-10-CM

## 2022-09-26 DIAGNOSIS — I25.10 CORONARY ARTERY DISEASE DUE TO LIPID RICH PLAQUE: ICD-10-CM

## 2022-09-26 DIAGNOSIS — I50.9 HEART FAILURE, NYHA CLASS 2 (HCC): ICD-10-CM

## 2022-09-26 DIAGNOSIS — I10 ESSENTIAL HYPERTENSION, BENIGN: ICD-10-CM

## 2022-09-26 DIAGNOSIS — Z79.01 CHRONIC ANTICOAGULATION: ICD-10-CM

## 2022-09-26 DIAGNOSIS — I48.0 PAF (PAROXYSMAL ATRIAL FIBRILLATION) (HCC): ICD-10-CM

## 2022-09-26 DIAGNOSIS — Z79.899 HIGH RISK MEDICATION USE: ICD-10-CM

## 2022-09-26 DIAGNOSIS — I25.5 ISCHEMIC CARDIOMYOPATHY: ICD-10-CM

## 2022-09-26 DIAGNOSIS — I50.20 ACC/AHA STAGE C SYSTOLIC HEART FAILURE (HCC): ICD-10-CM

## 2022-09-26 DIAGNOSIS — I25.83 CORONARY ARTERY DISEASE DUE TO LIPID RICH PLAQUE: ICD-10-CM

## 2022-09-26 DIAGNOSIS — R06.02 SHORTNESS OF BREATH: ICD-10-CM

## 2022-09-26 PROCEDURE — RXMED WILLOW AMBULATORY MEDICATION CHARGE: Performed by: NURSE PRACTITIONER

## 2022-09-26 PROCEDURE — 99214 OFFICE O/P EST MOD 30 MIN: CPT | Performed by: NURSE PRACTITIONER

## 2022-09-26 RX ORDER — LEVALBUTEROL TARTRATE 45 UG/1
AEROSOL, METERED ORAL
COMMUNITY
Start: 2022-09-11

## 2022-09-26 ASSESSMENT — ENCOUNTER SYMPTOMS
PND: 0
MYALGIAS: 0
CLAUDICATION: 0
SHORTNESS OF BREATH: 1
ORTHOPNEA: 0
FEVER: 0
PALPITATIONS: 0
ABDOMINAL PAIN: 0
DIZZINESS: 0
COUGH: 0

## 2022-09-26 ASSESSMENT — FIBROSIS 4 INDEX: FIB4 SCORE: 1.39

## 2022-09-26 NOTE — PROGRESS NOTES
Chief Complaint   Patient presents with    Congestive Heart Failure     F/V DX: Acute on chronic congestive heart failure, unspecified heart failure type (HCC)     Atrial Fibrillation     F/V DX: PAF (paroxysmal atrial fibrillation) (HCC)        Subjective     Martín Grijalva is a 67 y.o. male who presents today for follow up on his heart failure with his wife, Yvonne.    Patient of Dr. Walters and current patient of EP and the heart failure clinic.  He was last seen in HF clinic on 7/25/2022.  During his last visit, he was recommended to restart his spironolactone and started on Farxiga.    He reports no problems with the medication.  He did have follow-up with pharmacy and no changes were made.    Patient did have a successful trip in [place].  He did get a spider bite on his ankle, but states it is healing.    For his symptoms, he does report having episodes of swelling while he was away and some very mild shortness of breath, but he denies chest pain, palpitations, orthopnea, PND or dizziness/lightheadedness.    He continues to use his diuretic as needed.    He also had his ablation on 8/1/2022.  He does have a follow-up with EP tomorrow    His Home weights are ranging between 218-219 lbs.     Patient does have a history of sleep apnea he was recommended to restart his CPAP use.    Additonally, patient has the following medical problems:    -hospitalization from 5/19/2022 through 5/22/2022.  Patient presented with congestion and shortness of breath.  Patient was found to have pulmonary edema with diffuse crackles and respiratory difficulties.  He was aggressively diuresed.  Repeat echo was performed which showed a LVEF down to 35%.    -Atrial fibrillation, s/p ablation on 6/25/2021, on Xarelto    -CAD, history of anterior MI in 2008, BMS to the LAD, LV thrombus    -JOSE, has CPAP, unable to use until he gets replacement due to recall    -Hypertension    Past Medical History:   Diagnosis Date    Acute on  chronic clinical systolic heart failure (HCC) 5/19/2022    Arrhythmia 2020    A fib    Arthritis     fingers    CAD (coronary artery disease) 7/2/2012    Chronic anticoagulation 7/2/2012    Congestive heart failure (HCC)     Dyslipidemia 7/2/2012    Essential hypertension, benign 7/2/2012    High cholesterol     Insomnia     wakes up 2-5 times a night , wakes up to use restroom and sometimes just wakes up     Ischemic cardiomyopathy 7/2/2012    Left ventricular thrombosis 7/2/2012    Myocardial infarct (HCC) 2006    Pericarditis 7/2/2012    Pneumonia     history of as a child    Sleep apnea     told by MD that he has it, scheduled for sleep study     Snoring     no sleep study done    SOB (shortness of breath)     no c/o today; on exertion; pt uses 2L o2 qhs only; pt does not know provider    Stented coronary artery 7/2/2012     Past Surgical History:   Procedure Laterality Date    SHOULDER DECOMPRESSION ARTHROSCOPIC Right 11/27/2018    Procedure: SHOULDER DECOMPRESSION ARTHROSCOPIC- SUBACROMIAL CONVERTED TO OPEN;  Surgeon: Ni Weir M.D.;  Location: SURGERY [facility];  Service: Orthopedics    SHOULDER ARTHROSCOPY W/ ROTATOR CUFF REPAIR Right 11/27/2018    Procedure: OPEN ROTATOR CUFF REPAIR;  Surgeon: Ni Weir M.D.;  Location: SURGERY [facility];  Service: Orthopedics    STENT PLACEMENT  2007    cardiac stent    CARDIAC CATH, LEFT HEART      CARPAL TUNNEL RELEASE Bilateral     OTHER      cardioversions x 2    VASECTOMY       Family History   Problem Relation Age of Onset    No Known Problems Mother     Other Father         abestos    Heart Disease Maternal Grandmother     No Known Problems Maternal Grandfather     No Known Problems Paternal Grandmother     Heart Disease Paternal Grandfather     No Known Problems Daughter     No Known Problems Son      Social History     Socioeconomic History    Marital status:      Spouse name: Not on file    Number of children: Not on file     Years of education: Not on file    Highest education level: Not on file   Occupational History    Not on file   Tobacco Use    Smoking status: Never    Smokeless tobacco: Former     Types: Chew   Vaping Use    Vaping Use: Never used   Substance and Sexual Activity    Alcohol use: Not Currently     Comment: occ    Drug use: Never    Sexual activity: Not on file   Other Topics Concern    Not on file   Social History Narrative    Not on file     Social Determinants of Health     Financial Resource Strain: Not on file   Food Insecurity: Not on file   Transportation Needs: Not on file   Physical Activity: Not on file   Stress: Not on file   Social Connections: Not on file   Intimate Partner Violence: Not on file   Housing Stability: Not on file     Allergies   Allergen Reactions    Ace Inhibitors Cough    Other Environmental      Hayfever     Outpatient Encounter Medications as of 9/26/2022   Medication Sig Dispense Refill    FLUTICASONE PROPIONATE, NASAL, NA Administer  into affected nostril(S).      budesonide-formoterol (SYMBICORT) 80-4.5 MCG/ACT Aerosol Inhale 2 Puffs 2 times a day. Use spacer. Rinse mouth after each use. 3 Each 3    spironolactone (ALDACTONE) 25 MG Tab TAKE 1 TABLET BY MOUTH EVERY DAY 90 Tablet 3    dapagliflozin propanediol (FARXIGA) 10 MG Tab Take 1 Tablet by mouth every day. 30 Tablet 11    sacubitril-valsartan (ENTRESTO) 24-26 MG Tab tablet Take 1 Tablet by mouth 2 times a day. 60 Tablet 0    [DISCONTINUED] rivaroxaban (XARELTO) 20 MG Tab tablet Take 1 Tablet by mouth with dinner. 30 Tablet 0    torsemide (DEMADEX) 20 MG Tab Take 1 Tablet by mouth 1 time a day as needed (for weight gain, increased shortness of breath or swelling). 90 Tablet 3    simvastatin (ZOCOR) 40 MG Tab Take 1 Tablet by mouth every day. 90 Tablet 3    carvedilol (COREG) 6.25 MG Tab TAKE 1 TABLET BY MOUTH TWICE DAILY WITH MEALS (Patient taking differently: Take 6.25 mg by mouth 2 times a day with meals.) 180 Tablet 3     "levalbuterol (XOPENEX HFA) 45 MCG/ACT inhaler INHALE 1 TO 2 PUFFS BY MOUTH EVERY 4 HOURS AS NEEDED FOR SHORTNESS OF BREATH (Patient not taking: Reported on 9/26/2022)       No facility-administered encounter medications on file as of 9/26/2022.     Review of Systems   Constitutional:  Negative for fever and malaise/fatigue.   Respiratory:  Positive for shortness of breath (Mild). Negative for cough.    Cardiovascular:  Positive for leg swelling (Slight after camping). Negative for chest pain, palpitations, orthopnea, claudication and PND.   Gastrointestinal:  Negative for abdominal pain.   Musculoskeletal:  Negative for myalgias.   Neurological:  Negative for dizziness.   All other systems reviewed and are negative.           Objective     /68 (BP Location: Left arm, Patient Position: Sitting, BP Cuff Size: Adult)   Pulse 60   Resp 16   Ht 1.753 m (5' 9\")   Wt 104 kg (229 lb)   SpO2 96%   BMI 33.82 kg/m²     Physical Exam  Vitals reviewed.   Constitutional:       Appearance: He is well-developed.   HENT:      Head: Normocephalic and atraumatic.   Eyes:      Pupils: Pupils are equal, round, and reactive to light.   Neck:      Vascular: No JVD.   Cardiovascular:      Rate and Rhythm: Normal rate and regular rhythm.      Heart sounds: Normal heart sounds.   Pulmonary:      Effort: Pulmonary effort is normal. No respiratory distress.      Breath sounds: Normal breath sounds. No wheezing or rales.   Abdominal:      General: Bowel sounds are normal.      Palpations: Abdomen is soft.   Musculoskeletal:         General: Normal range of motion.      Cervical back: Normal range of motion and neck supple.      Right lower leg: No edema.      Left lower leg: No edema.   Skin:     General: Skin is warm and dry.      Comments: Left lateral ankle with 2 spider bites, healing   Neurological:      General: No focal deficit present.      Mental Status: He is alert and oriented to person, place, and time.   Psychiatric:    "      Behavior: Behavior normal.     Lab Results   Component Value Date/Time    CHOLSTRLTOT 123 08/11/2022 10:53 AM    LDL 66 08/11/2022 10:53 AM    HDL 42 08/11/2022 10:53 AM    TRIGLYCERIDE 75 08/11/2022 10:53 AM       Lab Results   Component Value Date/Time    SODIUM 135 08/11/2022 10:53 AM    POTASSIUM 4.8 08/11/2022 10:53 AM    CHLORIDE 102 08/11/2022 10:53 AM    CO2 20 08/11/2022 10:53 AM    GLUCOSE 88 08/11/2022 10:53 AM    BUN 13 08/11/2022 10:53 AM    CREATININE 0.91 08/11/2022 10:53 AM    CREATININE 0.77 07/09/2012 07:21 AM    BUNCREATRAT 22 02/18/2021 09:32 AM    BUNCREATRAT 14 07/09/2012 07:21 AM     Lab Results   Component Value Date/Time    ALKPHOSPHAT 77 07/28/2022 07:37 AM    ASTSGOT 17 07/28/2022 07:37 AM    ALTSGPT 18 07/28/2022 07:37 AM    TBILIRUBIN 1.7 (H) 07/28/2022 07:37 AM        Transthoracic Echo Report 4/20/2017  No prior study is available for comparison.   Technically difficult study .   Mildly reduced left ventricular systolic function.  Left ventricular ejection fraction is visually estimated to be 50%.  No significant valve abnormalities.      Myocardial Perfusion Report 6/7/2019   NUCLEAR IMAGING INTERPRETATION   Abnormal Lexiscan myocardial perfusion study.   Large distal anterior and apical infarct.   No significant ischemia.   Mildly reduced LVSF with EF of 47%. Mid to distal anterior wall hypokinesis.   No ischemic changes with Regadenoson.   No chest pain.   ECG INTERPRETATION   Negative stress ECG for ischemia.     Transthoracic Echo Report 8/10/2020  Compared to the images of the study done 04- there has been a   reduciton isn septal contractility. The patient is also now in atrial   fib.   Moderately reduced left ventricular systolic function.  Left ventricular ejection fraction is visually estimated to be 40%.No   pericardial effusion seen.  Estimated right ventricular systolic pressure is  22 mmHg.     Thrombus is not observed in the left ventricular apex.  Diastolic  function is difficult to assess with atrial fibrillation.  Akinesis of apex, septum and anterior wall.  Mildly dilated right ventricle.  Enlarged right atrium.  Mildly dilated left atrium.  Structurally normal mitral valve without significant stenosis or   regurgitation.  Structurally normal aortic valve without significant stenosis or   regurgitation.     Transesophageal Echo Report 9/24/2020  No thrombus detected in the left atrial appendage.  Mildly reduced left ventricular systolic function.     Transthoracic Echo Report 4/19/2021  Compared to the images of the study done on 08/10/20 there has been a   slight reduction isn the EF  Contrast was used to enhance visualization of the endocardial border.  Left ventricular ejection fraction is visually estimated to be 35%.  Global hypokinesis with regional variation.  Diastolic function is difficult to assess with atrial fibrillation.  Modertely enlarged right ventricle.  Enlarged right atrium.  Severely dilated left atrium.  Trace mitral regurgitation.  Structurally normal aortic valve without significant stenosis or   regurgitation.  Estimated right ventricular systolic pressure  is 35 mmHg.  Normal pericardium without effusion.  .   Transesophageal Echo Report 6/25/2021  LVEF is severely depressed.  No evidence of ARCADIO thrombus.  Mild MR  No evidence of ASD or PFO     Transthoracic Echo Report 7/7/2021  Limited Echocardiogram:  No pericardial effusion seen.  Severely reduced left ventricular systolic function.  Left ventricular ejection fraction is visually estimated to be 25%.  Dilated right ventricle.     Compared to the images of the prior study done  6/25/2021: There has   been no significant change.      Transthoracic Echo Report 11/9/2021  Compared to prior echo on 07/07/2021 - LVEF has improved from 25% to   45%.  Normal left ventricular chamber size.  The left ventricular ejection fraction is visually estimated to be 45%.  Mild tricuspid  regurgitation.  Right ventricular systolic pressure is estimated to be 44 mmHg.  Dilated inferior vena cava with inspiratory collapse.    Transthoracic Echo Report 5/19/2022  Fair quality study, improved with contrast.  The left ventricular ejection fraction is visually estimated to be 35%, moderately reduced.  Hypokinesis of the basal to mid anterior septum, basal inferior septum   and entire apex.  Right ventricular systolic pressure is estimated to be 35 mmHg.     Compared to the prior echo on 11/09/2021, EF is further declined, wall motion abnormalities are now present.     6MWT MLWHF   5/27/2022  286 m walked 6 min  95                           Assessment & Plan     1. ACC/AHA stage C systolic heart failure (HCC)  Basic Metabolic Panel    proBrain Natriuretic Peptide, NT      2. Heart failure, NYHA class 2 ([facility])  Basic Metabolic Panel    proBrain Natriuretic Peptide, NT      3. High risk medication use  Basic Metabolic Panel    proBrain Natriuretic Peptide, NT      4. Shortness of breath  Basic Metabolic Panel    proBrain Natriuretic Peptide, NT      5. Ischemic cardiomyopathy        6. Essential hypertension, benign        7. Coronary artery disease due to lipid rich plaque        8. Chronic anticoagulation        9. Obstructive sleep apnea syndrome        10. PAF (paroxysmal atrial fibrillation) ([facility])            Medical Decision Making: Today's Assessment/Status/Plan:        HFrEF, Stage C, Class 1-2, LVEF 35%, previously 45%, as low as 25%: Based on physical examination findings, patient is euvolemic. No JVD, lungs are clear to auscultation, no pitting edema in bilateral lower extremities, no ascites.  -Heart failure due to likely mixed between atrial fibrillation and ischemic cardiomyopathy  -ACE-I/ARB/ARNI: Continue Entresto 24-26 mg twice a day  -Evidence Based Beta-blocker: Continue carvedilol 6.25 mg twice a day  -Aldosterone Antagonist: continue spironolactone 25 mg daily  -Diuretic: Continue torsemide  20 mg daily as needed for weight gain, increased shortness of breath or swelling  -Stop potassium  -SGLT2 inhibitor: Continue Farxiga 10 mg daily  -Labs: BMP and NT proBNP due in 4 to 6 weeks before next appointment  -Will repeat echo after ablation, scheduled 11/3/2022, if LVEF not >35%, then will discuss/consider ICD for primary Prevention  -Reinforced s/sx of worsening heart failure with patient and weight monitoring. Pt verbalizes understanding. Pt to call office or RTC if present.    -PUMP line number [phone] (PUMP) reviewed with patient  -Heart Failure Education: pt to be contacted by HF nurse for further education   -Advanced care planning: Advanced directive and POLST to be discussed at future visit  -Patient referred over to the pharmacotherapy program for further optimization of medical therapy     Atrial fibrillation:  -s/p ablation 6/25/2021, Repeat ablation on 8/1/2022  -Continue Xarelto 20 mg daily, sample provided today    CAD, history of BMS stent to LAD in 2008:  -Continue simvastatin 40 mg daily  -Currently on Xarelto    Sleep apnea:  -CPAP use  -Followed by sleep medicine    Hypertension: Stable, borderline low  -Recommendation per above  -Will follow    Follow-up in the heart failure clinic after echo.  Has an appointment with EP tomorrow.  Patient can contact our office sooner if needed with problems.     Patient verbalizes understanding and agrees with the plan of care.     PLEASE NOTE: This Note was created using voice recognition Software. I have made every reasonable attempt to correct obvious errors, but I expect that there are errors of grammar and possibly content that I did not discover before finalizing the note

## 2022-09-27 ENCOUNTER — OFFICE VISIT (OUTPATIENT)
Dept: CARDIOLOGY | Facility: MEDICAL CENTER | Age: 67
End: 2022-09-27
Payer: MEDICARE

## 2022-09-27 ENCOUNTER — PHARMACY VISIT (OUTPATIENT)
Dept: PHARMACY | Facility: MEDICAL CENTER | Age: 67
End: 2022-09-27
Payer: COMMERCIAL

## 2022-09-27 VITALS
HEIGHT: 69 IN | BODY MASS INDEX: 33.47 KG/M2 | RESPIRATION RATE: 16 BRPM | SYSTOLIC BLOOD PRESSURE: 110 MMHG | OXYGEN SATURATION: 97 % | DIASTOLIC BLOOD PRESSURE: 68 MMHG | HEART RATE: 62 BPM | WEIGHT: 226 LBS

## 2022-09-27 DIAGNOSIS — Z79.01 CHRONIC ANTICOAGULATION: ICD-10-CM

## 2022-09-27 DIAGNOSIS — I10 ESSENTIAL HYPERTENSION, BENIGN: Chronic | ICD-10-CM

## 2022-09-27 DIAGNOSIS — I50.22 CHRONIC SYSTOLIC HEART FAILURE (HCC): ICD-10-CM

## 2022-09-27 DIAGNOSIS — I48.0 PAF (PAROXYSMAL ATRIAL FIBRILLATION) (HCC): ICD-10-CM

## 2022-09-27 DIAGNOSIS — Z98.890 H/O CARDIAC RADIOFREQUENCY ABLATION: ICD-10-CM

## 2022-09-27 PROCEDURE — 99214 OFFICE O/P EST MOD 30 MIN: CPT | Performed by: NURSE PRACTITIONER

## 2022-09-27 PROCEDURE — 93000 ELECTROCARDIOGRAM COMPLETE: CPT | Performed by: NURSE PRACTITIONER

## 2022-09-27 ASSESSMENT — ENCOUNTER SYMPTOMS
SPUTUM PRODUCTION: 0
CHILLS: 0
TINGLING: 0
NAUSEA: 0
PND: 0
DOUBLE VISION: 0
BLURRED VISION: 0
WHEEZING: 0
SENSORY CHANGE: 0
PALPITATIONS: 0
FOCAL WEAKNESS: 0
TREMORS: 0
HEADACHES: 0
VOMITING: 0
LOSS OF CONSCIOUSNESS: 0
DIZZINESS: 0
SHORTNESS OF BREATH: 0
COUGH: 0
HEARTBURN: 0
WEIGHT LOSS: 0
ORTHOPNEA: 0
FEVER: 0
DIARRHEA: 0
ABDOMINAL PAIN: 0
BLOOD IN STOOL: 0
HEMOPTYSIS: 0
SPEECH CHANGE: 0

## 2022-09-27 ASSESSMENT — FIBROSIS 4 INDEX: FIB4 SCORE: 1.39

## 2022-09-27 NOTE — PROGRESS NOTES
Chief Complaint   Patient presents with    Atrial Fibrillation     Fv Dx: PAF (paroxysmal atrial fibrillation)       Subjective     Martín Grijalva is a 67 y.o. male who presents today for procedural follow-up after ablation of symptomatic persistent atrial fibrillation by Dr. Rosas 8/1/2022.  procedure completed with pulmonary vein isolation, posterior wall isolation, successful ablation of CTI (noted to have a gap in prior CTI), ablation of AT from luz terminalis this.  Additionally noted to have frequent PACs and PVCs.    Prior ablation by Dr. Rosas 6/25/2021 with PVI, posterior wall with linear ablation, CTI.  Recently plan for redo ablation after noted to have A. fib with RVR again and decreased EF with admission for CHF exacerbation     He is followed by Dr. Rosas for EP and heart failure clinic, saw NATALIIA Hernandez yesterday.  Past additional medical history significant for heart failure with reduced ejection fraction, CAD with anterior MI in 2008 status post BMS to LAD with LV thrombus, hypertension, hyperlipidemia, JOSE with CPAP use.    Today in follow-up he is accompanied with his wife.  He reports that he is done well rhythm wise post ablation.  Now no evidence or symptoms of atrial fibrillation.  Reports that he has been following his fluid volume status closely and does not have any signs of volume overload at this time.      Past Medical History:   Diagnosis Date    Acute on chronic clinical systolic heart failure (HCC) 5/19/2022    Arrhythmia 2020    A fib    Arthritis     fingers    CAD (coronary artery disease) 7/2/2012    Chronic anticoagulation 7/2/2012    Congestive heart failure (HCC)     Dyslipidemia 7/2/2012    Essential hypertension, benign 7/2/2012    High cholesterol     Insomnia     wakes up 2-5 times a night , wakes up to use restroom and sometimes just wakes up     Ischemic cardiomyopathy 7/2/2012    Left ventricular thrombosis 7/2/2012    Myocardial infarct (HCC) 2006     Pericarditis 7/2/2012    Pneumonia     history of as a child    Sleep apnea     told by MD that he has it, scheduled for sleep study     Snoring     no sleep study done    SOB (shortness of breath)     no c/o today; on exertion; pt uses 2L o2 qhs only; pt does not know provider    Stented coronary artery 7/2/2012     Past Surgical History:   Procedure Laterality Date    SHOULDER DECOMPRESSION ARTHROSCOPIC Right 11/27/2018    Procedure: SHOULDER DECOMPRESSION ARTHROSCOPIC- SUBACROMIAL CONVERTED TO OPEN;  Surgeon: Ni Weir M.D.;  Location: SURGERY [facility];  Service: Orthopedics    SHOULDER ARTHROSCOPY W/ ROTATOR CUFF REPAIR Right 11/27/2018    Procedure: OPEN ROTATOR CUFF REPAIR;  Surgeon: Ni Weir M.D.;  Location: SURGERY [facility];  Service: Orthopedics    STENT PLACEMENT  2007    cardiac stent    CARDIAC CATH, LEFT HEART      CARPAL TUNNEL RELEASE Bilateral     OTHER      cardioversions x 2    VASECTOMY       Family History   Problem Relation Age of Onset    No Known Problems Mother     Other Father         abestos    Heart Disease Maternal Grandmother     No Known Problems Maternal Grandfather     No Known Problems Paternal Grandmother     Heart Disease Paternal Grandfather     No Known Problems Daughter     No Known Problems Son      Social History     Socioeconomic History    Marital status:      Spouse name: Not on file    Number of children: Not on file    Years of education: Not on file    Highest education level: Not on file   Occupational History    Not on file   Tobacco Use    Smoking status: Never    Smokeless tobacco: Former     Types: Chew   Vaping Use    Vaping Use: Never used   Substance and Sexual Activity    Alcohol use: Not Currently     Comment: occ    Drug use: Never    Sexual activity: Not on file   Other Topics Concern    Not on file   Social History Narrative    Not on file     Social Determinants of Health     Financial Resource Strain: Not on file    Food Insecurity: Not on file   Transportation Needs: Not on file   Physical Activity: Not on file   Stress: Not on file   Social Connections: Not on file   Intimate Partner Violence: Not on file   Housing Stability: Not on file     Allergies   Allergen Reactions    Ace Inhibitors Cough    Other Environmental      Hayfever     Outpatient Encounter Medications as of 9/27/2022   Medication Sig Dispense Refill    levalbuterol (XOPENEX HFA) 45 MCG/ACT inhaler INHALE 1 TO 2 PUFFS BY MOUTH EVERY 4 HOURS AS NEEDED FOR SHORTNESS OF BREATH (Patient not taking: Reported on 9/26/2022)      rivaroxaban (XARELTO) 20 MG Tab tablet Take 1 Tablet by mouth with dinner. 30 Tablet 0    FLUTICASONE PROPIONATE, NASAL, NA Administer  into affected nostril(S).      budesonide-formoterol (SYMBICORT) 80-4.5 MCG/ACT Aerosol Inhale 2 Puffs 2 times a day. Use spacer. Rinse mouth after each use. 3 Each 3    spironolactone (ALDACTONE) 25 MG Tab TAKE 1 TABLET BY MOUTH EVERY DAY 90 Tablet 3    dapagliflozin propanediol (FARXIGA) 10 MG Tab Take 1 Tablet by mouth every day. 30 Tablet 11    sacubitril-valsartan (ENTRESTO) 24-26 MG Tab tablet Take 1 Tablet by mouth 2 times a day. 60 Tablet 0    torsemide (DEMADEX) 20 MG Tab Take 1 Tablet by mouth 1 time a day as needed (for weight gain, increased shortness of breath or swelling). 90 Tablet 3    simvastatin (ZOCOR) 40 MG Tab Take 1 Tablet by mouth every day. 90 Tablet 3    carvedilol (COREG) 6.25 MG Tab TAKE 1 TABLET BY MOUTH TWICE DAILY WITH MEALS (Patient taking differently: Take 6.25 mg by mouth 2 times a day with meals.) 180 Tablet 3     No facility-administered encounter medications on file as of 9/27/2022.     Review of Systems   Constitutional:  Negative for chills, fever, malaise/fatigue and weight loss.   HENT:  Negative for nosebleeds.    Eyes:  Negative for blurred vision and double vision.   Respiratory:  Negative for cough, hemoptysis, sputum production, shortness of breath and wheezing.   "  Cardiovascular:  Negative for chest pain, palpitations, orthopnea, leg swelling and PND.   Gastrointestinal:  Negative for abdominal pain, blood in stool, diarrhea, heartburn, nausea and vomiting.   Skin:  Negative for rash.   Neurological:  Negative for dizziness, tingling, tremors, sensory change, speech change, focal weakness, loss of consciousness and headaches.            Objective     /68 (BP Location: Left arm, Patient Position: Sitting, BP Cuff Size: Adult)   Pulse 62   Resp 16   Ht 1.753 m (5' 9\")   Wt 103 kg (226 lb)   SpO2 97%   BMI 33.37 kg/m²     Physical Exam  Constitutional:       Appearance: Normal appearance. He is well-developed.   HENT:      Head: Normocephalic and atraumatic.      Mouth/Throat:      Mouth: Mucous membranes are moist.      Pharynx: Oropharynx is clear.   Eyes:      Extraocular Movements: Extraocular movements intact.      Conjunctiva/sclera: Conjunctivae normal.      Pupils: Pupils are equal, round, and reactive to light.   Neck:      Vascular: No JVD.   Cardiovascular:      Rate and Rhythm: Normal rate and regular rhythm.      Pulses: Normal pulses.      Heart sounds: Normal heart sounds. No murmur heard.    No friction rub. No gallop.   Pulmonary:      Effort: Pulmonary effort is normal.      Breath sounds: Normal breath sounds. No wheezing or rales.   Chest:      Chest wall: No tenderness.   Musculoskeletal:         General: Normal range of motion.      Cervical back: Normal range of motion and neck supple.   Skin:     General: Skin is warm and dry.      Capillary Refill: Capillary refill takes 2 to 3 seconds.   Neurological:      Mental Status: He is alert and oriented to person, place, and time.   Psychiatric:         Mood and Affect: Mood normal.         Behavior: Behavior normal.         Thought Content: Thought content normal.         Judgment: Judgment normal.              Assessment & Plan     1. PAF (paroxysmal atrial fibrillation) ([facility])  EKG      2. H/O " cardiac radiofrequency ablation 6/25/21 & 8/1/22 Dr Rosas        3. Chronic systolic heart failure (HCC)        4. Essential hypertension, benign        5. Chronic anticoagulation            Medical Decision Making: Today's Assessment/Status/Plan:   1.  Paroxysmal atrial fibrillation  2.  History of radiofrequency ablation  - Status post recent ablation 8/1/2022 by Dr. Rosas with PVI, PWI CTI (noted gap from prior ablation) and ablation of atrial tachycardia from the luz terminalis.  -Prior ablation in 2021 with PVI PWI and CTI.  Twelve-lead EKG today shows normal sinus rhythm.  He reports no known evidence of atrial fibrillation thus far.  -Currently not on pancho agents or antiarrhythmics.  He will continue Xarelto and carvedilol.    3.  Chronic systolic heart failure/ICM  -Prior recovery of his LV function, noted to be moderately depressed around 35% with persistent atrial fibrillation again.  -Last seen yesterday by Ladonna, on appropriate GDMT.  Plan for follow-up echo for reassessment of LV function in November.  -If LV remains significantly depressed 35% or less despite rhythm control and appropriate heart failure medical therapy consideration of primary prevention ICD, will follow-up after echo in regards to.    4.  Hypertension  - Blood pressures well controlled today.  Continue current medication regimen which includes heart failure GDMT.    5.  Anticoagulation  - [id number] score of 4.  Recommendation for long-term use of anticoagulation.       Return to EP clinic in December for review, sooner if clinical condition changes.

## 2022-10-21 ENCOUNTER — HOSPITAL ENCOUNTER (OUTPATIENT)
Dept: LAB | Facility: MEDICAL CENTER | Age: 67
End: 2022-10-21
Attending: NURSE PRACTITIONER
Payer: MEDICARE

## 2022-10-21 DIAGNOSIS — I50.9 HEART FAILURE, NYHA CLASS 2 (HCC): ICD-10-CM

## 2022-10-21 DIAGNOSIS — R06.02 SHORTNESS OF BREATH: ICD-10-CM

## 2022-10-21 DIAGNOSIS — Z79.899 HIGH RISK MEDICATION USE: ICD-10-CM

## 2022-10-21 DIAGNOSIS — I50.20 ACC/AHA STAGE C SYSTOLIC HEART FAILURE (HCC): ICD-10-CM

## 2022-10-21 LAB
ANION GAP SERPL CALC-SCNC: 13 MMOL/L (ref 7–16)
BUN SERPL-MCNC: 27 MG/DL (ref 8–22)
CALCIUM SERPL-MCNC: 9.1 MG/DL (ref 8.5–10.5)
CHLORIDE SERPL-SCNC: 101 MMOL/L (ref 96–112)
CO2 SERPL-SCNC: 24 MMOL/L (ref 20–33)
CREAT SERPL-MCNC: 1 MG/DL (ref 0.5–1.4)
GFR SERPLBLD CREATININE-BSD FMLA CKD-EPI: 82 ML/MIN/1.73 M 2
GLUCOSE SERPL-MCNC: 121 MG/DL (ref 65–99)
NT-PROBNP SERPL IA-MCNC: 957 PG/ML (ref 0–125)
POTASSIUM SERPL-SCNC: 4.4 MMOL/L (ref 3.6–5.5)
SODIUM SERPL-SCNC: 138 MMOL/L (ref 135–145)

## 2022-10-21 PROCEDURE — 36415 COLL VENOUS BLD VENIPUNCTURE: CPT

## 2022-10-21 PROCEDURE — 80048 BASIC METABOLIC PNL TOTAL CA: CPT

## 2022-10-21 PROCEDURE — 83880 ASSAY OF NATRIURETIC PEPTIDE: CPT

## 2022-10-25 LAB — EKG IMPRESSION: NORMAL

## 2022-10-27 ENCOUNTER — APPOINTMENT (OUTPATIENT)
Dept: SLEEP MEDICINE | Facility: MEDICAL CENTER | Age: 67
End: 2022-10-27
Payer: MEDICARE

## 2022-11-02 ENCOUNTER — PATIENT MESSAGE (OUTPATIENT)
Dept: HEALTH INFORMATION MANAGEMENT | Facility: OTHER | Age: 67
End: 2022-11-02

## 2022-11-03 ENCOUNTER — HOSPITAL ENCOUNTER (OUTPATIENT)
Dept: CARDIOLOGY | Facility: MEDICAL CENTER | Age: 67
End: 2022-11-03
Attending: INTERNAL MEDICINE
Payer: MEDICARE

## 2022-11-03 DIAGNOSIS — I25.5 ISCHEMIC CARDIOMYOPATHY: ICD-10-CM

## 2022-11-03 PROCEDURE — 700117 HCHG RX CONTRAST REV CODE 255: Performed by: INTERNAL MEDICINE

## 2022-11-03 PROCEDURE — 93306 TTE W/DOPPLER COMPLETE: CPT

## 2022-11-03 RX ADMIN — HUMAN ALBUMIN MICROSPHERES AND PERFLUTREN 3 ML: 10; .22 INJECTION, SOLUTION INTRAVENOUS at 09:10

## 2022-11-04 LAB
LV EJECT FRACT  99904: 55
LV EJECT FRACT MOD 2C 99903: 39.88
LV EJECT FRACT MOD 4C 99902: 49.16
LV EJECT FRACT MOD BP 99901: 44.91

## 2022-11-04 PROCEDURE — 93306 TTE W/DOPPLER COMPLETE: CPT | Mod: 26 | Performed by: INTERNAL MEDICINE

## 2022-11-09 ENCOUNTER — OFFICE VISIT (OUTPATIENT)
Dept: SLEEP MEDICINE | Facility: MEDICAL CENTER | Age: 67
End: 2022-11-09
Payer: MEDICARE

## 2022-11-09 VITALS
BODY MASS INDEX: 33.33 KG/M2 | RESPIRATION RATE: 16 BRPM | WEIGHT: 225 LBS | DIASTOLIC BLOOD PRESSURE: 70 MMHG | OXYGEN SATURATION: 98 % | SYSTOLIC BLOOD PRESSURE: 118 MMHG | HEART RATE: 69 BPM | HEIGHT: 69 IN

## 2022-11-09 DIAGNOSIS — Z78.9 NONSMOKER: ICD-10-CM

## 2022-11-09 DIAGNOSIS — G47.33 OBSTRUCTIVE SLEEP APNEA: ICD-10-CM

## 2022-11-09 DIAGNOSIS — J45.20 MILD INTERMITTENT ASTHMA, UNSPECIFIED WHETHER COMPLICATED: ICD-10-CM

## 2022-11-09 DIAGNOSIS — Z23 NEED FOR VACCINATION: ICD-10-CM

## 2022-11-09 DIAGNOSIS — I10 ESSENTIAL HYPERTENSION, BENIGN: Chronic | ICD-10-CM

## 2022-11-09 DIAGNOSIS — J45.20 MILD INTERMITTENT ASTHMA WITHOUT COMPLICATION: ICD-10-CM

## 2022-11-09 DIAGNOSIS — Z87.891 FORMER SMOKELESS TOBACCO USE: ICD-10-CM

## 2022-11-09 PROCEDURE — 99213 OFFICE O/P EST LOW 20 MIN: CPT | Mod: 25 | Performed by: NURSE PRACTITIONER

## 2022-11-09 PROCEDURE — G0009 ADMIN PNEUMOCOCCAL VACCINE: HCPCS | Performed by: NURSE PRACTITIONER

## 2022-11-09 PROCEDURE — 90677 PCV20 VACCINE IM: CPT | Performed by: NURSE PRACTITIONER

## 2022-11-09 ASSESSMENT — FIBROSIS 4 INDEX: FIB4 SCORE: 1.39

## 2022-11-09 NOTE — PATIENT INSTRUCTIONS
Complete overnight oximetry test using CPAP through DME:  [facility] / ph [phone] / fax [phone]  Call to schedule test and then call and leave voicemail once test complete for results    Try Neilmed sinus rinse 1-2x's a day to clear post nasal drip or flonase 1 squirt to each nostrils 1-2x's per day

## 2022-11-09 NOTE — PROGRESS NOTES
Chief Complaint   Patient presents with    Follow-Up     last seen 7/27/2022       HPI:  Zenon Grijalva is a 67 y.o. year old male here today for follow-up on asthma and JOSE.  History of CHF with dyspnea on exertion  Last OV 7/27/22     MMRC Grade: 0-1  Exacerbations this year: 0    Since last office visit patient was started on Symbicort 80 mcg 2 puffs twice daily and ALVARO as needed.  He notes using Symbicort mainly only in the a.m.  We reviewed appropriate use.  He does feel his breathing is stable possibly improved.  Main complaint is ongoing sinus drainage with occasional cough but no chest pain, chest tightness or wheezing.  He underwent ablation of his heart and is remained in normal rhythm.  He notes no issues.    He remains on CPAP 7 cm; Respironics device obtained 2021.  He also has a ResMed travel device now that he recently used were going to [place].  Compliance report 2/9/2022 through 11/7/2022 indicates 93.3% compliance, average nightly use 9 hours 4 minutes, minimal mask leak with a reduced AHI of 2.3/h.  Reviewed in detail with patient.  He feels he sleeps well on therapy and has no complaints.  Since his ablation has been completed recommend overnight oximetry on therapy.    PULM & SLEEP HX:  Echo 11/9/2021 indicated LVEF improvement from 25 to 45%.  RVSP 44 mmHg with a dilated inferior vena cava with inspiratory collapse.     PFT December 2020 noted mild to moderate obstructive lung disease with FEV1 2.1 L or 67%, FEV1/FVC ratio 72, FVC 2.93 L or 71%, %, TLC 91% and DLCO 124% predicted.  Patient did have a mild bronchodilator response.  He did undergo a trial of ICS/LABA but found no subjective benefit.    Spirometry 7/27/2022 notes obstruction characteristic of asthma with FVC 3.29 L 60%, FEV1 2.41 L or 59%, FEV1/FVC ratio 73.  No significant bronchodilator response.    Imaging with chest x-ray 5/27/2022 noted no acute cardiopulmonary process but again identified areas of calcific  pleural plaquing.  Patient notes a history of being a  and his father also had findings of asbestosis of which she  from.  No prior CT imaging on chart.     He had screening overnight oximetry showi ng desaturations for 100 minutes ninfa of 75% and was started on nighttime oxygen.  PSG  showed moderate JOSE with AHI:23/h and desaturations to 81% Sp02.  He was successfully titrated on CPAP: 7 cm of water with resolved JOSE.       ROS: As per HPI and otherwise negative if not stated.    Past Medical History:   Diagnosis Date    Acute on chronic clinical systolic heart failure (HCC) 2022    Arrhythmia     A fib    Arthritis     fingers    CAD (coronary artery disease) 2012    Chronic anticoagulation 2012    Congestive heart failure (HCC)     Dyslipidemia 2012    Essential hypertension, benign 2012    High cholesterol     Insomnia     wakes up 2-5 times a night , wakes up to use restroom and sometimes just wakes up     Ischemic cardiomyopathy 2012    Left ventricular thrombosis 2012    Myocardial infarct ([facility]) 2006    Pericarditis 2012    Pneumonia     history of as a child    Sleep apnea     told by MD that he has it, scheduled for sleep study     Snoring     no sleep study done    SOB (shortness of breath)     no c/o today; on exertion; pt uses 2L o2 qhs only; pt does not know provider    Stented coronary artery 2012       Past Surgical History:   Procedure Laterality Date    SHOULDER DECOMPRESSION ARTHROSCOPIC Right 2018    Procedure: SHOULDER DECOMPRESSION ARTHROSCOPIC- SUBACROMIAL CONVERTED TO OPEN;  Surgeon: Ni Weir M.D.;  Location: SURGERY [facility];  Service: Orthopedics    SHOULDER ARTHROSCOPY W/ ROTATOR CUFF REPAIR Right 2018    Procedure: OPEN ROTATOR CUFF REPAIR;  Surgeon: Ni Weir M.D.;  Location: SURGERY [facility];  Service: Orthopedics    STENT PLACEMENT      cardiac stent    CARDIAC CATH, LEFT  "HEART      CARPAL TUNNEL RELEASE Bilateral     OTHER      cardioversions x 2    VASECTOMY         Family History   Problem Relation Age of Onset    No Known Problems Mother     Other Father         abestos    Heart Disease Maternal Grandmother     No Known Problems Maternal Grandfather     No Known Problems Paternal Grandmother     Heart Disease Paternal Grandfather     No Known Problems Daughter     No Known Problems Son        Social History     Socioeconomic History    Marital status:      Spouse name: Not on file    Number of children: Not on file    Years of education: Not on file    Highest education level: Not on file   Occupational History    Not on file   Tobacco Use    Smoking status: Never    Smokeless tobacco: Former     Types: Chew   Vaping Use    Vaping Use: Never used   Substance and Sexual Activity    Alcohol use: Not Currently     Comment: occ    Drug use: Never    Sexual activity: Not on file   Other Topics Concern    Not on file   Social History Narrative    Not on file     Social Determinants of Health     Financial Resource Strain: Not on file   Food Insecurity: Not on file   Transportation Needs: Not on file   Physical Activity: Not on file   Stress: Not on file   Social Connections: Not on file   Intimate Partner Violence: Not on file   Housing Stability: Not on file       Allergies as of 11/09/2022 - Reviewed 11/09/2022   Allergen Reaction Noted    Ace inhibitors Cough 05/27/2022    Other environmental  07/06/2021        Vitals:  /70 (BP Location: Left arm, Patient Position: Sitting, BP Cuff Size: Adult)   Pulse 69   Resp 16   Ht 1.753 m (5' 9\")   Wt 102 kg (225 lb)   SpO2 98%     Current medications as of today   Current Outpatient Medications   Medication Sig Dispense Refill    levalbuterol (XOPENEX HFA) 45 MCG/ACT inhaler INHALE 1 TO 2 PUFFS BY MOUTH EVERY 4 HOURS AS NEEDED FOR SHORTNESS OF BREATH      rivaroxaban (XARELTO) 20 MG Tab tablet Take 1 Tablet by mouth with " dinner. 30 Tablet 0    FLUTICASONE PROPIONATE, NASAL, NA Administer  into affected nostril(S).      budesonide-formoterol (SYMBICORT) 80-4.5 MCG/ACT Aerosol Inhale 2 Puffs 2 times a day. Use spacer. Rinse mouth after each use. 3 Each 3    spironolactone (ALDACTONE) 25 MG Tab TAKE 1 TABLET BY MOUTH EVERY DAY 90 Tablet 3    dapagliflozin propanediol (FARXIGA) 10 MG Tab Take 1 Tablet by mouth every day. 30 Tablet 11    sacubitril-valsartan (ENTRESTO) 24-26 MG Tab tablet Take 1 Tablet by mouth 2 times a day. 60 Tablet 0    torsemide (DEMADEX) 20 MG Tab Take 1 Tablet by mouth 1 time a day as needed (for weight gain, increased shortness of breath or swelling). 90 Tablet 3    simvastatin (ZOCOR) 40 MG Tab Take 1 Tablet by mouth every day. 90 Tablet 3    carvedilol (COREG) 6.25 MG Tab TAKE 1 TABLET BY MOUTH TWICE DAILY WITH MEALS (Patient taking differently: Take 1 Tablet by mouth 2 times a day with meals.) 180 Tablet 3     No current facility-administered medications for this visit.         Physical Exam:   Gen:           Alert and oriented, No apparent distress. Mood and affect appropriate, normal interaction with examiner.  Eyes:          PERRL, EOM intact, sclere white, conjunctive moist.  Ears:          Not examined.   Hearing:     Grossly intact.  Nose:          Normal, no lesions or deformities.  Dentition:    Mask.  Oropharynx:   Mask.  Mallampati Classification: mask  Neck:        Supple, trachea midline, no masses.  Respiratory Effort: No intercostal retractions or use of accessory muscles.   Lung Auscultation:      Clear to auscultation bilaterally; no rales, rhonchi or wheezing.  CV:            Regular rate and rhythm. No murmurs, rubs or gallops.  Abd:           Not examined.   Lymphadenopathy: Not examined.  Gait and Station: Normal.  Digits and Nails: No clubbing, cyanosis, petechiae, or nodes.   Cranial Nerves: II-XII grossly intact.  Skin:        No rashes, lesions or ulcers noted.               Ext:            No cyanosis or edema.      Assessment:  1. Mild intermittent asthma without complication        2. Obstructive sleep apnea  Overnight Oximetry      3. Mild intermittent asthma, unspecified whether complicated  PULMONARY FUNCTION TESTS -Test requested: Complete Pulmonary Function Test; Include MIPS/MEPS? No      4. Essential hypertension, benign        5. Need for vaccination  Pneumococcal Conjugate Vaccine 20-Valent (19 yrs+)      6. BMI 33.0-33.9,adult  HEIGHT AND WEIGHT      7. Nonsmoker        8. Former smokeless tobacco use            Immunizations:    Flu:declines  Pneumovax 23:not dude  Prevnar 13:not due  PCV 20: 11/9/22  COVID-19: 9/27/22, 7/7/22    Plan:  Asthma is stable and possibly improved using Symbicort.  Reviewed proper use and recommend using Symbicort 2 puffs twice daily and ALVARO as needed.  Prevnar 20 given in office today  JOSE is well controlled on CPAP therapy.  He will continue use on a nightly basis.  Recommend updating overnight oximetry to verify nocturnal hypoxia is not an issue.  CNOX through DME now; patient will leave voicemail for results.  Pending results make empirical pressure adjustment versus pursuing sleep study if supplemental O2 is needed.  Call results to patient.  For postnasal drip recommend starting NeilMed sinus rinse 1-2 times daily and Flonase 1 squirt each nostril twice daily as needed as well.  Follow-up with primary care for other health concerns  Encourage weight loss through healthy eating and regular activity  Follow-up in 6 months with updated PFT, sooner if needed.    Please note that this dictation was created using voice recognition software. I have made every reasonable attempt to correct obvious errors, but it is possible there are errors of grammar and possibly content that I did not discover before finalizing the note.

## 2022-11-29 ENCOUNTER — OFFICE VISIT (OUTPATIENT)
Dept: CARDIOLOGY | Facility: MEDICAL CENTER | Age: 67
End: 2022-11-29
Payer: MEDICARE

## 2022-11-29 VITALS
SYSTOLIC BLOOD PRESSURE: 110 MMHG | HEART RATE: 62 BPM | DIASTOLIC BLOOD PRESSURE: 70 MMHG | OXYGEN SATURATION: 96 % | BODY MASS INDEX: 33.62 KG/M2 | RESPIRATION RATE: 14 BRPM | HEIGHT: 69 IN | WEIGHT: 227 LBS

## 2022-11-29 VITALS
WEIGHT: 227 LBS | RESPIRATION RATE: 15 BRPM | BODY MASS INDEX: 33.62 KG/M2 | SYSTOLIC BLOOD PRESSURE: 100 MMHG | OXYGEN SATURATION: 96 % | HEART RATE: 70 BPM | DIASTOLIC BLOOD PRESSURE: 60 MMHG | HEIGHT: 69 IN

## 2022-11-29 DIAGNOSIS — Z98.890 H/O CARDIAC RADIOFREQUENCY ABLATION: ICD-10-CM

## 2022-11-29 DIAGNOSIS — I10 ESSENTIAL HYPERTENSION: ICD-10-CM

## 2022-11-29 DIAGNOSIS — I48.0 PAF (PAROXYSMAL ATRIAL FIBRILLATION) (HCC): ICD-10-CM

## 2022-11-29 DIAGNOSIS — Z95.5 STENTED CORONARY ARTERY: ICD-10-CM

## 2022-11-29 DIAGNOSIS — Z79.899 HIGH RISK MEDICATION USE: ICD-10-CM

## 2022-11-29 DIAGNOSIS — G47.33 OBSTRUCTIVE SLEEP APNEA SYNDROME: ICD-10-CM

## 2022-11-29 DIAGNOSIS — I25.5 ISCHEMIC CARDIOMYOPATHY: ICD-10-CM

## 2022-11-29 DIAGNOSIS — I50.9 HEART FAILURE, NYHA CLASS 2 (HCC): ICD-10-CM

## 2022-11-29 DIAGNOSIS — I25.10 CORONARY ARTERY DISEASE INVOLVING NATIVE CORONARY ARTERY OF NATIVE HEART WITHOUT ANGINA PECTORIS: ICD-10-CM

## 2022-11-29 DIAGNOSIS — I48.91 HYPERCOAGULABLE STATE DUE TO ATRIAL FIBRILLATION (HCC): ICD-10-CM

## 2022-11-29 DIAGNOSIS — D68.69 HYPERCOAGULABLE STATE DUE TO ATRIAL FIBRILLATION (HCC): ICD-10-CM

## 2022-11-29 DIAGNOSIS — I50.20 ACC/AHA STAGE C SYSTOLIC HEART FAILURE (HCC): ICD-10-CM

## 2022-11-29 PROCEDURE — 99214 OFFICE O/P EST MOD 30 MIN: CPT | Performed by: NURSE PRACTITIONER

## 2022-11-29 PROCEDURE — 93000 ELECTROCARDIOGRAM COMPLETE: CPT | Performed by: INTERNAL MEDICINE

## 2022-11-29 PROCEDURE — 99214 OFFICE O/P EST MOD 30 MIN: CPT | Mod: 25 | Performed by: INTERNAL MEDICINE

## 2022-11-29 RX ORDER — SACUBITRIL AND VALSARTAN 24; 26 MG/1; MG/1
1 TABLET, FILM COATED ORAL 2 TIMES DAILY
Qty: 60 TABLET | Refills: 0 | Status: SHIPPED | OUTPATIENT
Start: 2022-11-29 | End: 2023-07-06 | Stop reason: SDUPTHER

## 2022-11-29 RX ORDER — DAPAGLIFLOZIN 10 MG/1
10 TABLET, FILM COATED ORAL DAILY
Qty: 30 TABLET | Refills: 0 | Status: SHIPPED | OUTPATIENT
Start: 2022-11-29 | End: 2023-06-08

## 2022-11-29 ASSESSMENT — ENCOUNTER SYMPTOMS
FEVER: 0
COUGH: 0
ABDOMINAL PAIN: 0
SHORTNESS OF BREATH: 0
DIZZINESS: 0
MYALGIAS: 0
PALPITATIONS: 0
CLAUDICATION: 0
PND: 0
ORTHOPNEA: 0

## 2022-11-29 ASSESSMENT — FIBROSIS 4 INDEX
FIB4 SCORE: 1.39
FIB4 SCORE: 1.39

## 2022-11-29 NOTE — PROGRESS NOTES
Chief Complaint   Patient presents with    Congestive Heart Failure     F/V DX: ACC/AHA stage C systolic heart failure (HCC)     Atrial Fibrillation     F/V DX: PAF (paroxysmal atrial fibrillation) (Formerly [facility])        Subjective     Martín Grijalva is a 67 y.o. male who presents today for follow up on his heart failure.     Patient of Dr. Walters and current patient of EP and the heart failure clinic.  He was last seen in HF clinic on 9/26/2022.  During his last visit, he was recommended to stop potassium and get labs done prior to his visit. He was scheduled for repeat echo.     Patient did see EP today, and his echocardiogram results were reviewed.  His EF did improve to 50 to 55%.  Patient will continue on OAC and will likely switch to generic in the new year when available.    Patient feels well, denies chest pain, shortness of breath, palpitations, dizziness/lightheadedness, orthopnea, PND or Edema.      He continues to use his diuretic as needed.    His Home weights are ranging between 218-219 lbs.     Patient did get his replacement CPAP and has been using regularly.    Patient continues to report concerns over cost of Farxiga and Entresto and is asking for samples.    Additonally, patient has the following medical problems:    -hospitalization from 5/19/2022 through 5/22/2022.  Patient presented with congestion and shortness of breath.  Patient was found to have pulmonary edema with diffuse crackles and respiratory difficulties.  He was aggressively diuresed.  Repeat echo was performed which showed a LVEF down to 35%.    -Atrial fibrillation, s/p ablation on 6/25/2021, on Xarelto    -CAD, history of anterior MI in 2008, BMS to the LAD, LV thrombus    -JOSE, has CPAP    -Hypertension    Past Medical History:   Diagnosis Date    Acute on chronic clinical systolic heart failure (HCC) 5/19/2022    Arrhythmia 2020    A fib    Arthritis     fingers    CAD (coronary artery disease) 7/2/2012    Chronic  anticoagulation 7/2/2012    Congestive heart failure (HCC)     Dyslipidemia 7/2/2012    Essential hypertension, benign 7/2/2012    High cholesterol     Insomnia     wakes up 2-5 times a night , wakes up to use restroom and sometimes just wakes up     Ischemic cardiomyopathy 7/2/2012    Left ventricular thrombosis 7/2/2012    Myocardial infarct (HCC) 2006    Pericarditis 7/2/2012    Pneumonia     history of as a child    Sleep apnea     told by MD that he has it, scheduled for sleep study     Snoring     no sleep study done    SOB (shortness of breath)     no c/o today; on exertion; pt uses 2L o2 qhs only; pt does not know provider    Stented coronary artery 7/2/2012     Past Surgical History:   Procedure Laterality Date    SHOULDER DECOMPRESSION ARTHROSCOPIC Right 11/27/2018    Procedure: SHOULDER DECOMPRESSION ARTHROSCOPIC- SUBACROMIAL CONVERTED TO OPEN;  Surgeon: Ni Weir M.D.;  Location: SURGERY [facility];  Service: Orthopedics    SHOULDER ARTHROSCOPY W/ ROTATOR CUFF REPAIR Right 11/27/2018    Procedure: OPEN ROTATOR CUFF REPAIR;  Surgeon: Ni Weir M.D.;  Location: SURGERY [facility];  Service: Orthopedics    STENT PLACEMENT  2007    cardiac stent    CARDIAC CATH, LEFT HEART      CARPAL TUNNEL RELEASE Bilateral     OTHER      cardioversions x 2    VASECTOMY       Family History   Problem Relation Age of Onset    No Known Problems Mother     Other Father         abestos    Heart Disease Maternal Grandmother     No Known Problems Maternal Grandfather     No Known Problems Paternal Grandmother     Heart Disease Paternal Grandfather     No Known Problems Daughter     No Known Problems Son      Social History     Socioeconomic History    Marital status:      Spouse name: Not on file    Number of children: Not on file    Years of education: Not on file    Highest education level: Not on file   Occupational History    Not on file   Tobacco Use    Smoking status: Never    Smokeless  tobacco: Former     Types: Chew   Vaping Use    Vaping Use: Never used   Substance and Sexual Activity    Alcohol use: Not Currently     Comment: occ    Drug use: Never    Sexual activity: Not on file   Other Topics Concern    Not on file   Social History Narrative    Not on file     Social Determinants of Health     Financial Resource Strain: Not on file   Food Insecurity: Not on file   Transportation Needs: Not on file   Physical Activity: Not on file   Stress: Not on file   Social Connections: Not on file   Intimate Partner Violence: Not on file   Housing Stability: Not on file     Allergies   Allergen Reactions    Ace Inhibitors Cough    Other Environmental      Hayfever     Outpatient Encounter Medications as of 11/29/2022   Medication Sig Dispense Refill    sacubitril-valsartan (ENTRESTO) 24-26 MG Tab Take 1 Tablet by mouth 2 times a day. 60 Tablet 0    dapagliflozin propanediol (FARXIGA) 10 MG Tab Take 1 Tablet by mouth every day. 30 Tablet 0    levalbuterol (XOPENEX HFA) 45 MCG/ACT inhaler INHALE 1 TO 2 PUFFS BY MOUTH EVERY 4 HOURS AS NEEDED FOR SHORTNESS OF BREATH      rivaroxaban (XARELTO) 20 MG Tab tablet Take 1 Tablet by mouth with dinner. 30 Tablet 0    FLUTICASONE PROPIONATE, NASAL, NA Administer  into affected nostril(S).      budesonide-formoterol (SYMBICORT) 80-4.5 MCG/ACT Aerosol Inhale 2 Puffs 2 times a day. Use spacer. Rinse mouth after each use. 3 Each 3    spironolactone (ALDACTONE) 25 MG Tab TAKE 1 TABLET BY MOUTH EVERY DAY 90 Tablet 3    dapagliflozin propanediol (FARXIGA) 10 MG Tab Take 1 Tablet by mouth every day. 30 Tablet 11    torsemide (DEMADEX) 20 MG Tab Take 1 Tablet by mouth 1 time a day as needed (for weight gain, increased shortness of breath or swelling). 90 Tablet 3    simvastatin (ZOCOR) 40 MG Tab Take 1 Tablet by mouth every day. 90 Tablet 3    carvedilol (COREG) 6.25 MG Tab TAKE 1 TABLET BY MOUTH TWICE DAILY WITH MEALS (Patient taking differently: Take 6.25 mg by mouth 2 times  "a day with meals.) 180 Tablet 3    [DISCONTINUED] sacubitril-valsartan (ENTRESTO) 24-26 MG Tab tablet Take 1 Tablet by mouth 2 times a day. 60 Tablet 0     No facility-administered encounter medications on file as of 11/29/2022.     Review of Systems   Constitutional:  Negative for fever and malaise/fatigue.   Respiratory:  Negative for cough and shortness of breath.    Cardiovascular:  Negative for chest pain, palpitations, orthopnea, claudication, leg swelling and PND.   Gastrointestinal:  Negative for abdominal pain.   Musculoskeletal:  Negative for myalgias.   Neurological:  Negative for dizziness.   All other systems reviewed and are negative.           Objective     /60 (BP Location: Left arm, Patient Position: Sitting, BP Cuff Size: Adult)   Pulse 70   Resp 15   Ht 1.753 m (5' 9\")   Wt 103 kg (227 lb)   SpO2 96%   BMI 33.52 kg/m²     Physical Exam  Vitals reviewed.   Constitutional:       Appearance: He is well-developed. He is obese.   HENT:      Head: Normocephalic and atraumatic.   Eyes:      Pupils: Pupils are equal, round, and reactive to light.   Neck:      Vascular: No JVD.   Cardiovascular:      Rate and Rhythm: Normal rate and regular rhythm.      Heart sounds: Normal heart sounds.   Pulmonary:      Effort: Pulmonary effort is normal. No respiratory distress.      Breath sounds: Normal breath sounds. No wheezing or rales.   Abdominal:      General: Bowel sounds are normal.      Palpations: Abdomen is soft.   Musculoskeletal:         General: Normal range of motion.      Cervical back: Normal range of motion and neck supple.      Right lower leg: No edema.      Left lower leg: No edema.   Skin:     General: Skin is warm and dry.   Neurological:      General: No focal deficit present.      Mental Status: He is alert and oriented to person, place, and time.   Psychiatric:         Behavior: Behavior normal.     Lab Results   Component Value Date/Time    CHOLSTRLTOT 123 08/11/2022 10:53 AM    " LDL 66 08/11/2022 10:53 AM    HDL 42 08/11/2022 10:53 AM    TRIGLYCERIDE 75 08/11/2022 10:53 AM       Lab Results   Component Value Date/Time    SODIUM 138 10/21/2022 06:31 AM    POTASSIUM 4.4 10/21/2022 06:31 AM    CHLORIDE 101 10/21/2022 06:31 AM    CO2 24 10/21/2022 06:31 AM    GLUCOSE 121 (H) 10/21/2022 06:31 AM    BUN 27 (H) 10/21/2022 06:31 AM    CREATININE 1.00 10/21/2022 06:31 AM    CREATININE 0.77 07/09/2012 07:21 AM    BUNCREATRAT 22 02/18/2021 09:32 AM    BUNCREATRAT 14 07/09/2012 07:21 AM     Lab Results   Component Value Date/Time    ALKPHOSPHAT 77 07/28/2022 07:37 AM    ASTSGOT 17 07/28/2022 07:37 AM    ALTSGPT 18 07/28/2022 07:37 AM    TBILIRUBIN 1.7 (H) 07/28/2022 07:37 AM        Transthoracic Echo Report 4/20/2017  No prior study is available for comparison.   Technically difficult study .   Mildly reduced left ventricular systolic function.  Left ventricular ejection fraction is visually estimated to be 50%.  No significant valve abnormalities.      Myocardial Perfusion Report 6/7/2019   NUCLEAR IMAGING INTERPRETATION   Abnormal Lexiscan myocardial perfusion study.   Large distal anterior and apical infarct.   No significant ischemia.   Mildly reduced LVSF with EF of 47%. Mid to distal anterior wall hypokinesis.   No ischemic changes with Regadenoson.   No chest pain.   ECG INTERPRETATION   Negative stress ECG for ischemia.     Transthoracic Echo Report 8/10/2020  Compared to the images of the study done 04- there has been a   reduciton isn septal contractility. The patient is also now in atrial   fib.   Moderately reduced left ventricular systolic function.  Left ventricular ejection fraction is visually estimated to be 40%.No   pericardial effusion seen.  Estimated right ventricular systolic pressure is  22 mmHg.     Thrombus is not observed in the left ventricular apex.  Diastolic function is difficult to assess with atrial fibrillation.  Akinesis of apex, septum and anterior  wall.  Mildly dilated right ventricle.  Enlarged right atrium.  Mildly dilated left atrium.  Structurally normal mitral valve without significant stenosis or   regurgitation.  Structurally normal aortic valve without significant stenosis or   regurgitation.     Transesophageal Echo Report 9/24/2020  No thrombus detected in the left atrial appendage.  Mildly reduced left ventricular systolic function.     Transthoracic Echo Report 4/19/2021  Compared to the images of the study done on 08/10/20 there has been a   slight reduction isn the EF  Contrast was used to enhance visualization of the endocardial border.  Left ventricular ejection fraction is visually estimated to be 35%.  Global hypokinesis with regional variation.  Diastolic function is difficult to assess with atrial fibrillation.  Modertely enlarged right ventricle.  Enlarged right atrium.  Severely dilated left atrium.  Trace mitral regurgitation.  Structurally normal aortic valve without significant stenosis or   regurgitation.  Estimated right ventricular systolic pressure  is 35 mmHg.  Normal pericardium without effusion.  .   Transesophageal Echo Report 6/25/2021  LVEF is severely depressed.  No evidence of ARCADIO thrombus.  Mild MR  No evidence of ASD or PFO     Transthoracic Echo Report 7/7/2021  Limited Echocardiogram:  No pericardial effusion seen.  Severely reduced left ventricular systolic function.  Left ventricular ejection fraction is visually estimated to be 25%.  Dilated right ventricle.     Compared to the images of the prior study done  6/25/2021: There has   been no significant change.      Transthoracic Echo Report 11/9/2021  Compared to prior echo on 07/07/2021 - LVEF has improved from 25% to   45%.  Normal left ventricular chamber size.  The left ventricular ejection fraction is visually estimated to be 45%.  Mild tricuspid regurgitation.  Right ventricular systolic pressure is estimated to be 44 mmHg.  Dilated inferior vena cava with  inspiratory collapse.    Transthoracic Echo Report 5/19/2022  Fair quality study, improved with contrast.  The left ventricular ejection fraction is visually estimated to be 35%, moderately reduced.  Hypokinesis of the basal to mid anterior septum, basal inferior septum and entire apex.  Right ventricular systolic pressure is estimated to be 35 mmHg.     Compared to the prior echo on 11/09/2021, EF is further declined, wall motion abnormalities are now present.    Transthoracic Echo Report 11/3/2022-results reviewed  Compared to the prior study on 05/19/2022 - the LVEF has improved to near normal, previously 35%.   Mild concentric left ventricular hypertrophy.  The left ventricular ejection fraction is visually estimated to be 50-  55%.  The right ventricle is dilated.  Normal right ventricular systolic function.  Mild to moderate mitral regurgitation.  Dilated inferior vena cava with inspiratory collapse.  Estimated right ventricular systolic pressure is 43 mmHg.     6MWT MLWHF   5/27/2022  286 m walked 6 min  95                           Assessment & Plan     1. PAF (paroxysmal atrial fibrillation) ([facility])  Comp Metabolic Panel    Lipid Profile    CBC WITH DIFFERENTIAL      2. Ischemic cardiomyopathy  Comp Metabolic Panel    Lipid Profile    CBC WITH DIFFERENTIAL      3. Stented coronary artery  Comp Metabolic Panel    Lipid Profile    CBC WITH DIFFERENTIAL      4. H/O cardiac radiofrequency ablation  Comp Metabolic Panel    Lipid Profile    CBC WITH DIFFERENTIAL      5. ACC/AHA stage C systolic heart failure (HCC)  Comp Metabolic Panel    Lipid Profile    CBC WITH DIFFERENTIAL      6. High risk medication use  Comp Metabolic Panel    Lipid Profile    CBC WITH DIFFERENTIAL      7. Heart failure, NYHA class 2 ([facility])        8. Hypercoagulable state due to atrial fibrillation ([facility])        9. Obstructive sleep apnea syndrome        10. Coronary artery disease involving native coronary artery of native heart without  angina pectoris        11. Essential hypertension            Medical Decision Making: Today's Assessment/Status/Plan:        HFrEF, Stage C, Class 1-2, LVEF 50-55%, as low as 25%: Based on physical examination findings, patient is euvolemic. No JVD, lungs are clear to auscultation, no pitting edema in bilateral lower extremities, no ascites.  -Heart failure due to likely mixed between atrial fibrillation and ischemic cardiomyopathy  -Patient reports he does not qualify for patient assistance on Farxiga or Entresto.  We will attempt to send sample prescription for Entresto and Farxiga to local pharmacy.  Patient to find out if there are samples available  -Recommend patient to continue GDMT  -ACE-I/ARB/ARNI: Continue Entresto 24-26 mg twice a day  -Evidence Based Beta-blocker: Continue carvedilol 6.25 mg twice a day  -Aldosterone Antagonist: continue spironolactone 25 mg daily  -Diuretic: Continue torsemide 20 mg daily as needed for weight gain, increased shortness of breath or swelling  -SGLT2 inhibitor: Continue Farxiga 10 mg daily  -Labs: CBC, CMP and lipid panel due in 6 months  -No indication for ICD as LVEF greater than 35%  -Reinforced s/sx of worsening heart failure with patient and weight monitoring. Pt verbalizes understanding. Pt to call office or RTC if present.    -PUMP line number [phone] (PUMP) reviewed with patient  -Heart Failure Education: pt to be contacted by HF nurse for further education   -Advanced care planning: Advanced directive and POLST to be discussed at future visit  -Patient referred over to the pharmacotherapy program for further optimization of medical therapy     Atrial fibrillation:  -s/p ablation 6/25/2021, Repeat ablation on 8/1/2022  -Continue Xarelto 20 mg daily, plan to switch patient to give due to Dabigatran when generic    CAD, history of BMS stent to LAD in 2008:  -Continue simvastatin 40 mg daily  -Currently on Xarelto  -CMP and lipid panel in 6 months    Sleep  apnea:  -Continue CPAP use  -Followed by sleep medicine    Hypertension: Stable, borderline low  -Recommendation per above  -Will follow    Patient to follow-up in the heart failure clinic in 6 months.  If stable at that time, will have patient follow-up with primary cardiologist.  Patient can contact our office sooner if needed with problems.     Patient verbalizes understanding and agrees with the plan of care.     PLEASE NOTE: This Note was created using voice recognition Software. I have made every reasonable attempt to correct obvious errors, but I expect that there are errors of grammar and possibly content that I did not discover before finalizing the note

## 2022-11-29 NOTE — PROGRESS NOTES
Arrhythmia Clinic Note (Established patient)    DOS: 11/29/2022    Chief complaint/Reason for consult: Afib    Interval History: 68 y/o M with persistent Afib s/p ablation x2 most recently 3 months ago. 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 No complaints feeling well, much improved. He wants to try to come off medications if possible due to cost considerations.    ROS (+ highlighted in bold):  Constitutional: Fevers/chills/fatigue/weightloss  HEENT: Blurry vision/eye pain/sore throat/hearing loss  Respiratory: Shortness of breath/cough  Cardiovascular: Chest pain/palpitations/edema/orthopnea/syncope  GI: Nausea/vomitting/diarrhea  MSK: Arthralgias/myagias/muscle weakness  Skin: Rash/sores  Neurological: Numbness/tremors/vertigo  Endocrine: Excessive thirst/polyuria/cold intolerance/heat intolerance  Psych: Depression/anxiety    Past Medical History:   Diagnosis Date    Acute on chronic clinical systolic heart failure (HCC) 5/19/2022    Arrhythmia 2020    A fib    Arthritis     fingers    CAD (coronary artery disease) 7/2/2012    Chronic anticoagulation 7/2/2012    Congestive heart failure (HCC)     Dyslipidemia 7/2/2012    Essential hypertension, benign 7/2/2012    High cholesterol     Insomnia     wakes up 2-5 times a night , wakes up to use restroom and sometimes just wakes up     Ischemic cardiomyopathy 7/2/2012    Left ventricular thrombosis 7/2/2012    Myocardial infarct (HCC) 2006    Pericarditis 7/2/2012    Pneumonia     history of as a child    Sleep apnea     told by MD that he has it, scheduled for sleep study     Snoring     no sleep study done    SOB (shortness of breath)     no c/o today; on exertion; pt uses 2L o2 qhs only; pt does not know provider    Stented coronary artery 7/2/2012       Past Surgical History:   Procedure Laterality Date    SHOULDER DECOMPRESSION ARTHROSCOPIC Right 11/27/2018    Procedure: SHOULDER  DECOMPRESSION ARTHROSCOPIC- SUBACROMIAL CONVERTED TO OPEN;  Surgeon: Ni Weir M.D.;  Location: SURGERY [facility];  Service: Orthopedics    SHOULDER ARTHROSCOPY W/ ROTATOR CUFF REPAIR Right 11/27/2018    Procedure: OPEN ROTATOR CUFF REPAIR;  Surgeon: Ni Weir M.D.;  Location: SURGERY [facility];  Service: Orthopedics    STENT PLACEMENT  2007    cardiac stent    CARDIAC CATH, LEFT HEART      CARPAL TUNNEL RELEASE Bilateral     OTHER      cardioversions x 2    VASECTOMY         Social History     Socioeconomic History    Marital status:      Spouse name: Not on file    Number of children: Not on file    Years of education: Not on file    Highest education level: Not on file   Occupational History    Not on file   Tobacco Use    Smoking status: Never    Smokeless tobacco: Former     Types: Chew   Vaping Use    Vaping Use: Never used   Substance and Sexual Activity    Alcohol use: Not Currently     Comment: occ    Drug use: Never    Sexual activity: Not on file   Other Topics Concern    Not on file   Social History Narrative    Not on file     Social Determinants of Health     Financial Resource Strain: Not on file   Food Insecurity: Not on file   Transportation Needs: Not on file   Physical Activity: Not on file   Stress: Not on file   Social Connections: Not on file   Intimate Partner Violence: Not on file   Housing Stability: Not on file       Family History   Problem Relation Age of Onset    No Known Problems Mother     Other Father         abestos    Heart Disease Maternal Grandmother     No Known Problems Maternal Grandfather     No Known Problems Paternal Grandmother     Heart Disease Paternal Grandfather     No Known Problems Daughter     No Known Problems Son        Allergies   Allergen Reactions    Ace Inhibitors Cough    Other Environmental      Hayfever       Current Outpatient Medications   Medication Sig Dispense Refill    levalbuterol (XOPENEX HFA) 45 MCG/ACT inhaler  "INHALE 1 TO 2 PUFFS BY MOUTH EVERY 4 HOURS AS NEEDED FOR SHORTNESS OF BREATH      rivaroxaban (XARELTO) 20 MG Tab tablet Take 1 Tablet by mouth with dinner. 30 Tablet 0    FLUTICASONE PROPIONATE, NASAL, NA Administer  into affected nostril(S).      budesonide-formoterol (SYMBICORT) 80-4.5 MCG/ACT Aerosol Inhale 2 Puffs 2 times a day. Use spacer. Rinse mouth after each use. 3 Each 3    spironolactone (ALDACTONE) 25 MG Tab TAKE 1 TABLET BY MOUTH EVERY DAY 90 Tablet 3    dapagliflozin propanediol (FARXIGA) 10 MG Tab Take 1 Tablet by mouth every day. 30 Tablet 11    sacubitril-valsartan (ENTRESTO) 24-26 MG Tab tablet Take 1 Tablet by mouth 2 times a day. 60 Tablet 0    torsemide (DEMADEX) 20 MG Tab Take 1 Tablet by mouth 1 time a day as needed (for weight gain, increased shortness of breath or swelling). 90 Tablet 3    simvastatin (ZOCOR) 40 MG Tab Take 1 Tablet by mouth every day. 90 Tablet 3    carvedilol (COREG) 6.25 MG Tab TAKE 1 TABLET BY MOUTH TWICE DAILY WITH MEALS (Patient taking differently: Take 6.25 mg by mouth 2 times a day with meals.) 180 Tablet 3     No current facility-administered medications for this visit.       Physical Exam:  Vitals:    11/29/22 0749   BP: 110/70   BP Location: Left arm   Patient Position: Sitting   BP Cuff Size: Adult   Pulse: 62   Resp: 14   SpO2: 96%   Weight: 103 kg (227 lb)   Height: 1.753 m (5' 9\")     General appearance: NAD, conversant   Eyes: anicteric sclerae, moist conjunctivae; no lid-lag; PERRLA  HENT: Atraumatic; oropharynx clear with moist mucous membranes and no mucosal ulcerations; normal hard and soft palate  Neck: Trachea midline; FROM, supple, no thyromegaly or lymphadenopathy  Lungs: CTA, with normal respiratory effort and no intercostal retractions  CV: RRR, no MRGs, no JVD  Abdomen: Soft, non-tender; no masses or HSM  Extremities: No peripheral edema or extremity lymphadenopathy  Skin: Normal temperature, turgor and texture; no rash, ulcers or subcutaneous " nodules  Psych: Appropriate affect, alert and oriented to person, place and time    Data:  Lipids:   Lab Results   Component Value Date/Time    CHOLSTRLTOT 123 08/11/2022 10:53 AM    TRIGLYCERIDE 75 08/11/2022 10:53 AM    HDL 42 08/11/2022 10:53 AM    LDL 66 08/11/2022 10:53 AM        BMP:  Lab Results   Component Value Date/Time    SODIUM 138 10/21/2022 0631    POTASSIUM 4.4 10/21/2022 0631    CHLORIDE 101 10/21/2022 0631    CO2 24 10/21/2022 0631    GLUCOSE 121 (H) 10/21/2022 0631    BUN 27 (H) 10/21/2022 0631    CREATININE 1.00 10/21/2022 0631    CALCIUM 9.1 10/21/2022 0631    ANION 13.0 10/21/2022 0631        TSH:   Lab Results   Component Value Date/Time    TSHULTRASEN 1.080 05/19/2022 0555        THYROXINE (T4):   No results found for: PAOLO     CBC:   Lab Results   Component Value Date/Time    WBC 7.2 07/28/2022 07:37 AM    RBC 4.19 (L) 07/28/2022 07:37 AM    HEMOGLOBIN 13.8 (L) 07/28/2022 07:37 AM    HEMATOCRIT 40.2 (L) 07/28/2022 07:37 AM    MCV 95.9 07/28/2022 07:37 AM    MCH 32.9 07/28/2022 07:37 AM    MCHC 34.3 07/28/2022 07:37 AM    RDW 49.9 07/28/2022 07:37 AM    PLATELETCT 193 07/28/2022 07:37 AM    MPV 10.8 07/28/2022 07:37 AM    NEUTSPOLYS 65.90 07/28/2022 07:37 AM    LYMPHOCYTES 20.80 (L) 07/28/2022 07:37 AM    MONOCYTES 9.80 07/28/2022 07:37 AM    EOSINOPHILS 2.40 07/28/2022 07:37 AM    BASOPHILS 0.70 07/28/2022 07:37 AM    IMMGRAN 0.40 07/28/2022 07:37 AM    IMMGRAN 0 02/22/2019 07:06 AM    NRBC 0.00 07/28/2022 07:37 AM    NEUTS 4.72 07/28/2022 07:37 AM    NEUTS 7.7 (H) 02/22/2019 07:06 AM    LYMPHS 1.49 07/28/2022 07:37 AM    LYMPHS 2.1 02/22/2019 07:06 AM    MONOS 0.70 07/28/2022 07:37 AM    MONOS 0.8 02/22/2019 07:06 AM    EOS 0.17 07/28/2022 07:37 AM    EOS 0.2 02/22/2019 07:06 AM    BASO 0.05 07/28/2022 07:37 AM    BASO 0.0 02/22/2019 07:06 AM    IMMGRANAB 0.03 07/28/2022 07:37 AM    IMMGRANAB 0.0 02/22/2019 07:06 AM    NRBCAB 0.00 07/28/2022 07:37 AM        CBC w/o DIFF  Lab Results    Component Value Date/Time    WBC 7.2 07/28/2022 07:37 AM    RBC 4.19 (L) 07/28/2022 07:37 AM    HEMOGLOBIN 13.8 (L) 07/28/2022 07:37 AM    MCV 95.9 07/28/2022 07:37 AM    MCH 32.9 07/28/2022 07:37 AM    MCHC 34.3 07/28/2022 07:37 AM    RDW 49.9 07/28/2022 07:37 AM    MPV 10.8 07/28/2022 07:37 AM       Prior echo/stress reviewed: Normal EF, compared to prior EF improved    EKG interpreted by me: NSR with PAC, compared to prior NSR still present    Impression/Plan:  1. PAF (paroxysmal atrial fibrillation) (HCC)  EKG        Persistent Afib s/p ablation x2  NICM arrhythmia mediated with recovery in EF  Hypercoagulable state due to afib    - I recommend continuing Xarelto for now, can consider switching to generic dabigatran in the future   - Regarding Farxiga and Entresto I am not sure if he needs to stay on these as his EF improvement is likely due to sinus rhythm restoration and may not be necessarily benefiting from these meds, has HF appointment to discuss    FV 6 months    Conrado Rosas MD  Cardiac Electrophysiology

## 2022-12-14 DIAGNOSIS — I25.10 CORONARY ARTERY DISEASE DUE TO LIPID RICH PLAQUE: ICD-10-CM

## 2022-12-14 DIAGNOSIS — I25.83 CORONARY ARTERY DISEASE DUE TO LIPID RICH PLAQUE: ICD-10-CM

## 2022-12-14 DIAGNOSIS — I48.19 PERSISTENT ATRIAL FIBRILLATION (HCC): ICD-10-CM

## 2022-12-14 LAB — EKG IMPRESSION: NORMAL

## 2022-12-27 NOTE — PROGRESS NOTES
Subjective:      Zenon Grijalva is a 63 y.o. male who presents with Fall (30 minutes ago, injured right shoulder)        HPI    The patient presents to urgent care today with complaints of right shoulder pain. States he accidentally tripped this morning, fell forward, and hit his right shoulder on an object. He immediately developed pain to the area and has had pain since. Denies neck or back pain. Denies head injury. He came straight to the  for care. His pain is significant with limited ROM of shoulder. He denies previous injuries to this shoulder. He has not tried anything for symptom relief. He is right hand dominate.       Past Medical History:   Diagnosis Date   • CAD (coronary artery disease) 7/2/2012   • Chronic anticoagulation 7/2/2012   • Dyslipidemia 7/2/2012   • Essential hypertension, benign 7/2/2012   • Ischemic cardiomyopathy 7/2/2012   • Left ventricular thrombosis 7/2/2012   • Pericarditis 7/2/2012   • Stented coronary artery 7/2/2012     Past Surgical History:   Procedure Laterality Date   • CARPAL TUNNEL RELEASE       Current Outpatient Prescriptions on File Prior to Visit   Medication Sig Dispense Refill   • carvedilol (COREG) 25 MG Tab Take 0.5 Tabs by mouth 2 times a day, with meals. 90 Tab 3   • simvastatin (ZOCOR) 40 MG Tab Take 1 Tab by mouth every evening. 90 Tab 3   • irbesartan (AVAPRO) 150 MG Tab TAKE 1 TABLET BY MOUTH ONCE DAILY 90 Tab 0   • aspirin EC (ECOTRIN) 81 MG TBEC Take 81 mg by mouth every day.     • nitroglycerin (NITROSTAT) 0.4 MG SL Tab Place 1 Tab under tongue as needed for Chest Pain. Every 5 min. Up to 3 doses. 25 Tab 3     No current facility-administered medications on file prior to visit.      Patient has no known allergies.      Review of Systems   Constitutional: Negative.    HENT: Negative.    Respiratory: Negative.    Cardiovascular: Negative.    Gastrointestinal: Negative.    Musculoskeletal: Positive for falls and joint pain. Negative for back pain and neck  "pain.   Skin: Negative.    Neurological: Negative.    Endo/Heme/Allergies: Negative.    Psychiatric/Behavioral: Negative.           Objective:     /58 (BP Location: Left arm, Patient Position: Sitting, BP Cuff Size: Large adult)   Pulse 64   Temp 36.3 °C (97.3 °F) (Temporal)   Resp 18   Ht 1.753 m (5' 9\")   Wt 102.1 kg (225 lb)   SpO2 95%   BMI 33.23 kg/m²      Physical Exam   Constitutional: He is oriented to person, place, and time. Vital signs are normal. He appears well-developed and well-nourished. He is active. He does not have a sickly appearance. He does not appear ill. No distress.   HENT:   Head: Normocephalic and atraumatic.   Right Ear: External ear normal.   Left Ear: External ear normal.   Nose: Nose normal.   Mouth/Throat: Oropharynx is clear and moist.   Eyes: Pupils are equal, round, and reactive to light. Conjunctivae are normal. Right eye exhibits no discharge. Left eye exhibits no discharge. No scleral icterus.   Neck: Normal range of motion. Neck supple. No JVD present. No tracheal deviation present.   Cardiovascular: Normal rate, regular rhythm, normal heart sounds and intact distal pulses.    Pulmonary/Chest: Effort normal and breath sounds normal. No stridor. No respiratory distress. He has no wheezes. He exhibits no tenderness.   Musculoskeletal: He exhibits tenderness. He exhibits no edema.        Right shoulder: He exhibits decreased range of motion, tenderness, bony tenderness, deformity, pain, spasm and decreased strength.        Right elbow: Normal.       Cervical back: Normal.        Arms:  Lymphadenopathy:     He has no cervical adenopathy.   Neurological: He is alert and oriented to person, place, and time. No cranial nerve deficit or sensory deficit. He exhibits normal muscle tone. Coordination and gait normal. GCS eye subscore is 4. GCS verbal subscore is 5. GCS motor subscore is 6.   Skin: Skin is warm, dry and intact. Capillary refill takes less than 2 seconds. No " "abrasion, no bruising, no ecchymosis, no laceration and no rash noted. He is not diaphoretic. No erythema. No pallor.   Psychiatric: He has a normal mood and affect. His behavior is normal. Judgment and thought content normal.   Vitals reviewed.              Assessment/Plan:     1. Dislocation of right shoulder joint, initial encounter  DX-SHOULDER 2+ RIGHT         - DX-SHOULDER 2+ RIGHT reviewed by myself, radiology reading \"Findings indicate anterior dislocation at the glenohumeral joint.\"        X-ray shows anterior dislocation. At this time, I feel the patient requires a higher level of care including reduction with potential for pain management and/or sedation. This has been discussed with the patient and he states agreement and understanding. The patient has yet to decide which ED he will be going to but will go without delay, will remain NPO. The patient is stable to leave POV, driven by his friend, will go directly to ED without delay.         ALEXANDRA Lo.      " show

## 2023-01-09 DIAGNOSIS — I10 ESSENTIAL HYPERTENSION, BENIGN: Chronic | ICD-10-CM

## 2023-01-09 DIAGNOSIS — E78.5 HYPERLIPIDEMIA, UNSPECIFIED HYPERLIPIDEMIA TYPE: ICD-10-CM

## 2023-01-09 DIAGNOSIS — I10 ESSENTIAL HYPERTENSION: ICD-10-CM

## 2023-01-09 RX ORDER — SIMVASTATIN 40 MG
40 TABLET ORAL
Qty: 90 TABLET | Refills: 3 | Status: SHIPPED | OUTPATIENT
Start: 2023-01-09 | End: 2024-01-04

## 2023-01-10 RX ORDER — CARVEDILOL 6.25 MG/1
TABLET ORAL
Qty: 180 TABLET | Refills: 3 | Status: SHIPPED | OUTPATIENT
Start: 2023-01-10 | End: 2024-02-27

## 2023-01-10 NOTE — TELEPHONE ENCOUNTER
Is the patient due for a refill? Yes    Was the patient seen the past year? Yes    Date of last office visit: 11/29/2022    Does the patient have an upcoming appointment?  Yes   If yes, When? 6/8/2023    Provider to refill:YOGI    Does the patients insurance require a 100 day supply?  No

## 2023-01-10 NOTE — TELEPHONE ENCOUNTER
Is the patient due for a refill? Yes    Was the patient seen the past year? Yes    Date of last office visit: 11/29/22    Does the patient have an upcoming appointment?  Yes   If yes, When? 6/8/23    Provider to refill:YOGI    Does the patients insurance require a 100 day supply?  No

## 2023-01-12 NOTE — ED NOTES
VS reassessed.  Pt updated that we are awaiting ERP re-eval.   Special Stains Stage 1 - Results: Base On Clearance Noted Above

## 2023-02-28 NOTE — ED TRIAGE NOTES
Chief Complaint   Patient presents with   • Dislocation     right shoulder this am tripped over a gas hose.   imaging done at the Urgent Care   sensory intact

## 2023-04-17 ENCOUNTER — TELEPHONE (OUTPATIENT)
Dept: HEALTH INFORMATION MANAGEMENT | Facility: OTHER | Age: 68
End: 2023-04-17
Payer: MEDICARE

## 2023-05-09 ENCOUNTER — NON-PROVIDER VISIT (OUTPATIENT)
Dept: SLEEP MEDICINE | Facility: MEDICAL CENTER | Age: 68
End: 2023-05-09
Attending: NURSE PRACTITIONER
Payer: MEDICARE

## 2023-05-09 VITALS — BODY MASS INDEX: 34.56 KG/M2 | WEIGHT: 228 LBS | HEIGHT: 68 IN

## 2023-05-09 VITALS
HEIGHT: 68 IN | WEIGHT: 228 LBS | RESPIRATION RATE: 16 BRPM | HEART RATE: 64 BPM | BODY MASS INDEX: 34.56 KG/M2 | OXYGEN SATURATION: 96 % | DIASTOLIC BLOOD PRESSURE: 60 MMHG | SYSTOLIC BLOOD PRESSURE: 122 MMHG

## 2023-05-09 DIAGNOSIS — J45.20 MILD INTERMITTENT ASTHMA WITHOUT COMPLICATION: Chronic | ICD-10-CM

## 2023-05-09 DIAGNOSIS — G47.33 OBSTRUCTIVE SLEEP APNEA: Chronic | ICD-10-CM

## 2023-05-09 DIAGNOSIS — I48.0 PAF (PAROXYSMAL ATRIAL FIBRILLATION) (HCC): ICD-10-CM

## 2023-05-09 DIAGNOSIS — J45.20 MILD INTERMITTENT ASTHMA, UNSPECIFIED WHETHER COMPLICATED: ICD-10-CM

## 2023-05-09 DIAGNOSIS — I50.22 CHRONIC SYSTOLIC HEART FAILURE (HCC): ICD-10-CM

## 2023-05-09 DIAGNOSIS — Z87.891 FORMER SMOKELESS TOBACCO USE: ICD-10-CM

## 2023-05-09 DIAGNOSIS — I10 ESSENTIAL HYPERTENSION, BENIGN: Chronic | ICD-10-CM

## 2023-05-09 PROCEDURE — 99212 OFFICE O/P EST SF 10 MIN: CPT | Performed by: NURSE PRACTITIONER

## 2023-05-09 PROCEDURE — 94729 DIFFUSING CAPACITY: CPT | Mod: 26 | Performed by: INTERNAL MEDICINE

## 2023-05-09 PROCEDURE — 94726 PLETHYSMOGRAPHY LUNG VOLUMES: CPT | Performed by: NURSE PRACTITIONER

## 2023-05-09 PROCEDURE — 99213 OFFICE O/P EST LOW 20 MIN: CPT | Performed by: NURSE PRACTITIONER

## 2023-05-09 PROCEDURE — 94729 DIFFUSING CAPACITY: CPT | Performed by: NURSE PRACTITIONER

## 2023-05-09 PROCEDURE — 94060 EVALUATION OF WHEEZING: CPT | Performed by: NURSE PRACTITIONER

## 2023-05-09 PROCEDURE — 94060 EVALUATION OF WHEEZING: CPT | Mod: 26 | Performed by: INTERNAL MEDICINE

## 2023-05-09 PROCEDURE — 94726 PLETHYSMOGRAPHY LUNG VOLUMES: CPT | Mod: 26 | Performed by: INTERNAL MEDICINE

## 2023-05-09 RX ORDER — BUDESONIDE AND FORMOTEROL FUMARATE DIHYDRATE 80; 4.5 UG/1; UG/1
2 AEROSOL RESPIRATORY (INHALATION) 2 TIMES DAILY
Qty: 3 EACH | Refills: 3 | Status: SHIPPED | OUTPATIENT
Start: 2023-05-09

## 2023-05-09 ASSESSMENT — PULMONARY FUNCTION TESTS
FEV1/FVC_PERCENT_CHANGE: 67
FVC: 3.18
FEV1_PERCENT_CHANGE: 6
FEV1/FVC_PERCENT_PREDICTED: 77
FEV1/FVC_PERCENT_CHANGE: -2
FEV1/FVC_PERCENT_PREDICTED: 95
FEV1_PREDICTED: 3.07
FEV1/FVC: 75
FEV1: 2.24
FEV1/FVC_PERCENT_PREDICTED: 95
FEV1_PERCENT_CHANGE: 4
FEV1: 2.33
FVC_PERCENT_PREDICTED: 79
FVC_LLN: 3.34
FEV1_LLN: 2.56
FEV1/FVC: 73.27
FEV1/FVC_PERCENT_PREDICTED: 97
FEV1/FVC_PERCENT_PREDICTED: 97
FEV1_PERCENT_PREDICTED: 75
FVC_PREDICTED: 4
FEV1/FVC: 73
FEV1_PERCENT_PREDICTED: 72
FEV1/FVC: 75
FVC_PERCENT_PREDICTED: 74
FVC: 2.99
FEV1/FVC_PREDICTED: 77
FEV1/FVC_PERCENT_LLN: 64

## 2023-05-09 ASSESSMENT — FIBROSIS 4 INDEX
FIB4 SCORE: 1.39
FIB4 SCORE: 1.39

## 2023-05-09 ASSESSMENT — PATIENT HEALTH QUESTIONNAIRE - PHQ9: CLINICAL INTERPRETATION OF PHQ2 SCORE: 0

## 2023-05-09 NOTE — PROCEDURES
Technician: Cheryl Walker RRT, CPFT  Good patient effort & cooperation.  The results of this test meet the ATS/ERS standards for acceptability & reproducibility.  Test was performed on the [employer] Body Plethysmograph-Elite DX system.  Predicted equations for Spirometry are GLI-2012, ITS for lung volumes, and GLI-2017 for DLCO.  The DLCO was uncorrected for Hgb.  A bronchodilator of Ventolin HFA -2puffs via spacer administered.  DLCO performed during dilation period.    Interpretation:  Basic spirometry demonstrates a mild pseudo restrictive pattern.  There is a negative bronchodilator response.  Flow volume loops demonstrate mild scooping of the expiratory loop suggestive of small airways disease.  Full lung volumes demonstrate mild air trapping in addition to a disproportionate decrease in ERV likely due to BMI 35.2.  The DLCO is normal.    Summary:  Findings consistent with likely small airways disease with air trapping in addition to obesity with alveolar hypoventilation.  Correlate clinically and with imaging.    Jorge Luis Barragan,   Staff Pulmonologist and Intensivist  [facility]     Please note that this dictation was created using voice recognition software. The accuracy of the dictation is limited to the abilities of the software. I have made every reasonable attempt to correct obvious errors, but I expect that there are errors of grammar and possibly content that I did not discover before finalizing the note.

## 2023-05-09 NOTE — PROGRESS NOTES
"Chief Complaint   Patient presents with    Follow-Up     Asthma / Apnea //last seen 11/9/2022    Results     OPO 12/21/2022  PFT 5/9/2023       HPI:  Zenon Grijalva is a 67 y.o. year old male here today for follow-up asthma and JOSE.   History of CHF with dyspnea on exertion  Last office visit 11/9/2022    [facility] stGstrstastdstest:st st1st Exacerbations this year: 0    Patient was started on Symbicort 80 -2 puffs twice daily due to shortness of breath and felt at last office visit his breathing was possibly improved.  He uses ALVARO as needed which is never.  He denies any exacerbations.  He overall feels his breathing is stable.  He does admit to sometimes forgetting to take his Symbicort.  He does need refills.  50 5/9/2023 notes FVC 2.99 L or 74%, FEV1 2.24 L 72%, FEV1/FVC ratio 75, mild bronchodilator response, %, TLC 6.31 L or 96% and DLCO 99% predicted.  Reviewed with patient.  Airflows are improved based on percentage.    He remains on CPAP 7 cm; Respironics device obtained 2021.  He also has a ResMed travel device.  Compliance report 4/8/2023 through 5/7/2022 indicates 93.3% compliance, average nightly use 8 hours 25 minutes, no mask leak with a reduced AHI of 2.5/h.  Reviewed with patient.  He feels he sleeps well in therapy.  He goes to bed around 8 PM and fall asleep within 20 minutes.  He generally wakes between midnight and 2 AM to use the restroom and sometimes his mind will be \"going\".  Once able to resume sleep he will sleep to 4 AM to 5 AM.  He wakes up without headaches and denies any daytime sleepiness or napping.  His main complaint is sinus drip in the morning and having to blow his nose to clear up.  He does use Flonase as needed as well.  His humidifier set at 3.    CNOX 12/19/2022 using CPAP indicated 15.8 minutes less than 80% ox saturation, basal SPO2 of 91.7%, DAYNA 18.  Bradycardia noted for 453.9 minutes of the night.  Recommend increasing CPAP to 8 cm nightly.  Reviewed with patient.    PULM & " SLEEP HX:  Echo 2021 indicated LVEF improvement from 25 to 45%.  RVSP 44 mmHg with a dilated inferior vena cava with inspiratory collapse.     PFT 2020 noted mild to moderate obstructive lung disease with FEV1 2.1 L or 67%, FEV1/FVC ratio 72, FVC 2.93 L or 71%, %, TLC 91% and DLCO 124% predicted.  Patient did have a mild bronchodilator response.  He did undergo a trial of ICS/LABA but found no subjective benefit.    Spirometry 2022 notes obstruction characteristic of asthma with FVC 3.29 L 60%, FEV1 2.41 L or 59%, FEV1/FVC ratio 73.  No significant bronchodilator response.     Imaging with chest x-ray 2022 noted no acute cardiopulmonary process but again identified areas of calcific pleural plaquing.  Patient notes a history of being a  and his father also had findings of asbestosis of which she  from.  No prior CT imaging on chart.     He had screening overnight oximetry showi ng desaturations for 100 minutes ninfa of 75% and was started on nighttime oxygen.  PSG  showed moderate JOSE with AHI:23/h and desaturations to 81% Sp02.  He was successfully titrated on CPAP: 7 cm of water with resolved JOSE.     ROS: As per HPI and otherwise negative if not stated.    Past Medical History:   Diagnosis Date    Acute on chronic clinical systolic heart failure (HCC) 2022    Arrhythmia     A fib    Arthritis     fingers    CAD (coronary artery disease) 2012    Chronic anticoagulation 2012    Congestive heart failure (HCC)     Dyslipidemia 2012    Essential hypertension, benign 2012    High cholesterol     Insomnia     wakes up 2-5 times a night , wakes up to use restroom and sometimes just wakes up     Ischemic cardiomyopathy 2012    Left ventricular thrombosis 2012    Myocardial infarct (HCC) 2006    Pericarditis 2012    Pneumonia     history of as a child    Sleep apnea     told by MD that he has it, scheduled for sleep study     Snoring      no sleep study done    SOB (shortness of breath)     no c/o today; on exertion; pt uses 2L o2 qhs only; pt does not know provider    Stented coronary artery 7/2/2012       Past Surgical History:   Procedure Laterality Date    SHOULDER DECOMPRESSION ARTHROSCOPIC Right 11/27/2018    Procedure: SHOULDER DECOMPRESSION ARTHROSCOPIC- SUBACROMIAL CONVERTED TO OPEN;  Surgeon: Ni Weir M.D.;  Location: SURGERY [facility];  Service: Orthopedics    SHOULDER ARTHROSCOPY W/ ROTATOR CUFF REPAIR Right 11/27/2018    Procedure: OPEN ROTATOR CUFF REPAIR;  Surgeon: Ni Weir M.D.;  Location: SURGERY [facility];  Service: Orthopedics    STENT PLACEMENT  2007    cardiac stent    CARDIAC CATH, LEFT HEART      CARPAL TUNNEL RELEASE Bilateral     OTHER      cardioversions x 2    VASECTOMY         Family History   Problem Relation Age of Onset    No Known Problems Mother     Other Father         abestos    Heart Disease Maternal Grandmother     No Known Problems Maternal Grandfather     No Known Problems Paternal Grandmother     Heart Disease Paternal Grandfather     No Known Problems Daughter     No Known Problems Son        Social History     Socioeconomic History    Marital status:      Spouse name: Not on file    Number of children: Not on file    Years of education: Not on file    Highest education level: Not on file   Occupational History    Not on file   Tobacco Use    Smoking status: Never    Smokeless tobacco: Former     Types: Chew   Vaping Use    Vaping Use: Never used   Substance and Sexual Activity    Alcohol use: Not Currently     Comment: occ    Drug use: Never    Sexual activity: Not on file   Other Topics Concern    Not on file   Social History Narrative    Not on file     Social Determinants of Health     Financial Resource Strain: Not on file   Food Insecurity: Not on file   Transportation Needs: Not on file   Physical Activity: Not on file   Stress: Not on file   Social Connections:  "Not on file   Intimate Partner Violence: Not on file   Housing Stability: Not on file       Allergies as of 05/09/2023 - Reviewed 05/09/2023   Allergen Reaction Noted    Ace inhibitors Cough 05/27/2022    Other environmental  07/06/2021        Vitals:  /60 (BP Location: Left arm, Patient Position: Sitting, BP Cuff Size: Adult)   Pulse 64   Resp 16   Ht 1.715 m (5' 7.5\")   Wt 103 kg (228 lb)   SpO2 96%     Current medications as of today   Current Outpatient Medications   Medication Sig Dispense Refill    carvedilol (COREG) 6.25 MG Tab TAKE 1 TABLET BY MOUTH TWICE DAILY WITH MEALS 180 Tablet 3    simvastatin (ZOCOR) 40 MG Tab Take 1 Tablet by mouth every day. 90 Tablet 3    rivaroxaban (XARELTO) 20 MG Tab tablet Take 1 Tablet by mouth with dinner. 90 Tablet 3    sacubitril-valsartan (ENTRESTO) 24-26 MG Tab Take 1 Tablet by mouth 2 times a day. 60 Tablet 0    dapagliflozin propanediol (FARXIGA) 10 MG Tab Take 1 Tablet by mouth every day. 30 Tablet 0    levalbuterol (XOPENEX HFA) 45 MCG/ACT inhaler INHALE 1 TO 2 PUFFS BY MOUTH EVERY 4 HOURS AS NEEDED FOR SHORTNESS OF BREATH      FLUTICASONE PROPIONATE, NASAL, NA Administer  into affected nostril(S).      budesonide-formoterol (SYMBICORT) 80-4.5 MCG/ACT Aerosol Inhale 2 Puffs 2 times a day. Use spacer. Rinse mouth after each use. 3 Each 3    spironolactone (ALDACTONE) 25 MG Tab TAKE 1 TABLET BY MOUTH EVERY DAY 90 Tablet 3    dapagliflozin propanediol (FARXIGA) 10 MG Tab Take 1 Tablet by mouth every day. 30 Tablet 11    torsemide (DEMADEX) 20 MG Tab Take 1 Tablet by mouth 1 time a day as needed (for weight gain, increased shortness of breath or swelling). 90 Tablet 3     No current facility-administered medications for this visit.         Physical Exam:   Gen:           Alert and oriented, No apparent distress. Mood and affect appropriate, normal interaction with examiner.  Eyes:          PERRL, EOM intact, sclere white, conjunctive moist.  Ears:          Not " examined.   Hearing:     Grossly intact.  Nose:          Normal, no lesions or deformities.  Dentition:    Not examined.   Oropharynx:   Not examined.   Mallampati Classification: Not examined.   Neck:        Supple, trachea midline, no masses.  Respiratory Effort: No intercostal retractions or use of accessory muscles.   Lung Auscultation:      Clear to auscultation bilaterally; no rales, rhonchi or wheezing.  CV:            Regular rate and rhythm. No murmurs, rubs or gallops.  Abd:           Not examined.   Lymphadenopathy: Not examined.   Gait and Station: Normal.  Digits and Nails: No clubbing, cyanosis, petechiae, or nodes.   Cranial Nerves: II-XII grossly intact.  Skin:        No rashes, lesions or ulcers noted.               Ext:           No cyanosis or edema.      Assessment:  1. Mild intermittent asthma without complication        2. Obstructive sleep apnea        3. Essential hypertension, benign        4. PAF (paroxysmal atrial fibrillation) (HCC)        5. Chronic systolic heart failure (HCC)        6. BMI 35.0-35.9,adult        7. Former smokeless tobacco use                 Immunizations:    Flu:not due  Pneumovax 23:not due  Prevnar 13:not due  PCV 20: 2022  COVID-19: 9/27/22    Plan:  1.  Asthma is clinically stable and improved using Symbicort.  Continue current bronchodilators.  Refills provided.    Spirometry at next OV  2.  Sleep apnea is well treated with mild low oxygen levels noted night on CNOX.  We will adjust to CPAP 8 cm.  We will also decrease his humidifier to 2 to see if this improves sinus congestion upon waking. Continue CPAP 8 cm on a nightly basis.   DME mask/supplies  3.  Follow-up with cardiology for ongoing monitoring of CHF, A-fib and hypertension.  4..Encourage weight loss or healthy eating regular exercise  5.  Follow-up with primary care further health concerns.  6.  Follow-up in 1 year spirometry and compliance report, sooner if needed.    Please note that this dictation  was created using voice recognition software. I have made every reasonable attempt to correct obvious errors, but it is possible there are errors of grammar and possibly content that I did not discover before finalizing the note.

## 2023-05-16 ENCOUNTER — TELEPHONE (OUTPATIENT)
Dept: HEALTH INFORMATION MANAGEMENT | Facility: OTHER | Age: 68
End: 2023-05-16
Payer: MEDICARE

## 2023-05-16 DIAGNOSIS — I50.20 ACC/AHA STAGE C SYSTOLIC HEART FAILURE (HCC): ICD-10-CM

## 2023-05-16 DIAGNOSIS — E78.5 DYSLIPIDEMIA: Chronic | ICD-10-CM

## 2023-05-16 RX ORDER — DAPAGLIFLOZIN 10 MG/1
10 TABLET, FILM COATED ORAL DAILY
Qty: 90 TABLET | Refills: 1 | Status: SHIPPED | OUTPATIENT
Start: 2023-05-16

## 2023-05-17 ENCOUNTER — HOSPITAL ENCOUNTER (OUTPATIENT)
Dept: LAB | Facility: MEDICAL CENTER | Age: 68
End: 2023-05-17
Attending: NURSE PRACTITIONER
Payer: MEDICARE

## 2023-05-17 DIAGNOSIS — Z95.5 STENTED CORONARY ARTERY: ICD-10-CM

## 2023-05-17 DIAGNOSIS — Z79.899 HIGH RISK MEDICATION USE: ICD-10-CM

## 2023-05-17 DIAGNOSIS — Z98.890 H/O CARDIAC RADIOFREQUENCY ABLATION: ICD-10-CM

## 2023-05-17 DIAGNOSIS — I50.20 ACC/AHA STAGE C SYSTOLIC HEART FAILURE (HCC): ICD-10-CM

## 2023-05-17 DIAGNOSIS — I25.5 ISCHEMIC CARDIOMYOPATHY: ICD-10-CM

## 2023-05-17 DIAGNOSIS — I48.0 PAF (PAROXYSMAL ATRIAL FIBRILLATION) (HCC): ICD-10-CM

## 2023-05-17 LAB
ALBUMIN SERPL BCP-MCNC: 4.1 G/DL (ref 3.2–4.9)
ALBUMIN/GLOB SERPL: 1.5 G/DL
ALP SERPL-CCNC: 85 U/L (ref 30–99)
ALT SERPL-CCNC: 30 U/L (ref 2–50)
ANION GAP SERPL CALC-SCNC: 14 MMOL/L (ref 7–16)
AST SERPL-CCNC: 23 U/L (ref 12–45)
BASOPHILS # BLD AUTO: 0.7 % (ref 0–1.8)
BASOPHILS # BLD: 0.05 K/UL (ref 0–0.12)
BILIRUB SERPL-MCNC: 1.6 MG/DL (ref 0.1–1.5)
BUN SERPL-MCNC: 27 MG/DL (ref 8–22)
CALCIUM ALBUM COR SERPL-MCNC: 8.9 MG/DL (ref 8.5–10.5)
CALCIUM SERPL-MCNC: 9 MG/DL (ref 8.5–10.5)
CHLORIDE SERPL-SCNC: 103 MMOL/L (ref 96–112)
CHOLEST SERPL-MCNC: 127 MG/DL (ref 100–199)
CO2 SERPL-SCNC: 24 MMOL/L (ref 20–33)
CREAT SERPL-MCNC: 0.99 MG/DL (ref 0.5–1.4)
EOSINOPHIL # BLD AUTO: 0.13 K/UL (ref 0–0.51)
EOSINOPHIL NFR BLD: 1.8 % (ref 0–6.9)
ERYTHROCYTE [DISTWIDTH] IN BLOOD BY AUTOMATED COUNT: 47 FL (ref 35.9–50)
FASTING STATUS PATIENT QL REPORTED: NORMAL
GFR SERPLBLD CREATININE-BSD FMLA CKD-EPI: 83 ML/MIN/1.73 M 2
GLOBULIN SER CALC-MCNC: 2.8 G/DL (ref 1.9–3.5)
GLUCOSE SERPL-MCNC: 82 MG/DL (ref 65–99)
HCT VFR BLD AUTO: 48.9 % (ref 42–52)
HDLC SERPL-MCNC: 42 MG/DL
HGB BLD-MCNC: 16.2 G/DL (ref 14–18)
IMM GRANULOCYTES # BLD AUTO: 0.02 K/UL (ref 0–0.11)
IMM GRANULOCYTES NFR BLD AUTO: 0.3 % (ref 0–0.9)
LDLC SERPL CALC-MCNC: 67 MG/DL
LYMPHOCYTES # BLD AUTO: 1.67 K/UL (ref 1–4.8)
LYMPHOCYTES NFR BLD: 23.8 % (ref 22–41)
MCH RBC QN AUTO: 32.6 PG (ref 27–33)
MCHC RBC AUTO-ENTMCNC: 33.1 G/DL (ref 33.7–35.3)
MCV RBC AUTO: 98.4 FL (ref 81.4–97.8)
MONOCYTES # BLD AUTO: 0.79 K/UL (ref 0–0.85)
MONOCYTES NFR BLD AUTO: 11.2 % (ref 0–13.4)
NEUTROPHILS # BLD AUTO: 4.37 K/UL (ref 1.82–7.42)
NEUTROPHILS NFR BLD: 62.2 % (ref 44–72)
NRBC # BLD AUTO: 0 K/UL
NRBC BLD-RTO: 0 /100 WBC
PLATELET # BLD AUTO: 197 K/UL (ref 164–446)
PMV BLD AUTO: 11.2 FL (ref 9–12.9)
POTASSIUM SERPL-SCNC: 4.4 MMOL/L (ref 3.6–5.5)
PROT SERPL-MCNC: 6.9 G/DL (ref 6–8.2)
RBC # BLD AUTO: 4.97 M/UL (ref 4.7–6.1)
SODIUM SERPL-SCNC: 141 MMOL/L (ref 135–145)
TRIGL SERPL-MCNC: 90 MG/DL (ref 0–149)
WBC # BLD AUTO: 7 K/UL (ref 4.8–10.8)

## 2023-05-17 PROCEDURE — 80053 COMPREHEN METABOLIC PANEL: CPT

## 2023-05-17 PROCEDURE — 80061 LIPID PANEL: CPT

## 2023-05-17 PROCEDURE — 36415 COLL VENOUS BLD VENIPUNCTURE: CPT

## 2023-05-17 PROCEDURE — 85025 COMPLETE CBC W/AUTO DIFF WBC: CPT

## 2023-05-18 ENCOUNTER — TELEPHONE (OUTPATIENT)
Dept: CARDIOLOGY | Facility: MEDICAL CENTER | Age: 68
End: 2023-05-18
Payer: MEDICARE

## 2023-05-18 NOTE — TELEPHONE ENCOUNTER
New order from In Basket for Farxiga 10MG tablets #90 tablets for 90 days    Ran Test Claim- PA required    Submitted PA in CMM-Key#[id number]     Diagnosis-Heart failure (NYHA class II-IV) with reduced ejection fraction    Tried and Failed Medications-Metoprolol    Attached chart notes and answered clinical questions.    Waiting on Plan Response

## 2023-05-19 NOTE — TELEPHONE ENCOUNTER
New order prior authorization for Farxiga 10mg tablets for quantity of 90 for a day supply of 90  has been APPROVED    Insurance- [employer]     Reference#-? [id number]    Dates in effect, from 05/18/23 through 05/18/2024    Pharmacy and phone number   [id number]   [address] [place] [address]   Phone:  [phone]  Fax:  [phone]     Co pay- $1,598.53     Even with a PA Insurance is paying nothing on this patient's copay     FA-Screen for FA.    The patient can fill with [facility] or we will release to the patient's preferred pharmacy. The team will provide outreach to the patient and offer clinical services.    Approval Letter

## 2023-05-24 ENCOUNTER — TELEPHONE (OUTPATIENT)
Dept: CARDIOLOGY | Facility: MEDICAL CENTER | Age: 68
End: 2023-05-24
Payer: MEDICARE

## 2023-05-24 NOTE — TELEPHONE ENCOUNTER
Liaison Called patient @[phone], s/w (Zenon Grijalva). Notified patient of high copay on Xarelto, Farxiga, and Entresto. Patient stated copay is unaffordable. Patient gave verbal consent for Financial Assistance thru (All FA options for 2023).      Filled out PAP forms for    Xarelto- [employer] Patient assistances  Entresto-[employer] Patient Assistance  Farxiga- [place] Patient Assitance    Sent them to patient via [employer]...     Detail Level: Zone

## 2023-06-05 NOTE — TELEPHONE ENCOUNTER
New PAP for Farxiga 10mg tablets has been APPROVED.    [facility]  [place] Prescription Savings Program     Phone#- [phone]    Approval dates 05/31/23-12/31/2023    Allow 7-10 days business days for medication to ship.     Check status at [email or web address]  Patient Id# [id number]      Approval letter

## 2023-06-05 NOTE — TELEPHONE ENCOUNTER
New PAP for Entresto 24-26mg has been Denied.    Source- [employer]    Denial reason- Household Income Exceeds Program Limits.    Indotrading for Cardiomyopathy for Medicare Patient's will get income information from liaison and apply patient.- He consented verbally on 05/24/23 to ALL FA options.        Denial Letter

## 2023-06-07 NOTE — TELEPHONE ENCOUNTER
New Varun for Entresto 24-26mg tablets  has been APPROVED.     Varun-  [facility]    Diagnosis- Cardiomyopathy    Approval dates-  05/28/23-05/680371    Varun Amount- $10,000    Pharmacy information added to [facility]        Approval letter

## 2023-06-08 ENCOUNTER — OFFICE VISIT (OUTPATIENT)
Dept: CARDIOLOGY | Facility: MEDICAL CENTER | Age: 68
End: 2023-06-08
Attending: NURSE PRACTITIONER
Payer: MEDICARE

## 2023-06-08 VITALS
DIASTOLIC BLOOD PRESSURE: 64 MMHG | BODY MASS INDEX: 34.25 KG/M2 | HEART RATE: 63 BPM | HEIGHT: 68 IN | SYSTOLIC BLOOD PRESSURE: 120 MMHG | OXYGEN SATURATION: 95 % | WEIGHT: 226 LBS | RESPIRATION RATE: 16 BRPM

## 2023-06-08 DIAGNOSIS — I25.83 CORONARY ARTERY DISEASE DUE TO LIPID RICH PLAQUE: ICD-10-CM

## 2023-06-08 DIAGNOSIS — I50.20 ACC/AHA STAGE C SYSTOLIC HEART FAILURE (HCC): ICD-10-CM

## 2023-06-08 DIAGNOSIS — I25.10 CORONARY ARTERY DISEASE DUE TO LIPID RICH PLAQUE: ICD-10-CM

## 2023-06-08 DIAGNOSIS — Z95.5 STENTED CORONARY ARTERY: ICD-10-CM

## 2023-06-08 DIAGNOSIS — I25.5 ISCHEMIC CARDIOMYOPATHY: ICD-10-CM

## 2023-06-08 DIAGNOSIS — I48.0 PAF (PAROXYSMAL ATRIAL FIBRILLATION) (HCC): ICD-10-CM

## 2023-06-08 DIAGNOSIS — D68.69 HYPERCOAGULABLE STATE DUE TO PAROXYSMAL ATRIAL FIBRILLATION (HCC): ICD-10-CM

## 2023-06-08 DIAGNOSIS — E78.5 DYSLIPIDEMIA: ICD-10-CM

## 2023-06-08 DIAGNOSIS — Z79.899 HIGH RISK MEDICATION USE: ICD-10-CM

## 2023-06-08 DIAGNOSIS — G47.33 OBSTRUCTIVE SLEEP APNEA SYNDROME: ICD-10-CM

## 2023-06-08 DIAGNOSIS — I50.9 HEART FAILURE, NYHA CLASS 1 (HCC): ICD-10-CM

## 2023-06-08 DIAGNOSIS — I50.22 CHRONIC SYSTOLIC HEART FAILURE (HCC): ICD-10-CM

## 2023-06-08 DIAGNOSIS — I48.0 HYPERCOAGULABLE STATE DUE TO PAROXYSMAL ATRIAL FIBRILLATION (HCC): ICD-10-CM

## 2023-06-08 DIAGNOSIS — I10 ESSENTIAL HYPERTENSION, BENIGN: ICD-10-CM

## 2023-06-08 DIAGNOSIS — Z98.890 H/O CARDIAC RADIOFREQUENCY ABLATION: ICD-10-CM

## 2023-06-08 PROCEDURE — 99214 OFFICE O/P EST MOD 30 MIN: CPT | Performed by: NURSE PRACTITIONER

## 2023-06-08 PROCEDURE — 3074F SYST BP LT 130 MM HG: CPT | Performed by: NURSE PRACTITIONER

## 2023-06-08 PROCEDURE — 3078F DIAST BP <80 MM HG: CPT | Performed by: NURSE PRACTITIONER

## 2023-06-08 RX ORDER — SACUBITRIL AND VALSARTAN 24; 26 MG/1; MG/1
1 TABLET, FILM COATED ORAL 2 TIMES DAILY
Qty: 180 TABLET | Refills: 3 | Status: CANCELLED | OUTPATIENT
Start: 2023-06-08

## 2023-06-08 ASSESSMENT — MINNESOTA LIVING WITH HEART FAILURE QUESTIONNAIRE (MLHF)
GIVING YOU SIDE EFFECTS FROM TREATMENTS: 0
DIFFICULTY WORKING TO EARN A LIVING: 0
WORKING AROUND THE HOUSE OR YARD DIFFICULT: 0
SWELLING IN ANKLES OR LEGS: 0
DIFFICULTY SOCIALIZING WITH FAMILY OR FRIENDS: 0
TOTAL_SCORE: 0
MAKING YOU FEEL DEPRESSED: 0
DIFFICULTY SLEEPING WELL AT NIGHT: 0
LOSS OF SELF CONTROL IN YOUR LIFE: 0
COSTING YOU MONEY FOR MEDICAL CARE: 0
HAVING TO SIT OR LIE DOWN DURING THE DAY: 0
DIFFICULTY TO CONCENTRATE OR REMEMBERING THINGS: 0
DIFFICULTY WITH RECREATIONAL PASTIMES, SPORTS, HOBBIES: 0
WALKING ABOUT OR CLIMBING STAIRS DIFFICULT: 0
EATING LESS FOODS YOU LIKE: 0
MAKING YOU STAY IN A HOSPITAL: 0
FEELING LIKE A BURDEN TO FAMILY AND FRIENDS: 0
MAKING YOU SHORT OF BREATH: 0
DIFFICULTY WITH SEXUAL ACTIVITIES: 0
MAKING YOU WORRY: 0
DIFFICULTY GOING AWAY FROM HOME: 0
TIRED, FATIGUED OR LOW ON ENERGY: 0

## 2023-06-08 ASSESSMENT — ENCOUNTER SYMPTOMS
CLAUDICATION: 0
ORTHOPNEA: 0
PALPITATIONS: 0
FEVER: 0
PND: 0
DIZZINESS: 0
ABDOMINAL PAIN: 0
SHORTNESS OF BREATH: 0
MYALGIAS: 0
COUGH: 0

## 2023-06-08 ASSESSMENT — FIBROSIS 4 INDEX: FIB4 SCORE: 1.45

## 2023-06-08 NOTE — PROGRESS NOTES
Chief Complaint   Patient presents with    Atrial Fibrillation     F/V Dx: PAF (paroxysmal atrial fibrillation) (Formerly [facility])    Congestive Heart Failure     F/V DX: ACC/AHA stage C systolic heart failure (HCC)       Subjective     Martín Grijalva is a 67 y.o. male who presents today for follow up on his heart failure with his wife, Yvonne.     Patient of Dr. Walters and current patient of EP and the heart failure clinic.  He was last seen in HF clinic on 11/29/2022.  During his last visit, no changes were made during his last visit, he was sent for follow-up lab testing.    Patient feels well, denies chest pain, shortness of breath, palpitations, dizziness/lightheadedness, orthopnea, PND or Edema.     He reports he has been fishing recently, did get a little sunburn on his legs.    Patient reports he is signed up for patient assistance for his medications.    He continues to use his diuretic as needed.    His Home weights are ranging between 218-219 lbs.     He uses his CPAP regularly.    Additonally, patient has the following medical problems:    -hospitalization from 5/19/2022 through 5/22/2022.  Patient presented with congestion and shortness of breath.  Patient was found to have pulmonary edema with diffuse crackles and respiratory difficulties.  He was aggressively diuresed.  Repeat echo was performed which showed a LVEF down to 35%.    -Atrial fibrillation, s/p ablation on 6/25/2021, on Xarelto    -CAD, history of anterior MI in 2008, BMS to the LAD, LV thrombus    -JOSE, has CPAP    -Hypertension    Past Medical History:   Diagnosis Date    Acute on chronic clinical systolic heart failure (HCC) 5/19/2022    Arrhythmia 2020    A fib    Arthritis     fingers    CAD (coronary artery disease) 7/2/2012    Chronic anticoagulation 7/2/2012    Congestive heart failure (HCC)     Dyslipidemia 7/2/2012    Essential hypertension, benign 7/2/2012    High cholesterol     Insomnia     wakes up 2-5 times a night , wakes up to  use restroom and sometimes just wakes up     Ischemic cardiomyopathy 7/2/2012    Left ventricular thrombosis 7/2/2012    Myocardial infarct (HCC) 2006    Pericarditis 7/2/2012    Pneumonia     history of as a child    Sleep apnea     told by MD that he has it, scheduled for sleep study     Snoring     no sleep study done    SOB (shortness of breath)     no c/o today; on exertion; pt uses 2L o2 qhs only; pt does not know provider    Stented coronary artery 7/2/2012     Past Surgical History:   Procedure Laterality Date    SHOULDER DECOMPRESSION ARTHROSCOPIC Right 11/27/2018    Procedure: SHOULDER DECOMPRESSION ARTHROSCOPIC- SUBACROMIAL CONVERTED TO OPEN;  Surgeon: Ni Weir M.D.;  Location: SURGERY [facility];  Service: Orthopedics    SHOULDER ARTHROSCOPY W/ ROTATOR CUFF REPAIR Right 11/27/2018    Procedure: OPEN ROTATOR CUFF REPAIR;  Surgeon: Ni Weir M.D.;  Location: SURGERY [facility];  Service: Orthopedics    STENT PLACEMENT  2007    cardiac stent    CARDIAC CATH, LEFT HEART      CARPAL TUNNEL RELEASE Bilateral     OTHER      cardioversions x 2    VASECTOMY       Family History   Problem Relation Age of Onset    No Known Problems Mother     Other Father         abestos    Heart Disease Maternal Grandmother     No Known Problems Maternal Grandfather     No Known Problems Paternal Grandmother     Heart Disease Paternal Grandfather     No Known Problems Daughter     No Known Problems Son      Social History     Socioeconomic History    Marital status:      Spouse name: Not on file    Number of children: Not on file    Years of education: Not on file    Highest education level: Not on file   Occupational History    Not on file   Tobacco Use    Smoking status: Never    Smokeless tobacco: Former     Types: Chew   Vaping Use    Vaping Use: Never used   Substance and Sexual Activity    Alcohol use: Not Currently     Comment: occ    Drug use: Never    Sexual activity: Not on file    Other Topics Concern    Not on file   Social History Narrative    Not on file     Social Determinants of Health     Financial Resource Strain: Not on file   Food Insecurity: Not on file   Transportation Needs: Not on file   Physical Activity: Not on file   Stress: Not on file   Social Connections: Not on file   Intimate Partner Violence: Not on file   Housing Stability: Not on file     Allergies   Allergen Reactions    Ace Inhibitors Cough    Other Environmental      Hayfever     Outpatient Encounter Medications as of 6/8/2023   Medication Sig Dispense Refill    dapagliflozin propanediol (FARXIGA) 10 MG Tab Take 1 Tablet by mouth every day. 90 Tablet 1    budesonide-formoterol (SYMBICORT) 80-4.5 MCG/ACT Aerosol Inhale 2 Puffs 2 times a day. Use spacer. Rinse mouth after each use. 3 Each 3    carvedilol (COREG) 6.25 MG Tab TAKE 1 TABLET BY MOUTH TWICE DAILY WITH MEALS 180 Tablet 3    simvastatin (ZOCOR) 40 MG Tab Take 1 Tablet by mouth every day. 90 Tablet 3    rivaroxaban (XARELTO) 20 MG Tab tablet Take 1 Tablet by mouth with dinner. 90 Tablet 3    sacubitril-valsartan (ENTRESTO) 24-26 MG Tab Take 1 Tablet by mouth 2 times a day. 60 Tablet 0    levalbuterol (XOPENEX HFA) 45 MCG/ACT inhaler INHALE 1 TO 2 PUFFS BY MOUTH EVERY 4 HOURS AS NEEDED FOR SHORTNESS OF BREATH      FLUTICASONE PROPIONATE, NASAL, NA Administer  into affected nostril(S).      spironolactone (ALDACTONE) 25 MG Tab TAKE 1 TABLET BY MOUTH EVERY DAY 90 Tablet 3    torsemide (DEMADEX) 20 MG Tab Take 1 Tablet by mouth 1 time a day as needed (for weight gain, increased shortness of breath or swelling). 90 Tablet 3    [DISCONTINUED] dapagliflozin propanediol (FARXIGA) 10 MG Tab Take 1 Tablet by mouth every day. (Patient not taking: Reported on 6/8/2023) 30 Tablet 0     No facility-administered encounter medications on file as of 6/8/2023.     Review of Systems   Constitutional:  Negative for fever and malaise/fatigue.   Respiratory:  Negative for cough  "and shortness of breath.    Cardiovascular:  Negative for chest pain, palpitations, orthopnea, claudication, leg swelling and PND.   Gastrointestinal:  Negative for abdominal pain.   Musculoskeletal:  Negative for myalgias.   Neurological:  Negative for dizziness.   All other systems reviewed and are negative.             Objective     /64 (BP Location: Left arm, Patient Position: Sitting, BP Cuff Size: Adult)   Pulse 63   Resp 16   Ht 1.715 m (5' 7.5\")   Wt 103 kg (226 lb)   SpO2 95%   BMI 34.87 kg/m²     Physical Exam  Vitals reviewed.   Constitutional:       Appearance: He is well-developed. He is obese.   HENT:      Head: Normocephalic and atraumatic.   Eyes:      Pupils: Pupils are equal, round, and reactive to light.   Neck:      Vascular: No JVD.   Cardiovascular:      Rate and Rhythm: Normal rate and regular rhythm.      Heart sounds: Normal heart sounds.   Pulmonary:      Effort: Pulmonary effort is normal. No respiratory distress.      Breath sounds: Normal breath sounds. No wheezing or rales.   Abdominal:      General: Bowel sounds are normal.      Palpations: Abdomen is soft.   Musculoskeletal:         General: Normal range of motion.      Cervical back: Normal range of motion and neck supple.      Right lower leg: No edema.      Left lower leg: No edema.   Skin:     General: Skin is warm and dry.   Neurological:      General: No focal deficit present.      Mental Status: He is alert and oriented to person, place, and time.   Psychiatric:         Behavior: Behavior normal.       Lab Results   Component Value Date/Time    CHOLSTRLTOT 127 05/17/2023 06:13 AM    LDL 67 05/17/2023 06:13 AM    HDL 42 05/17/2023 06:13 AM    TRIGLYCERIDE 90 05/17/2023 06:13 AM       Lab Results   Component Value Date/Time    SODIUM 141 05/17/2023 06:13 AM    POTASSIUM 4.4 05/17/2023 06:13 AM    CHLORIDE 103 05/17/2023 06:13 AM    CO2 24 05/17/2023 06:13 AM    GLUCOSE 82 05/17/2023 06:13 AM    BUN 27 (H) 05/17/2023 " 06:13 AM    CREATININE 0.99 05/17/2023 06:13 AM    CREATININE 0.77 07/09/2012 07:21 AM    BUNCREATRAT 22 02/18/2021 09:32 AM    BUNCREATRAT 14 07/09/2012 07:21 AM     Lab Results   Component Value Date/Time    ALKPHOSPHAT 85 05/17/2023 06:13 AM    ASTSGOT 23 05/17/2023 06:13 AM    ALTSGPT 30 05/17/2023 06:13 AM    TBILIRUBIN 1.6 (H) 05/17/2023 06:13 AM        Transthoracic Echo Report 4/20/2017  No prior study is available for comparison.   Technically difficult study .   Mildly reduced left ventricular systolic function.  Left ventricular ejection fraction is visually estimated to be 50%.  No significant valve abnormalities.      Myocardial Perfusion Report 6/7/2019   NUCLEAR IMAGING INTERPRETATION   Abnormal Lexiscan myocardial perfusion study.   Large distal anterior and apical infarct.   No significant ischemia.   Mildly reduced LVSF with EF of 47%. Mid to distal anterior wall hypokinesis.   No ischemic changes with Regadenoson.   No chest pain.   ECG INTERPRETATION   Negative stress ECG for ischemia.     Transthoracic Echo Report 8/10/2020  Compared to the images of the study done 04- there has been a   reduciton isn septal contractility. The patient is also now in atrial   fib.   Moderately reduced left ventricular systolic function.  Left ventricular ejection fraction is visually estimated to be 40%.No   pericardial effusion seen.  Estimated right ventricular systolic pressure is  22 mmHg.     Thrombus is not observed in the left ventricular apex.  Diastolic function is difficult to assess with atrial fibrillation.  Akinesis of apex, septum and anterior wall.  Mildly dilated right ventricle.  Enlarged right atrium.  Mildly dilated left atrium.  Structurally normal mitral valve without significant stenosis or   regurgitation.  Structurally normal aortic valve without significant stenosis or   regurgitation.     Transesophageal Echo Report 9/24/2020  No thrombus detected in the left atrial  appendage.  Mildly reduced left ventricular systolic function.     Transthoracic Echo Report 4/19/2021  Compared to the images of the study done on 08/10/20 there has been a   slight reduction isn the EF  Contrast was used to enhance visualization of the endocardial border.  Left ventricular ejection fraction is visually estimated to be 35%.  Global hypokinesis with regional variation.  Diastolic function is difficult to assess with atrial fibrillation.  Modertely enlarged right ventricle.  Enlarged right atrium.  Severely dilated left atrium.  Trace mitral regurgitation.  Structurally normal aortic valve without significant stenosis or   regurgitation.  Estimated right ventricular systolic pressure  is 35 mmHg.  Normal pericardium without effusion.  .   Transesophageal Echo Report 6/25/2021  LVEF is severely depressed.  No evidence of ARCADIO thrombus.  Mild MR  No evidence of ASD or PFO     Transthoracic Echo Report 7/7/2021  Limited Echocardiogram:  No pericardial effusion seen.  Severely reduced left ventricular systolic function.  Left ventricular ejection fraction is visually estimated to be 25%.  Dilated right ventricle.     Compared to the images of the prior study done  6/25/2021: There has   been no significant change.      Transthoracic Echo Report 11/9/2021  Compared to prior echo on 07/07/2021 - LVEF has improved from 25% to   45%.  Normal left ventricular chamber size.  The left ventricular ejection fraction is visually estimated to be 45%.  Mild tricuspid regurgitation.  Right ventricular systolic pressure is estimated to be 44 mmHg.  Dilated inferior vena cava with inspiratory collapse.    Transthoracic Echo Report 5/19/2022  Fair quality study, improved with contrast.  The left ventricular ejection fraction is visually estimated to be 35%, moderately reduced.  Hypokinesis of the basal to mid anterior septum, basal inferior septum and entire apex.  Right ventricular systolic pressure is estimated to be  35 mmHg.     Compared to the prior echo on 11/09/2021, EF is further declined, wall motion abnormalities are now present.    Transthoracic Echo Report 11/3/2022-results reviewed  Compared to the prior study on 05/19/2022 - the LVEF has improved to near normal, previously 35%.   Mild concentric left ventricular hypertrophy.  The left ventricular ejection fraction is visually estimated to be 50-  55%.  The right ventricle is dilated.  Normal right ventricular systolic function.  Mild to moderate mitral regurgitation.  Dilated inferior vena cava with inspiratory collapse.  Estimated right ventricular systolic pressure is 43 mmHg.     6MWT MLWHF   5/27/2022  286 m walked 6 min  95   6/8/2023 -- 0                      Assessment & Plan     1. Chronic systolic heart failure (HCC)        2. ACC/AHA stage C systolic heart failure (HCC)        3. Heart failure, NYHA class 1 (HCC)        4. Coronary artery disease due to lipid rich plaque        5. PAF (paroxysmal atrial fibrillation) (HCC)        6. Stented coronary artery        7. H/O cardiac radiofrequency ablation        8. Ischemic cardiomyopathy        9. Dyslipidemia        10. Hypercoagulable state due to paroxysmal atrial fibrillation (HCC)        11. Obstructive sleep apnea syndrome        12. High risk medication use        13. Essential hypertension, benign            Medical Decision Making: Today's Assessment/Status/Plan:        HFrEF, Stage C, Class 1, LVEF 50-55%, as low as 25%: Based on physical examination findings, patient is euvolemic. No JVD, lungs are clear to auscultation, no pitting edema in bilateral lower extremities, no ascites.  -Heart failure due to likely mixed between atrial fibrillation and ischemic cardiomyopathy  -Recommend patient to continue GDMT  -ACE-I/ARB/ARNI: Continue Entresto 24-26 mg twice a day  -Evidence Based Beta-blocker: Continue carvedilol 6.25 mg twice a day  -Aldosterone Antagonist: continue spironolactone 25 mg  daily  -Diuretic: Continue torsemide 20 mg daily as needed for weight gain, increased shortness of breath or swelling  -SGLT2 inhibitor: Continue Farxiga 10 mg daily  -Labs: No labs due at this time  -No indication for ICD as LVEF greater than 35%  -Reinforced s/sx of worsening heart failure with patient and weight monitoring. Pt verbalizes understanding. Pt to call office or RTC if present.    -PUMP line number [phone] (PUMP) reviewed with patient  -Heart Failure Education: pt to be contacted by HF nurse for further education   -Advanced care planning: Advanced directive and POLST to be discussed at future visit  -Patient referred over to the pharmacotherapy program for further optimization of medical therapy     Atrial fibrillation:  -s/p ablation 6/25/2021, Repeat ablation on 8/1/2022  -Continue Xarelto 20 mg daily, patient on patient assistance    CAD, history of BMS stent to LAD in 2008:  -Continue simvastatin 40 mg daily  -Currently on Xarelto  -Recent LDL 67 on 5/17/2023    Sleep apnea:  -Continue CPAP use  -Followed by sleep medicine    Hypertension: Stable  -Recommendation per above  -Will follow    Patient reports she does have a follow-up with Dr. Rosas later this month.  At this time, he will be discharged from the heart failure clinic and he can follow-up as needed.  Patient to get back in with Dr. Walters in 6 months.  Patient can contact our office sooner if needed with problems.     Patient verbalizes understanding and agrees with the plan of care.     PLEASE NOTE: This Note was created using voice recognition Software. I have made every reasonable attempt to correct obvious errors, but I expect that there are errors of grammar and possibly content that I did not discover before finalizing the note

## 2023-06-12 PROCEDURE — RXMED WILLOW AMBULATORY MEDICATION CHARGE: Performed by: NURSE PRACTITIONER

## 2023-06-13 ENCOUNTER — PHARMACY VISIT (OUTPATIENT)
Dept: PHARMACY | Facility: MEDICAL CENTER | Age: 68
End: 2023-06-13
Payer: COMMERCIAL

## 2023-06-13 PROCEDURE — RXMED WILLOW AMBULATORY MEDICATION CHARGE: Performed by: NURSE PRACTITIONER

## 2023-06-21 ENCOUNTER — APPOINTMENT (OUTPATIENT)
Dept: CARDIOLOGY | Facility: MEDICAL CENTER | Age: 68
End: 2023-06-21
Payer: MEDICARE

## 2023-06-26 ENCOUNTER — OFFICE VISIT (OUTPATIENT)
Dept: CARDIOLOGY | Facility: MEDICAL CENTER | Age: 68
End: 2023-06-26
Attending: INTERNAL MEDICINE
Payer: MEDICARE

## 2023-06-26 VITALS
HEIGHT: 68 IN | OXYGEN SATURATION: 96 % | SYSTOLIC BLOOD PRESSURE: 100 MMHG | DIASTOLIC BLOOD PRESSURE: 62 MMHG | BODY MASS INDEX: 34.56 KG/M2 | HEART RATE: 54 BPM | RESPIRATION RATE: 16 BRPM | WEIGHT: 228 LBS

## 2023-06-26 DIAGNOSIS — I48.0 PAF (PAROXYSMAL ATRIAL FIBRILLATION) (HCC): ICD-10-CM

## 2023-06-26 PROCEDURE — 3074F SYST BP LT 130 MM HG: CPT | Performed by: INTERNAL MEDICINE

## 2023-06-26 PROCEDURE — 99212 OFFICE O/P EST SF 10 MIN: CPT | Performed by: INTERNAL MEDICINE

## 2023-06-26 PROCEDURE — 3078F DIAST BP <80 MM HG: CPT | Performed by: INTERNAL MEDICINE

## 2023-06-26 PROCEDURE — 99214 OFFICE O/P EST MOD 30 MIN: CPT | Performed by: INTERNAL MEDICINE

## 2023-06-26 PROCEDURE — 93005 ELECTROCARDIOGRAM TRACING: CPT | Performed by: INTERNAL MEDICINE

## 2023-06-26 PROCEDURE — 93010 ELECTROCARDIOGRAM REPORT: CPT | Performed by: INTERNAL MEDICINE

## 2023-06-26 ASSESSMENT — FIBROSIS 4 INDEX: FIB4 SCORE: 1.45

## 2023-06-26 NOTE — PROGRESS NOTES
Arrhythmia Clinic Note (Established patient)    DOS: 6/26/2023    Chief complaint/Reason for consult: Afib    Interval History: 67 y/o M with perAF and HF with improvement in EF in NSR. 2 ablation, last one last year, NSR since, recovered EF. Discharged from HF clinic. No complaints.     ROS (+ highlighted in bold):  Constitutional: Fevers/chills/fatigue/weightloss  HEENT: Blurry vision/eye pain/sore throat/hearing loss  Respiratory: Shortness of breath/cough  Cardiovascular: Chest pain/palpitations/edema/orthopnea/syncope  GI: Nausea/vomitting/diarrhea  MSK: Arthralgias/myagias/muscle weakness  Skin: Rash/sores  Neurological: Numbness/tremors/vertigo  Endocrine: Excessive thirst/polyuria/cold intolerance/heat intolerance  Psych: Depression/anxiety    Past Medical History:   Diagnosis Date    Acute on chronic clinical systolic heart failure (HCC) 5/19/2022    Arrhythmia 2020    A fib    Arthritis     fingers    CAD (coronary artery disease) 7/2/2012    Chronic anticoagulation 7/2/2012    Congestive heart failure (HCC)     Dyslipidemia 7/2/2012    Essential hypertension, benign 7/2/2012    High cholesterol     Insomnia     wakes up 2-5 times a night , wakes up to use restroom and sometimes just wakes up     Ischemic cardiomyopathy 7/2/2012    Left ventricular thrombosis 7/2/2012    Myocardial infarct (HCC) 2006    Pericarditis 7/2/2012    Pneumonia     history of as a child    Sleep apnea     told by MD that he has it, scheduled for sleep study     Snoring     no sleep study done    SOB (shortness of breath)     no c/o today; on exertion; pt uses 2L o2 qhs only; pt does not know provider    Stented coronary artery 7/2/2012       Past Surgical History:   Procedure Laterality Date    SHOULDER DECOMPRESSION ARTHROSCOPIC Right 11/27/2018    Procedure: SHOULDER DECOMPRESSION ARTHROSCOPIC- SUBACROMIAL CONVERTED TO OPEN;  Surgeon: Ni Weir M.D.;  Location: SURGERY [facility];  Service: Orthopedics     SHOULDER ARTHROSCOPY W/ ROTATOR CUFF REPAIR Right 11/27/2018    Procedure: OPEN ROTATOR CUFF REPAIR;  Surgeon: Ni Weir M.D.;  Location: SURGERY [facility];  Service: Orthopedics    STENT PLACEMENT  2007    cardiac stent    CARDIAC CATH, LEFT HEART      CARPAL TUNNEL RELEASE Bilateral     OTHER      cardioversions x 2    VASECTOMY         Social History     Socioeconomic History    Marital status:      Spouse name: Not on file    Number of children: Not on file    Years of education: Not on file    Highest education level: Not on file   Occupational History    Not on file   Tobacco Use    Smoking status: Never    Smokeless tobacco: Former     Types: Chew   Vaping Use    Vaping Use: Never used   Substance and Sexual Activity    Alcohol use: Not Currently     Comment: occ    Drug use: Never    Sexual activity: Not on file   Other Topics Concern    Not on file   Social History Narrative    Not on file     Social Determinants of Health     Financial Resource Strain: Not on file   Food Insecurity: Not on file   Transportation Needs: Not on file   Physical Activity: Not on file   Stress: Not on file   Social Connections: Not on file   Intimate Partner Violence: Not on file   Housing Stability: Not on file       Family History   Problem Relation Age of Onset    No Known Problems Mother     Other Father         abestos    Heart Disease Maternal Grandmother     No Known Problems Maternal Grandfather     No Known Problems Paternal Grandmother     Heart Disease Paternal Grandfather     No Known Problems Daughter     No Known Problems Son        Allergies   Allergen Reactions    Ace Inhibitors Cough    Other Environmental      Hayfever       Current Outpatient Medications   Medication Sig Dispense Refill    dapagliflozin propanediol (FARXIGA) 10 MG Tab Take 1 Tablet by mouth every day. 90 Tablet 1    budesonide-formoterol (SYMBICORT) 80-4.5 MCG/ACT Aerosol Inhale 2 Puffs 2 times a day. Use spacer. Rinse  "mouth after each use. 3 Each 3    carvedilol (COREG) 6.25 MG Tab TAKE 1 TABLET BY MOUTH TWICE DAILY WITH MEALS 180 Tablet 3    simvastatin (ZOCOR) 40 MG Tab Take 1 Tablet by mouth every day. 90 Tablet 3    rivaroxaban (XARELTO) 20 MG Tab tablet Take 1 Tablet by mouth with dinner. 90 Tablet 3    sacubitril-valsartan (ENTRESTO) 24-26 MG Tab Take 1 Tablet by mouth 2 times a day. 60 Tablet 0    levalbuterol (XOPENEX HFA) 45 MCG/ACT inhaler INHALE 1 TO 2 PUFFS BY MOUTH EVERY 4 HOURS AS NEEDED FOR SHORTNESS OF BREATH      spironolactone (ALDACTONE) 25 MG Tab TAKE 1 TABLET BY MOUTH EVERY DAY 90 Tablet 3    torsemide (DEMADEX) 20 MG Tab Take 1 Tablet by mouth 1 time a day as needed (for weight gain, increased shortness of breath or swelling). 90 Tablet 3     No current facility-administered medications for this visit.       Physical Exam:  Vitals:    06/26/23 0839   BP: 100/62   BP Location: Left arm   Patient Position: Sitting   BP Cuff Size: Adult   Pulse: (!) 54   Resp: 16   SpO2: 96%   Weight: 103 kg (228 lb)   Height: 1.715 m (5' 7.5\")     General appearance: NAD, conversant   Eyes: anicteric sclerae, moist conjunctivae; no lid-lag; PERRLA  HENT: Atraumatic; oropharynx clear with moist mucous membranes and no mucosal ulcerations; normal hard and soft palate  Neck: Trachea midline; FROM, supple, no thyromegaly or lymphadenopathy  Lungs: CTA, with normal respiratory effort and no intercostal retractions  CV: RRR, no MRGs, no JVD  Abdomen: Soft, non-tender; no masses or HSM  Extremities: No peripheral edema or extremity lymphadenopathy  Skin: Normal temperature, turgor and texture; no rash, ulcers or subcutaneous nodules  Psych: Appropriate affect, alert and oriented to person, place and time    Data:  Lipids:   Lab Results   Component Value Date/Time    CHOLSTRLTOT 127 05/17/2023 06:13 AM    TRIGLYCERIDE 90 05/17/2023 06:13 AM    HDL 42 05/17/2023 06:13 AM    LDL 67 05/17/2023 06:13 AM        BMP:  Lab Results "   Component Value Date/Time    SODIUM 141 05/17/2023 0613    POTASSIUM 4.4 05/17/2023 0613    CHLORIDE 103 05/17/2023 0613    CO2 24 05/17/2023 0613    GLUCOSE 82 05/17/2023 0613    BUN 27 (H) 05/17/2023 0613    CREATININE 0.99 05/17/2023 0613    CALCIUM 9.0 05/17/2023 0613    ANION 14.0 05/17/2023 0613        TSH:   Lab Results   Component Value Date/Time    TSHULTRASEN 1.080 05/19/2022 0555        THYROXINE (T4):   No results found for: FREEDIR     CBC:   Lab Results   Component Value Date/Time    WBC 7.0 05/17/2023 06:13 AM    RBC 4.97 05/17/2023 06:13 AM    HEMOGLOBIN 16.2 05/17/2023 06:13 AM    HEMATOCRIT 48.9 05/17/2023 06:13 AM    MCV 98.4 (H) 05/17/2023 06:13 AM    MCH 32.6 05/17/2023 06:13 AM    MCHC 33.1 (L) 05/17/2023 06:13 AM    RDW 47.0 05/17/2023 06:13 AM    PLATELETCT 197 05/17/2023 06:13 AM    MPV 11.2 05/17/2023 06:13 AM    NEUTSPOLYS 62.20 05/17/2023 06:13 AM    LYMPHOCYTES 23.80 05/17/2023 06:13 AM    MONOCYTES 11.20 05/17/2023 06:13 AM    EOSINOPHILS 1.80 05/17/2023 06:13 AM    BASOPHILS 0.70 05/17/2023 06:13 AM    IMMGRAN 0.30 05/17/2023 06:13 AM    IMMGRAN 0 02/22/2019 07:06 AM    NRBC 0.00 05/17/2023 06:13 AM    NEUTS 4.37 05/17/2023 06:13 AM    NEUTS 7.7 (H) 02/22/2019 07:06 AM    LYMPHS 1.67 05/17/2023 06:13 AM    LYMPHS 2.1 02/22/2019 07:06 AM    MONOS 0.79 05/17/2023 06:13 AM    MONOS 0.8 02/22/2019 07:06 AM    EOS 0.13 05/17/2023 06:13 AM    EOS 0.2 02/22/2019 07:06 AM    BASO 0.05 05/17/2023 06:13 AM    BASO 0.0 02/22/2019 07:06 AM    IMMGRANAB 0.02 05/17/2023 06:13 AM    IMMGRANAB 0.0 02/22/2019 07:06 AM    NRBCAB 0.00 05/17/2023 06:13 AM        CBC w/o DIFF  Lab Results   Component Value Date/Time    WBC 7.0 05/17/2023 06:13 AM    RBC 4.97 05/17/2023 06:13 AM    HEMOGLOBIN 16.2 05/17/2023 06:13 AM    MCV 98.4 (H) 05/17/2023 06:13 AM    MCH 32.6 05/17/2023 06:13 AM    MCHC 33.1 (L) 05/17/2023 06:13 AM    RDW 47.0 05/17/2023 06:13 AM    MPV 11.2 05/17/2023 06:13 AM       Prior  echo/stress reviewed: Normal EF    EKG interpreted by me: NSR    Impression/Plan:  1. PAF (paroxysmal atrial fibrillation) (HCC)  EKG        AF s/p ablation  NICM EF now recovered  Hypercoagulable state due to afib    - Continue Xarelto  - Personally I think he can probably be off Entresto and Farxiga as his cardiomyopathy was due to arrhythmia and corrected with ablation. HF clinic kept him on these meds but have since discharged him. I will defer to Dr Dean but my preference would be to try coming off these meds and re-check an echo to see if there is any decline in EF    FV annual    Conrado Rosas MD  Cardiac Electrophysiology

## 2023-07-06 DIAGNOSIS — I50.22 CHRONIC SYSTOLIC HEART FAILURE (HCC): ICD-10-CM

## 2023-07-10 RX ORDER — SACUBITRIL AND VALSARTAN 24; 26 MG/1; MG/1
1 TABLET, FILM COATED ORAL 2 TIMES DAILY
Qty: 180 TABLET | Refills: 3 | Status: SHIPPED | OUTPATIENT
Start: 2023-07-10

## 2023-07-10 NOTE — TELEPHONE ENCOUNTER
Is the patient due for a refill? Yes    Was the patient seen the past year? Yes    Date of last office visit: 06/26/23    Does the patient have an upcoming appointment?  Yes   If yes, When? 12/12/23    Provider to refill:ELY    Does the patients insurance require a 100 day supply?  No

## 2023-07-13 PROCEDURE — RXMED WILLOW AMBULATORY MEDICATION CHARGE: Performed by: NURSE PRACTITIONER

## 2023-07-17 ENCOUNTER — PHARMACY VISIT (OUTPATIENT)
Dept: PHARMACY | Facility: MEDICAL CENTER | Age: 68
End: 2023-07-17
Payer: COMMERCIAL

## 2023-07-18 ENCOUNTER — DOCUMENTATION (OUTPATIENT)
Dept: PHARMACY | Facility: MEDICAL CENTER | Age: 68
End: 2023-07-18
Payer: MEDICARE

## 2023-07-18 NOTE — PROGRESS NOTES
I S/W BAKARI ABOUT RF FOR ENTRESTO 60/30DS $0.  07/17.  PATIENT STATED THEY ARE DOING WELL ON MEDICATION. NO MISSED DOSES.  APPROX 5DS ON HAND.  NO QUESTIONS FOR RPH.  BAKARI APPRECIATED CALL FOR FOLLOW UP/REFILL

## 2023-08-21 DIAGNOSIS — I50.9 ACUTE ON CHRONIC CONGESTIVE HEART FAILURE, UNSPECIFIED HEART FAILURE TYPE (HCC): ICD-10-CM

## 2023-08-22 PROCEDURE — RXMED WILLOW AMBULATORY MEDICATION CHARGE: Performed by: NURSE PRACTITIONER

## 2023-08-22 RX ORDER — SPIRONOLACTONE 25 MG/1
25 TABLET ORAL DAILY
Qty: 90 TABLET | Refills: 3 | Status: SHIPPED | OUTPATIENT
Start: 2023-08-22

## 2023-08-23 ENCOUNTER — PHARMACY VISIT (OUTPATIENT)
Dept: PHARMACY | Facility: MEDICAL CENTER | Age: 68
End: 2023-08-23
Payer: COMMERCIAL

## 2023-08-24 LAB — EKG IMPRESSION: NORMAL

## 2023-09-25 PROCEDURE — RXMED WILLOW AMBULATORY MEDICATION CHARGE: Performed by: NURSE PRACTITIONER

## 2023-09-27 ENCOUNTER — PHARMACY VISIT (OUTPATIENT)
Dept: PHARMACY | Facility: MEDICAL CENTER | Age: 68
End: 2023-09-27
Payer: COMMERCIAL

## 2023-10-23 PROCEDURE — RXMED WILLOW AMBULATORY MEDICATION CHARGE: Performed by: NURSE PRACTITIONER

## 2023-10-24 ENCOUNTER — PHARMACY VISIT (OUTPATIENT)
Dept: PHARMACY | Facility: MEDICAL CENTER | Age: 68
End: 2023-10-24
Payer: COMMERCIAL

## 2023-10-27 ENCOUNTER — TELEPHONE (OUTPATIENT)
Dept: PHARMACY | Facility: MEDICAL CENTER | Age: 68
End: 2023-10-27
Payer: MEDICARE

## 2023-10-27 NOTE — TELEPHONE ENCOUNTER
"Medication: Entresto 24-26mg Tabs was dispensed on 10/24 per [place] and adherence questions could not be answered at this time due to patient unavailable for phone call \"unable to verify if pt received delivery and complete adherence questions\" unable to verify is patient is still taking Farxiga or if it's been d/c. (not on TRx card)  "

## 2023-10-30 NOTE — PROGRESS NOTES
Is This A New Presentation, Or A Follow-Up?: Skin Lesion Subjective     Martín Grijalva is a 67 y.o. male who presents with Medication Refill (Doxycycline hyc 100 mcg pt feels like it was working great but not fully. )            HPI    This is a recurrent problem.  The patient presents to clinic complaining of continued/recurrent URI-like symptoms.  The patient was recently seen in clinic on 5/27/2022 for URI-like symptoms.  The patient states he was prescribed Doxycycline at that time for a respiratory tract infection and sinus infection.  The patient states his symptoms drastically improved while taking the antibiotic.  The patient recently completed the antibiotic x2 days ago on 6/2/2022.  The patient states that since completing the antibiotic he is developing some recurrent symptoms.  The patient states he is experiencing a slight cough and a runny nose.  The patient reports no associated fever.  No ear pain.  No sore throat.  No chest pain.  No shortness of breath.  The patient has not taken any OTC medications for his current symptoms.    PMH:  has a past medical history of Arrhythmia (2020), Arthritis, CAD (coronary artery disease) (7/2/2012), Chronic anticoagulation (7/2/2012), Congestive heart failure (HCC), Dyslipidemia (7/2/2012), Essential hypertension, benign (7/2/2012), High cholesterol, Insomnia, Ischemic cardiomyopathy (7/2/2012), Left ventricular thrombosis (7/2/2012), Myocardial infarct (HCC) (2006), Pericarditis (7/2/2012), Pneumonia, Sleep apnea, Snoring, SOB (shortness of breath), and Stented coronary artery (7/2/2012).    He has no past medical history of Apnea, sleep, Daytime sleepiness, Gasping for breath, or Morning headache.  MEDS:   Current Outpatient Medications:   •  sacubitril-valsartan (ENTRESTO) 24-26 MG Tab tablet, Take 1 Tablet by mouth 2 times a day., Disp: 60 Tablet, Rfl: 11  •  torsemide (DEMADEX) 20 MG Tab, Take 1 Tablet by mouth 1 time a day as needed (for weight gain, increased shortness of breath or swelling)., Disp: 90  How Severe Is Your Skin Cancer?: moderate Tablet, Rfl: 3  •  potassium chloride SA (KDUR) 20 MEQ Tab CR, Take 1 Tablet by mouth 2 times a day., Disp: 180 Tablet, Rfl: 0  •  fluticasone (FLONASE) 50 MCG/ACT nasal spray, Administer 1 Spray into affected nostril(S) 2 times a day., Disp: 16 g, Rfl: 0  •  spironolactone (ALDACTONE) 25 MG Tab, Take 1 Tablet by mouth every day., Disp: 30 Tablet, Rfl: 0  •  Dextromethorphan-GG-APAP (CORICIDIN HBP COLD/COUGH/FLU PO), Take 30 mL by mouth every four hours as needed., Disp: , Rfl:   •  Spacer/Aero-Holding Chambers (VALVED HOLDING CHAMBER) Device, USE AS DIRECTED WITH INHALER, Disp: 1 Each, Rfl: 5  •  rivaroxaban (XARELTO) 20 MG Tab tablet, Take 1 Tablet by mouth with dinner., Disp: 90 Tablet, Rfl: 3  •  simvastatin (ZOCOR) 40 MG Tab, Take 1 Tablet by mouth every day., Disp: 90 Tablet, Rfl: 3  •  levalbuterol (XOPENEX HFA) 45 MCG/ACT inhaler, Inhale 1-2 Puffs every four hours as needed for Shortness of Breath., Disp: 1 Each, Rfl: 5  •  carvedilol (COREG) 6.25 MG Tab, TAKE 1 TABLET BY MOUTH TWICE DAILY WITH MEALS (Patient taking differently: Take 6.25 mg by mouth 2 times a day.), Disp: 180 Tablet, Rfl: 3  ALLERGIES:   Allergies   Allergen Reactions   • Ace Inhibitors Cough   • Other Environmental      Hayfever     SURGHX:   Past Surgical History:   Procedure Laterality Date   • SHOULDER DECOMPRESSION ARTHROSCOPIC Right 11/27/2018    Procedure: SHOULDER DECOMPRESSION ARTHROSCOPIC- SUBACROMIAL CONVERTED TO OPEN;  Surgeon: Ni Weir M.D.;  Location: SURGERY [facility];  Service: Orthopedics   • SHOULDER ARTHROSCOPY W/ ROTATOR CUFF REPAIR Right 11/27/2018    Procedure: OPEN ROTATOR CUFF REPAIR;  Surgeon: Ni Weir M.D.;  Location: SURGERY [facility];  Service: Orthopedics   • CARDIAC CATH, LEFT HEART     • CARPAL TUNNEL RELEASE Bilateral    • STENT PLACEMENT      cardiac stent   • VASECTOMY       SOCHX:  reports that he has never smoked. He has quit using smokeless tobacco.  His smokeless  "tobacco use included chew. He reports previous alcohol use. He reports that he does not use drugs.  FH: Family history was reviewed, no pertinent findings to report      Review of Systems   Constitutional: Negative for fever.   HENT: Positive for congestion. Negative for ear pain and sore throat.    Eyes: Negative for discharge and redness.   Respiratory: Positive for cough. Negative for shortness of breath.    Cardiovascular: Negative for chest pain.   Gastrointestinal: Negative for nausea and vomiting.   Musculoskeletal: Negative for myalgias.   Neurological: Negative for headaches.              Objective     /86   Pulse (!) 122   Temp 36.7 °C (98.1 °F) (Tympanic)   Resp 16   Ht 1.753 m (5' 9\")   Wt 98.4 kg (217 lb)   SpO2 97%   BMI 32.05 kg/m²      Physical Exam  Constitutional:       General: He is not in acute distress.     Appearance: Normal appearance. He is not ill-appearing.   HENT:      Head: Normocephalic and atraumatic.      Right Ear: External ear normal.      Left Ear: External ear normal.      Nose: Nose normal.      Mouth/Throat:      Mouth: Mucous membranes are moist.      Pharynx: Oropharynx is clear. No posterior oropharyngeal erythema.   Eyes:      Extraocular Movements: Extraocular movements intact.      Conjunctiva/sclera: Conjunctivae normal.   Cardiovascular:      Rate and Rhythm: Normal rate and regular rhythm.      Heart sounds: Normal heart sounds.   Pulmonary:      Effort: Pulmonary effort is normal. No respiratory distress.      Breath sounds: Normal breath sounds. No wheezing.   Musculoskeletal:         General: Normal range of motion.      Cervical back: Normal range of motion and neck supple.   Skin:     General: Skin is warm and dry.   Neurological:      Mental Status: He is alert and oriented to person, place, and time.              Progress:  Recheck:  HR: 78              Assessment & Plan          1. Acute rhinosinusitis  - doxycycline (VIBRAMYCIN) 100 MG Tab; Take 1 " Tablet by mouth 2 times a day for 5 days.  Dispense: 10 Tablet; Refill: 0    The patient's presenting symptoms and physical exam endings are consistent with acute rhinosinusitis.  The patient's physical exam today in clinic was normal.  The patient's lungs were clear to auscultation without wheezing or rhonchi, and his pulse ox was within normal limits.  The patient appears in no acute distress.  The patient's vital signs are stable and within normal limits.  The patient's heart rate was initially elevated upon arrival, but returned within normal limits on examination.  He is afebrile today in clinic.  Based on the patient's presenting symptoms, will extend the patient's Doxycycline for an additional 5 days for a total of 10 days.  However, advised the patient that his recurrent symptoms could be viral in nature.  Advised the patient to monitor for worsening signs or symptoms.  Recommend OTC medications and supportive care for symptomatic management.  Recommend patient follow-up with his PCP as needed.  Discussed return precautions with the patient, and he verbalized understanding.    Differential diagnoses, supportive care, and indications for immediate follow-up discussed with patient.   Instructed to return to clinic or nearest emergency department for any change in condition, further concerns, or worsening of symptoms.    OTC Tylenol for fever/discomfort.  OTC cough/cold medication for symptomatic relief  OTC antihistamines for symptomatic relief  OTC Flonase for symptomatic relief  OTC Supportive Care for Congestion - saline nasal spray or neti pot  Drink plenty of fluids  Follow-up with PCP  Return to clinic or go to the ED if symptoms worsen or fail to improve, or if the patient should develop worsening/increasing sinus pain/pressure, congestion, ear pain, sore throat, headache, cough, shortness of breath, wheezing, chest pain, fever/chills, and/or any concerning symptoms.    Discussed plan with the patient,  and he agrees to the above.    I personally reviewed prior external notes and test results pertinent to today's visit.  I have independently reviewed and interpreted all diagnostics ordered during this urgent care visit.       Please note that this dictation was created using voice recognition software. I have made every reasonable attempt to correct obvious errors, but I expect that there may be errors of grammar and possibly content that I did not discover before finalizing the note.     This note was electronically signed by Lisy Figueroa PA-C

## 2023-11-15 ENCOUNTER — TELEPHONE (OUTPATIENT)
Dept: HEALTH INFORMATION MANAGEMENT | Facility: OTHER | Age: 68
End: 2023-11-15
Payer: MEDICARE

## 2023-11-21 PROCEDURE — RXMED WILLOW AMBULATORY MEDICATION CHARGE: Performed by: NURSE PRACTITIONER

## 2023-11-22 ENCOUNTER — PHARMACY VISIT (OUTPATIENT)
Dept: PHARMACY | Facility: MEDICAL CENTER | Age: 68
End: 2023-11-22
Payer: COMMERCIAL

## 2023-11-24 ENCOUNTER — TELEPHONE (OUTPATIENT)
Dept: PHARMACY | Facility: MEDICAL CENTER | Age: 68
End: 2023-11-24
Payer: MEDICARE

## 2023-11-24 NOTE — TELEPHONE ENCOUNTER
Contact:    [id number]    Martín Grijalva    Phone number: [phone]    Name of person spoken with and relationship to patient: Martín, self   Patient’s Adherence:            How patient is doing on medication:  well    How many missed doses and reason: 0 (unable to determine)    Any new medications: no    Any new conditions: no    Any new allergies: no    Any new side effects: no     Any new diagnoses: no     How many doses remainin (unable to determine)    Did patient want to speak with pharmacist: no  Delivery:            Delivery date and method:  patient already received on . Adherence questions by default    Needs by Date:  na    Signature required:  no    Any additional details for :  [place]  Teach Appointment Date:  2023  Shipping Address: [address] [place] [address]   Medication(name, strength and dose):  Entresto 24-26 MG Tabs bid  Copay: $0  Payment Method: na  Supplies:  na  Additional info:  2nd attempt: Medication: Entresto 24-26 MG Tabs  was dispensed on  per [place] and adherence questions could not be answered at this time due to patient unavailable for phone call . unable to verify if pt received delivery and complete adherence questions / unable to determine how many on hand prior to receiving. set next call back date appropriately.

## 2023-12-04 ENCOUNTER — TELEPHONE (OUTPATIENT)
Dept: PHARMACY | Facility: MEDICAL CENTER | Age: 68
End: 2023-12-04
Payer: MEDICARE

## 2023-12-04 NOTE — TELEPHONE ENCOUNTER
dapagliflozin propanediol (FARXIGA) 10 MG Tab      FA Renewal      obtained FA application form. need pt designated contact. need pt income and household size. today's date, need pt and provider signature. need pt and provider signature.     Emailed designated liaisons FA form

## 2023-12-04 NOTE — TELEPHONE ENCOUNTER
"rivaroxaban (XARELTO) 20 MG Tab      FA Renewal       obtained FA application Form. need pt income and household size, need \"need by date\". need list of current pt meds, need provider state license exp. need provider Transaction access number, need pt and provider signature.     Emailed designated liaisons FA form  "

## 2023-12-04 NOTE — TELEPHONE ENCOUNTER
Called Pt @ [phone] and unable to LVM.     Called Pt's wife, Yvonne @ [phone] and LVM. Will await for Pt's call back.     JOHAN Jeffers, PhT  Pharmacy Liaison (Rx Coordinator)  P: [phone]  12/4/2023 2:35 PM

## 2023-12-18 ENCOUNTER — TELEPHONE (OUTPATIENT)
Dept: PHARMACY | Facility: MEDICAL CENTER | Age: 68
End: 2023-12-18
Payer: MEDICARE

## 2023-12-18 NOTE — TELEPHONE ENCOUNTER
FARXIGA 10MG TABLETS     FA Renewal     Obtained FA application form. Designated Contact, Income Information: HHI, HHS, MBI # (cannot find her red/white/blue card), Patient Authorization, today date, need pt and provider signature. Emailed designated liaisons FA form.

## 2023-12-19 ENCOUNTER — TELEPHONE (OUTPATIENT)
Dept: PHARMACY | Facility: MEDICAL CENTER | Age: 68
End: 2023-12-19
Payer: MEDICARE

## 2023-12-19 PROCEDURE — RXMED WILLOW AMBULATORY MEDICATION CHARGE: Performed by: NURSE PRACTITIONER

## 2023-12-20 NOTE — TELEPHONE ENCOUNTER
Contact: Incoming Call    Phone number: [phone], Mobile    Name of person spoken with and relationship to patient: Martín Grijalva, Self   Patient’s Adherence:      How patient is doing on medication: Good    How many missed doses and reason: 0    Any new medications: No    Any new conditions: No    Any new allergies: No    Any new side effects: No    Any new diagnoses: No    How many doses remainin-9 tablets    Did patient want to speak with pharmacist: No  Delivery:      Delivery date and method:  via     Needs by Date:     Signature required: No    Any additional details for : None   Teach Appointment Date: 2023  Shipping Address: [address] [place] [address]  Medication (name, strength and dose): Entresto 24-26mg tabs  Copay: $0  Payment Method: N/A, $0 copay   Supplies: None  Additional Information: Next call date: .

## 2023-12-21 ENCOUNTER — PHARMACY VISIT (OUTPATIENT)
Dept: PHARMACY | Facility: MEDICAL CENTER | Age: 68
End: 2023-12-21
Payer: COMMERCIAL

## 2023-12-28 NOTE — TELEPHONE ENCOUNTER
Medication: Farxiga 10 mg tab  Type of Insurance: Commercial  Type of Financial assistance requested MAP/Free Drug  Source: [place]  Source Phone #: [phone]  Outcome: Approved   Effective dates: 1/1/24 until 12/31/24  Final Copay: $0

## 2024-01-04 DIAGNOSIS — E78.5 HYPERLIPIDEMIA, UNSPECIFIED HYPERLIPIDEMIA TYPE: ICD-10-CM

## 2024-01-04 RX ORDER — SIMVASTATIN 40 MG
40 TABLET ORAL
Qty: 90 TABLET | Refills: 1 | Status: SHIPPED | OUTPATIENT
Start: 2024-01-04

## 2024-01-04 NOTE — TELEPHONE ENCOUNTER
Is the patient due for a refill? Yes    Was the patient seen the past year? Yes    Date of last office visit: 06/26/2023    Does the patient have an upcoming appointment?  Yes   If yes, When? 01/30/2024    Provider to refill:ELY    Does the patients insurance require a 100 day supply?  No

## 2024-01-17 ENCOUNTER — TELEPHONE (OUTPATIENT)
Dept: PHARMACY | Facility: MEDICAL CENTER | Age: 69
End: 2024-01-17
Payer: MEDICARE

## 2024-01-17 PROCEDURE — RXMED WILLOW AMBULATORY MEDICATION CHARGE: Performed by: NURSE PRACTITIONER

## 2024-01-17 NOTE — TELEPHONE ENCOUNTER
Contact: [id number]    Martín Grijalva       Phone number: [phone]    Name of person spoken with and relationship to patient: Martín, self   Patient’s Adherence:            How patient is doing on medication:  well    How many missed doses and reason: 0    Any new medications: no    Any new conditions: no    Any new allergies: no    Any new side effects: no     Any new diagnoses: no     How many doses remaining:  Entresto (approx 5 days -qty #10), Farxiga  approx 10 days)    Did patient want to speak with pharmacist: no  Delivery:            Delivery date and method:  1/19     Needs by Date:  1/19    Signature required:  no    Any additional details for :  Knock first. If no answer, please leave inside screen door  Teach Appointment Date:  06/12/2023  Shipping Address:  [address] [place] [address]  Medication(name, strength and dose):  Entresto 24-26 MG Tabs bid , Farxiga 10 MG Tabs qd  Copay: $0  Payment Method: na  Supplies:  na  Additional info:  DEREK Resendiz. He stated doing well on the medication. He stated he has  slightly less than a week on Entresto and slightly more than a week on the Farxiga, so set delivery for Friday 1/19 to ensure he won't run out of the Entresto over the weekend. No further questions/concerns at this time

## 2024-01-19 ENCOUNTER — PHARMACY VISIT (OUTPATIENT)
Dept: PHARMACY | Facility: MEDICAL CENTER | Age: 69
End: 2024-01-19
Payer: COMMERCIAL

## 2024-01-30 ENCOUNTER — TELEPHONE (OUTPATIENT)
Dept: CARDIOLOGY | Facility: MEDICAL CENTER | Age: 69
End: 2024-01-30
Payer: MEDICARE

## 2024-01-30 ENCOUNTER — OFFICE VISIT (OUTPATIENT)
Dept: CARDIOLOGY | Facility: MEDICAL CENTER | Age: 69
End: 2024-01-30
Attending: INTERNAL MEDICINE
Payer: MEDICARE

## 2024-01-30 VITALS
BODY MASS INDEX: 35.01 KG/M2 | WEIGHT: 231 LBS | OXYGEN SATURATION: 96 % | RESPIRATION RATE: 16 BRPM | HEART RATE: 64 BPM | DIASTOLIC BLOOD PRESSURE: 62 MMHG | HEIGHT: 68 IN | SYSTOLIC BLOOD PRESSURE: 94 MMHG

## 2024-01-30 DIAGNOSIS — Z98.890 H/O CARDIAC RADIOFREQUENCY ABLATION: ICD-10-CM

## 2024-01-30 DIAGNOSIS — I50.22 CHRONIC SYSTOLIC HEART FAILURE (HCC): ICD-10-CM

## 2024-01-30 DIAGNOSIS — I25.5 ISCHEMIC CARDIOMYOPATHY: Chronic | ICD-10-CM

## 2024-01-30 DIAGNOSIS — Z79.01 ON CONTINUOUS ORAL ANTICOAGULATION: ICD-10-CM

## 2024-01-30 DIAGNOSIS — I48.19 PERSISTENT ATRIAL FIBRILLATION (HCC): ICD-10-CM

## 2024-01-30 DIAGNOSIS — Z95.5 STENTED CORONARY ARTERY: Chronic | ICD-10-CM

## 2024-01-30 DIAGNOSIS — I25.83 CORONARY ARTERY DISEASE DUE TO LIPID RICH PLAQUE: ICD-10-CM

## 2024-01-30 DIAGNOSIS — I25.10 CORONARY ARTERY DISEASE DUE TO LIPID RICH PLAQUE: ICD-10-CM

## 2024-01-30 PROBLEM — I48.0 PAF (PAROXYSMAL ATRIAL FIBRILLATION) (HCC): Status: RESOLVED | Noted: 2021-02-04 | Resolved: 2024-01-30

## 2024-01-30 PROCEDURE — RXMED WILLOW AMBULATORY MEDICATION CHARGE: Performed by: INTERNAL MEDICINE

## 2024-01-30 PROCEDURE — 99212 OFFICE O/P EST SF 10 MIN: CPT | Performed by: INTERNAL MEDICINE

## 2024-01-30 PROCEDURE — 3074F SYST BP LT 130 MM HG: CPT | Performed by: INTERNAL MEDICINE

## 2024-01-30 PROCEDURE — 99215 OFFICE O/P EST HI 40 MIN: CPT | Performed by: INTERNAL MEDICINE

## 2024-01-30 PROCEDURE — 3078F DIAST BP <80 MM HG: CPT | Performed by: INTERNAL MEDICINE

## 2024-01-30 RX ORDER — TORSEMIDE 20 MG/1
20 TABLET ORAL
Qty: 90 TABLET | Refills: 3 | Status: SHIPPED | OUTPATIENT
Start: 2024-01-30

## 2024-01-30 ASSESSMENT — FIBROSIS 4 INDEX: FIB4 SCORE: 1.45

## 2024-01-30 NOTE — TELEPHONE ENCOUNTER
Contact:  Phone number:[phone] (home)    Name of person spoken with and relationship to patient: Self   Patient’s Adherence:  How patient is doing on medication: Well    How many missed doses and reason: 0 N/A    Any new medications: No    Any new conditions: No    Any new allergies: No    Any new side effects: No    Any new diagnoses: No    How many doses remaining: n/a    Did patient want to speak with pharmacist: No   Delivery:  Delivery date and method: 02/01/24 via         Signature required: No     Any additional details for :  Ok to leave inside glass storm-door if not home   Teach Appointment Date:  01/30/2024   Shipping Address:  [address] [place] [address]   Medication(name,strength and dose):  Xarelto 20mg tabs   Copay:  $10 / 30 days   Payment Method:  Credit card on file   Supplies:  N/A   Additional Information:  Patient has a co-pay card on file for Xarelto

## 2024-01-30 NOTE — TELEPHONE ENCOUNTER
Medication: Xarelto 20mg tabs  Type of Insurance: Commercial  Type of Financial assistance requested Copay Card  Source: [place]  Source Phone #: [phone]  Outcome: Approved  Effective dates: 01/30/2024 until 01/29/2025  Details/Billing Information:   [id number]  PCN: [id number]  GRP: [id number]  ID: [id number]  Max Award Amount: $3400  Final Copay: $10     Received refill ERX in [facility]. Ran test claim and copay came back as $519.46 / 30 days. Patient has commercial pharmacy insurance. He was on PAP in 2023 due to having Medicare A & B, however, he is not eligible for PAP this year so far due to not meeting the 4% OOP costs towards prescriptions yet. I called the patient to offer a co-pay card, and the patient consented. He did not know what co-pay cards were so I explained it to him.  I attempted to sign him up for a co-pay card and Harley stated he has a co-pay card from previous years. I called [employer] and spoke with Robert (rep) who confirmed he previously had a co-pay card but would need to have it reactivated and can only be reactivated over the phone with the patient's verbal consent. I conferenced the patient and he provided them with consent.    Robert ([employer]arelto rep) reactivated the patient's copay card and provided me with the billing details for the card. I ran a test claim through his insurance with the co-pay card and it came to a $10 copay for 30 day supply, as his insurance only covers for 30 days at a time. Called the patient back to let him know and was unable to leave a voicemail for him. Will call him back to offer him specialty pharmacy services for Xarelto since he is currently filling his Entresto and Farxiga with [facility] with [employer] assistance.

## 2024-01-30 NOTE — PROGRESS NOTES
Chief Complaint   Patient presents with    Atrial Fibrillation     Follow up       Subjective     Martín Grijalva is a 68 y.o. male who presents today for follow-up of coronary artery disease ischemic cardiomyopathy and persistent atrial fibrillation.  He has a history of anterior MI status post primary PCI 2008 with subsequent ischemic cardiomyopathy with mostly recovered EF.  Unfortunately he developed atrial fibrillation recently which was coincident with the development of a recurrence of his reduced ejection fraction.  He was maintained on Xarelto and has been cardioverted and underwent atrial fibrillation ablation by Dr. Rosas 6/2021.      Since last visit tolerating oral anticoagulation well.  Continues to maintain sinus rhythm.  Maintained on Eliquis heart failure medications electrophysiology feels that he should be able to de-escalate as his most recent decrement in EF is related to rhythm issues that have not been controlled.  He feels well and has no symptoms.    Past Medical History:   Diagnosis Date    Acute on chronic clinical systolic heart failure (HCC) 5/19/2022    Arrhythmia 2020    A fib    Arthritis     fingers    CAD (coronary artery disease) 7/2/2012    Chronic anticoagulation 7/2/2012    Congestive heart failure (HCC)     Dyslipidemia 7/2/2012    Essential hypertension, benign 7/2/2012    High cholesterol     Insomnia     wakes up 2-5 times a night , wakes up to use restroom and sometimes just wakes up     Ischemic cardiomyopathy 7/2/2012    Left ventricular thrombosis 7/2/2012    Myocardial infarct (HCC) 2006    Pericarditis 7/2/2012    Pneumonia     history of as a child    Sleep apnea     told by MD that he has it, scheduled for sleep study     Snoring     no sleep study done    SOB (shortness of breath)     no c/o today; on exertion; pt uses 2L o2 qhs only; pt does not know provider    Stented coronary artery 7/2/2012     Past Surgical History:   Procedure Laterality Date    SHOULDER  DECOMPRESSION ARTHROSCOPIC Right 11/27/2018    Procedure: SHOULDER DECOMPRESSION ARTHROSCOPIC- SUBACROMIAL CONVERTED TO OPEN;  Surgeon: Ni Weir M.D.;  Location: SURGERY [facility];  Service: Orthopedics    SHOULDER ARTHROSCOPY W/ ROTATOR CUFF REPAIR Right 11/27/2018    Procedure: OPEN ROTATOR CUFF REPAIR;  Surgeon: Ni Weir M.D.;  Location: SURGERY [facility];  Service: Orthopedics    STENT PLACEMENT  2007    cardiac stent    CARDIAC CATH, LEFT HEART      CARPAL TUNNEL RELEASE Bilateral     OTHER      cardioversions x 2    VASECTOMY       Family History   Problem Relation Age of Onset    No Known Problems Mother     Other Father         abestos    Heart Disease Maternal Grandmother     No Known Problems Maternal Grandfather     No Known Problems Paternal Grandmother     Heart Disease Paternal Grandfather     No Known Problems Daughter     No Known Problems Son      Social History     Socioeconomic History    Marital status:      Spouse name: Not on file    Number of children: Not on file    Years of education: Not on file    Highest education level: Not on file   Occupational History    Not on file   Tobacco Use    Smoking status: Never    Smokeless tobacco: Former     Types: Chew   Vaping Use    Vaping Use: Never used   Substance and Sexual Activity    Alcohol use: Not Currently     Comment: occ    Drug use: Never    Sexual activity: Not on file   Other Topics Concern    Not on file   Social History Narrative    Not on file     Social Determinants of Health     Financial Resource Strain: Not on file   Food Insecurity: Not on file   Transportation Needs: Not on file   Physical Activity: Not on file   Stress: Not on file   Social Connections: Not on file   Intimate Partner Violence: Not on file   Housing Stability: Not on file     Allergies   Allergen Reactions    Ace Inhibitors Cough    Other Environmental      Hayfever     Outpatient Encounter Medications as of 1/30/2024  "  Medication Sig Dispense Refill    rivaroxaban (XARELTO) 20 MG Tab tablet Take 1 Tablet by mouth with dinner. 90 Tablet 3    torsemide (DEMADEX) 20 MG Tab Take 1 Tablet by mouth 1 time a day as needed (for weight gain, increased shortness of breath or swelling). 90 Tablet 3    simvastatin (ZOCOR) 40 MG Tab TAKE 1 TABLET BY MOUTH EVERY DAY 90 Tablet 1    spironolactone (ALDACTONE) 25 MG Tab TAKE 1 TABLET BY MOUTH EVERY DAY 90 Tablet 3    sacubitril-valsartan (ENTRESTO) 24-26 MG Tab Take 1 tablet by mouth 2 times a day. 180 Tablet 3    dapagliflozin propanediol (FARXIGA) 10 MG Tab Take 1 Tablet by mouth every day. 90 Tablet 1    budesonide-formoterol (SYMBICORT) 80-4.5 MCG/ACT Aerosol Inhale 2 Puffs 2 times a day. Use spacer. Rinse mouth after each use. 3 Each 3    carvedilol (COREG) 6.25 MG Tab TAKE 1 TABLET BY MOUTH TWICE DAILY WITH MEALS 180 Tablet 3    levalbuterol (XOPENEX HFA) 45 MCG/ACT inhaler INHALE 1 TO 2 PUFFS BY MOUTH EVERY 4 HOURS AS NEEDED FOR SHORTNESS OF BREATH      [DISCONTINUED] rivaroxaban (XARELTO) 20 MG Tab tablet Take 1 Tablet by mouth with dinner. 90 Tablet 3    [DISCONTINUED] torsemide (DEMADEX) 20 MG Tab Take 1 Tablet by mouth 1 time a day as needed (for weight gain, increased shortness of breath or swelling). 90 Tablet 3     No facility-administered encounter medications on file as of 1/30/2024.     Review of Systems   All other systems reviewed and are negative.             Objective     BP 94/62 (BP Location: Left arm, Patient Position: Sitting)   Pulse 64   Resp 16   Ht 1.715 m (5' 7.5\")   Wt 105 kg (231 lb)   SpO2 96%   BMI 35.65 kg/m²     Physical Exam  Vitals reviewed.   Constitutional:       General: He is not in acute distress.     Appearance: He is well-developed. He is not diaphoretic.   HENT:      Head: Normocephalic and atraumatic.      Right Ear: External ear normal.      Left Ear: External ear normal.   Eyes:      General: No scleral icterus.        Right eye: No " discharge.         Left eye: No discharge.      Conjunctiva/sclera: Conjunctivae normal.      Pupils: Pupils are equal, round, and reactive to light.   Neck:      Thyroid: No thyromegaly.      Vascular: No JVD.      Trachea: No tracheal deviation.   Cardiovascular:      Rate and Rhythm: Normal rate and regular rhythm.      Chest Wall: PMI is not displaced.      Pulses:           Carotid pulses are 2+ on the right side and 2+ on the left side.       Radial pulses are 2+ on the left side.        Popliteal pulses are 2+ on the right side and 2+ on the left side.        Dorsalis pedis pulses are 2+ on the right side and 2+ on the left side.        Posterior tibial pulses are 2+ on the right side and 2+ on the left side.      Heart sounds: No murmur heard.     No friction rub. No gallop.   Pulmonary:      Effort: Pulmonary effort is normal. No respiratory distress.      Breath sounds: Normal breath sounds. No wheezing or rales.   Chest:      Chest wall: No tenderness.   Abdominal:      General: Bowel sounds are normal. There is no distension.      Palpations: Abdomen is soft.      Tenderness: There is no abdominal tenderness.   Musculoskeletal:         General: No tenderness or deformity. Normal range of motion.      Cervical back: Normal range of motion and neck supple.   Skin:     General: Skin is warm and dry.      Coloration: Skin is not pale.      Findings: No erythema or rash.   Neurological:      Mental Status: He is alert and oriented to person, place, and time.      Cranial Nerves: No cranial nerve deficit (cranial nerves II through XII grossly intact).      Coordination: Coordination normal.   Psychiatric:         Behavior: Behavior normal.         Thought Content: Thought content normal.       LABS:  Lab Results   Component Value Date/Time    CHOLSTRLTOT 127 05/17/2023 06:13 AM    LDL 67 05/17/2023 06:13 AM    HDL 42 05/17/2023 06:13 AM    TRIGLYCERIDE 90 05/17/2023 06:13 AM       Lab Results   Component Value  "Date/Time    WBC 7.0 05/17/2023 06:13 AM    RBC 4.97 05/17/2023 06:13 AM    HEMOGLOBIN 16.2 05/17/2023 06:13 AM    HEMATOCRIT 48.9 05/17/2023 06:13 AM    MCV 98.4 (H) 05/17/2023 06:13 AM    NEUTSPOLYS 62.20 05/17/2023 06:13 AM    LYMPHOCYTES 23.80 05/17/2023 06:13 AM    MONOCYTES 11.20 05/17/2023 06:13 AM    EOSINOPHILS 1.80 05/17/2023 06:13 AM    BASOPHILS 0.70 05/17/2023 06:13 AM     Lab Results   Component Value Date/Time    SODIUM 141 05/17/2023 06:13 AM    POTASSIUM 4.4 05/17/2023 06:13 AM    CHLORIDE 103 05/17/2023 06:13 AM    CO2 24 05/17/2023 06:13 AM    GLUCOSE 82 05/17/2023 06:13 AM    BUN 27 (H) 05/17/2023 06:13 AM    CREATININE 0.99 05/17/2023 06:13 AM    CREATININE 0.77 07/09/2012 07:21 AM    BUNCREATRAT 22 02/18/2021 09:32 AM    BUNCREATRAT 14 07/09/2012 07:21 AM         Lab Results   Component Value Date/Time    ALKPHOSPHAT 85 05/17/2023 06:13 AM    ASTSGOT 23 05/17/2023 06:13 AM    ALTSGPT 30 05/17/2023 06:13 AM    TBILIRUBIN 1.6 (H) 05/17/2023 06:13 AM      No results found for: \"BNPBTYPENAT\"   No results found for: \"TSH\"  Lab Results   Component Value Date/Time    PROTHROMBTM 18.4 (H) 08/01/2022 10:00 AM    INR 1.57 (H) 08/01/2022 10:00 AM      Imaging reviewed         Assessment & Plan     1. Coronary artery disease due to lipid rich plaque  rivaroxaban (XARELTO) 20 MG Tab tablet    torsemide (DEMADEX) 20 MG Tab    EC-ECHOCARDIOGRAM COMPLETE W/O CONT      2. Persistent atrial fibrillation (HCC)  rivaroxaban (XARELTO) 20 MG Tab tablet    EC-ECHOCARDIOGRAM COMPLETE W/O CONT    Basic Metabolic Panel      3. HFrEF with recovered LVEF  torsemide (DEMADEX) 20 MG Tab    EC-ECHOCARDIOGRAM COMPLETE W/O CONT      4. H/O cardiac radiofrequency ablation 6/25/21 & 8/1/22 Dr Rosas        5. Ischemic cardiomyopathy        6. Stented coronary artery        7. On continuous oral anticoagulation  Basic Metabolic Panel          Medical Decision Making: Today's Assessment/Status/Plan:          Doing well NYHA class I " with recovered LVEF, mixed ischemic and nonischemic cardiomyopathy.  De-escalation of his medical therapy is appropriate if he has maintained good LV function however full de-escalation is not appropriate given his underlying large infarction and concomitant ischemic cardiomyopathy.  He does not use or need torsemide but likes it for as needed use so I refilled this.  He is tolerating his oral anticoagulation with high risk medication being monitored with laboratory studies ordered today.  Would be reasonable to come down off Entresto which is costly for him should his EF remains improved with a subsequent monitoring of blood pressure and recheck of his ejection fraction after 3 months of a change.  First we will ensure he has maintained normal left ventricular function with an echocardiogram.  He will follow-up routinely.      Mr. Grijalva's care is highly complex due to high risk diagnosis with either severe exacerbation, progression, or side effects of treatment. We specifically discussed the need for high risk medication requiring at least quarterly testing and/or made a decision on elective/emergent major surgery with identified patient or procedure risk factors specific to Mr. Grijalva. I have personally spent extra time in discussion about these facts with Mr. Grijalva and reviewed and or ordered at least 3 tests, documents or other physician/TAJ reports available including labs, imaging and EKGs as appropriate separate from today's encounter.  I have reviewed images with Mr. Grijalva and personally interpreted on this encounter day the referenced EKG, echocardiogram, stress tests, CT scan, cardiac catheterization or other cardiac imaging and my personal interpretation is what is specifically stated in this note.    Thank you for this interesting consultation. It was my pleasure to see Zenon Grijalva today.    Davon Walters MD, FACC, [facility]  Division of Interventional Cardiology  [facility]  Heart and Vascular Health

## 2024-01-30 NOTE — TELEPHONE ENCOUNTER
Received Renewal ERX via [facility]  for Xarelto 20mg tabs. (Quantity:90, Day Supply:90)     Insurance: [employer] [place] - commerical ([employer])  Member ID:  [id number]  BIN: [id number]  PCN: [id number]  Group: [id number]     Ran Test claim via [place] & medication Pays for a $519.46 for 30 day copay. Will outreach to patient to offer specialty pharmacy services and or release to preferred pharmacy

## 2024-02-01 ENCOUNTER — PHARMACY VISIT (OUTPATIENT)
Dept: PHARMACY | Facility: MEDICAL CENTER | Age: 69
End: 2024-02-01
Payer: COMMERCIAL

## 2024-02-14 PROCEDURE — RXMED WILLOW AMBULATORY MEDICATION CHARGE: Performed by: NURSE PRACTITIONER

## 2024-02-15 ENCOUNTER — TELEPHONE (OUTPATIENT)
Dept: PHARMACY | Facility: MEDICAL CENTER | Age: 69
End: 2024-02-15
Payer: MEDICARE

## 2024-02-15 NOTE — TELEPHONE ENCOUNTER
Contact:           Phone number: [phone]   Name of person spoken with and relationship to patient: BAKARI ANTONIO  Medication:  Patient’s Adherence:           How patient is doing on medication: well   How many missed doses and reason: 0   Any new medications: no   Any new conditions: no   Any new allergies: no   Any new side effects: no    Any new diagnoses: no    How many doses remainin   Did patient want to speak with pharmacist: no  Delivery:           Delivery date and method:     Needs by Date:    Signature required: no   Any additional details for : RICKI  Teach Appointment Date: RICKI  Shipping Address:[address] [place] [id number]  Medication(name, strength and dose): ENTRESTO 60/30DS $0  Copay: $0  Payment Method: RICKI  Supplies:   Additional Information:

## 2024-02-16 ENCOUNTER — PHARMACY VISIT (OUTPATIENT)
Dept: PHARMACY | Facility: MEDICAL CENTER | Age: 69
End: 2024-02-16
Payer: COMMERCIAL

## 2024-02-20 ENCOUNTER — TELEPHONE (OUTPATIENT)
Dept: CARDIOLOGY | Facility: MEDICAL CENTER | Age: 69
End: 2024-02-20
Payer: MEDICARE

## 2024-02-20 DIAGNOSIS — I10 ESSENTIAL HYPERTENSION, BENIGN: Chronic | ICD-10-CM

## 2024-02-20 DIAGNOSIS — I10 ESSENTIAL HYPERTENSION: ICD-10-CM

## 2024-02-21 NOTE — TELEPHONE ENCOUNTER
Is the patient due for a refill? Yes    Was the patient seen the past year? Yes    Date of last office visit: 01/30/2024    Does the patient have an upcoming appointment?  Yes   If yes, When? 05/06/2024    Provider to refill:TW    Does the patients insurance require a 100 day supply?  No

## 2024-02-27 RX ORDER — CARVEDILOL 6.25 MG/1
TABLET ORAL
Qty: 180 TABLET | Refills: 3 | Status: SHIPPED | OUTPATIENT
Start: 2024-02-27

## 2024-02-27 NOTE — TELEPHONE ENCOUNTER
TW    Caller: Zenon Grijalva     Topic/issue: pt is checking on the status of his refill for   carvedilol (COREG) 6.25 MG Tab [[id number]]     [facility]armacy: [facility]     Callback Number: [phone] (home)      Thank you    Tamia WILDER

## 2024-03-05 ENCOUNTER — TELEPHONE (OUTPATIENT)
Dept: PHARMACY | Facility: MEDICAL CENTER | Age: 69
End: 2024-03-05
Payer: MEDICARE

## 2024-03-05 NOTE — TELEPHONE ENCOUNTER
Contact:  [id number]   Martín Grijalva      Phone number: [phone]    Name of person spoken with and relationship to patient: Martín, self   Patient’s Adherence:            How patient is doing on medication:  well    How many missed doses and reason: 0    Any new medications: no    Any new conditions: no    Any new allergies: no    Any new side effects: no     Any new diagnoses: no     How many doses remaining:  approx 2 weeks worth on both meds. He wasn't 100% sure    Did patient want to speak with pharmacist: no  Delivery:            Delivery date and method:  3/13     Needs by Date:  3/18    Signature required:  no    Any additional details for :  Knock first. If no answer, please leave inside screen door  Teach Appointment Date:  06/12/2023, 01/30/2024  Shipping Address:  [address] [place] [id number]  Medication(name, strength and dose):  Entresto 24-26 MG Tabs bid, Xarelto 20 MG Tabs qd with pm meal  Copay: est $10 (system down)  Payment Method: ccof  Supplies:  na  Additional info:  DEREK Resendiz. He stated doing well on the medication. He approved estimated copay.  He likes being abled to get via . No further questions/concerns at this time     LIBRADO ORDER SENT VIA EMAIL 02.14.23

## 2024-03-06 PROCEDURE — RXMED WILLOW AMBULATORY MEDICATION CHARGE: Performed by: NURSE PRACTITIONER

## 2024-03-06 PROCEDURE — RXMED WILLOW AMBULATORY MEDICATION CHARGE: Performed by: INTERNAL MEDICINE

## 2024-03-13 ENCOUNTER — PHARMACY VISIT (OUTPATIENT)
Dept: PHARMACY | Facility: MEDICAL CENTER | Age: 69
End: 2024-03-13
Payer: COMMERCIAL

## 2024-04-05 DIAGNOSIS — I50.20 ACC/AHA STAGE C SYSTOLIC HEART FAILURE (HCC): ICD-10-CM

## 2024-04-08 RX ORDER — DAPAGLIFLOZIN 10 MG/1
10 TABLET, FILM COATED ORAL DAILY
Qty: 90 TABLET | Refills: 1 | Status: SHIPPED | OUTPATIENT
Start: 2024-04-08 | End: 2024-04-08

## 2024-04-08 RX ORDER — DAPAGLIFLOZIN 10 MG/1
10 TABLET, FILM COATED ORAL DAILY
Qty: 90 TABLET | Refills: 1 | Status: SHIPPED | OUTPATIENT
Start: 2024-04-08

## 2024-04-09 ENCOUNTER — TELEPHONE (OUTPATIENT)
Dept: CARDIOLOGY | Facility: MEDICAL CENTER | Age: 69
End: 2024-04-09
Payer: MEDICARE

## 2024-04-09 PROCEDURE — RXMED WILLOW AMBULATORY MEDICATION CHARGE: Performed by: INTERNAL MEDICINE

## 2024-04-09 PROCEDURE — RXMED WILLOW AMBULATORY MEDICATION CHARGE: Performed by: NURSE PRACTITIONER

## 2024-04-09 NOTE — TELEPHONE ENCOUNTER
Medication: FARXIGA TABLET 10 MG  Type of Insurance: Government funded (Medicare/Medicare Advantage)  Type of Financial assistance requested Foundation  Source: [employer]  Source Phone #: [phone]  Outcome: Approval  Effective dates: 05/8/24 until 5/8/25  Details/Billing Information:   [id number]  PCN: [id number]  GRP: [id number]  ID: [id number]  Max Award Amount: $10,000  Final Copay: $0

## 2024-04-09 NOTE — TELEPHONE ENCOUNTER
Contact:  Phone number:[phone] (mobile)    Name of person spoken with and relationship to patient: zac   Patient’s Adherence:  How patient is doing on medication: Very Well    How many missed doses and reason: 0 N/A    Any new medications: No    Any new conditions: No    Any new allergies: No    Any new side effects: No    Any new diagnoses: No    How many doses remaining: 3    Did patient want to speak with pharmacist: No   Delivery:  Delivery date and method: 4/11/24 via     Needs by Date: 4/11/24    Signature required: No     Any additional details for : N/A   Teach Appointment Date:  N/A   Shipping Address:  [address] [place] [id number]   Medication(name,strength and dose):  Entresto 24-26 mg tablet  Xarelto 20 mg tablet    Copay:  $10   Payment Method:  Credit card on file   Supplies:  N   Additional Information:  NA

## 2024-04-11 ENCOUNTER — PHARMACY VISIT (OUTPATIENT)
Dept: PHARMACY | Facility: MEDICAL CENTER | Age: 69
End: 2024-04-11
Payer: COMMERCIAL

## 2024-04-29 ENCOUNTER — HOSPITAL ENCOUNTER (OUTPATIENT)
Dept: CARDIOLOGY | Facility: MEDICAL CENTER | Age: 69
End: 2024-04-29
Attending: INTERNAL MEDICINE
Payer: MEDICARE

## 2024-04-29 DIAGNOSIS — I25.10 CORONARY ARTERY DISEASE DUE TO LIPID RICH PLAQUE: ICD-10-CM

## 2024-04-29 DIAGNOSIS — I48.19 PERSISTENT ATRIAL FIBRILLATION (HCC): ICD-10-CM

## 2024-04-29 DIAGNOSIS — I50.22 CHRONIC SYSTOLIC HEART FAILURE (HCC): ICD-10-CM

## 2024-04-29 DIAGNOSIS — I25.83 CORONARY ARTERY DISEASE DUE TO LIPID RICH PLAQUE: ICD-10-CM

## 2024-04-29 PROCEDURE — 93306 TTE W/DOPPLER COMPLETE: CPT

## 2024-04-29 PROCEDURE — 700117 HCHG RX CONTRAST REV CODE 255: Performed by: INTERNAL MEDICINE

## 2024-04-29 RX ADMIN — HUMAN ALBUMIN MICROSPHERES AND PERFLUTREN 3 ML: 10; .22 INJECTION, SOLUTION INTRAVENOUS at 08:24

## 2024-05-06 ENCOUNTER — OFFICE VISIT (OUTPATIENT)
Dept: CARDIOLOGY | Facility: MEDICAL CENTER | Age: 69
End: 2024-05-06
Attending: INTERNAL MEDICINE
Payer: MEDICARE

## 2024-05-06 VITALS
SYSTOLIC BLOOD PRESSURE: 90 MMHG | OXYGEN SATURATION: 94 % | HEART RATE: 78 BPM | HEIGHT: 68 IN | DIASTOLIC BLOOD PRESSURE: 64 MMHG | RESPIRATION RATE: 16 BRPM | WEIGHT: 232 LBS | BODY MASS INDEX: 35.16 KG/M2

## 2024-05-06 DIAGNOSIS — Z79.01 ON CONTINUOUS ORAL ANTICOAGULATION: ICD-10-CM

## 2024-05-06 DIAGNOSIS — I25.10 CORONARY ARTERY DISEASE DUE TO LIPID RICH PLAQUE: ICD-10-CM

## 2024-05-06 DIAGNOSIS — I25.83 CORONARY ARTERY DISEASE DUE TO LIPID RICH PLAQUE: ICD-10-CM

## 2024-05-06 DIAGNOSIS — I48.19 PERSISTENT ATRIAL FIBRILLATION (HCC): ICD-10-CM

## 2024-05-06 DIAGNOSIS — I50.22 CHRONIC SYSTOLIC HEART FAILURE (HCC): ICD-10-CM

## 2024-05-06 DIAGNOSIS — I25.5 ISCHEMIC CARDIOMYOPATHY: ICD-10-CM

## 2024-05-06 DIAGNOSIS — Z98.890 H/O CARDIAC RADIOFREQUENCY ABLATION: ICD-10-CM

## 2024-05-06 LAB
EKG IMPRESSION: NORMAL
LV EJECT FRACT  99904: 40
LV EJECT FRACT MOD 2C 99903: 42.22
LV EJECT FRACT MOD 4C 99902: 31.89
LV EJECT FRACT MOD BP 99901: 37.15

## 2024-05-06 PROCEDURE — 93005 ELECTROCARDIOGRAM TRACING: CPT | Performed by: INTERNAL MEDICINE

## 2024-05-06 PROCEDURE — 3078F DIAST BP <80 MM HG: CPT | Performed by: INTERNAL MEDICINE

## 2024-05-06 PROCEDURE — G2211 COMPLEX E/M VISIT ADD ON: HCPCS | Performed by: INTERNAL MEDICINE

## 2024-05-06 PROCEDURE — 3074F SYST BP LT 130 MM HG: CPT | Performed by: INTERNAL MEDICINE

## 2024-05-06 PROCEDURE — 93010 ELECTROCARDIOGRAM REPORT: CPT | Performed by: INTERNAL MEDICINE

## 2024-05-06 PROCEDURE — 99215 OFFICE O/P EST HI 40 MIN: CPT | Performed by: INTERNAL MEDICINE

## 2024-05-06 PROCEDURE — 99211 OFF/OP EST MAY X REQ PHY/QHP: CPT | Performed by: INTERNAL MEDICINE

## 2024-05-06 ASSESSMENT — FIBROSIS 4 INDEX: FIB4 SCORE: 1.45

## 2024-05-08 ENCOUNTER — TELEPHONE (OUTPATIENT)
Dept: CARDIOLOGY | Facility: MEDICAL CENTER | Age: 69
End: 2024-05-08
Payer: MEDICARE

## 2024-05-08 PROCEDURE — RXMED WILLOW AMBULATORY MEDICATION CHARGE: Performed by: NURSE PRACTITIONER

## 2024-05-08 PROCEDURE — RXMED WILLOW AMBULATORY MEDICATION CHARGE: Performed by: INTERNAL MEDICINE

## 2024-05-08 NOTE — TELEPHONE ENCOUNTER
Contact:  Phone number:There is no home phone number on file.    Name of person spoken with and relationship to patient: PATIENT   Patient’s Adherence:  How patient is doing on medication: Well    How many missed doses and reason: 0 N/A    Any new medications: No    Any new conditions: No    Any new allergies: No    Any new side effects: No    Any new diagnoses: No    How many doses remainin    Did patient want to speak with pharmacist: No   Delivery:  Delivery date and method: 24 via     Needs by Date: 24    Signature required: No     Any additional details for : N/A   Teach Appointment Date:  N/A   Shipping Address:  [address] [place] [id number]   Medication(name,strength and dose):  XARELTO TABLET 20 MG, ENTRESTO TABLET 24-26 MG   Copay:  $10   Payment Method:  Credit card on file   Supplies:  N/A   Additional Information:  N/A

## 2024-05-14 ENCOUNTER — PHARMACY VISIT (OUTPATIENT)
Dept: PHARMACY | Facility: MEDICAL CENTER | Age: 69
End: 2024-05-14
Payer: COMMERCIAL

## 2024-05-29 NOTE — ANESTHESIA PREPROCEDURE EVALUATION
Relevant Problems   NEURO   (positive) History of acute anterior wall MI      CARDIAC   (positive) CAD (coronary artery disease)   (positive) Essential hypertension, benign   (positive) PAF (paroxysmal atrial fibrillation) (HCC)   (positive) Stented coronary artery       Physical Exam    Airway   Mallampati: II  TM distance: >3 FB  Neck ROM: full       Cardiovascular - normal exam  Rhythm: regular  Rate: normal     Dental - normal exam           Pulmonary - normal exam  Breath sounds clear to auscultation     Abdominal   (+) obese     Neurological - normal exam                 Anesthesia Plan    ASA 4   ASA physical status 4 criteria: other (comment), MI - recent (< 3 months) and CAD/stents - recent (< 3 months)    Plan - general       Airway plan will be ETT          Induction: intravenous    Postoperative Plan: Postoperative administration of opioids is intended.    Pertinent diagnostic labs and testing reviewed    Informed Consent:    Anesthetic plan and risks discussed with patient.    Use of blood products discussed with: patient whom consented to blood products.         
Fall Risk

## 2024-06-06 ENCOUNTER — TELEPHONE (OUTPATIENT)
Dept: CARDIOLOGY | Facility: MEDICAL CENTER | Age: 69
End: 2024-06-06
Payer: MEDICARE

## 2024-06-06 DIAGNOSIS — I50.22 CHRONIC SYSTOLIC HEART FAILURE (HCC): ICD-10-CM

## 2024-06-06 NOTE — TELEPHONE ENCOUNTER
Can we please get a new 90day RX sent to [facility] please the one we have does not have enough left to fill

## 2024-06-07 RX ORDER — SACUBITRIL AND VALSARTAN 24; 26 MG/1; MG/1
1 TABLET, FILM COATED ORAL 2 TIMES DAILY
Qty: 180 TABLET | Refills: 1 | Status: SHIPPED | OUTPATIENT
Start: 2024-06-07

## 2024-06-10 NOTE — PROGRESS NOTES
Chief Complaint   Patient presents with    Coronary Artery Disease     Follow up       Subjective     Martín Grijalva is a 69 y.o. male who presents today for follow-up of coronary artery disease ischemic cardiomyopathy and persistent atrial fibrillation.  He has a history of anterior MI status post primary PCI 2008 with subsequent ischemic cardiomyopathy with mostly recovered EF.  Unfortunately he developed atrial fibrillation recently which was coincident with the development of a recurrence of his reduced ejection fraction.  He was maintained on Xarelto and has been cardioverted and underwent atrial fibrillation ablation by Dr. Rosas 6/2021.      Doing well since last visit tolerating oral anticoagulation well.    Past Medical History:   Diagnosis Date    Acute on chronic clinical systolic heart failure (HCC) 5/19/2022    Arrhythmia 2020    A fib    Arthritis     fingers    CAD (coronary artery disease) 7/2/2012    Chronic anticoagulation 7/2/2012    Congestive heart failure (HCC)     Dyslipidemia 7/2/2012    Essential hypertension, benign 7/2/2012    High cholesterol     Insomnia     wakes up 2-5 times a night , wakes up to use restroom and sometimes just wakes up     Ischemic cardiomyopathy 7/2/2012    Left ventricular thrombosis 7/2/2012    Myocardial infarct (HCC) 2006    Pericarditis 7/2/2012    Pneumonia     history of as a child    Sleep apnea     told by MD that he has it, scheduled for sleep study     Snoring     no sleep study done    SOB (shortness of breath)     no c/o today; on exertion; pt uses 2L o2 qhs only; pt does not know provider    Stented coronary artery 7/2/2012     Past Surgical History:   Procedure Laterality Date    SHOULDER DECOMPRESSION ARTHROSCOPIC Right 11/27/2018    Procedure: SHOULDER DECOMPRESSION ARTHROSCOPIC- SUBACROMIAL CONVERTED TO OPEN;  Surgeon: Ni Weir M.D.;  Location: SURGERY [facility];  Service: Orthopedics    SHOULDER ARTHROSCOPY W/ ROTATOR CUFF REPAIR  Right 11/27/2018    Procedure: OPEN ROTATOR CUFF REPAIR;  Surgeon: Ni Weir M.D.;  Location: SURGERY [facility];  Service: Orthopedics    STENT PLACEMENT  2007    cardiac stent    CARDIAC CATH, LEFT HEART      CARPAL TUNNEL RELEASE Bilateral     OTHER      cardioversions x 2    VASECTOMY       Family History   Problem Relation Age of Onset    No Known Problems Mother     Other Father         abestos    Heart Disease Maternal Grandmother     No Known Problems Maternal Grandfather     No Known Problems Paternal Grandmother     Heart Disease Paternal Grandfather     No Known Problems Daughter     No Known Problems Son      Social History     Socioeconomic History    Marital status:      Spouse name: Not on file    Number of children: Not on file    Years of education: Not on file    Highest education level: Not on file   Occupational History    Not on file   Tobacco Use    Smoking status: Never    Smokeless tobacco: Former     Types: Chew   Vaping Use    Vaping status: Never Used   Substance and Sexual Activity    Alcohol use: Yes     Comment: occ    Drug use: Never    Sexual activity: Not on file   Other Topics Concern    Not on file   Social History Narrative    Not on file     Social Determinants of Health     Financial Resource Strain: Not on file   Food Insecurity: Not on file   Transportation Needs: Not on file   Physical Activity: Not on file   Stress: Not on file   Social Connections: Not on file   Intimate Partner Violence: Not on file   Housing Stability: Not on file     Allergies   Allergen Reactions    Ace Inhibitors Cough    Other Environmental      Hayfever     Outpatient Encounter Medications as of 5/6/2024   Medication Sig Dispense Refill    dapagliflozin propanediol (FARXIGA) 10 MG Tab Take 1 Tablet by mouth every day. 90 Tablet 1    carvedilol (COREG) 6.25 MG Tab TAKE 1 TABLET BY MOUTH TWICE DAILY WITH MEALS 180 Tablet 3    rivaroxaban (XARELTO) 20 MG Tab tablet Take 1 Tablet by  "mouth with dinner. 90 Tablet 3    torsemide (DEMADEX) 20 MG Tab Take 1 Tablet by mouth 1 time a day as needed (for weight gain, increased shortness of breath or swelling). 90 Tablet 3    simvastatin (ZOCOR) 40 MG Tab TAKE 1 TABLET BY MOUTH EVERY DAY 90 Tablet 1    spironolactone (ALDACTONE) 25 MG Tab TAKE 1 TABLET BY MOUTH EVERY DAY 90 Tablet 3    [DISCONTINUED] sacubitril-valsartan (ENTRESTO) 24-26 MG Tab Take 1 tablet by mouth 2 times a day. 180 Tablet 3    budesonide-formoterol (SYMBICORT) 80-4.5 MCG/ACT Aerosol Inhale 2 Puffs 2 times a day. Use spacer. Rinse mouth after each use. 3 Each 3    levalbuterol (XOPENEX HFA) 45 MCG/ACT inhaler INHALE 1 TO 2 PUFFS BY MOUTH EVERY 4 HOURS AS NEEDED FOR SHORTNESS OF BREATH       No facility-administered encounter medications on file as of 5/6/2024.     Review of Systems   All other systems reviewed and are negative.             Objective     BP 90/64 (BP Location: Left arm, Patient Position: Sitting)   Pulse 78   Resp 16   Ht 1.715 m (5' 7.5\")   Wt 105 kg (232 lb)   SpO2 94%   BMI 35.80 kg/m²     Physical Exam  Vitals reviewed.   Constitutional:       General: He is not in acute distress.     Appearance: He is well-developed. He is not diaphoretic.   HENT:      Head: Normocephalic and atraumatic.      Right Ear: External ear normal.      Left Ear: External ear normal.   Eyes:      General: No scleral icterus.        Right eye: No discharge.         Left eye: No discharge.      Conjunctiva/sclera: Conjunctivae normal.      Pupils: Pupils are equal, round, and reactive to light.   Neck:      Thyroid: No thyromegaly.      Vascular: No JVD.      Trachea: No tracheal deviation.   Cardiovascular:      Rate and Rhythm: Normal rate and regular rhythm.      Chest Wall: PMI is not displaced.      Pulses:           Carotid pulses are 2+ on the right side and 2+ on the left side.       Radial pulses are 2+ on the left side.        Popliteal pulses are 2+ on the right side and 2+ " on the left side.        Dorsalis pedis pulses are 2+ on the right side and 2+ on the left side.        Posterior tibial pulses are 2+ on the right side and 2+ on the left side.      Heart sounds: No murmur heard.     No friction rub. No gallop.   Pulmonary:      Effort: Pulmonary effort is normal. No respiratory distress.      Breath sounds: Normal breath sounds. No wheezing or rales.   Chest:      Chest wall: No tenderness.   Abdominal:      General: Bowel sounds are normal. There is no distension.      Palpations: Abdomen is soft.      Tenderness: There is no abdominal tenderness.   Musculoskeletal:         General: No tenderness or deformity. Normal range of motion.      Cervical back: Normal range of motion and neck supple.   Skin:     General: Skin is warm and dry.      Coloration: Skin is not pale.      Findings: No erythema or rash.   Neurological:      Mental Status: He is alert and oriented to person, place, and time.      Cranial Nerves: No cranial nerve deficit (cranial nerves II through XII grossly intact).      Coordination: Coordination normal.   Psychiatric:         Behavior: Behavior normal.         Thought Content: Thought content normal.       LABS:  Lab Results   Component Value Date/Time    CHOLSTRLTOT 127 05/17/2023 06:13 AM    LDL 67 05/17/2023 06:13 AM    HDL 42 05/17/2023 06:13 AM    TRIGLYCERIDE 90 05/17/2023 06:13 AM       Lab Results   Component Value Date/Time    WBC 7.0 05/17/2023 06:13 AM    RBC 4.97 05/17/2023 06:13 AM    HEMOGLOBIN 16.2 05/17/2023 06:13 AM    HEMATOCRIT 48.9 05/17/2023 06:13 AM    MCV 98.4 (H) 05/17/2023 06:13 AM    NEUTSPOLYS 62.20 05/17/2023 06:13 AM    LYMPHOCYTES 23.80 05/17/2023 06:13 AM    MONOCYTES 11.20 05/17/2023 06:13 AM    EOSINOPHILS 1.80 05/17/2023 06:13 AM    BASOPHILS 0.70 05/17/2023 06:13 AM     Lab Results   Component Value Date/Time    SODIUM 141 05/17/2023 06:13 AM    POTASSIUM 4.4 05/17/2023 06:13 AM    CHLORIDE 103 05/17/2023 06:13 AM    CO2 24  "05/17/2023 06:13 AM    GLUCOSE 82 05/17/2023 06:13 AM    BUN 27 (H) 05/17/2023 06:13 AM    CREATININE 0.99 05/17/2023 06:13 AM    CREATININE 0.77 07/09/2012 07:21 AM    BUNCREATRAT 22 02/18/2021 09:32 AM    BUNCREATRAT 14 07/09/2012 07:21 AM         Lab Results   Component Value Date/Time    ALKPHOSPHAT 85 05/17/2023 06:13 AM    ASTSGOT 23 05/17/2023 06:13 AM    ALTSGPT 30 05/17/2023 06:13 AM    TBILIRUBIN 1.6 (H) 05/17/2023 06:13 AM      No results found for: \"BNPBTYPENAT\"   No results found for: \"TSH\"  Lab Results   Component Value Date/Time    PROTHROMBTM 18.4 (H) 08/01/2022 10:00 AM    INR 1.57 (H) 08/01/2022 10:00 AM      Imaging reviewed         Assessment & Plan     1. Persistent atrial fibrillation (HCC)  EKG    EC-ECHOCARDIOGRAM COMPLETE W/ CONT      2. Coronary artery disease due to lipid rich plaque        3. Ischemic cardiomyopathy  EC-ECHOCARDIOGRAM COMPLETE W/ CONT      4. H/O cardiac radiofrequency ablation 6/25/21 & 8/1/22 Dr Rosas        5. On continuous oral anticoagulation  Basic Metabolic Panel      6. HFrEF with recovered LVEF            Medical Decision Making: Today's Assessment/Status/Plan:          Overall doing well.  Echocardiographic reassessment of his ischemic cardiomyopathy and atrial fibrillation.  Tolerating oral anticoagulation which is high risk medication being monitored laboratory studies ordered today.  Goal LDL less than 70 closer to 50 which she is achieving.  Blood pressure is well-controlled.  He will follow-up routinely.    Mr. Grijalva's care is highly complex due to high risk diagnosis with either severe exacerbation, progression, or side effects of treatment and is at high risk for complication, morbidity, and mortality. We specifically discussed the need for high risk medication requiring at least quarterly testing and/or made a decision on elective/emergent major surgery with identified patient or procedure risk factors specific to Mr. Grijalva. I have personally spent " extra time in discussion about these facts with Mr. Grijalva and reviewed and or ordered at least 3 tests, documents or other physician/TAJ reports available including labs, imaging and EKGs as appropriate separate from today's encounter.  When relevant, I have reviewed images with Mr. Grijalva and personally interpreted on this encounter day the referenced EKG, echocardiogram, stress tests, CT scan, cardiac catheterization or other cardiac imaging and my personal interpretation is what is specifically stated in this note.    () Today's E/M visit is associated with medical care services that serve as the continuing focal point for all needed health care services and/or with medical care services that  are part of ongoing care related to a patient's single, serious condition, or a complex condition: This includes  furnishing services to patients on an ongoing basis that result in care that is personalized  to the patient. The services result in a comprehensive, longitudinal, and continuous  relationship with the patient and involve delivery of team-based care that is accessible, coordinated with other practitioners and providers, and integrated with the broader health  care landscape.

## 2024-06-11 PROCEDURE — RXMED WILLOW AMBULATORY MEDICATION CHARGE: Performed by: INTERNAL MEDICINE

## 2024-06-14 ENCOUNTER — PHARMACY VISIT (OUTPATIENT)
Dept: PHARMACY | Facility: MEDICAL CENTER | Age: 69
End: 2024-06-14
Payer: COMMERCIAL

## 2024-07-12 PROCEDURE — RXMED WILLOW AMBULATORY MEDICATION CHARGE: Performed by: INTERNAL MEDICINE

## 2024-07-15 ENCOUNTER — PHARMACY VISIT (OUTPATIENT)
Dept: PHARMACY | Facility: MEDICAL CENTER | Age: 69
End: 2024-07-15
Payer: COMMERCIAL

## 2024-07-30 DIAGNOSIS — E78.5 HYPERLIPIDEMIA, UNSPECIFIED HYPERLIPIDEMIA TYPE: ICD-10-CM

## 2024-07-30 DIAGNOSIS — I50.9 ACUTE ON CHRONIC CONGESTIVE HEART FAILURE, UNSPECIFIED HEART FAILURE TYPE (HCC): ICD-10-CM

## 2024-07-31 RX ORDER — SIMVASTATIN 40 MG
40 TABLET ORAL
Qty: 90 TABLET | Refills: 3 | Status: SHIPPED | OUTPATIENT
Start: 2024-07-31

## 2024-07-31 RX ORDER — SPIRONOLACTONE 25 MG/1
25 TABLET ORAL DAILY
Qty: 90 TABLET | Refills: 3 | Status: SHIPPED | OUTPATIENT
Start: 2024-07-31

## 2024-08-03 DIAGNOSIS — J45.20 MILD INTERMITTENT ASTHMA WITHOUT COMPLICATION: Chronic | ICD-10-CM

## 2024-08-05 RX ORDER — BUDESONIDE AND FORMOTEROL FUMARATE DIHYDRATE 80; 4.5 UG/1; UG/1
AEROSOL RESPIRATORY (INHALATION)
Qty: 30.6 G | OUTPATIENT
Start: 2024-08-05

## 2024-08-05 NOTE — TELEPHONE ENCOUNTER
Have we ever prescribed this med? Yes.  If yes, what date? 5/9/2023    Last OV: 5/9/2023- Kaitlin WILLIAM     Next OV: none     DX:  Mild intermittent asthma without complication [J45.20]     Medications: budesonide-formoterol (SYMBICORT) 80-4.5 MCG/ACT Aerosol   
Refused by: REBEKA Cooley     Refusal reason: Patient needs appointment.       Called pt to inform him that the refill request for  budesonide-formoterol (SYMBICORT) 80-4.5 MCG/ACT Aerosol  has been denied at the moment due to pt not being seen since 5/9/2023 with Kaitlin WILLIAM. Asked pt if this was a good time to schedule an apt and pt stated yes. Pt is schedule to come in on 12/4/2024 with Kaitlin WILLIAM. Pt apt has been added to the waitlist for a sooner apt.   
None

## 2024-08-08 PROCEDURE — RXMED WILLOW AMBULATORY MEDICATION CHARGE: Performed by: INTERNAL MEDICINE

## 2024-08-13 ENCOUNTER — PHARMACY VISIT (OUTPATIENT)
Dept: PHARMACY | Facility: MEDICAL CENTER | Age: 69
End: 2024-08-13
Payer: COMMERCIAL

## 2024-09-11 PROCEDURE — RXMED WILLOW AMBULATORY MEDICATION CHARGE: Performed by: INTERNAL MEDICINE

## 2024-09-12 ENCOUNTER — PHARMACY VISIT (OUTPATIENT)
Dept: PHARMACY | Facility: MEDICAL CENTER | Age: 69
End: 2024-09-12
Payer: COMMERCIAL

## 2024-10-04 PROCEDURE — RXMED WILLOW AMBULATORY MEDICATION CHARGE: Performed by: INTERNAL MEDICINE

## 2024-10-09 ENCOUNTER — PHARMACY VISIT (OUTPATIENT)
Dept: PHARMACY | Facility: MEDICAL CENTER | Age: 69
End: 2024-10-09
Payer: COMMERCIAL

## 2024-10-31 PROCEDURE — RXMED WILLOW AMBULATORY MEDICATION CHARGE: Performed by: INTERNAL MEDICINE

## 2024-11-04 ENCOUNTER — TELEPHONE (OUTPATIENT)
Dept: CARDIOLOGY | Facility: MEDICAL CENTER | Age: 69
End: 2024-11-04
Payer: MEDICARE

## 2024-11-04 NOTE — TELEPHONE ENCOUNTER
Chart prep:    Patient has echo scheduled on 11/6/24. Called to remind patient to have labs done as well. Confirmed with patient the times of the echo and his appointment with Dr. Walters.

## 2024-11-06 ENCOUNTER — HOSPITAL ENCOUNTER (OUTPATIENT)
Dept: CARDIOLOGY | Facility: MEDICAL CENTER | Age: 69
End: 2024-11-06
Attending: INTERNAL MEDICINE
Payer: MEDICARE

## 2024-11-06 ENCOUNTER — PHARMACY VISIT (OUTPATIENT)
Dept: PHARMACY | Facility: MEDICAL CENTER | Age: 69
End: 2024-11-06
Payer: COMMERCIAL

## 2024-11-06 DIAGNOSIS — I25.5 ISCHEMIC CARDIOMYOPATHY: ICD-10-CM

## 2024-11-06 DIAGNOSIS — I48.19 PERSISTENT ATRIAL FIBRILLATION (HCC): ICD-10-CM

## 2024-11-06 PROCEDURE — 93306 TTE W/DOPPLER COMPLETE: CPT

## 2024-11-06 PROCEDURE — 700117 HCHG RX CONTRAST REV CODE 255: Performed by: INTERNAL MEDICINE

## 2024-11-06 RX ADMIN — HUMAN ALBUMIN MICROSPHERES AND PERFLUTREN 3 ML: 10; .22 INJECTION, SOLUTION INTRAVENOUS at 14:45

## 2024-11-12 ENCOUNTER — TELEPHONE (OUTPATIENT)
Dept: CARDIOLOGY | Facility: MEDICAL CENTER | Age: 69
End: 2024-11-12

## 2024-11-12 ENCOUNTER — OFFICE VISIT (OUTPATIENT)
Dept: CARDIOLOGY | Facility: MEDICAL CENTER | Age: 69
End: 2024-11-12
Attending: INTERNAL MEDICINE
Payer: MEDICARE

## 2024-11-12 VITALS
DIASTOLIC BLOOD PRESSURE: 66 MMHG | BODY MASS INDEX: 35.46 KG/M2 | HEIGHT: 68 IN | OXYGEN SATURATION: 96 % | WEIGHT: 234 LBS | HEART RATE: 94 BPM | RESPIRATION RATE: 16 BRPM | SYSTOLIC BLOOD PRESSURE: 84 MMHG

## 2024-11-12 DIAGNOSIS — I48.19 PERSISTENT ATRIAL FIBRILLATION (HCC): ICD-10-CM

## 2024-11-12 DIAGNOSIS — I50.9 ACUTE ON CHRONIC CONGESTIVE HEART FAILURE, UNSPECIFIED HEART FAILURE TYPE (HCC): ICD-10-CM

## 2024-11-12 DIAGNOSIS — I25.5 ISCHEMIC CARDIOMYOPATHY: ICD-10-CM

## 2024-11-12 DIAGNOSIS — I10 ESSENTIAL HYPERTENSION: ICD-10-CM

## 2024-11-12 DIAGNOSIS — Z79.01 ON CONTINUOUS ORAL ANTICOAGULATION: ICD-10-CM

## 2024-11-12 DIAGNOSIS — Z98.890 H/O CARDIAC RADIOFREQUENCY ABLATION: ICD-10-CM

## 2024-11-12 PROCEDURE — G2211 COMPLEX E/M VISIT ADD ON: HCPCS | Performed by: INTERNAL MEDICINE

## 2024-11-12 PROCEDURE — 99215 OFFICE O/P EST HI 40 MIN: CPT | Performed by: INTERNAL MEDICINE

## 2024-11-12 PROCEDURE — 3078F DIAST BP <80 MM HG: CPT | Performed by: INTERNAL MEDICINE

## 2024-11-12 PROCEDURE — 3074F SYST BP LT 130 MM HG: CPT | Performed by: INTERNAL MEDICINE

## 2024-11-12 PROCEDURE — 99212 OFFICE O/P EST SF 10 MIN: CPT | Performed by: INTERNAL MEDICINE

## 2024-11-12 ASSESSMENT — FIBROSIS 4 INDEX: FIB4 SCORE: 1.47

## 2024-11-12 NOTE — PROGRESS NOTES
Chief Complaint   Patient presents with    Atrial Fibrillation     Follow up       Subjective     Martín Grijalva is a 69 y.o. male who presents today for follow-up of coronary artery disease ischemic cardiomyopathy and persistent atrial fibrillation.  He has a history of anterior MI status post primary PCI 2008 with subsequent ischemic cardiomyopathy with mostly recovered EF.  Unfortunately he developed atrial fibrillation recently which was coincident with the development of a recurrence of his reduced ejection fraction.  He was maintained on Xarelto and has been cardioverted and underwent atrial fibrillation ablation by Dr. Rosas 6/2021.      At last visit he was noted to be back in atrial fibrillation.  Subsequently echocardiogram revealed decrement in EF and persistent decrement on repeat check.  Tolerating oral anticoagulation.  Feels poorly while in atrial fibrillation.  Would like to discuss another repeat ablation.    Past Medical History:   Diagnosis Date    Acute on chronic clinical systolic heart failure (HCC) 5/19/2022    Arrhythmia 2020    A fib    Arthritis     fingers    CAD (coronary artery disease) 7/2/2012    Chronic anticoagulation 7/2/2012    Congestive heart failure (HCC)     Dyslipidemia 7/2/2012    Essential hypertension, benign 7/2/2012    High cholesterol     Insomnia     wakes up 2-5 times a night , wakes up to use restroom and sometimes just wakes up     Ischemic cardiomyopathy 7/2/2012    Left ventricular thrombosis 7/2/2012    Myocardial infarct (HCC) 2006    Pericarditis 7/2/2012    Pneumonia     history of as a child    Sleep apnea     told by MD that he has it, scheduled for sleep study     Snoring     no sleep study done    SOB (shortness of breath)     no c/o today; on exertion; pt uses 2L o2 qhs only; pt does not know provider    Stented coronary artery 7/2/2012     Past Surgical History:   Procedure Laterality Date    SHOULDER DECOMPRESSION ARTHROSCOPIC Right 11/27/2018     Procedure: SHOULDER DECOMPRESSION ARTHROSCOPIC- SUBACROMIAL CONVERTED TO OPEN;  Surgeon: Ni Weir M.D.;  Location: SURGERY [facility];  Service: Orthopedics    SHOULDER ARTHROSCOPY W/ ROTATOR CUFF REPAIR Right 11/27/2018    Procedure: OPEN ROTATOR CUFF REPAIR;  Surgeon: Ni Weir M.D.;  Location: SURGERY [facility];  Service: Orthopedics    STENT PLACEMENT  2007    cardiac stent    CARDIAC CATH, LEFT HEART      CARPAL TUNNEL RELEASE Bilateral     OTHER      cardioversions x 2    VASECTOMY       Family History   Problem Relation Age of Onset    No Known Problems Mother     Other Father         abestos    Heart Disease Maternal Grandmother     No Known Problems Maternal Grandfather     No Known Problems Paternal Grandmother     Heart Disease Paternal Grandfather     No Known Problems Daughter     No Known Problems Son      Social History     Socioeconomic History    Marital status:      Spouse name: Not on file    Number of children: Not on file    Years of education: Not on file    Highest education level: Not on file   Occupational History    Not on file   Tobacco Use    Smoking status: Never    Smokeless tobacco: Former     Types: Chew   Vaping Use    Vaping status: Never Used   Substance and Sexual Activity    Alcohol use: Yes     Comment: occ    Drug use: Never    Sexual activity: Not on file   Other Topics Concern    Not on file   Social History Narrative    Not on file     Social Drivers of Health     Financial Resource Strain: Not on file   Food Insecurity: Not on file   Transportation Needs: Not on file   Physical Activity: Not on file   Stress: Not on file   Social Connections: Not on file   Intimate Partner Violence: Not on file   Housing Stability: Not on file     Allergies   Allergen Reactions    Ace Inhibitors Cough    Other Environmental      Hayfever     Outpatient Encounter Medications as of 11/12/2024   Medication Sig Dispense Refill    spironolactone (ALDACTONE)  "25 MG Tab TAKE 1 TABLET BY MOUTH EVERY DAY 90 Tablet 3    simvastatin (ZOCOR) 40 MG Tab TAKE 1 TABLET BY MOUTH EVERY DAY 90 Tablet 3    sacubitril-valsartan (ENTRESTO) 24-26 MG Tab Take 1 tablet by mouth 2 times a day. 180 Tablet 1    dapagliflozin propanediol (FARXIGA) 10 MG Tab Take 1 Tablet by mouth every day. 90 Tablet 1    carvedilol (COREG) 6.25 MG Tab TAKE 1 TABLET BY MOUTH TWICE DAILY WITH MEALS 180 Tablet 3    rivaroxaban (XARELTO) 20 MG Tab tablet Take 1 Tablet by mouth with dinner. 90 Tablet 3    torsemide (DEMADEX) 20 MG Tab Take 1 Tablet by mouth 1 time a day as needed (for weight gain, increased shortness of breath or swelling). 90 Tablet 3    budesonide-formoterol (SYMBICORT) 80-4.5 MCG/ACT Aerosol Inhale 2 Puffs 2 times a day. Use spacer. Rinse mouth after each use. 3 Each 3    levalbuterol (XOPENEX HFA) 45 MCG/ACT inhaler INHALE 1 TO 2 PUFFS BY MOUTH EVERY 4 HOURS AS NEEDED FOR SHORTNESS OF BREATH       No facility-administered encounter medications on file as of 11/12/2024.     Review of Systems   All other systems reviewed and are negative.             Objective     BP (!) 84/66 (BP Location: Left arm, Patient Position: Sitting)   Pulse 94   Resp 16   Ht 1.715 m (5' 7.5\")   Wt 106 kg (234 lb)   SpO2 96%   BMI 36.11 kg/m²     Physical Exam  Vitals reviewed.   Constitutional:       General: He is not in acute distress.     Appearance: He is well-developed. He is not diaphoretic.   HENT:      Head: Normocephalic and atraumatic.      Right Ear: External ear normal.      Left Ear: External ear normal.   Eyes:      General: No scleral icterus.        Right eye: No discharge.         Left eye: No discharge.      Conjunctiva/sclera: Conjunctivae normal.      Pupils: Pupils are equal, round, and reactive to light.   Neck:      Thyroid: No thyromegaly.      Vascular: No JVD.      Trachea: No tracheal deviation.   Cardiovascular:      Rate and Rhythm: Normal rate and regular rhythm.      Chest Wall: " PMI is not displaced.      Pulses:           Carotid pulses are 2+ on the right side and 2+ on the left side.       Radial pulses are 2+ on the left side.        Popliteal pulses are 2+ on the right side and 2+ on the left side.        Dorsalis pedis pulses are 2+ on the right side and 2+ on the left side.        Posterior tibial pulses are 2+ on the right side and 2+ on the left side.      Heart sounds: No murmur heard.     No friction rub. No gallop.   Pulmonary:      Effort: Pulmonary effort is normal. No respiratory distress.      Breath sounds: Normal breath sounds. No wheezing or rales.   Chest:      Chest wall: No tenderness.   Abdominal:      General: Bowel sounds are normal. There is no distension.      Palpations: Abdomen is soft.      Tenderness: There is no abdominal tenderness.   Musculoskeletal:         General: No tenderness or deformity. Normal range of motion.      Cervical back: Normal range of motion and neck supple.   Skin:     General: Skin is warm and dry.      Coloration: Skin is not pale.      Findings: No erythema or rash.   Neurological:      Mental Status: He is alert and oriented to person, place, and time.      Cranial Nerves: No cranial nerve deficit (cranial nerves II through XII grossly intact).      Coordination: Coordination normal.   Psychiatric:         Behavior: Behavior normal.         Thought Content: Thought content normal.       LABS:  Lab Results   Component Value Date/Time    CHOLSTRLTOT 127 05/17/2023 06:13 AM    LDL 67 05/17/2023 06:13 AM    HDL 42 05/17/2023 06:13 AM    TRIGLYCERIDE 90 05/17/2023 06:13 AM       Lab Results   Component Value Date/Time    WBC 7.0 05/17/2023 06:13 AM    RBC 4.97 05/17/2023 06:13 AM    HEMOGLOBIN 16.2 05/17/2023 06:13 AM    HEMATOCRIT 48.9 05/17/2023 06:13 AM    MCV 98.4 (H) 05/17/2023 06:13 AM    NEUTSPOLYS 62.20 05/17/2023 06:13 AM    LYMPHOCYTES 23.80 05/17/2023 06:13 AM    MONOCYTES 11.20 05/17/2023 06:13 AM    EOSINOPHILS 1.80  "05/17/2023 06:13 AM    BASOPHILS 0.70 05/17/2023 06:13 AM     Lab Results   Component Value Date/Time    SODIUM 141 05/17/2023 06:13 AM    POTASSIUM 4.4 05/17/2023 06:13 AM    CHLORIDE 103 05/17/2023 06:13 AM    CO2 24 05/17/2023 06:13 AM    GLUCOSE 82 05/17/2023 06:13 AM    BUN 27 (H) 05/17/2023 06:13 AM    CREATININE 0.99 05/17/2023 06:13 AM    CREATININE 0.77 07/09/2012 07:21 AM    BUNCREATRAT 22 02/18/2021 09:32 AM    BUNCREATRAT 14 07/09/2012 07:21 AM         Lab Results   Component Value Date/Time    ALKPHOSPHAT 85 05/17/2023 06:13 AM    ASTSGOT 23 05/17/2023 06:13 AM    ALTSGPT 30 05/17/2023 06:13 AM    TBILIRUBIN 1.6 (H) 05/17/2023 06:13 AM      No results found for: \"BNPBTYPENAT\"   No results found for: \"TSH\"  Lab Results   Component Value Date/Time    PROTHROMBTM 18.4 (H) 08/01/2022 10:00 AM    INR 1.57 (H) 08/01/2022 10:00 AM      Imaging reviewed         Assessment & Plan     1. Persistent atrial fibrillation (HCC)  REFERRAL TO CARDIOLOGY    TSH    FREE THYROXINE      2. Acute on chronic congestive heart failure, unspecified heart failure type (HCC)  REFERRAL TO CARDIOLOGY    proBrain Natriuretic Peptide, NT    CL-LEFT HEART CATHETERIZATION WITH POSSIBLE INTERVENTION      3. H/O cardiac radiofrequency ablation  REFERRAL TO CARDIOLOGY      4. On continuous oral anticoagulation  CBC WITHOUT DIFFERENTIAL    Comp Metabolic Panel      5. Essential hypertension        6. Ischemic cardiomyopathy  CL-LEFT HEART CATHETERIZATION WITH POSSIBLE INTERVENTION          Medical Decision Making: Today's Assessment/Status/Plan:            Feeling less well now and atrial fibrillation with persistently impaired LV function since atrial fibrillation recurrence despite medical therapy.  Heart rates are well-controlled overall blood pressure is borderline and would not support further neurohumoral modification at this time.  He will check his home blood pressures and if they are persistently low this may be contributing to " his fatigue as well.  He also has underlying ischemic component to his cardiomyopathy which had previously been well-controlled and represented scar without ischemia in 2019.  Given his persistent change and possible need for other invasive electrophysiologic procedures recommend ischemic evaluation and we discussed noninvasive versus invasive.  Invasive is the choice we have made together and I recommend coronary angiography with possible PCI.  Laboratory evaluation to assess for alternative issues.  Referral back to electrophysiology to discuss repeat ablation longitudinal antiarrhythmic therapy etc.  Currently tolerating his oral anticoagulation which is high risk medication being monitored with laboratory studies ordered today.  He will follow-up routinely with me.    I have discussed the risks and benefits of cardiac catheterization as well as the procedure itself, rationale and appropriateness in detail with the patient today. Complications including but not limited to death, stroke, MI, urgent bypass surgery, contrast nephropathy, vascular complications, bleeding and infection were explained to the patient. The potential outcomes associated with the procedure (possible PCI, possible CABG, possible medical Rx only) were also discussed at length. The patient agrees to proceed.    Mr. Grijalva's care is highly complex due to high risk diagnosis with either severe exacerbation, progression, or side effects of treatment and is at high risk for complication, morbidity, and mortality. We specifically discussed the need for high risk medication requiring at least quarterly testing and/or made a decision on elective/emergent major surgery with identified patient or procedure risk factors specific to Mr. Grijalva. I have personally spent extra time in discussion about these facts with Mr. Grijalva and reviewed and or ordered at least 3 tests, documents or other physician/TAJ reports available including labs, imaging and  EKGs as appropriate separate from today's encounter.  When relevant, I have reviewed images with Mr. Grijalva and personally interpreted on this encounter day the referenced EKG, echocardiogram, stress tests, CT scan, cardiac catheterization or other cardiac imaging and my personal interpretation is what is specifically stated in this note.    () Today's E/M visit is associated with medical care services that serve as the continuing focal point for all needed health care services and/or with medical care services that  are part of ongoing care related to a patient's single, serious condition, or a complex condition: This includes  furnishing services to patients on an ongoing basis that result in care that is personalized  to the patient. The services result in a comprehensive, longitudinal, and continuous  relationship with the patient and involve delivery of team-based care that is accessible, coordinated with other practitioners and providers, and integrated with the broader health  care landscape.

## 2024-11-13 PROCEDURE — 93306 TTE W/DOPPLER COMPLETE: CPT | Mod: 26 | Performed by: INTERNAL MEDICINE

## 2024-11-13 NOTE — TELEPHONE ENCOUNTER
Per patients request, patient is scheduled on 12-19-24 for a C w/poss with . Patient was told to hold Xarelto for 2 days prior, hold Farxiga AM day of. Patient to check in at 6:00 for an 8:00 procedure. Updated H&P to be done on admit by NP. Pre admit to call patient.

## 2024-11-19 ENCOUNTER — APPOINTMENT (OUTPATIENT)
Dept: ADMISSIONS | Facility: MEDICAL CENTER | Age: 69
End: 2024-11-19
Attending: INTERNAL MEDICINE
Payer: MEDICARE

## 2024-11-25 ENCOUNTER — OFFICE VISIT (OUTPATIENT)
Dept: CARDIOLOGY | Facility: MEDICAL CENTER | Age: 69
End: 2024-11-25
Attending: INTERNAL MEDICINE
Payer: MEDICARE

## 2024-11-25 VITALS
OXYGEN SATURATION: 98 % | SYSTOLIC BLOOD PRESSURE: 117 MMHG | HEART RATE: 76 BPM | RESPIRATION RATE: 16 BRPM | WEIGHT: 236 LBS | BODY MASS INDEX: 37.04 KG/M2 | DIASTOLIC BLOOD PRESSURE: 62 MMHG | HEIGHT: 67 IN

## 2024-11-25 DIAGNOSIS — I25.5 ISCHEMIC CARDIOMYOPATHY: Chronic | ICD-10-CM

## 2024-11-25 DIAGNOSIS — I48.19 PERSISTENT ATRIAL FIBRILLATION (HCC): ICD-10-CM

## 2024-11-25 DIAGNOSIS — I50.22 CHRONIC SYSTOLIC HEART FAILURE (HCC): ICD-10-CM

## 2024-11-25 DIAGNOSIS — Z98.890 H/O CARDIAC RADIOFREQUENCY ABLATION: ICD-10-CM

## 2024-11-25 LAB — EKG IMPRESSION: NORMAL

## 2024-11-25 PROCEDURE — 99212 OFFICE O/P EST SF 10 MIN: CPT | Performed by: INTERNAL MEDICINE

## 2024-11-25 PROCEDURE — 93005 ELECTROCARDIOGRAM TRACING: CPT | Performed by: INTERNAL MEDICINE

## 2024-11-25 ASSESSMENT — FIBROSIS 4 INDEX: FIB4 SCORE: 1.47

## 2024-11-25 NOTE — PROGRESS NOTES
Arrhythmia Clinic Note (Established patient)    DOS: 11/25/2024    Chief complaint/Reason for consult: Persistent AF    Interval History:  Pt is a 70 yo M. History of HTN, CAD s/p prior PCI. Has history of cardiomyopathy and persistent AF. LV function recovered somewhat with sinus restoration after RF ablation in 6/21 and then again in 8/22 with Dr. Rosas. Unfortunately despite prior ablation attempts back in AF. Feels fatigued again like he had been before. Repeat cath is pending.    ROS (+ highlighted in red):  General--Negative for fatigue, weight loss or weight gain  Cardiovascular--Negative for CP, orthopnea, PND    Past Medical History:   Diagnosis Date    Acute on chronic clinical systolic heart failure (HCC) 5/19/2022    Arrhythmia 2020    A fib    Arthritis     fingers    CAD (coronary artery disease) 7/2/2012    Chronic anticoagulation 7/2/2012    Congestive heart failure (HCC)     Dyslipidemia 7/2/2012    Essential hypertension, benign 7/2/2012    High cholesterol     Insomnia     wakes up 2-5 times a night , wakes up to use restroom and sometimes just wakes up     Ischemic cardiomyopathy 7/2/2012    Left ventricular thrombosis 7/2/2012    Myocardial infarct (HCC) 2006    Pericarditis 7/2/2012    Pneumonia     history of as a child    Sleep apnea     told by MD that he has it, scheduled for sleep study     Snoring     no sleep study done    SOB (shortness of breath)     no c/o today; on exertion; pt uses 2L o2 qhs only; pt does not know provider    Stented coronary artery 7/2/2012       Past Surgical History:   Procedure Laterality Date    SHOULDER DECOMPRESSION ARTHROSCOPIC Right 11/27/2018    Procedure: SHOULDER DECOMPRESSION ARTHROSCOPIC- SUBACROMIAL CONVERTED TO OPEN;  Surgeon: Ni Weir M.D.;  Location: SURGERY [facility];  Service: Orthopedics    SHOULDER ARTHROSCOPY W/ ROTATOR CUFF REPAIR Right 11/27/2018    Procedure: OPEN ROTATOR CUFF REPAIR;  Surgeon: Ni Weir M.D.;   Location: SURGERY [facility];  Service: Orthopedics    STENT PLACEMENT  2007    cardiac stent    CARDIAC CATH, LEFT HEART      CARPAL TUNNEL RELEASE Bilateral     OTHER      cardioversions x 2    VASECTOMY         Social History     Socioeconomic History    Marital status:      Spouse name: Not on file    Number of children: Not on file    Years of education: Not on file    Highest education level: Not on file   Occupational History    Not on file   Tobacco Use    Smoking status: Never    Smokeless tobacco: Former     Types: Chew   Vaping Use    Vaping status: Never Used   Substance and Sexual Activity    Alcohol use: Yes     Comment: occ    Drug use: Never    Sexual activity: Not on file   Other Topics Concern    Not on file   Social History Narrative    Not on file     Social Drivers of Health     Financial Resource Strain: Not on file   Food Insecurity: Not on file   Transportation Needs: Not on file   Physical Activity: Not on file   Stress: Not on file   Social Connections: Not on file   Intimate Partner Violence: Not on file   Housing Stability: Not on file       Family History   Problem Relation Age of Onset    No Known Problems Mother     Other Father         abestos    Heart Disease Maternal Grandmother     No Known Problems Maternal Grandfather     No Known Problems Paternal Grandmother     Heart Disease Paternal Grandfather     No Known Problems Daughter     No Known Problems Son        Allergies   Allergen Reactions    Ace Inhibitors Cough    Other Environmental      Hayfever       Current Outpatient Medications   Medication Sig Dispense Refill    spironolactone (ALDACTONE) 25 MG Tab TAKE 1 TABLET BY MOUTH EVERY DAY 90 Tablet 3    simvastatin (ZOCOR) 40 MG Tab TAKE 1 TABLET BY MOUTH EVERY DAY 90 Tablet 3    sacubitril-valsartan (ENTRESTO) 24-26 MG Tab Take 1 tablet by mouth 2 times a day. 180 Tablet 1    dapagliflozin propanediol (FARXIGA) 10 MG Tab Take 1 Tablet by mouth every day. 90 Tablet  "1    carvedilol (COREG) 6.25 MG Tab TAKE 1 TABLET BY MOUTH TWICE DAILY WITH MEALS 180 Tablet 3    rivaroxaban (XARELTO) 20 MG Tab tablet Take 1 Tablet by mouth with dinner. 90 Tablet 3    torsemide (DEMADEX) 20 MG Tab Take 1 Tablet by mouth 1 time a day as needed (for weight gain, increased shortness of breath or swelling). 90 Tablet 3    budesonide-formoterol (SYMBICORT) 80-4.5 MCG/ACT Aerosol Inhale 2 Puffs 2 times a day. Use spacer. Rinse mouth after each use. 3 Each 3    levalbuterol (XOPENEX HFA) 45 MCG/ACT inhaler INHALE 1 TO 2 PUFFS BY MOUTH EVERY 4 HOURS AS NEEDED FOR SHORTNESS OF BREATH       No current facility-administered medications for this visit.       Physical Exam:  Vitals:    11/25/24 0739   BP: 117/62   BP Location: Left arm   Patient Position: Sitting   BP Cuff Size: Adult   Pulse: 76   Resp: 16   SpO2: 98%   Weight: 107 kg (236 lb)   Height: 1.702 m (5' 7\")     General appearance: NAD, conversant  HEENT: PERRL, neck is supple with FROM  Lungs: Clear to auscultation, normal respiratory effort  CV: irregularly irregular, no murmurs/rubs/gallops, no JVD  Abdomen: Soft, non-tender with normal bowel sounds  Extremities: No peripheral edema, no clubbing or cyanosis  Skin: No rash, lesions, or ulcers  Psych: Alert and oriented to person, place and time    Data:  Labs reviewed  Prior echo/stress reviewed:  LVEF 40%    Prior cath reviewed    EKG interpreted by me:  AF    Impression/Plan:  1. Persistent atrial fibrillation (HCC)  EKG      2. Ischemic cardiomyopathy        3. H/O cardiac radiofrequency ablation 6/25/21 & 8/1/22 Dr Rosas          -His symptoms are consistent with symptomatic AF  -We will see what the repeat cath shows  -I discussed with him further rhythm control options including sotalol load/repeat endocardial ablation/hybrid ablation for persistent fib refractory now to RF  -He will let us know how he wants to proceed    Glen Grimm MD    "

## 2024-11-26 ENCOUNTER — PRE-ADMISSION TESTING (OUTPATIENT)
Dept: ADMISSIONS | Facility: MEDICAL CENTER | Age: 69
End: 2024-11-26
Attending: INTERNAL MEDICINE
Payer: MEDICARE

## 2024-11-26 NOTE — TELEPHONE ENCOUNTER
Is the patient due for a refill? Yes    Was the patient seen the past year? Yes    Date of last office visit: 11.25.2024    Does the patient have an upcoming appointment?  No    Provider to refill:SS    Does the patient have alf Plus and need 100-day supply? (This applies to ALL medications) Patient does not have SCP

## 2024-11-26 NOTE — OR NURSING
RN tele pre admit appointment completed with patient:  Patient provided medication and fasting instructions, per Cardiology Procedure Instruction Sheet and bathing instructions per Preparing for Your Procedure packet.  Procedure date 12/19/2024.   Patient verbalizes understanding of all instructions given. No further questions at this time.

## 2024-11-27 RX ORDER — SACUBITRIL AND VALSARTAN 24; 26 MG/1; MG/1
1 TABLET, FILM COATED ORAL 2 TIMES DAILY
Qty: 180 TABLET | Refills: 3 | Status: SHIPPED | OUTPATIENT
Start: 2024-11-27

## 2024-12-03 ENCOUNTER — HOSPITAL ENCOUNTER (OUTPATIENT)
Dept: LAB | Facility: MEDICAL CENTER | Age: 69
End: 2024-12-03
Attending: INTERNAL MEDICINE
Payer: MEDICARE

## 2024-12-03 DIAGNOSIS — I50.9 ACUTE ON CHRONIC CONGESTIVE HEART FAILURE, UNSPECIFIED HEART FAILURE TYPE (HCC): ICD-10-CM

## 2024-12-03 DIAGNOSIS — Z79.01 ON CONTINUOUS ORAL ANTICOAGULATION: ICD-10-CM

## 2024-12-03 DIAGNOSIS — I48.19 PERSISTENT ATRIAL FIBRILLATION (HCC): ICD-10-CM

## 2024-12-03 LAB
ERYTHROCYTE [DISTWIDTH] IN BLOOD BY AUTOMATED COUNT: 45.1 FL (ref 35.9–50)
HCT VFR BLD AUTO: 46.3 % (ref 42–52)
HGB BLD-MCNC: 15.1 G/DL (ref 14–18)
MCH RBC QN AUTO: 32.1 PG (ref 27–33)
MCHC RBC AUTO-ENTMCNC: 32.6 G/DL (ref 32.3–36.5)
MCV RBC AUTO: 98.5 FL (ref 81.4–97.8)
PLATELET # BLD AUTO: 184 K/UL (ref 164–446)
PMV BLD AUTO: 10.9 FL (ref 9–12.9)
RBC # BLD AUTO: 4.7 M/UL (ref 4.7–6.1)
WBC # BLD AUTO: 6.6 K/UL (ref 4.8–10.8)

## 2024-12-03 PROCEDURE — 83880 ASSAY OF NATRIURETIC PEPTIDE: CPT

## 2024-12-03 PROCEDURE — 80053 COMPREHEN METABOLIC PANEL: CPT

## 2024-12-03 PROCEDURE — 84443 ASSAY THYROID STIM HORMONE: CPT

## 2024-12-03 PROCEDURE — 85027 COMPLETE CBC AUTOMATED: CPT

## 2024-12-03 PROCEDURE — 36415 COLL VENOUS BLD VENIPUNCTURE: CPT

## 2024-12-03 PROCEDURE — RXMED WILLOW AMBULATORY MEDICATION CHARGE: Performed by: INTERNAL MEDICINE

## 2024-12-04 ENCOUNTER — PHARMACY VISIT (OUTPATIENT)
Dept: PHARMACY | Facility: MEDICAL CENTER | Age: 69
End: 2024-12-04
Payer: COMMERCIAL

## 2024-12-04 ENCOUNTER — OFFICE VISIT (OUTPATIENT)
Dept: SLEEP MEDICINE | Facility: MEDICAL CENTER | Age: 69
End: 2024-12-04
Attending: NURSE PRACTITIONER
Payer: MEDICARE

## 2024-12-04 VITALS
HEIGHT: 69 IN | WEIGHT: 230.8 LBS | SYSTOLIC BLOOD PRESSURE: 124 MMHG | HEART RATE: 91 BPM | DIASTOLIC BLOOD PRESSURE: 84 MMHG | BODY MASS INDEX: 34.18 KG/M2 | RESPIRATION RATE: 16 BRPM | OXYGEN SATURATION: 96 %

## 2024-12-04 DIAGNOSIS — G47.33 OBSTRUCTIVE SLEEP APNEA: Chronic | ICD-10-CM

## 2024-12-04 DIAGNOSIS — Z87.891 FORMER SMOKELESS TOBACCO USE: ICD-10-CM

## 2024-12-04 DIAGNOSIS — Z78.9 NONSMOKER: ICD-10-CM

## 2024-12-04 DIAGNOSIS — J45.20 MILD INTERMITTENT ASTHMA WITHOUT COMPLICATION: Chronic | ICD-10-CM

## 2024-12-04 LAB
ALBUMIN SERPL BCP-MCNC: 4.1 G/DL (ref 3.2–4.9)
ALBUMIN/GLOB SERPL: 1.8 G/DL
ALP SERPL-CCNC: 71 U/L (ref 30–99)
ALT SERPL-CCNC: 23 U/L (ref 2–50)
ANION GAP SERPL CALC-SCNC: 10 MMOL/L (ref 7–16)
AST SERPL-CCNC: 23 U/L (ref 12–45)
BILIRUB SERPL-MCNC: 1.7 MG/DL (ref 0.1–1.5)
BUN SERPL-MCNC: 19 MG/DL (ref 8–22)
CALCIUM ALBUM COR SERPL-MCNC: 8.7 MG/DL (ref 8.5–10.5)
CALCIUM SERPL-MCNC: 8.8 MG/DL (ref 8.5–10.5)
CHLORIDE SERPL-SCNC: 103 MMOL/L (ref 96–112)
CO2 SERPL-SCNC: 24 MMOL/L (ref 20–33)
CREAT SERPL-MCNC: 0.88 MG/DL (ref 0.5–1.4)
GFR SERPLBLD CREATININE-BSD FMLA CKD-EPI: 93 ML/MIN/1.73 M 2
GLOBULIN SER CALC-MCNC: 2.3 G/DL (ref 1.9–3.5)
GLUCOSE SERPL-MCNC: 116 MG/DL (ref 65–99)
NT-PROBNP SERPL IA-MCNC: 1116 PG/ML (ref 0–125)
POTASSIUM SERPL-SCNC: 4.5 MMOL/L (ref 3.6–5.5)
PROT SERPL-MCNC: 6.4 G/DL (ref 6–8.2)
SODIUM SERPL-SCNC: 137 MMOL/L (ref 135–145)
TSH SERPL-ACNC: 1.27 UIU/ML (ref 0.35–5.5)

## 2024-12-04 PROCEDURE — 99214 OFFICE O/P EST MOD 30 MIN: CPT | Performed by: NURSE PRACTITIONER

## 2024-12-04 PROCEDURE — 3074F SYST BP LT 130 MM HG: CPT | Performed by: NURSE PRACTITIONER

## 2024-12-04 PROCEDURE — 3079F DIAST BP 80-89 MM HG: CPT | Performed by: NURSE PRACTITIONER

## 2024-12-04 PROCEDURE — 99213 OFFICE O/P EST LOW 20 MIN: CPT | Performed by: NURSE PRACTITIONER

## 2024-12-04 RX ORDER — LEVALBUTEROL TARTRATE 45 UG/1
1-2 AEROSOL, METERED ORAL EVERY 4 HOURS PRN
Qty: 15 G | Refills: 11 | Status: SHIPPED | OUTPATIENT
Start: 2024-12-04

## 2024-12-04 RX ORDER — BUDESONIDE AND FORMOTEROL FUMARATE DIHYDRATE 80; 4.5 UG/1; UG/1
2 AEROSOL RESPIRATORY (INHALATION) 2 TIMES DAILY
Qty: 3 EACH | Refills: 3 | Status: SHIPPED | OUTPATIENT
Start: 2024-12-04 | End: 2024-12-16

## 2024-12-04 ASSESSMENT — PATIENT HEALTH QUESTIONNAIRE - PHQ9: CLINICAL INTERPRETATION OF PHQ2 SCORE: 0

## 2024-12-04 ASSESSMENT — FIBROSIS 4 INDEX: FIB4 SCORE: 1.8

## 2024-12-04 NOTE — PROGRESS NOTES
Chief Complaint   Patient presents with    Follow-Up     asthma and JOSE.   5/9/2023- Kaitlin WILLIAM   CPAP 8 cm.        HPI:  Zenon Grijalva is a 69 y.o. year old male here today for follow-up on asthma and JOSE.  Last OV 5/9/23     PATIENT DOES NOT USE MYCHART    [facility] stGstrstastdstest:st st1st Exacerbations this year: 0    Prescribed Symbicort 80 mcgs 2 puffs twice daily and Xopenex HFA as needed.    Using CPAP 8 cm; Respironics device obtained 2021.  He also has a travel ResMed device.    Interval events  12/4/2024: He notes breathing to be stable but off Symbicort since April or May of this year since he is unable to obtain a refill.  He has not required his max HFA either.  He remains active walking at least twice daily and also likes to hunt and fish.  He denies any exacerbations or colds.  No significant shortness of breath with activity, no chest pain, chest tightness or wheezing.  He does have some congestion in the morning that clears with cough.  He also notes some sinus drainage in the morning that generally clears.  Pending heart cath by cardiology to further determine how to correct his atrial fibrillation.    PULM & SLEEP HX:  Echo 11/9/2021 indicated LVEF improvement from 25 to 45%.  RVSP 44 mmHg with a dilated inferior vena cava with inspiratory collapse.     PFT December 2020 noted mild to moderate obstructive lung disease with FEV1 2.1 L or 67%, FEV1/FVC ratio 72, FVC 2.93 L or 71%, %, TLC 91% and DLCO 124% predicted.  Patient did have a mild bronchodilator response.  He did undergo a trial of ICS/LABA but found no subjective benefit.    Spirometry 7/27/2022 notes obstruction characteristic of asthma with FVC 3.29 L 60%, FEV1 2.41 L or 59%, FEV1/FVC ratio 73.  No significant bronchodilator response.  PFT 5/9/2023 notes FVC 2.99 L or 74%, FEV1 2.24 L 72%, FEV1/FVC ratio 75, mild bronchodilator response, %, TLC 6.31 L or 96% and DLCO 99% predicted. Reviewed with patient. Airflows are improved  based on percentage.      Imaging with chest x-ray 2022 noted no acute cardiopulmonary process but again identified areas of calcific pleural plaquing.  Patient notes a history of being a  and his father also had findings of asbestosis of which she  from.  No prior CT imaging on chart.     He had screening overnight oximetry showing desaturations for 100 minutes ninfa of 75% and was started on nighttime oxygen.  PSG 2020 showed moderate JOSE with AHI:23/h and desaturations to 81% Sp02.  He was successfully titrated on CPAP: 7 cm of water with resolved JOSE.   CNOX 2022 using CPAP indicated 15.8 minutes less than 80% ox saturation, basal SPO2 of 91.7%, DAYNA 18. Bradycardia noted for 453.9 minutes of the night. Recommend increasing CPAP to 8 cm nightly.     ROS: As per HPI and otherwise negative if not stated.    Past Medical History:   Diagnosis Date    Acute on chronic clinical systolic heart failure (HCC) 2022    Arrhythmia     A fib    Arthritis     fingers    CAD (coronary artery disease) 2012    Chronic anticoagulation 2012    Congestive heart failure (HCC)     Dyslipidemia 2012    Essential hypertension, benign 2012    High cholesterol     Insomnia     wakes up 2-5 times a night , wakes up to use restroom and sometimes just wakes up     Ischemic cardiomyopathy 2012    Left ventricular thrombosis 2012    Myocardial infarct (HCC) 2006    Pericarditis 2012    Pneumonia     history of as a child    Sleep apnea     told by MD that he has it, scheduled for sleep study     Snoring     no sleep study done    SOB (shortness of breath)     no c/o today; on exertion; pt uses 2L o2 qhs only; pt does not know provider    Stented coronary artery 2012       Past Surgical History:   Procedure Laterality Date    SHOULDER DECOMPRESSION ARTHROSCOPIC Right 2018    Procedure: SHOULDER DECOMPRESSION ARTHROSCOPIC- SUBACROMIAL CONVERTED TO OPEN;  Surgeon: Ni  JENNIFER Weir M.D.;  Location: SURGERY [facility];  Service: Orthopedics    SHOULDER ARTHROSCOPY W/ ROTATOR CUFF REPAIR Right 11/27/2018    Procedure: OPEN ROTATOR CUFF REPAIR;  Surgeon: Ni Weir M.D.;  Location: SURGERY [facility];  Service: Orthopedics    STENT PLACEMENT  2007    cardiac stent    CARDIAC CATH, LEFT HEART      CARPAL TUNNEL RELEASE Bilateral     OTHER      cardioversions x 2    VASECTOMY         Family History   Problem Relation Age of Onset    No Known Problems Mother     Other Father         abestos    Heart Disease Maternal Grandmother     No Known Problems Maternal Grandfather     No Known Problems Paternal Grandmother     Heart Disease Paternal Grandfather     No Known Problems Daughter     No Known Problems Son        Social History     Socioeconomic History    Marital status:      Spouse name: Not on file    Number of children: Not on file    Years of education: Not on file    Highest education level: Not on file   Occupational History    Not on file   Tobacco Use    Smoking status: Never    Smokeless tobacco: Former     Types: Chew    Tobacco comments:     Quit chewing tobacco about 30 years ago.   Vaping Use    Vaping status: Never Used   Substance and Sexual Activity    Alcohol use: Yes     Comment: occ    Drug use: Never    Sexual activity: Not on file   Other Topics Concern    Not on file   Social History Narrative    Not on file     Social Drivers of Health     Financial Resource Strain: Not on file   Food Insecurity: Not on file   Transportation Needs: Not on file   Physical Activity: Not on file   Stress: Not on file   Social Connections: Not on file   Intimate Partner Violence: Not on file   Housing Stability: Not on file       Allergies as of 12/04/2024 - Reviewed 12/04/2024   Allergen Reaction Noted    Ace inhibitors Cough 05/27/2022    Other environmental  07/06/2021        Vitals:  /84 (BP Location: Left arm, Patient Position: Sitting, BP Cuff  "Size: Adult)   Pulse 91   Resp 16   Ht 1.753 m (5' 9\")   Wt 105 kg (230 lb 12.8 oz)   SpO2 96%     Current medications as of today   Current Outpatient Medications   Medication Sig Dispense Refill    budesonide-formoterol (SYMBICORT) 80-4.5 MCG/ACT Aerosol Inhale 2 Puffs 2 times a day. Use spacer. Rinse mouth after each use. 3 Each 3    levalbuterol (XOPENEX HFA) 45 MCG/ACT inhaler Inhale 1-2 Puffs every four hours as needed for Shortness of Breath. 15 g 11    sacubitril-valsartan (ENTRESTO) 24-26 MG Tab Take 1 tablet by mouth 2 times a day. 180 Tablet 3    spironolactone (ALDACTONE) 25 MG Tab TAKE 1 TABLET BY MOUTH EVERY DAY 90 Tablet 3    simvastatin (ZOCOR) 40 MG Tab TAKE 1 TABLET BY MOUTH EVERY DAY 90 Tablet 3    dapagliflozin propanediol (FARXIGA) 10 MG Tab Take 1 Tablet by mouth every day. 90 Tablet 1    carvedilol (COREG) 6.25 MG Tab TAKE 1 TABLET BY MOUTH TWICE DAILY WITH MEALS 180 Tablet 3    rivaroxaban (XARELTO) 20 MG Tab tablet Take 1 Tablet by mouth with dinner. 90 Tablet 3    torsemide (DEMADEX) 20 MG Tab Take 1 Tablet by mouth 1 time a day as needed (for weight gain, increased shortness of breath or swelling). 90 Tablet 3     No current facility-administered medications for this visit.         Physical Exam:   Gen:           Alert and oriented, No apparent distress. Mood and affect appropriate, normal interaction with examiner.  Eyes:          PERRL, EOM intact, sclere white, conjunctive moist.  Ears:          Not examined  Hearing:     Grossly intact.  Nose:          Normal, no lesions or deformities.  Dentition:    Not examined  Oropharynx:   Not examined  Mallampati Classification: Not examined  Neck:        Supple, trachea midline, no masses.  Respiratory Effort: No intercostal retractions or use of accessory muscles.   Lung Auscultation:      Clear to auscultation bilaterally; no rales, rhonchi or wheezing.  CV:            A. FIb  Abd:           Not examined.  Lymphadenopathy: Not " examined  Gait and Station: Normal.  Digits and Nails: No clubbing, cyanosis, petechiae, or nodes.   Cranial Nerves: II-XII grossly intact.  Skin:        No rashes, lesions or ulcers noted.               Ext:           No cyanosis or edema.      Assessment:  1. Obstructive sleep apnea  DME Mask and Supplies    Overnight Oximetry      2. Mild intermittent asthma without complication  budesonide-formoterol (SYMBICORT) 80-4.5 MCG/ACT Aerosol    levalbuterol (XOPENEX HFA) 45 MCG/ACT inhaler    Spirometry      3. BMI 34.0-34.9,adult  HEIGHT AND WEIGHT      4. Nonsmoker        5. Former smokeless tobacco use                 Immunizations:    Flu:declines  Pneumovax 23:2022  Prevnar 13:not due  PCV 20: not due  COVID-19: 2022    Plan:  Obstructive sleep apnea appears well-controlled but will recheck overnight oximetry due to regular A-fib.  Patient does not use MyChart so need to call results.   DME mask/supplies   CNOX on pap now - CALL RESULTS  Asthma is overall mild and he has not had any exacerbations since being off maintenance bronchodilator therapy.  Due to mild obstruction noted previously recommend resuming therapy and reviewed proper use.  Refills provided.   MELISSA    REFILLS  Encourage weight loss healthy diet and activity.  He is starting a healthy diet per his wife.  Follow-up primary care for the health concerns.  Follow-up in 6 months with spirometry and compliance report, sooner if needed.    Please note that this dictation was created using voice recognition software. I have made every reasonable attempt to correct obvious errors, but it is possible there are errors of grammar and possibly content that I did not discover before finalizing the note.

## 2024-12-04 NOTE — PATIENT INSTRUCTIONS
Check on symbicort RX/generic - let me know if changes need to be made    Complete overnight oxygen testing using CPAP

## 2024-12-14 DIAGNOSIS — J45.20 MILD INTERMITTENT ASTHMA WITHOUT COMPLICATION: Chronic | ICD-10-CM

## 2024-12-16 ENCOUNTER — PRE-ADMISSION TESTING (OUTPATIENT)
Dept: ADMISSIONS | Facility: MEDICAL CENTER | Age: 69
End: 2024-12-16
Attending: INTERNAL MEDICINE
Payer: MEDICARE

## 2024-12-16 DIAGNOSIS — Z01.812 PRE-OPERATIVE LABORATORY EXAMINATION: ICD-10-CM

## 2024-12-16 DIAGNOSIS — Z01.810 PRE-OPERATIVE CARDIOVASCULAR EXAMINATION: ICD-10-CM

## 2024-12-16 LAB
ALBUMIN SERPL BCP-MCNC: 4 G/DL (ref 3.2–4.9)
ALBUMIN/GLOB SERPL: 1.5 G/DL
ALP SERPL-CCNC: 74 U/L (ref 30–99)
ALT SERPL-CCNC: 29 U/L (ref 2–50)
ANION GAP SERPL CALC-SCNC: 11 MMOL/L (ref 7–16)
APTT PPP: 32.4 SEC (ref 24.7–36)
AST SERPL-CCNC: 30 U/L (ref 12–45)
BILIRUB SERPL-MCNC: 2.1 MG/DL (ref 0.1–1.5)
BUN SERPL-MCNC: 19 MG/DL (ref 8–22)
CALCIUM ALBUM COR SERPL-MCNC: 9.3 MG/DL (ref 8.5–10.5)
CALCIUM SERPL-MCNC: 9.3 MG/DL (ref 8.5–10.5)
CHLORIDE SERPL-SCNC: 104 MMOL/L (ref 96–112)
CO2 SERPL-SCNC: 23 MMOL/L (ref 20–33)
CREAT SERPL-MCNC: 1.11 MG/DL (ref 0.5–1.4)
EKG IMPRESSION: NORMAL
ERYTHROCYTE [DISTWIDTH] IN BLOOD BY AUTOMATED COUNT: 45 FL (ref 35.9–50)
GFR SERPLBLD CREATININE-BSD FMLA CKD-EPI: 72 ML/MIN/1.73 M 2
GLOBULIN SER CALC-MCNC: 2.6 G/DL (ref 1.9–3.5)
GLUCOSE SERPL-MCNC: 105 MG/DL (ref 65–99)
HCT VFR BLD AUTO: 45.4 % (ref 42–52)
HGB BLD-MCNC: 15.5 G/DL (ref 14–18)
INR PPP: 1.61 (ref 0.87–1.13)
MCH RBC QN AUTO: 33.4 PG (ref 27–33)
MCHC RBC AUTO-ENTMCNC: 34.1 G/DL (ref 32.3–36.5)
MCV RBC AUTO: 97.8 FL (ref 81.4–97.8)
PLATELET # BLD AUTO: 195 K/UL (ref 164–446)
PMV BLD AUTO: 10.4 FL (ref 9–12.9)
POTASSIUM SERPL-SCNC: 4.7 MMOL/L (ref 3.6–5.5)
PROT SERPL-MCNC: 6.6 G/DL (ref 6–8.2)
PROTHROMBIN TIME: 19.2 SEC (ref 12–14.6)
RBC # BLD AUTO: 4.64 M/UL (ref 4.7–6.1)
SODIUM SERPL-SCNC: 138 MMOL/L (ref 135–145)
WBC # BLD AUTO: 6.6 K/UL (ref 4.8–10.8)

## 2024-12-16 PROCEDURE — 85610 PROTHROMBIN TIME: CPT

## 2024-12-16 PROCEDURE — 93005 ELECTROCARDIOGRAM TRACING: CPT | Mod: TC

## 2024-12-16 PROCEDURE — 36415 COLL VENOUS BLD VENIPUNCTURE: CPT

## 2024-12-16 PROCEDURE — 85027 COMPLETE CBC AUTOMATED: CPT

## 2024-12-16 PROCEDURE — 80053 COMPREHEN METABOLIC PANEL: CPT

## 2024-12-16 PROCEDURE — 93010 ELECTROCARDIOGRAM REPORT: CPT | Performed by: INTERNAL MEDICINE

## 2024-12-16 PROCEDURE — 85730 THROMBOPLASTIN TIME PARTIAL: CPT

## 2024-12-16 RX ORDER — BUDESONIDE AND FORMOTEROL FUMARATE DIHYDRATE 80; 4.5 UG/1; UG/1
AEROSOL RESPIRATORY (INHALATION)
Qty: 30.6 G | Refills: 3 | Status: SHIPPED | OUTPATIENT
Start: 2024-12-16

## 2024-12-19 ENCOUNTER — APPOINTMENT (OUTPATIENT)
Dept: CARDIOLOGY | Facility: MEDICAL CENTER | Age: 69
End: 2024-12-19
Attending: INTERNAL MEDICINE
Payer: MEDICARE

## 2024-12-19 ENCOUNTER — HOSPITAL ENCOUNTER (OUTPATIENT)
Facility: MEDICAL CENTER | Age: 69
End: 2024-12-19
Attending: INTERNAL MEDICINE | Admitting: INTERNAL MEDICINE
Payer: MEDICARE

## 2024-12-19 VITALS
WEIGHT: 230.16 LBS | BODY MASS INDEX: 34.09 KG/M2 | DIASTOLIC BLOOD PRESSURE: 59 MMHG | OXYGEN SATURATION: 94 % | TEMPERATURE: 96.1 F | HEART RATE: 92 BPM | HEIGHT: 69 IN | RESPIRATION RATE: 16 BRPM | SYSTOLIC BLOOD PRESSURE: 82 MMHG

## 2024-12-19 DIAGNOSIS — I25.5 ISCHEMIC CARDIOMYOPATHY: ICD-10-CM

## 2024-12-19 DIAGNOSIS — I50.9 ACUTE ON CHRONIC CONGESTIVE HEART FAILURE, UNSPECIFIED HEART FAILURE TYPE (HCC): ICD-10-CM

## 2024-12-19 PROCEDURE — 160047 HCHG PACU  - EA ADDL 30 MINS PHASE II

## 2024-12-19 PROCEDURE — 99152 MOD SED SAME PHYS/QHP 5/>YRS: CPT | Performed by: INTERNAL MEDICINE

## 2024-12-19 PROCEDURE — A9270 NON-COVERED ITEM OR SERVICE: HCPCS

## 2024-12-19 PROCEDURE — 700102 HCHG RX REV CODE 250 W/ 637 OVERRIDE(OP)

## 2024-12-19 PROCEDURE — 160002 HCHG RECOVERY MINUTES (STAT)

## 2024-12-19 PROCEDURE — 160035 HCHG PACU - 1ST 60 MINS PHASE I

## 2024-12-19 PROCEDURE — 160046 HCHG PACU - 1ST 60 MINS PHASE II

## 2024-12-19 PROCEDURE — 99153 MOD SED SAME PHYS/QHP EA: CPT

## 2024-12-19 PROCEDURE — 700111 HCHG RX REV CODE 636 W/ 250 OVERRIDE (IP)

## 2024-12-19 PROCEDURE — 93458 L HRT ARTERY/VENTRICLE ANGIO: CPT | Performed by: INTERNAL MEDICINE

## 2024-12-19 PROCEDURE — 700101 HCHG RX REV CODE 250

## 2024-12-19 PROCEDURE — 700117 HCHG RX CONTRAST REV CODE 255: Performed by: INTERNAL MEDICINE

## 2024-12-19 RX ORDER — VERAPAMIL HYDROCHLORIDE 2.5 MG/ML
INJECTION, SOLUTION INTRAVENOUS
Status: COMPLETED
Start: 2024-12-19 | End: 2024-12-19

## 2024-12-19 RX ORDER — SODIUM CHLORIDE 9 MG/ML
INJECTION, SOLUTION INTRAVENOUS CONTINUOUS
Status: DISCONTINUED | OUTPATIENT
Start: 2024-12-19 | End: 2024-12-19 | Stop reason: HOSPADM

## 2024-12-19 RX ORDER — HEPARIN SODIUM 1000 [USP'U]/ML
INJECTION, SOLUTION INTRAVENOUS; SUBCUTANEOUS
Status: COMPLETED
Start: 2024-12-19 | End: 2024-12-19

## 2024-12-19 RX ORDER — ASPIRIN 81 MG/1
TABLET, CHEWABLE ORAL
Status: COMPLETED
Start: 2024-12-19 | End: 2024-12-19

## 2024-12-19 RX ORDER — HEPARIN SODIUM 200 [USP'U]/100ML
INJECTION, SOLUTION INTRAVENOUS
Status: COMPLETED
Start: 2024-12-19 | End: 2024-12-19

## 2024-12-19 RX ORDER — LIDOCAINE HYDROCHLORIDE 20 MG/ML
INJECTION, SOLUTION INFILTRATION; PERINEURAL
Status: COMPLETED
Start: 2024-12-19 | End: 2024-12-19

## 2024-12-19 RX ORDER — MIDAZOLAM HYDROCHLORIDE 1 MG/ML
INJECTION INTRAMUSCULAR; INTRAVENOUS
Status: COMPLETED
Start: 2024-12-19 | End: 2024-12-19

## 2024-12-19 RX ADMIN — NITROGLYCERIN 10 ML: 20 INJECTION INTRAVENOUS at 07:51

## 2024-12-19 RX ADMIN — HEPARIN SODIUM 2000 UNITS: 200 INJECTION, SOLUTION INTRAVENOUS at 07:52

## 2024-12-19 RX ADMIN — ASPIRIN 324 MG: 81 TABLET, CHEWABLE ORAL at 08:00

## 2024-12-19 RX ADMIN — FENTANYL CITRATE 100 MCG: 50 INJECTION, SOLUTION INTRAMUSCULAR; INTRAVENOUS at 08:28

## 2024-12-19 RX ADMIN — HEPARIN SODIUM: 1000 INJECTION, SOLUTION INTRAVENOUS; SUBCUTANEOUS at 07:51

## 2024-12-19 RX ADMIN — MIDAZOLAM HYDROCHLORIDE 2 MG: 1 INJECTION, SOLUTION INTRAMUSCULAR; INTRAVENOUS at 08:28

## 2024-12-19 RX ADMIN — VERAPAMIL HYDROCHLORIDE 2.5 MG: 2.5 INJECTION, SOLUTION INTRAVENOUS at 07:51

## 2024-12-19 RX ADMIN — HEPARIN SODIUM 2000 UNITS: 200 INJECTION, SOLUTION INTRAVENOUS at 07:53

## 2024-12-19 RX ADMIN — IOHEXOL 40 ML: 350 INJECTION, SOLUTION INTRAVENOUS at 08:31

## 2024-12-19 RX ADMIN — LIDOCAINE HYDROCHLORIDE: 20 INJECTION, SOLUTION INFILTRATION; PERINEURAL at 07:51

## 2024-12-19 ASSESSMENT — FIBROSIS 4 INDEX: FIB4 SCORE: 1.97

## 2024-12-19 NOTE — DISCHARGE INSTRUCTIONS
POST ANGIOGRAM  General Care Instructions  Maintain a bandage over the incision site for 24 hours.  It's normal to find a small bruise or dime-sized lump at the insertion site. This should disappear within a few weeks.  Do not apply lotions or powders to the site.  Do not immerse the catheter insertion site in water (bathtub/swimming) for five days. It is ok to shower 24 hours after the procedure.  You may resume your normal diet immediately; on the day of your procedure, drink 6-10 glasses of water to help flush the contrast liquid out of your system.  If the doctor inserted the catheter in through your groin:  Walking short distances on a flat surface is OK. Limit going up/down stairs for the first 2 days.  DO NOT do yard work, drive, squat, lift heavy objects, or play sports for 2 days; or until your health care provider tells you it is OK.  If the doctor inserted the catheter in your arm:  For 3 days, DO NOT lift anything heavier than 10 pounds (approximately a gallon of milk). DO NOT do any heavy pushing, pulling, or twisting.    Medications  If your current medications need to be changed, you will be provided with an updated list of your medications prior to discharge.  If you take warfarin (Coumadin), resume taking your usual dose the evening after the procedure.  DO NOT STOP taking prescribed blood thinning (anti-platelet) medications unless instructed by your cardiologist.  These medications include:  Aspirin, Clopidogrel (Plavix), Ticagrelor (Brilinta), or Prasugrel (Effient)   If you take one of the following anticoagulants, RESUME 24 HOURS after your procedure:  Apixiban (Eliquis), Rivaroxaban (Xarelto), Dabigatran (Pradaxa), Edoxaban (Savaysa)  If you take metformin (Glucophage), RESUME 48 HOURS after your procedure.    When to call your healthcare provider  Call your cardiologist right away at [phone] if you have any of the following:   Problems/Concerns taking any of your prescribed heart  medicines.   The insertion site has increasing pain, swelling, redness, bleeding, or drainage.   Your arm or leg below where the insertion site changes color, is cool, or is numb.   You have chest pain or shortness of breath that does not go away with rest.   Your pulse feels irregular -- very slow (less than 60 beats/minute) or very fast (over 100 beats/minute).   You have dizziness, fainting, or you are very tired.   You are coughing up blood or yellow or green mucus.   You have chills or a fever over 101°F (38.3°C).    If there is bleeding at the catheter insertion site, apply pressure for 10 minutes.  If bleeding persists, call 911, and continue to hold pressure until advanced medical support arrives.        Exercising Safely After Percutaneous Coronary Intervention (PCI)  After percutaneous coronary intervention (PCI), which involves angioplasty and often stenting, it's important to focus on your heart health. Exercise can help strengthen your heart. It can also help you feel good and improve your overall health. Talk with your health care provider or cardiac rehab team member about good options for you.  Start slowly. Work up to more vigorous exercise as you get stronger. Aim for at least 150 minutes of exercise each week.  Include aerobic activities. These make the heart beat faster. They work the heart and lungs, and improve the body's ability to use oxygen. Good choices include walking, swimming, and biking .  Always follow your doctor's recommendation for exercise.   You have been referred to cardiac rehabilitation, which is important for your recovery.  You may contact [facility] at [phone] to learn more and schedule a visit.        Lifestyle Management After Percutaneous Coronary Intervention (PCI)  Percutaneous coronary intervention (PCI)  involves angioplasty and often stenting. This procedure can open arteries and relieve symptoms. But, it doesn't cure coronary artery  disease. New blockages can still form. You need to take steps to prevent this by managing risk factors. Doing so will help make your heart and arteries healthier. Your doctor may prescribe cardiac rehabilitation to help with this lifelong process.  Understanding risk factors  Some risk factors for coronary artery disease can be controlled. These include smoking, high blood pressure, cholesterol, diabetes, and obesity. They can be managed with medication, diet, and exercise. Support and counseling can also play a role. The effort will pay off! Managing risk factors can help you be more active, feel better, and reduce the risk of heart attack.    If you smoke, quit!  If your doctor has been urging you to quit smoking, it's for good reasons. Smoking damages your heart, blood vessels, and lungs. The good news is that quitting can halt or even reverse the damage of smoking. To quit now:  Get medical help. Ask your doctor for advice on stop-smoking programs. Also ask about medications or nicotine replacement therapy products that may help you quit smoking.  Get support. Join a support group. Ask for help from your family and friends.  Don't give up. It often takes several tries to succeed in quitting smoking.  Avoid secondhand smoke. Ask family and friends not to smoke around you.  What to Expect Post Sedation    Rest and take it easy for the first 24 hours.  A responsible adult is recommended to remain with you during that time.  It is normal to feel sleepy.  We encourage you to not do anything that requires balance, judgment or coordination.    FOR 24 HOURS DO NOT:  Drive, operate machinery or run household appliances.  Drink beer or alcoholic beverages.  Make important decisions or sign legal documents.    To avoid nausea, slowly advance diet as tolerated, avoiding spicy or greasy foods for the first day.  Add more substantial food to your diet according to your provider's instructions.  Babies can be fed formula or  breast milk as soon as they are hungry.  INCREASE FLUIDS AND FIBER TO AVOID CONSTIPATION.    MILD FLU-LIKE SYMPTOMS ARE NORMAL.  YOU MAY EXPERIENCE GENERALIZED MUSCLE ACHES, THROAT IRRITATION, HEADACHE AND/OR SOME NAUSEA.  If any questions arise, call your provider.  If your provider is not available, please feel free to call the [facility] at [phone].    MEDICATIONS: Resume taking daily medication.  Take prescribed pain medication with food.  If no medication is prescribed, you may take non-aspirin pain medication if needed.  PAIN MEDICATION CAN BE VERY CONSTIPATING.  Take a stool softener or laxative such as senokot, pericolace, or milk of magnesia if needed.    Diet    Resume your normal diet as tolerated.  A diet low in cholesterol, fat, and sodium is recommended for good health.     DRESSING: Keep dressing and incision dry and intact for 24 hours, may remove dressing and shower after 0840 on 12/20/24, do not need to replace.  Do not submerge site in water for 7 days.     BOWEL FUNCTION:  If you are having problems, use what you normally would or call your provider for suggestions. It also helps to stay regular by including fiber in your diet (for example: bran or fruits and vegetables) and drink plenty of liquids (water, juice, etc.).

## 2024-12-19 NOTE — PROCEDURES
Cardiac Catheterization report    12/19/2024  8:34 AM      Indication/Preoperative diagnosis:  Fatigue  Recurrent atrial fibrillation, persistent  CAD status post prior PCI  Ischemic cardiomyopathy    Postoperative diagnosis:  Widely patent previously placed stent  Mild nonobstructive CAD otherwise  LVEDP 17 mmHg    Recommendations:  Guideline directed medical therapy   Cardiovascular Risk factor modification  Pursue further atrial fibrillation therapy under the care of his electrophysiologist Dr. Grimm      Procedures performed:  Coronary arteriograms  Left heart catheterization   Supervision moderate sedation      FINDINGS:  I.  HEMODYNAMICS   Ao: 101/57 mmHg   LVEDP: 20 mmHg   Gradient on LV pullback: No    II. CORONARY ANGIOGRAPHY:  Left main coronary artery: Large bifurcating no CAD  Left anterior descending artery: Large-caliber widely patent previously placed stent no obstructive CAD  Left circumflex coronary artery: Large-caliber mild nonobstructive CAD  Right coronary artery: Large-caliber dominant mild nonobstructive CAD      Procedure details:  The risks and benefits of cardiac catheterization and possible intervention were explained to the patient including death, heart attack, stroke, and emergency surgery.  The patient verbalized understanding and wished to proceed.  The patient was brought to the cardiac catheterization laboratory in the fasting state and prepped and draped in the usual sterile fashion.  The right wrist was locally anesthetized with lidocaine and the right radial artery was cannulated with 5/6-Mongolian equipment and standard radial cocktail was given.  Coronary angiography was performed using standard  diagnostic catheters in the usual fashion and used to cross the aortic valve to perform  left heart catheterization. All catheters and guidewires were removed.  A TR-Band was placed without difficulty to achieve patent hemostasis.  Patient tolerated procedure well left the Cath Lab in  stable condition.    Complications:  None apparent    Sedation time:  Moderate sedation directly monitored by me during the case while supervising the administration of the sedation medication by an independent trained RN to assist in the monitoring of the patient's level of consciousness and physiological status. I, the supervising physician was present the entire time from beginning of medication administration until the end of the procedure from 8:18 AM until 8:31 AM. For detailed administration records please see the moderate sedation documentation in the media tab.    Following the procedure I discussed the results with the patient and the patients designated contact/family member.    Davon Walters MD, FACC, [facility]

## 2024-12-19 NOTE — PROGRESS NOTES
0840 - patient arrived to pacu.  2 identifiers verified, attached to monitors, alarms/parameters verified, and received report.  Right radial site assessed with cath lab RN.  Site is clean dry and soft with TR band in place.  Oriented to unit and plan of care discussed.    0854 - LM for pts wife, instructed she can come back, pt is in recovery  0857 - Wife at bedside  0940 - 2cc of air removed from TR band,site looks great.   1030 - discharge instructions discussed with pt and pts wife, all questions and concerns addressed, plan of care discussed.   1046- TR band removed, pt tolerated well. No s/sx of bleeding. 2x2 gauze and tegaderm in place.

## 2024-12-24 ENCOUNTER — HOME STUDY (OUTPATIENT)
Dept: SLEEP MEDICINE | Facility: MEDICAL CENTER | Age: 69
End: 2024-12-24
Attending: NURSE PRACTITIONER
Payer: MEDICARE

## 2024-12-24 DIAGNOSIS — G47.33 OBSTRUCTIVE SLEEP APNEA: Chronic | ICD-10-CM

## 2024-12-24 PROCEDURE — 94762 N-INVAS EAR/PLS OXIMTRY CONT: CPT | Performed by: INTERNAL MEDICINE

## 2024-12-26 ENCOUNTER — TELEPHONE (OUTPATIENT)
Dept: SLEEP MEDICINE | Facility: MEDICAL CENTER | Age: 69
End: 2024-12-26
Payer: MEDICARE

## 2024-12-26 DIAGNOSIS — J45.20 MILD INTERMITTENT ASTHMA WITHOUT COMPLICATION: Chronic | ICD-10-CM

## 2024-12-26 NOTE — TELEPHONE ENCOUNTER
This pt has been in contact with the pharm, they told him the ins won't cover the generic from the last RX. Please send a new RX with a covered generic med.    Please reach out to pt when completed.

## 2024-12-27 NOTE — PROCEDURES
Overnight oximetry  Study done CPAP of 8 cmH2O  Analyzed time 8 hours 18 minutes  Basal saturation 91%  Time less than 88% 1.9 minutes  O2 ninfa 85%  Average pulse rate 78.5 bpm    Impression  Adequate oxygenation overnight on CPAP of 8 cmH2O

## 2024-12-31 LAB — EKG IMPRESSION: NORMAL

## 2025-01-03 RX ORDER — BUDESONIDE AND FORMOTEROL FUMARATE DIHYDRATE 80; 4.5 UG/1; UG/1
2 AEROSOL RESPIRATORY (INHALATION) 2 TIMES DAILY
Qty: 30.6 G | Refills: 3 | Status: SHIPPED | OUTPATIENT
Start: 2025-01-03

## 2025-01-06 NOTE — TELEPHONE ENCOUNTER
Called pt to inform him that Kaitlin sent over a new prescription to his pharmacy. I advise the pt to give us a call back if there are any issues or concerns.

## 2025-01-14 PROCEDURE — RXMED WILLOW AMBULATORY MEDICATION CHARGE: Performed by: INTERNAL MEDICINE

## 2025-01-15 ENCOUNTER — PHARMACY VISIT (OUTPATIENT)
Dept: PHARMACY | Facility: MEDICAL CENTER | Age: 70
End: 2025-01-15
Payer: COMMERCIAL

## 2025-01-27 DIAGNOSIS — I48.19 PERSISTENT ATRIAL FIBRILLATION (HCC): ICD-10-CM

## 2025-01-27 DIAGNOSIS — I25.83 CORONARY ARTERY DISEASE DUE TO LIPID RICH PLAQUE: ICD-10-CM

## 2025-01-27 DIAGNOSIS — I25.10 CORONARY ARTERY DISEASE DUE TO LIPID RICH PLAQUE: ICD-10-CM

## 2025-02-07 PROCEDURE — RXMED WILLOW AMBULATORY MEDICATION CHARGE: Performed by: INTERNAL MEDICINE

## 2025-02-10 ENCOUNTER — PHARMACY VISIT (OUTPATIENT)
Dept: PHARMACY | Facility: MEDICAL CENTER | Age: 70
End: 2025-02-10
Payer: COMMERCIAL

## 2025-02-21 ENCOUNTER — TELEPHONE (OUTPATIENT)
Dept: SLEEP MEDICINE | Facility: MEDICAL CENTER | Age: 70
End: 2025-02-21

## 2025-02-21 ENCOUNTER — OFFICE VISIT (OUTPATIENT)
Dept: CARDIOLOGY | Facility: MEDICAL CENTER | Age: 70
End: 2025-02-21
Attending: INTERNAL MEDICINE
Payer: MEDICARE

## 2025-02-21 VITALS
SYSTOLIC BLOOD PRESSURE: 88 MMHG | OXYGEN SATURATION: 97 % | DIASTOLIC BLOOD PRESSURE: 56 MMHG | HEART RATE: 84 BPM | BODY MASS INDEX: 33.62 KG/M2 | WEIGHT: 227 LBS | RESPIRATION RATE: 16 BRPM | HEIGHT: 69 IN

## 2025-02-21 DIAGNOSIS — I25.5 ISCHEMIC CARDIOMYOPATHY: ICD-10-CM

## 2025-02-21 DIAGNOSIS — I50.22 CHRONIC SYSTOLIC HEART FAILURE (HCC): ICD-10-CM

## 2025-02-21 DIAGNOSIS — J45.20 MILD INTERMITTENT ASTHMA WITHOUT COMPLICATION: Chronic | ICD-10-CM

## 2025-02-21 DIAGNOSIS — Z98.890 H/O CARDIAC RADIOFREQUENCY ABLATION: ICD-10-CM

## 2025-02-21 DIAGNOSIS — I48.19 PERSISTENT ATRIAL FIBRILLATION (HCC): ICD-10-CM

## 2025-02-21 DIAGNOSIS — I10 ESSENTIAL HYPERTENSION: ICD-10-CM

## 2025-02-21 DIAGNOSIS — Z79.01 ON CONTINUOUS ORAL ANTICOAGULATION: ICD-10-CM

## 2025-02-21 PROCEDURE — 3074F SYST BP LT 130 MM HG: CPT | Performed by: INTERNAL MEDICINE

## 2025-02-21 PROCEDURE — 99214 OFFICE O/P EST MOD 30 MIN: CPT | Performed by: INTERNAL MEDICINE

## 2025-02-21 PROCEDURE — G2211 COMPLEX E/M VISIT ADD ON: HCPCS | Performed by: INTERNAL MEDICINE

## 2025-02-21 PROCEDURE — 99212 OFFICE O/P EST SF 10 MIN: CPT | Performed by: INTERNAL MEDICINE

## 2025-02-21 PROCEDURE — 3078F DIAST BP <80 MM HG: CPT | Performed by: INTERNAL MEDICINE

## 2025-02-21 RX ORDER — FLUTICASONE FUROATE AND VILANTEROL 100; 25 UG/1; UG/1
1 POWDER RESPIRATORY (INHALATION) DAILY
Qty: 1 EACH | Refills: 5 | Status: SHIPPED | OUTPATIENT
Start: 2025-02-21

## 2025-02-21 RX ORDER — CARVEDILOL 6.25 MG/1
6.25 TABLET ORAL 2 TIMES DAILY WITH MEALS
Qty: 180 TABLET | Refills: 3 | Status: SHIPPED | OUTPATIENT
Start: 2025-02-21

## 2025-02-21 ASSESSMENT — FIBROSIS 4 INDEX: FIB4 SCORE: 1.97

## 2025-02-21 NOTE — PROGRESS NOTES
Chief Complaint   Patient presents with    Coronary Artery Disease     Follow up       Subjective     Martín Grijalva is a 69 y.o. male who presents today for follow-up of coronary artery disease ischemic cardiomyopathy and persistent atrial fibrillation.  He has a history of anterior MI status post primary PCI 2008 with subsequent ischemic cardiomyopathy with mostly recovered EF.  Unfortunately he developed atrial fibrillation recently which was coincident with the development of a recurrence of his reduced ejection fraction.  He was maintained on Xarelto and has been cardioverted and underwent atrial fibrillation ablation by Dr. Rosas 6/2021.  Subsequently reverted back to persistent A-fib and echocardiogram revealed decrement in EF and persistent decrement on repeat check.  Tolerating oral anticoagulation.  Feels poorly while in atrial fibrillation.      In the interim has seen Dr. Grimm to discuss repeat ablation.  They are planning to pursue antiarrhythmic procedure hopefully at [facility].  Tolerating his medications well including his oral anticoagulation.    Past Medical History:   Diagnosis Date    Acute on chronic clinical systolic heart failure (HCC) 5/19/2022    Arrhythmia 2020    A fib    Arthritis     fingers    CAD (coronary artery disease) 7/2/2012    Chronic anticoagulation 7/2/2012    Congestive heart failure (HCC)     Dyslipidemia 7/2/2012    Essential hypertension, benign 7/2/2012    High cholesterol     Insomnia     wakes up 2-5 times a night , wakes up to use restroom and sometimes just wakes up     Ischemic cardiomyopathy 7/2/2012    Left ventricular thrombosis 7/2/2012    Myocardial infarct (HCC) 2006    Pericarditis 7/2/2012    Pneumonia     history of as a child    Sleep apnea     told by MD that he has it, scheduled for sleep study     Snoring     no sleep study done    SOB (shortness of breath)     no c/o today; on exertion; pt uses 2L o2 qhs only; pt does not know provider     Stented coronary artery 7/2/2012     Past Surgical History:   Procedure Laterality Date    SHOULDER DECOMPRESSION ARTHROSCOPIC Right 11/27/2018    Procedure: SHOULDER DECOMPRESSION ARTHROSCOPIC- SUBACROMIAL CONVERTED TO OPEN;  Surgeon: Ni Weir M.D.;  Location: SURGERY [facility];  Service: Orthopedics    SHOULDER ARTHROSCOPY W/ ROTATOR CUFF REPAIR Right 11/27/2018    Procedure: OPEN ROTATOR CUFF REPAIR;  Surgeon: Ni Weir M.D.;  Location: SURGERY [facility];  Service: Orthopedics    STENT PLACEMENT  2007    cardiac stent    CARDIAC CATH, LEFT HEART      CARPAL TUNNEL RELEASE Bilateral     OTHER      cardioversions x 2    VASECTOMY       Family History   Problem Relation Age of Onset    No Known Problems Mother     Other Father         abestos    Heart Disease Maternal Grandmother     No Known Problems Maternal Grandfather     No Known Problems Paternal Grandmother     Heart Disease Paternal Grandfather     No Known Problems Daughter     No Known Problems Son      Social History     Socioeconomic History    Marital status:      Spouse name: Not on file    Number of children: Not on file    Years of education: Not on file    Highest education level: Not on file   Occupational History    Not on file   Tobacco Use    Smoking status: Never    Smokeless tobacco: Former     Types: Chew    Tobacco comments:     Quit chewing tobacco about 30 years ago.   Vaping Use    Vaping status: Never Used   Substance and Sexual Activity    Alcohol use: Yes     Comment: occ    Drug use: Never    Sexual activity: Not on file   Other Topics Concern    Not on file   Social History Narrative    Not on file     Social Drivers of Health     Financial Resource Strain: Not on file   Food Insecurity: Not on file   Transportation Needs: Not on file   Physical Activity: Not on file   Stress: Not on file   Social Connections: Not on file   Intimate Partner Violence: Not on file   Housing Stability: Not on file  "    Allergies   Allergen Reactions    Ace Inhibitors Cough    Other Environmental      Hayfever     Outpatient Encounter Medications as of 2/21/2025   Medication Sig Dispense Refill    carvedilol (COREG) 6.25 MG Tab Take 1 Tablet by mouth 2 times a day with meals. 180 Tablet 3    rivaroxaban (XARELTO) 20 MG Tab tablet Take 1 Tablet by mouth with dinner. 90 Tablet 3    budesonide-formoterol (SYMBICORT) 80-4.5 MCG/ACT Aerosol Inhale 2 Puffs 2 times a day. 30.6 g 3    sacubitril-valsartan (ENTRESTO) 24-26 MG Tab Take 1 tablet by mouth 2 times a day. 180 Tablet 3    spironolactone (ALDACTONE) 25 MG Tab TAKE 1 TABLET BY MOUTH EVERY DAY 90 Tablet 3    simvastatin (ZOCOR) 40 MG Tab TAKE 1 TABLET BY MOUTH EVERY DAY 90 Tablet 3    dapagliflozin propanediol (FARXIGA) 10 MG Tab Take 1 Tablet by mouth every day. 90 Tablet 1    torsemide (DEMADEX) 20 MG Tab Take 1 Tablet by mouth 1 time a day as needed (for weight gain, increased shortness of breath or swelling). 90 Tablet 3    [DISCONTINUED] levalbuterol (XOPENEX HFA) 45 MCG/ACT inhaler Inhale 1-2 Puffs every four hours as needed for Shortness of Breath. (Patient not taking: Reported on 2/21/2025) 15 g 11    [DISCONTINUED] carvedilol (COREG) 6.25 MG Tab TAKE 1 TABLET BY MOUTH TWICE DAILY WITH MEALS 180 Tablet 3     No facility-administered encounter medications on file as of 2/21/2025.     Review of Systems   All other systems reviewed and are negative.             Objective     BP (!) 88/56 (BP Location: Left arm, Patient Position: Sitting)   Pulse 84   Resp 16   Ht 1.753 m (5' 9\")   Wt 103 kg (227 lb)   SpO2 97%   BMI 33.52 kg/m²     Physical Exam  Vitals reviewed.   Constitutional:       General: He is not in acute distress.     Appearance: He is well-developed. He is not diaphoretic.   HENT:      Head: Normocephalic and atraumatic.      Right Ear: External ear normal.      Left Ear: External ear normal.   Eyes:      General: No scleral icterus.        Right eye: No " discharge.         Left eye: No discharge.      Conjunctiva/sclera: Conjunctivae normal.      Pupils: Pupils are equal, round, and reactive to light.   Neck:      Thyroid: No thyromegaly.      Vascular: No JVD.      Trachea: No tracheal deviation.   Cardiovascular:      Rate and Rhythm: Normal rate and regular rhythm.      Chest Wall: PMI is not displaced.      Pulses:           Carotid pulses are 2+ on the right side and 2+ on the left side.       Radial pulses are 2+ on the left side.        Popliteal pulses are 2+ on the right side and 2+ on the left side.        Dorsalis pedis pulses are 2+ on the right side and 2+ on the left side.        Posterior tibial pulses are 2+ on the right side and 2+ on the left side.      Heart sounds: No murmur heard.     No friction rub. No gallop.   Pulmonary:      Effort: Pulmonary effort is normal. No respiratory distress.      Breath sounds: Normal breath sounds. No wheezing or rales.   Chest:      Chest wall: No tenderness.   Abdominal:      General: Bowel sounds are normal. There is no distension.      Palpations: Abdomen is soft.      Tenderness: There is no abdominal tenderness.   Musculoskeletal:         General: No tenderness or deformity. Normal range of motion.      Cervical back: Normal range of motion and neck supple.   Skin:     General: Skin is warm and dry.      Coloration: Skin is not pale.      Findings: No erythema or rash.   Neurological:      Mental Status: He is alert and oriented to person, place, and time.      Cranial Nerves: No cranial nerve deficit (cranial nerves II through XII grossly intact).      Coordination: Coordination normal.   Psychiatric:         Behavior: Behavior normal.         Thought Content: Thought content normal.       LABS:  Lab Results   Component Value Date/Time    CHOLSTRLTOT 127 05/17/2023 06:13 AM    LDL 67 05/17/2023 06:13 AM    HDL 42 05/17/2023 06:13 AM    TRIGLYCERIDE 90 05/17/2023 06:13 AM       Lab Results   Component Value  "Date/Time    WBC 6.6 12/16/2024 08:11 AM    RBC 4.64 (L) 12/16/2024 08:11 AM    HEMOGLOBIN 15.5 12/16/2024 08:11 AM    HEMATOCRIT 45.4 12/16/2024 08:11 AM    MCV 97.8 12/16/2024 08:11 AM    NEUTSPOLYS 62.20 05/17/2023 06:13 AM    LYMPHOCYTES 23.80 05/17/2023 06:13 AM    MONOCYTES 11.20 05/17/2023 06:13 AM    EOSINOPHILS 1.80 05/17/2023 06:13 AM    BASOPHILS 0.70 05/17/2023 06:13 AM     Lab Results   Component Value Date/Time    SODIUM 138 12/16/2024 08:11 AM    POTASSIUM 4.7 12/16/2024 08:11 AM    CHLORIDE 104 12/16/2024 08:11 AM    CO2 23 12/16/2024 08:11 AM    GLUCOSE 105 (H) 12/16/2024 08:11 AM    BUN 19 12/16/2024 08:11 AM    CREATININE 1.11 12/16/2024 08:11 AM    CREATININE 0.77 07/09/2012 07:21 AM    BUNCREATRAT 22 02/18/2021 09:32 AM    BUNCREATRAT 14 07/09/2012 07:21 AM         Lab Results   Component Value Date/Time    ALKPHOSPHAT 74 12/16/2024 08:11 AM    ASTSGOT 30 12/16/2024 08:11 AM    ALTSGPT 29 12/16/2024 08:11 AM    TBILIRUBIN 2.1 (H) 12/16/2024 08:11 AM      No results found for: \"BNPBTYPENAT\"   No results found for: \"TSH\"  Lab Results   Component Value Date/Time    PROTHROMBTM 19.2 (H) 12/16/2024 08:11 AM    INR 1.61 (H) 12/16/2024 08:11 AM      Imaging reviewed         Assessment & Plan     1. Essential hypertension  carvedilol (COREG) 6.25 MG Tab      2. Persistent atrial fibrillation (HCC)  REFERRAL TO CARDIOLOGY      3. H/O cardiac radiofrequency ablation 6/25/21 & 8/1/22 Dr Rosas  REFERRAL TO CARDIOLOGY      4. Chronic systolic heart failure (HCC)        5. Ischemic cardiomyopathy        6. On continuous oral anticoagulation  Basic Metabolic Panel    REFERRAL TO CARDIOLOGY          Medical Decision Making: Today's Assessment/Status/Plan:            Coronary angiography last visit showed no changes and a nonischemic decrement in his EF likely related to his rhythm disturbances with an LVEF of 40% and persistent fatigue.  Continues to tolerate his medical therapy well.  Coreg refilled without " change today continue other neurohumoral therapy and as needed diuretics to maintain euvolemia.  Continue oral anticoagulation.  We discussed watchman as an alternative for longitudinal stroke prevention.  He would like to consider hybrid maze with [facility] and they are working on this and I will also submit a referral to assist in the process.  NYHA class I-II.  Follow-up routinely.    () Today's E/M visit is associated with medical care services that serve as the continuing focal point for all needed health care services and/or with medical care services that  are part of ongoing care related to a patient's single, serious condition, or a complex condition: This includes  furnishing services to patients on an ongoing basis that result in care that is personalized  to the patient. The services result in a comprehensive, longitudinal, and continuous  relationship with the patient and involve delivery of team-based care that is accessible, coordinated with other practitioners and providers, and integrated with the broader health  care landscape.

## 2025-02-21 NOTE — PROGRESS NOTES
Symbicort was not covered by insurance, Breo was. Sent Breo in lieu. Pending prior authorization completion.

## 2025-02-21 NOTE — TELEPHONE ENCOUNTER
Patient has been waiting for insurance to cover Symbicort but its been almost a month and nothing has been covered I spoke to pharmacy and insurance they will cover Breo. If you can please send in a new prescription and cancel out the Symbicort. Patient has been without medication for a while now and is now feeling the difference.    Thank you so much!

## 2025-02-22 ENCOUNTER — TELEPHONE (OUTPATIENT)
Dept: SLEEP MEDICINE | Facility: MEDICAL CENTER | Age: 70
End: 2025-02-22
Payer: MEDICARE

## 2025-02-22 NOTE — TELEPHONE ENCOUNTER
Received Renewal request via [employer]  for fluticasone furoate-vilanterol (BREO ELLIPTA) 100-25 MCG/ACT AEROSOL POWDER, BREATH ACTIVATED . (Quantity:1, Day Supply:30)     Insurance: RX [employer]/ [employer] [place]  Member ID:  [id number]  BIN: [id number]  PCN: [id number]  Group: [id number]     Ran Test claim via [place] & medication Pays for a $6.69 copay. Will outreach to patient to offer specialty pharmacy services and or release to preferred pharmacy    Holly Canela [facility]   Pharmacy Liaison  [phone]

## 2025-03-19 PROCEDURE — RXMED WILLOW AMBULATORY MEDICATION CHARGE: Performed by: INTERNAL MEDICINE

## 2025-03-20 DIAGNOSIS — I25.10 CORONARY ARTERY DISEASE DUE TO LIPID RICH PLAQUE: ICD-10-CM

## 2025-03-20 DIAGNOSIS — I25.83 CORONARY ARTERY DISEASE DUE TO LIPID RICH PLAQUE: ICD-10-CM

## 2025-03-21 ENCOUNTER — PHARMACY VISIT (OUTPATIENT)
Dept: PHARMACY | Facility: MEDICAL CENTER | Age: 70
End: 2025-03-21
Payer: COMMERCIAL

## 2025-03-25 DIAGNOSIS — J45.20 MILD INTERMITTENT ASTHMA WITHOUT COMPLICATION: Chronic | ICD-10-CM

## 2025-03-25 NOTE — TELEPHONE ENCOUNTER
Patient has been having problems with the local pharmacy covering his Symbicort. He found that he can get his Symbicort through [facility] for next to nothing. Patient will call us back and provide us with the FAX # so that the RX can be faxed to the new pharmacy.    3-26-25 Pt stopped in to provide us with this information for [facility]:   FAX # [phone]  & PH # [phone].

## 2025-03-31 RX ORDER — BUDESONIDE AND FORMOTEROL FUMARATE DIHYDRATE 80; 4.5 UG/1; UG/1
2 AEROSOL RESPIRATORY (INHALATION) 2 TIMES DAILY
Qty: 30.6 G | Refills: 3 | Status: SHIPPED
Start: 2025-03-31

## 2025-03-31 NOTE — TELEPHONE ENCOUNTER
Faxed rx to [place] pharmacy with fax number pt has provided.     Called and spoke with pt. Informed pt his rx was faxed.

## 2025-04-14 ENCOUNTER — HOSPITAL ENCOUNTER (OUTPATIENT)
Dept: LAB | Facility: MEDICAL CENTER | Age: 70
End: 2025-04-14
Attending: INTERNAL MEDICINE
Payer: MEDICARE

## 2025-04-14 PROCEDURE — 36415 COLL VENOUS BLD VENIPUNCTURE: CPT

## 2025-04-14 PROCEDURE — 80053 COMPREHEN METABOLIC PANEL: CPT

## 2025-04-15 LAB
ALBUMIN SERPL BCP-MCNC: 4.1 G/DL (ref 3.2–4.9)
ALBUMIN/GLOB SERPL: 1.8 G/DL
ALP SERPL-CCNC: 70 U/L (ref 30–99)
ALT SERPL-CCNC: 26 U/L (ref 2–50)
ANION GAP SERPL CALC-SCNC: 11 MMOL/L (ref 7–16)
AST SERPL-CCNC: 26 U/L (ref 12–45)
BILIRUB SERPL-MCNC: 1.6 MG/DL (ref 0.1–1.5)
BUN SERPL-MCNC: 27 MG/DL (ref 8–22)
CALCIUM ALBUM COR SERPL-MCNC: 9 MG/DL (ref 8.5–10.5)
CALCIUM SERPL-MCNC: 9.1 MG/DL (ref 8.5–10.5)
CHLORIDE SERPL-SCNC: 102 MMOL/L (ref 96–112)
CO2 SERPL-SCNC: 24 MMOL/L (ref 20–33)
CREAT SERPL-MCNC: 0.96 MG/DL (ref 0.5–1.4)
GFR SERPLBLD CREATININE-BSD FMLA CKD-EPI: 85 ML/MIN/1.73 M 2
GLOBULIN SER CALC-MCNC: 2.3 G/DL (ref 1.9–3.5)
GLUCOSE SERPL-MCNC: 99 MG/DL (ref 65–99)
POTASSIUM SERPL-SCNC: 4.6 MMOL/L (ref 3.6–5.5)
PROT SERPL-MCNC: 6.4 G/DL (ref 6–8.2)
SODIUM SERPL-SCNC: 137 MMOL/L (ref 135–145)

## 2025-04-16 PROCEDURE — RXMED WILLOW AMBULATORY MEDICATION CHARGE: Performed by: INTERNAL MEDICINE

## 2025-04-21 ENCOUNTER — PHARMACY VISIT (OUTPATIENT)
Dept: PHARMACY | Facility: MEDICAL CENTER | Age: 70
End: 2025-04-21
Payer: COMMERCIAL

## 2025-05-01 DIAGNOSIS — E78.5 HYPERLIPIDEMIA, UNSPECIFIED HYPERLIPIDEMIA TYPE: ICD-10-CM

## 2025-05-01 RX ORDER — SIMVASTATIN 40 MG
40 TABLET ORAL
Qty: 90 TABLET | Refills: 2 | Status: SHIPPED | OUTPATIENT
Start: 2025-05-01

## 2025-05-01 NOTE — TELEPHONE ENCOUNTER
Is the patient due for a refill? Yes    Was the patient seen the last 15 months? Yes    Date of last office visit: 2/21/2025    Does the patient have an upcoming appointment?  Yes   If yes, When? 8/15/2025    Provider to refill:TW    Does the patient have FDC Plus and need 100-day supply? (This applies to ALL medications) Patient does not have SCP

## 2025-05-13 ENCOUNTER — NON-PROVIDER VISIT (OUTPATIENT)
Dept: SLEEP MEDICINE | Facility: MEDICAL CENTER | Age: 70
End: 2025-05-13
Attending: NURSE PRACTITIONER
Payer: MEDICARE

## 2025-05-13 VITALS
HEART RATE: 64 BPM | DIASTOLIC BLOOD PRESSURE: 74 MMHG | SYSTOLIC BLOOD PRESSURE: 122 MMHG | HEIGHT: 68 IN | BODY MASS INDEX: 34.4 KG/M2 | OXYGEN SATURATION: 95 % | WEIGHT: 227 LBS

## 2025-05-13 VITALS — BODY MASS INDEX: 34.4 KG/M2 | HEIGHT: 68 IN | WEIGHT: 227 LBS

## 2025-05-13 DIAGNOSIS — J45.20 MILD INTERMITTENT ASTHMA WITHOUT COMPLICATION: Chronic | ICD-10-CM

## 2025-05-13 DIAGNOSIS — G47.33 OBSTRUCTIVE SLEEP APNEA: Chronic | ICD-10-CM

## 2025-05-13 DIAGNOSIS — Z87.891 FORMER SMOKELESS TOBACCO USE: ICD-10-CM

## 2025-05-13 PROCEDURE — 3078F DIAST BP <80 MM HG: CPT | Performed by: NURSE PRACTITIONER

## 2025-05-13 PROCEDURE — 3074F SYST BP LT 130 MM HG: CPT | Performed by: NURSE PRACTITIONER

## 2025-05-13 PROCEDURE — 94060 EVALUATION OF WHEEZING: CPT | Performed by: NURSE PRACTITIONER

## 2025-05-13 PROCEDURE — 99213 OFFICE O/P EST LOW 20 MIN: CPT | Performed by: NURSE PRACTITIONER

## 2025-05-13 RX ORDER — ALBUTEROL SULFATE 90 UG/1
2 INHALANT RESPIRATORY (INHALATION) EVERY 6 HOURS PRN
Qty: 8.5 G | Refills: 11 | Status: SHIPPED | OUTPATIENT
Start: 2025-05-13

## 2025-05-13 ASSESSMENT — FIBROSIS 4 INDEX
FIB4 SCORE: 1.8
FIB4 SCORE: 1.8

## 2025-05-13 ASSESSMENT — PATIENT HEALTH QUESTIONNAIRE - PHQ9: CLINICAL INTERPRETATION OF PHQ2 SCORE: 0

## 2025-05-13 NOTE — PROGRESS NOTES
Chief Complaint   Patient presents with    Follow-Up     Dx/Last seen: Obstructive sleep apnea 12/04/24 Kaitlin Clark     Tests completed: OPO 12/24/24, Spirometry 5/13/25    O2 or/and CPAP: CPAP 8 cm       HPI:  Zenon Grijalva is a 69 y.o. year old male here today for follow-up on asthma and JOSE.  Last OV 12/4/24.     [place] 5/13/2025 indicates FVC 3.03 L or 77%, FEV1 2.06 L or 69% ratio 68.  Trend towards bronchodilator response.  Compared to 2023 testing FEV1 stable and FVC improved.  The patient.    MMRC stGstrstastdstest:st st1st Exacerbations this year: 0    Prescribed Symbicort 80 mcgs 2 puffs twice daily and Xopenex HFA as needed.     Using CPAP 8 cm; Respironics device obtained 2021.  He also has a travel ResMed device.  Compliance report 12/25 through 5/11/2025 indicates 93.3% compliance, average nightly use 8 hours 18 minutes, minimal mask leak with reduced AHI of 2.9/h.  Reviewed with patient.     Interval events  5/13/25: Patient just obtained Symbicort 2 to 3 weeks ago from [facility] due to cost.  He does feel his breathing is improved using it.  He notes having some cough and phlegm in the morning that generally clears.  He does have some chronic sinus congestion and is going to start doing a nasal rinse/Navage.  He is seen by cardiology with EF of 40% and recommended Maze procedure but declined at this time.  Actively in A-fib so he does note some symptom changes and not at his baseline.  12/4/2024: He notes breathing to be stable but off Symbicort since April or May of this year since he is unable to obtain a refill.  He has not required his max HFA either.  He remains active walking at least twice daily and also likes to hunt and fish.  He denies any exacerbations or colds.  No significant shortness of breath with activity, no chest pain, chest tightness or wheezing.  He does have some congestion in the morning that clears with cough.  He also notes some sinus drainage in the morning that generally  regla.  Pending heart cath by cardiology to further determine how to correct his atrial fibrillation.     PULM & SLEEP HX:  Echo 2021 indicated LVEF improvement from 25 to 45%.  RVSP 44 mmHg with a dilated inferior vena cava with inspiratory collapse.     PFT 2020 noted mild to moderate obstructive lung disease with FEV1 2.1 L or 67%, FEV1/FVC ratio 72, FVC 2.93 L or 71%, %, TLC 91% and DLCO 124% predicted.  Patient did have a mild bronchodilator response.  He did undergo a trial of ICS/LABA but found no subjective benefit.    Spirometry 2022 notes obstruction characteristic of asthma with FVC 3.29 L 60%, FEV1 2.41 L or 59%, FEV1/FVC ratio 73.  No significant bronchodilator response.  PFT 2023 notes FVC 2.99 L or 74%, FEV1 2.24 L 72%, FEV1/FVC ratio 75, mild bronchodilator response, %, TLC 6.31 L or 96% and DLCO 99% predicted. Reviewed with patient. Airflows are improved based on percentage.      Imaging with chest x-ray 2022 noted no acute cardiopulmonary process but again identified areas of calcific pleural plaquing.  Patient notes a history of being a  and his father also had findings of asbestosis of which she  from.  No prior CT imaging on chart.     He had screening overnight oximetry showing desaturations for 100 minutes ninfa of 75% and was started on nighttime oxygen.  PSG 2020 showed moderate JOSE with AHI:23/h and desaturations to 81% Sp02.  He was successfully titrated on CPAP: 7 cm of water with resolved JOSE.   CNOX 2022 using CPAP indicated 15.8 minutes less than 80% ox saturation, basal SPO2 of 91.7%, DAYNA 18. Bradycardia noted for 453.9 minutes of the night. Recommend increasing CPAP to 8 cm nightly.     ROS: As per HPI and otherwise negative if not stated.    Past Medical History:   Diagnosis Date    Acute on chronic clinical systolic heart failure (HCC) 2022    Arrhythmia     A fib    Arthritis     fingers    CAD (coronary  artery disease) 7/2/2012    Chronic anticoagulation 7/2/2012    Congestive heart failure (HCC)     Dyslipidemia 7/2/2012    Essential hypertension, benign 7/2/2012    High cholesterol     Insomnia     wakes up 2-5 times a night , wakes up to use restroom and sometimes just wakes up     Ischemic cardiomyopathy 7/2/2012    Left ventricular thrombosis 7/2/2012    Myocardial infarct (HCC) 2006    Pericarditis 7/2/2012    Pneumonia     history of as a child    Sleep apnea     told by MD that he has it, scheduled for sleep study     Snoring     no sleep study done    SOB (shortness of breath)     no c/o today; on exertion; pt uses 2L o2 qhs only; pt does not know provider    Stented coronary artery 7/2/2012       Past Surgical History:   Procedure Laterality Date    SHOULDER DECOMPRESSION ARTHROSCOPIC Right 11/27/2018    Procedure: SHOULDER DECOMPRESSION ARTHROSCOPIC- SUBACROMIAL CONVERTED TO OPEN;  Surgeon: Ni Weir M.D.;  Location: SURGERY [facility];  Service: Orthopedics    SHOULDER ARTHROSCOPY W/ ROTATOR CUFF REPAIR Right 11/27/2018    Procedure: OPEN ROTATOR CUFF REPAIR;  Surgeon: Ni Weir M.D.;  Location: SURGERY [facility];  Service: Orthopedics    STENT PLACEMENT  2007    cardiac stent    CARDIAC CATH, LEFT HEART      CARPAL TUNNEL RELEASE Bilateral     OTHER      cardioversions x 2    VASECTOMY         Family History   Problem Relation Age of Onset    No Known Problems Mother     Other Father         abestos    Heart Disease Maternal Grandmother     No Known Problems Maternal Grandfather     No Known Problems Paternal Grandmother     Heart Disease Paternal Grandfather     No Known Problems Daughter     No Known Problems Son        Social History     Socioeconomic History    Marital status:      Spouse name: Not on file    Number of children: Not on file    Years of education: Not on file    Highest education level: Not on file   Occupational History    Not on file   Tobacco  "Use    Smoking status: Never    Smokeless tobacco: Former     Types: Chew    Tobacco comments:     Quit chewing tobacco about 30 years ago.   Vaping Use    Vaping status: Never Used   Substance and Sexual Activity    Alcohol use: Yes     Comment: occ    Drug use: Never    Sexual activity: Not on file   Other Topics Concern    Not on file   Social History Narrative    Not on file     Social Drivers of Health     Financial Resource Strain: Not on file   Food Insecurity: Not on file   Transportation Needs: Not on file   Physical Activity: Not on file   Stress: Not on file   Social Connections: Not on file   Intimate Partner Violence: Not on file   Housing Stability: Not on file       Allergies as of 05/13/2025 - Reviewed 05/13/2025   Allergen Reaction Noted    Ace inhibitors Cough 05/27/2022    Other environmental  07/06/2021        Vitals:  /74   Pulse 64   Ht 1.715 m (5' 7.5\")   Wt 103 kg (227 lb)   SpO2 95%     Current medications as of today   Current Outpatient Medications   Medication Sig Dispense Refill    albuterol 108 (90 Base) MCG/ACT Aero Soln inhalation aerosol Inhale 2 Puffs every 6 hours as needed for Shortness of Breath. 8.5 g 11    simvastatin (ZOCOR) 40 MG Tab Take 1 Tablet by mouth every day. 90 Tablet 2    budesonide-formoterol (SYMBICORT) 80-4.5 MCG/ACT Aerosol Inhale 2 Puffs 2 times a day. 30.6 g 3    carvedilol (COREG) 6.25 MG Tab Take 1 Tablet by mouth 2 times a day with meals. 180 Tablet 3    rivaroxaban (XARELTO) 20 MG Tab tablet Take 1 Tablet by mouth with dinner. 90 Tablet 3    sacubitril-valsartan (ENTRESTO) 24-26 MG Tab Take 1 tablet by mouth 2 times a day. 180 Tablet 3    spironolactone (ALDACTONE) 25 MG Tab TAKE 1 TABLET BY MOUTH EVERY DAY 90 Tablet 3    dapagliflozin propanediol (FARXIGA) 10 MG Tab Take 1 Tablet by mouth every day. 90 Tablet 1    torsemide (DEMADEX) 20 MG Tab Take 1 Tablet by mouth 1 time a day as needed (for weight gain, increased shortness of breath or " swelling). 90 Tablet 3     No current facility-administered medications for this visit.         Physical Exam:   Gen:           Alert and oriented, No apparent distress. Mood and affect appropriate, normal interaction with examiner.  Eyes:          PERRL, EOM intact, sclere white, conjunctive moist.  Ears:          Not examined.   Hearing:     Grossly intact.  Nose:          Normal, no lesions or deformities.  Dentition:    Not examined.   Oropharynx:   Not examined.   Mallampati Classification: Not examined.   Neck:        Supple, trachea midline, no masses.  Respiratory Effort: No intercostal retractions or use of accessory muscles.   Lung Auscultation:      Clear to auscultation bilaterally; no rales, rhonchi or wheezing.  CV:            Regular rate and rhythm. No murmurs, rubs or gallops.  Abd:           Not examined.   Lymphadenopathy: Not examined.   Gait and Station: Normal.  Digits and Nails: No clubbing, cyanosis, petechiae, or nodes.   Cranial Nerves: II-XII grossly intact.  Skin:        No rashes, lesions or ulcers noted.               Ext:           No cyanosis or edema.      Assessment:  1. Mild intermittent asthma without complication  albuterol 108 (90 Base) MCG/ACT Aero Soln inhalation aerosol    Spirometry      2. Obstructive sleep apnea  DME Mask and Supplies      3. BMI 35.0-35.9,adult  HEIGHT AND WEIGHT      4. Former smokeless tobacco use                 Immunizations:    Flu:declines  Pneumovax 23:not due  Prevnar 13:not due  PCV 20: 2022  COVID-19: 2022    Plan:  Mild asthma is improved on bronchodilator therapy he will continue with maintenance inhalers and recommend repeat spirometry at next office visit for monitoring. Uses [facility] pharmacy   RX Albuterol HFA    [place]  JOSE is well-controlled.  He will continue to benefit from nightly usage.  He does travel with his air mini device.   DME mask/supplies  Encourage weight loss healthy diet and activity.  Follow-up in 2 months with spirometry  and compliance report, sooner if needed.    Please note that this dictation was created using voice recognition software. I have made every reasonable attempt to correct obvious errors, but it is possible there are errors of grammar and possibly content that I did not discover before finalizing the note.

## 2025-05-13 NOTE — PROCEDURES
Technician: Lillie Chavez CPFT  Tech notes:  Good pt effort and cooperation. ATS standards met.  Albuterol HFA 2 puffs via spacer administered.    Interpretation:  Baseline spirometry demonstrates a moderate obstruction.  There is a negative bronchodilator response.  However, the obstruction improves to mild.  Flow-volume loops are consistent with spirometric data.  There is a 39% improvement in mid flows with bronchodilator.    Summary:  Findings are consistent with suboptimally controlled asthma.  Correlate clinically.    I, Jorge Luis Barragan DO, am the author of this note.     Jorge Luis Barragan DO, [facility]  Staff Pulmonologist and Intensivist  [facility]     Please note that this dictation was created using voice recognition software. The accuracy of the dictation is limited to the abilities of the software. I have made every reasonable attempt to correct obvious errors, but I expect that there are errors of grammar and possibly content that I did not discover before finalizing the note.

## 2025-05-19 PROCEDURE — RXMED WILLOW AMBULATORY MEDICATION CHARGE: Performed by: INTERNAL MEDICINE

## 2025-05-20 ENCOUNTER — PHARMACY VISIT (OUTPATIENT)
Dept: PHARMACY | Facility: MEDICAL CENTER | Age: 70
End: 2025-05-20
Payer: COMMERCIAL

## 2025-05-20 PROCEDURE — RXMED WILLOW AMBULATORY MEDICATION CHARGE: Performed by: INTERNAL MEDICINE

## 2025-05-21 ENCOUNTER — PHARMACY VISIT (OUTPATIENT)
Dept: PHARMACY | Facility: MEDICAL CENTER | Age: 70
End: 2025-05-21
Payer: COMMERCIAL

## 2025-06-17 PROCEDURE — RXMED WILLOW AMBULATORY MEDICATION CHARGE: Performed by: INTERNAL MEDICINE

## 2025-06-19 ENCOUNTER — PHARMACY VISIT (OUTPATIENT)
Dept: PHARMACY | Facility: MEDICAL CENTER | Age: 70
End: 2025-06-19
Payer: COMMERCIAL

## 2025-06-27 ENCOUNTER — HOSPITAL ENCOUNTER (OUTPATIENT)
Dept: RADIOLOGY | Facility: MEDICAL CENTER | Age: 70
End: 2025-06-27
Attending: INTERNAL MEDICINE
Payer: MEDICARE

## 2025-07-09 ENCOUNTER — HOSPITAL ENCOUNTER (OUTPATIENT)
Dept: RADIOLOGY | Facility: MEDICAL CENTER | Age: 70
End: 2025-07-09
Attending: INTERNAL MEDICINE
Payer: MEDICARE

## 2025-07-09 DIAGNOSIS — I50.22 CHRONIC SYSTOLIC CONGESTIVE HEART FAILURE (HCC): ICD-10-CM

## 2025-07-09 PROCEDURE — 75561 CARDIAC MRI FOR MORPH W/DYE: CPT

## 2025-07-09 PROCEDURE — 700117 HCHG RX CONTRAST REV CODE 255: Performed by: INTERNAL MEDICINE

## 2025-07-09 PROCEDURE — A9578 INJ MULTIHANCE MULTIPACK: HCPCS | Performed by: INTERNAL MEDICINE

## 2025-07-09 RX ADMIN — GADOBENATE DIMEGLUMINE 20 ML: 529 INJECTION, SOLUTION INTRAVENOUS at 14:17

## 2025-07-28 ENCOUNTER — TELEPHONE (OUTPATIENT)
Dept: CARDIOLOGY | Facility: MEDICAL CENTER | Age: 70
End: 2025-07-28
Payer: MEDICARE

## 2025-07-28 NOTE — TELEPHONE ENCOUNTER
Medication: Entesto   Type of Insurance: Government funded (Medicare/Medicare Advantage)  Type of Financial assistance requested Foundation  Source: [employer]  Source Phone #: [phone]  Outcome: apptoved  Effective dates: 508/25 until 5/7/26  Details/Billing Information:   [id number]  PCN: [id number]  GRP: [id number]  ID: [id number]  Max Award Amount: $4,500  Final Copay: $0

## 2025-08-08 DIAGNOSIS — I50.9 ACUTE ON CHRONIC CONGESTIVE HEART FAILURE, UNSPECIFIED HEART FAILURE TYPE (HCC): ICD-10-CM

## 2025-08-11 DIAGNOSIS — I50.20 ACC/AHA STAGE C SYSTOLIC HEART FAILURE (HCC): ICD-10-CM

## 2025-08-11 PROCEDURE — RXMED WILLOW AMBULATORY MEDICATION CHARGE: Performed by: INTERNAL MEDICINE

## 2025-08-11 RX ORDER — DAPAGLIFLOZIN 10 MG/1
10 TABLET, FILM COATED ORAL DAILY
Qty: 90 TABLET | Refills: 1 | Status: CANCELLED | OUTPATIENT
Start: 2025-08-11

## 2025-08-12 RX ORDER — SPIRONOLACTONE 25 MG/1
25 TABLET ORAL DAILY
Qty: 90 TABLET | Refills: 1 | Status: SHIPPED | OUTPATIENT
Start: 2025-08-12

## 2025-08-15 ENCOUNTER — OFFICE VISIT (OUTPATIENT)
Facility: MEDICAL CENTER | Age: 70
End: 2025-08-15
Attending: INTERNAL MEDICINE
Payer: MEDICARE

## 2025-08-15 VITALS
BODY MASS INDEX: 33.8 KG/M2 | HEIGHT: 68 IN | SYSTOLIC BLOOD PRESSURE: 108 MMHG | WEIGHT: 223 LBS | RESPIRATION RATE: 16 BRPM | OXYGEN SATURATION: 97 % | HEART RATE: 76 BPM | DIASTOLIC BLOOD PRESSURE: 58 MMHG

## 2025-08-15 DIAGNOSIS — E78.5 DYSLIPIDEMIA: Chronic | ICD-10-CM

## 2025-08-15 DIAGNOSIS — I25.83 CORONARY ARTERY DISEASE DUE TO LIPID RICH PLAQUE: ICD-10-CM

## 2025-08-15 DIAGNOSIS — I25.5 ISCHEMIC CARDIOMYOPATHY: Primary | Chronic | ICD-10-CM

## 2025-08-15 DIAGNOSIS — I50.20 ACC/AHA STAGE C SYSTOLIC HEART FAILURE (HCC): ICD-10-CM

## 2025-08-15 DIAGNOSIS — Z79.01 ON CONTINUOUS ORAL ANTICOAGULATION: ICD-10-CM

## 2025-08-15 DIAGNOSIS — G47.33 OBSTRUCTIVE SLEEP APNEA: Chronic | ICD-10-CM

## 2025-08-15 DIAGNOSIS — I25.10 CORONARY ARTERY DISEASE DUE TO LIPID RICH PLAQUE: ICD-10-CM

## 2025-08-15 DIAGNOSIS — I48.19 PERSISTENT ATRIAL FIBRILLATION (HCC): ICD-10-CM

## 2025-08-15 DIAGNOSIS — Z98.890 H/O CARDIAC RADIOFREQUENCY ABLATION: ICD-10-CM

## 2025-08-15 PROCEDURE — 3074F SYST BP LT 130 MM HG: CPT | Performed by: INTERNAL MEDICINE

## 2025-08-15 PROCEDURE — 99213 OFFICE O/P EST LOW 20 MIN: CPT | Performed by: INTERNAL MEDICINE

## 2025-08-15 PROCEDURE — 99215 OFFICE O/P EST HI 40 MIN: CPT | Performed by: INTERNAL MEDICINE

## 2025-08-15 PROCEDURE — 3078F DIAST BP <80 MM HG: CPT | Performed by: INTERNAL MEDICINE

## 2025-08-15 PROCEDURE — RXMED WILLOW AMBULATORY MEDICATION CHARGE: Performed by: INTERNAL MEDICINE

## 2025-08-15 RX ORDER — DAPAGLIFLOZIN 10 MG/1
10 TABLET, FILM COATED ORAL DAILY
Qty: 90 TABLET | Refills: 3 | Status: SHIPPED | OUTPATIENT
Start: 2025-08-15

## 2025-08-15 ASSESSMENT — FIBROSIS 4 INDEX: FIB4 SCORE: 1.83

## 2025-08-18 ENCOUNTER — PHARMACY VISIT (OUTPATIENT)
Dept: PHARMACY | Facility: MEDICAL CENTER | Age: 70
End: 2025-08-18
Payer: COMMERCIAL

## 2025-08-18 DIAGNOSIS — I25.10 CORONARY ARTERY DISEASE DUE TO LIPID RICH PLAQUE: ICD-10-CM

## 2025-08-18 DIAGNOSIS — I25.83 CORONARY ARTERY DISEASE DUE TO LIPID RICH PLAQUE: ICD-10-CM

## 2025-08-19 ENCOUNTER — PHARMACY VISIT (OUTPATIENT)
Dept: PHARMACY | Facility: MEDICAL CENTER | Age: 70
End: 2025-08-19
Payer: COMMERCIAL

## (undated) DEVICE — DRAPE LARGE 3 QUARTER - (20/CA)

## (undated) DEVICE — Device

## (undated) DEVICE — PROBESUCTION  3.5MM 90IN SERFAS ACCELERATOR

## (undated) DEVICE — GLOVE BIOGEL SZ 7 SURGICAL PF LTX - (50PR/BX 4BX/CA)

## (undated) DEVICE — DRAPE U ORTHOPEDIC - (10/BX)

## (undated) DEVICE — DRAPETIBURON SHOULDER W/POUCH - (5EA/CA)

## (undated) DEVICE — SUTURE 3-0 PROLENE PS-1 (12PK/BX)

## (undated) DEVICE — KIT ROOM DECONTAMINATION

## (undated) DEVICE — SUTURE PASSER

## (undated) DEVICE — DRAPE MAYO STAND - (30/CA)

## (undated) DEVICE — SHAVER4.0 RESECTOR FORMULA - (5EA/BX)

## (undated) DEVICE — SET LEADWIRE 5 LEAD BEDSIDE DISPOSABLE ECG (1SET OF 5/EA)

## (undated) DEVICE — CANNULA TWIST IN 8.25MM X 7CM (5/BX)

## (undated) DEVICE — BOVIE BLADE COATED - (50/PK)

## (undated) DEVICE — STOCKINET TUBULAR 4IN STERILE - 4 X 36YDS (25/CA)

## (undated) DEVICE — IMMOBILIZER ORTHOPEDIC 7IN UNIVERSAL 10.5-17IN SHOULDER ABDUCTION PILLOW QUICK FIT (1EA)

## (undated) DEVICE — HEAD HOLDER JUNIOR/ADULT

## (undated) DEVICE — SPIDER SHOULDER HOLDER (12EA/BX)

## (undated) DEVICE — SUTURE 3-0 MONOCRYL PLUS PS-2 - (12/BX)

## (undated) DEVICE — TUBING PATIENT W/CONNECTOR REDEUCE (1EA)

## (undated) DEVICE — SHAVER 5.5 RESECTOR FORMULA (5EA/BX )

## (undated) DEVICE — DRAPE IOBAN II INCISE 23X17 - (10EA/BX 4BX/CA)

## (undated) DEVICE — CANNULA CRYSTAL 5.75MM X 7CM PART THD (5/BX)

## (undated) DEVICE — DRESSING 3X8 ADAPTIC GAUZE - NON-ADHERING (36/PK 6PK/BX)

## (undated) DEVICE — GLOVE BIOGEL INDICATOR SZ 7SURGICAL PF LTX - (50/BX 4BX/CA)

## (undated) DEVICE — SET EXTENSION WITH 2 PORTS (48EA/CA) ***PART #2C8610 IS A SUBSTITUTE*****

## (undated) DEVICE — MASK ANESTHESIA ADULT  - (100/CA)

## (undated) DEVICE — SLEEVE SHOULDER DISP(ARTHREX) - (6/BX)

## (undated) DEVICE — PAD PREP 24 X 48 CUFFED - (100/CA)

## (undated) DEVICE — CLOSURE SKIN STRIP 1/2 X 4 IN - (STERI STRIP) (50/BX 4BX/CA)

## (undated) DEVICE — DRAPE SURGICAL U 77X120 - (10/CA)

## (undated) DEVICE — BLOCK

## (undated) DEVICE — SUTURE 0 PDS CT-2 (36PK/BX)

## (undated) DEVICE — SODIUM CHL IRRIGATION 0.9% 1000ML (12EA/CA)

## (undated) DEVICE — TOWELS CLOTH SURGICAL - (4/PK 20PK/CA)

## (undated) DEVICE — GLOVE BIOGEL PI INDICATOR SZ 6.5 SURGICAL PF LF - (50/BX 4BX/CA)

## (undated) DEVICE — GOWN WARMING STANDARD FLEX - (30/CA)

## (undated) DEVICE — ELECTRODE 850 FOAM ADHESIVE - HYDROGEL RADIOTRNSPRNT (50/PK)

## (undated) DEVICE — SPONGE GAUZESTER 4 X 4 4PLY - (128PK/CA)

## (undated) DEVICE — GLOVE SZ 6 BIOGEL PI MICRO - PF LF (50PR/BX 4BX/CA)

## (undated) DEVICE — TUBING CLEARLINK DUO-VENT - C-FLO (48EA/CA)

## (undated) DEVICE — PROTECTOR ULNA NERVE - (36PR/CA)

## (undated) DEVICE — CHLORAPREP 26 ML APPLICATOR - ORANGE TINT(25/CA)

## (undated) DEVICE — TUBING PUMP WITH CONNECTOR REDEUCE (1EA)

## (undated) DEVICE — TUBE E-T HI-LO CUFF 7.5MM (10EA/PK)

## (undated) DEVICE — SYRINGE 10 ML CONTROL LL (25EA/BX 4BX/CA)

## (undated) DEVICE — NEPTUNE 4 PORT MANIFOLD - (20/PK)

## (undated) DEVICE — DETERGENT RENUZYME PLUS 10 OZ PACKET (50/BX)

## (undated) DEVICE — PACK ARTHROSCOPY - (2EA//CA)

## (undated) DEVICE — NEEDLE NON SAFETY HYPO 22 GA X 1 1/2 IN (100/BX)

## (undated) DEVICE — CANISTER SUCTION 3000ML MECHANICAL FILTER AUTO SHUTOFF MEDI-VAC NONSTERILE LF DISP  (40EA/CA)

## (undated) DEVICE — KIT ANESTHESIA W/CIRCUIT & 3/LT BAG W/FILTER (20EA/CA)

## (undated) DEVICE — DRESSING ABDOMINAL PAD STERILE 8 X 10" (360EA/CA)"

## (undated) DEVICE — CONNECTOR 5-IN-1 STERILE - (25EA/BX)

## (undated) DEVICE — SUTURE LASSO CORKSCREW RT

## (undated) DEVICE — SUTURE 2-0 VICRYL PLUS CT-1 36 (36PK/BX)"

## (undated) DEVICE — SODIUM CHL. IRRIGATION 0.9% 3000ML (4EA/CA 65CA/PF)

## (undated) DEVICE — DRAPE SHOULDER FLUID CONTROL - 77 X 85 (10/CA)

## (undated) DEVICE — SUCTION INSTRUMENT YANKAUER BULBOUS TIP W/O VENT (50EA/CA)

## (undated) DEVICE — DRAPE STRLE REG TOWEL 18X24 - (10/BX 4BX/CA)"

## (undated) DEVICE — LACTATED RINGERS INJ 1000 ML - (14EA/CA 60CA/PF)

## (undated) DEVICE — SENSOR SPO2 NEO LNCS ADHESIVE (20/BX) SEE USER NOTES

## (undated) DEVICE — SUTURE GENERAL

## (undated) DEVICE — PENCIL ELECTSURG 10FT BTN SWH - (50/CA)